# Patient Record
Sex: MALE | Race: WHITE | NOT HISPANIC OR LATINO
[De-identification: names, ages, dates, MRNs, and addresses within clinical notes are randomized per-mention and may not be internally consistent; named-entity substitution may affect disease eponyms.]

---

## 2017-11-22 PROBLEM — Z00.00 ENCOUNTER FOR PREVENTIVE HEALTH EXAMINATION: Status: ACTIVE | Noted: 2017-11-22

## 2017-11-27 ENCOUNTER — APPOINTMENT (OUTPATIENT)
Dept: ORTHOPEDIC SURGERY | Facility: CLINIC | Age: 62
End: 2017-11-27
Payer: COMMERCIAL

## 2017-11-27 VITALS — WEIGHT: 250 LBS | HEIGHT: 76 IN | BODY MASS INDEX: 30.44 KG/M2 | HEART RATE: 14 BPM

## 2017-11-27 DIAGNOSIS — S72.002A FRACTURE OF UNSPECIFIED PART OF NECK OF LEFT FEMUR, INITIAL ENCOUNTER FOR CLOSED FRACTURE: ICD-10-CM

## 2017-11-27 DIAGNOSIS — M79.652 PAIN IN LEFT THIGH: ICD-10-CM

## 2017-11-27 PROCEDURE — 99203 OFFICE O/P NEW LOW 30 MIN: CPT

## 2018-01-22 ENCOUNTER — INPATIENT (INPATIENT)
Facility: HOSPITAL | Age: 63
LOS: 3 days | Discharge: HOME | DRG: 872 | End: 2018-01-26
Attending: FAMILY MEDICINE | Admitting: INTERNAL MEDICINE
Payer: COMMERCIAL

## 2018-01-22 VITALS
SYSTOLIC BLOOD PRESSURE: 145 MMHG | DIASTOLIC BLOOD PRESSURE: 86 MMHG | RESPIRATION RATE: 16 BRPM | HEIGHT: 76 IN | TEMPERATURE: 100 F | HEART RATE: 100 BPM | OXYGEN SATURATION: 97 % | WEIGHT: 274.92 LBS

## 2018-01-22 DIAGNOSIS — R50.9 FEVER, UNSPECIFIED: ICD-10-CM

## 2018-01-22 LAB
ALBUMIN SERPL ELPH-MCNC: 3 G/DL — LOW (ref 3.3–5)
ALP SERPL-CCNC: 91 U/L — SIGNIFICANT CHANGE UP (ref 40–120)
ALT FLD-CCNC: 11 U/L — LOW (ref 12–78)
ANION GAP SERPL CALC-SCNC: 8 MMOL/L — SIGNIFICANT CHANGE UP (ref 5–17)
APPEARANCE UR: ABNORMAL
APTT BLD: 35.2 SEC — SIGNIFICANT CHANGE UP (ref 27.5–37.4)
AST SERPL-CCNC: 8 U/L — LOW (ref 15–37)
BASOPHILS # BLD AUTO: 0 K/UL — SIGNIFICANT CHANGE UP (ref 0–0.2)
BASOPHILS NFR BLD AUTO: 0.4 % — SIGNIFICANT CHANGE UP (ref 0–2)
BILIRUB SERPL-MCNC: 0.7 MG/DL — SIGNIFICANT CHANGE UP (ref 0.2–1.2)
BILIRUB UR-MCNC: ABNORMAL
BUN SERPL-MCNC: 15 MG/DL — SIGNIFICANT CHANGE UP (ref 7–23)
CALCIUM SERPL-MCNC: 8.7 MG/DL — SIGNIFICANT CHANGE UP (ref 8.5–10.1)
CHLORIDE SERPL-SCNC: 94 MMOL/L — LOW (ref 96–108)
CK MB CFR SERPL CALC: 0.7 NG/ML — SIGNIFICANT CHANGE UP (ref 0–3.6)
CO2 SERPL-SCNC: 31 MMOL/L — SIGNIFICANT CHANGE UP (ref 22–31)
COLOR SPEC: YELLOW — SIGNIFICANT CHANGE UP
CREAT SERPL-MCNC: 0.72 MG/DL — SIGNIFICANT CHANGE UP (ref 0.5–1.3)
DIFF PNL FLD: ABNORMAL
EOSINOPHIL # BLD AUTO: 0 K/UL — SIGNIFICANT CHANGE UP (ref 0–0.5)
EOSINOPHIL NFR BLD AUTO: 0.1 % — SIGNIFICANT CHANGE UP (ref 0–6)
GLUCOSE SERPL-MCNC: 110 MG/DL — HIGH (ref 70–99)
GLUCOSE UR QL: NEGATIVE — SIGNIFICANT CHANGE UP
HCT VFR BLD CALC: 37.5 % — LOW (ref 39–50)
HGB BLD-MCNC: 12.2 G/DL — LOW (ref 13–17)
INR BLD: 1.31 RATIO — HIGH (ref 0.88–1.16)
KETONES UR-MCNC: ABNORMAL
LACTATE SERPL-SCNC: 1.7 MMOL/L — SIGNIFICANT CHANGE UP (ref 0.7–2)
LEUKOCYTE ESTERASE UR-ACNC: ABNORMAL
LIDOCAIN IGE QN: 44 U/L — LOW (ref 73–393)
LYMPHOCYTES # BLD AUTO: 1.1 K/UL — SIGNIFICANT CHANGE UP (ref 1–3.3)
LYMPHOCYTES # BLD AUTO: 8.9 % — LOW (ref 13–44)
MAGNESIUM SERPL-MCNC: 1.8 MG/DL — SIGNIFICANT CHANGE UP (ref 1.6–2.6)
MCHC RBC-ENTMCNC: 28.5 PG — SIGNIFICANT CHANGE UP (ref 27–34)
MCHC RBC-ENTMCNC: 32.5 GM/DL — SIGNIFICANT CHANGE UP (ref 32–36)
MCV RBC AUTO: 87.6 FL — SIGNIFICANT CHANGE UP (ref 80–100)
MONOCYTES # BLD AUTO: 0.8 K/UL — SIGNIFICANT CHANGE UP (ref 0–0.9)
MONOCYTES NFR BLD AUTO: 6.5 % — SIGNIFICANT CHANGE UP (ref 1–9)
NEUTROPHILS # BLD AUTO: 10.2 K/UL — HIGH (ref 1.8–7.4)
NEUTROPHILS NFR BLD AUTO: 84.2 % — HIGH (ref 43–77)
NITRITE UR-MCNC: NEGATIVE — SIGNIFICANT CHANGE UP
PH UR: 5 — SIGNIFICANT CHANGE UP (ref 5–8)
PLATELET # BLD AUTO: 210 K/UL — SIGNIFICANT CHANGE UP (ref 150–400)
POTASSIUM SERPL-MCNC: 3.8 MMOL/L — SIGNIFICANT CHANGE UP (ref 3.5–5.3)
POTASSIUM SERPL-SCNC: 3.8 MMOL/L — SIGNIFICANT CHANGE UP (ref 3.5–5.3)
PROT SERPL-MCNC: 7.1 G/DL — SIGNIFICANT CHANGE UP (ref 6–8.3)
PROT UR-MCNC: 75 MG/DL
PROTHROM AB SERPL-ACNC: 14.4 SEC — HIGH (ref 9.8–12.7)
RAPID RVP RESULT: SIGNIFICANT CHANGE UP
RBC # BLD: 4.28 M/UL — SIGNIFICANT CHANGE UP (ref 4.2–5.8)
RBC # FLD: 13.6 % — SIGNIFICANT CHANGE UP (ref 10.3–14.5)
SODIUM SERPL-SCNC: 133 MMOL/L — LOW (ref 135–145)
SP GR SPEC: 1.02 — SIGNIFICANT CHANGE UP (ref 1.01–1.02)
TROPONIN I SERPL-MCNC: <.015 NG/ML — SIGNIFICANT CHANGE UP (ref 0.01–0.04)
UROBILINOGEN FLD QL: 1
WBC # BLD: 12.1 K/UL — HIGH (ref 3.8–10.5)
WBC # FLD AUTO: 12.1 K/UL — HIGH (ref 3.8–10.5)

## 2018-01-22 PROCEDURE — 93010 ELECTROCARDIOGRAM REPORT: CPT

## 2018-01-22 PROCEDURE — 73502 X-RAY EXAM HIP UNI 2-3 VIEWS: CPT | Mod: 26,LT

## 2018-01-22 PROCEDURE — 99285 EMERGENCY DEPT VISIT HI MDM: CPT

## 2018-01-22 PROCEDURE — 71045 X-RAY EXAM CHEST 1 VIEW: CPT | Mod: 26

## 2018-01-22 PROCEDURE — 73552 X-RAY EXAM OF FEMUR 2/>: CPT | Mod: 26,LT

## 2018-01-22 RX ORDER — DEXTROSE MONOHYDRATE, SODIUM CHLORIDE, AND POTASSIUM CHLORIDE 50; .745; 4.5 G/1000ML; G/1000ML; G/1000ML
1000 INJECTION, SOLUTION INTRAVENOUS
Qty: 0 | Refills: 0 | Status: DISCONTINUED | OUTPATIENT
Start: 2018-01-22 | End: 2018-01-26

## 2018-01-22 RX ORDER — CEFTRIAXONE 500 MG/1
1 INJECTION, POWDER, FOR SOLUTION INTRAMUSCULAR; INTRAVENOUS ONCE
Qty: 0 | Refills: 0 | Status: COMPLETED | OUTPATIENT
Start: 2018-01-22 | End: 2018-01-22

## 2018-01-22 RX ORDER — CEFTRIAXONE 500 MG/1
1 INJECTION, POWDER, FOR SOLUTION INTRAMUSCULAR; INTRAVENOUS EVERY 24 HOURS
Qty: 0 | Refills: 0 | Status: DISCONTINUED | OUTPATIENT
Start: 2018-01-22 | End: 2018-01-25

## 2018-01-22 RX ORDER — ACETAMINOPHEN 500 MG
650 TABLET ORAL ONCE
Qty: 0 | Refills: 0 | Status: COMPLETED | OUTPATIENT
Start: 2018-01-22 | End: 2018-01-22

## 2018-01-22 RX ORDER — ENOXAPARIN SODIUM 100 MG/ML
40 INJECTION SUBCUTANEOUS DAILY
Qty: 0 | Refills: 0 | Status: DISCONTINUED | OUTPATIENT
Start: 2018-01-22 | End: 2018-01-23

## 2018-01-22 RX ORDER — SODIUM CHLORIDE 9 MG/ML
1000 INJECTION INTRAMUSCULAR; INTRAVENOUS; SUBCUTANEOUS ONCE
Qty: 0 | Refills: 0 | Status: COMPLETED | OUTPATIENT
Start: 2018-01-22 | End: 2018-01-22

## 2018-01-22 RX ORDER — ENOXAPARIN SODIUM 100 MG/ML
40 INJECTION SUBCUTANEOUS ONCE
Qty: 0 | Refills: 0 | Status: COMPLETED | OUTPATIENT
Start: 2018-01-22 | End: 2018-01-22

## 2018-01-22 RX ADMIN — ENOXAPARIN SODIUM 40 MILLIGRAM(S): 100 INJECTION SUBCUTANEOUS at 22:49

## 2018-01-22 RX ADMIN — CEFTRIAXONE 100 GRAM(S): 500 INJECTION, POWDER, FOR SOLUTION INTRAMUSCULAR; INTRAVENOUS at 21:00

## 2018-01-22 RX ADMIN — SODIUM CHLORIDE 1000 MILLILITER(S): 9 INJECTION INTRAMUSCULAR; INTRAVENOUS; SUBCUTANEOUS at 18:16

## 2018-01-22 RX ADMIN — Medication 650 MILLIGRAM(S): at 19:11

## 2018-01-22 NOTE — ED ADULT NURSE NOTE - CHPI ED SYMPTOMS NEG
no dizziness/no decreased eating/drinking/no chills/no nausea/no numbness/no vomiting/no tingling/no pain

## 2018-01-22 NOTE — ED ADULT NURSE NOTE - OBJECTIVE STATEMENT
Pt received in bed alert and oriented with the c/o increasing weakness. Pt has MS and has been bed ridden for numerous years now but has became weaker over the last 3 days. Pt states that he was able to move left arm freely. As per Md's Iv sajan placed blood specimen obtained and sent to the lab. Pt stable nursing care ongoing and safety maintained.

## 2018-01-22 NOTE — ED PROVIDER NOTE - OBJECTIVE STATEMENT
63 yo male hx of MS, left femur fracture (atraumatic) 63 yo male hx of MS with baseline lower extremity paralysis, left femur fracture (atraumatic), here with worsening weakness in his b/l upper extremities.  Oral temp 100.3, worried about infection/sepsis.  Has been c/o of urinary frequency/urgency.  No nausea/vomiting/diarrhea. PMD Dr. Gatito Conti.  Neuro Dr. Osullivan.

## 2018-01-22 NOTE — CONSULT NOTE ADULT - SUBJECTIVE AND OBJECTIVE BOX
[facility]     Patient is a 62y old  Male who presents with a chief complaint of     HPI:      HPI:    62y Male for Cardiology Consult    PAST MEDICAL & SURGICAL HISTORY:  Femur fracture, left  MS (multiple sclerosis)      FAMILY HISTORY:      SOCIAL HISTORY:   Alcohol:  Smoking:    Allergies    No Known Allergies    Intolerances        MEDICATIONS  (STANDING):    MEDICATIONS  (PRN):      REVIEW OF SYSTEMS:  CONSTITUTIONAL: No fever, weight loss, chills, shakes, or fat  RESPIRATORY: No cough, wheezing, hemoptysis, or shortness of breath  CARDIOVASCULAR: No chest pain, dyspnea, palpitations, dizziness, syncope, paroxysmal nocturnal dyspnea, orthopnea, or arm or leg swelling  GASTROINTESTINAL: No abdominal  or epigastric pain, nausea, vomiting, hematemesis, diarrhea, constipation, melena or bright red bloo  NEUROLOGICAL: No headaches, memory loss, slurred speech, limb weakness, loss of strength, numbness, or tremors  SKIN: No itching, burning, rashes, or lesions  ENDOCRINE: No heat or cold intolerance, or hair loss  MUSCULOSKELETAL: No joint pain or swelling, muscle, back, or extremity pain  HEME/LYMPH: No easy bruising or bleeding gums  ALLERY AND IMMUNOLOGIC: No hives or rash.    Vital Signs Last 24 Hrs  T(C): 37.2 (2018 22:00), Max: 38.4 (2018 18:00)  T(F): 99 (2018 22:00), Max: 101.1 (2018 18:00)  HR: 95 (2018 22:00) (90 - 100)  BP: 154/75 (2018 22:00) (145/86 - 168/85)  BP(mean): --  RR: 16 (2018 22:00) (15 - 16)  SpO2: 98% (2018 22:00) (97% - 99%)    PHYSICAL EXAM:  HEAD:  Atraumatic, Normocephalic  EYES: EOMI, PERRLA, conjunctiva and sclera clear  NECK: Supple and normal thyroid.  No JVD or carotid bruit.   HEART: S1, S2 regular , 1/6 soft ejection systolic murmur in mitral area , no thrill and no gallops .  PULMONARY: Bilateral vesicular breathing , few scattered ronchi and few scattered rales are present .  ABDOMEN: Soft nontender and positive bowl sounds   EXTREMITIES:  No clubbing, cyanosis, or pedal  edema  NEUROLOGICAL: AAOX3 , no focal deficit .    LABS:                        12.2   12.1  )-----------( 210      ( 2018 18:31 )             37.5         133<L>  |  94<L>  |  15  ----------------------------<  110<H>  3.8   |  31  |  0.72    Ca    8.7      2018 18:31  Mg     1.8         TPro  7.1  /  Alb  3.0<L>  /  TBili  0.7  /  DBili  x   /  AST  8<L>  /  ALT  11<L>  /  AlkPhos  91      CARDIAC MARKERS ( 2018 18:31 )  <.015 ng/mL / x     / x     / x     / 0.7 ng/mL      PT/INR - ( 2018 18:31 )   PT: 14.4 sec;   INR: 1.31 ratio         PTT - ( 2018 18:31 )  PTT:35.2 sec  Urinalysis Basic - ( 2018 20:12 )    Color: Yellow / Appearance: Slightly Turbid / S.020 / pH: x  Gluc: x / Ketone: Moderate  / Bili: Small / Urobili: 1   Blood: x / Protein: 75 mg/dL / Nitrite: Negative   Leuk Esterase: Small / RBC: 6-10 /HPF / WBC 11-25   Sq Epi: x / Non Sq Epi: Few / Bacteria: Few      BNP      EKG:  ECHO:  IMAGING:    Assessment and Plan :     Will continue to follow during hospital course and give further recommendations as needed . Thanks for your referral . if any questions please contact me at office [phone] [phone])

## 2018-01-22 NOTE — ED PROVIDER NOTE - CARE PLAN
Principal Discharge DX:	Fever Principal Discharge DX:	Fever  Secondary Diagnosis:	Closed fracture of left hip, initial encounter

## 2018-01-22 NOTE — ED ADULT NURSE NOTE - ED STAT RN HANDOFF DETAILS 2
Patient and report received.  Patient sleeping, comfortable appearance. Patient and report received.  Patient sleeping, comfortable appearance.  Per night RN, patiewnt had fever of 101 this morniong and he administered tylenol just before 7 AM. Patient and report received.  Patient sleeping, comfortable appearance.  Per night RN, patient had fever of 101 this morning and he administered tylenol just before 7 AM.

## 2018-01-22 NOTE — CONSULT NOTE ADULT - SUBJECTIVE AND OBJECTIVE BOX
62y Male bed and wheelchair bound presents to [facility] ED with worsening b/l upper extremity weakness. Pt also c/o L hip pain. Pt states he fell out of his wheelchair in September and was found to have a L hip IT fracture. Pt saw two orthopedic surgeons, one in [place] and one with [place] (can not remember the names) who both recommended non-operative management of the left hip fracture. Pt has been having minimal pain while at home, and he states the pain is only bad when his aides move him in certain directions. Denies any acute trauma/falls. Denies HS/LOC. Denies numbness/tingling. Pt unsure if he wants surgery at this time.    PAST MEDICAL & SURGICAL HISTORY:  Femur fracture, left  MS (multiple sclerosis)      MEDICATIONS  (STANDING):  cefTRIAXone   IVPB 1 Gram(s) IV Intermittent every 24 hours  dextrose 5% + sodium chloride 0.9% with potassium chloride 20 mEq/L 1000 milliLiter(s) IV Continuous <Continuous>  enoxaparin Injectable 40 milliGRAM(s) SubCutaneous daily    Allergies    No Known Allergies    Intolerances                              12.2   12.1  )-----------( 210      ( 22 Jan 2018 18:31 )             37.5     22 Jan 2018 18:31    133    |  94     |  15     ----------------------------<  110    3.8     |  31     |  0.72     Ca    8.7        22 Jan 2018 18:31  Mg     1.8       22 Jan 2018 18:31    TPro  7.1    /  Alb  3.0    /  TBili  0.7    /  DBili  x      /  AST  8      /  ALT  11     /  AlkPhos  91     22 Jan 2018 18:31    PT/INR - ( 22 Jan 2018 18:31 )   PT: 14.4 sec;   INR: 1.31 ratio         PTT - ( 22 Jan 2018 18:31 )  PTT:35.2 sec  Vital Signs Last 24 Hrs  T(C): 37.2 (01-22-18 @ 22:00), Max: 38.4 (01-22-18 @ 18:00)  T(F): 99 (01-22-18 @ 22:00), Max: 101.1 (01-22-18 @ 18:00)  HR: 95 (01-22-18 @ 22:00) (90 - 100)  BP: 154/75 (01-22-18 @ 22:00) (145/86 - 168/85)  BP(mean): --  RR: 16 (01-22-18 @ 22:00) (15 - 16)  SpO2: 98% (01-22-18 @ 22:00) (97% - 99%)    Imaging: XR demonstrates L hip IT fracture    Physical Exam  Gen: NAD  LLE: Skin intact, short/ER leg, +pitting edema of foot, minimal ttp over hip, no ttp elsewhere, Unable to move lower extremity 2/2 MS, SILT L3-S1, no calf ttp, dp/pt pulse intact, negative log roll, compartments soft  RLE: Skin intact, no ecchymosis/erythema, no ttp throughout extremity, +pitting edema of foot, Unable to move lower extremity 2/2 MS, SILT L3-S1, no calf ttp, dp/pt pulse intact, negative log roll, compartments soft    B/L UE: Skin intact, no ttp throughout extremities, unable to move shoulder/elbows, minimal flex/ext at wrists, weakened  strength, SILT C5-T1, +radial pulses, compartments soft, warm well perfused hands

## 2018-01-23 DIAGNOSIS — S72.002A FRACTURE OF UNSPECIFIED PART OF NECK OF LEFT FEMUR, INITIAL ENCOUNTER FOR CLOSED FRACTURE: ICD-10-CM

## 2018-01-23 DIAGNOSIS — N39.0 URINARY TRACT INFECTION, SITE NOT SPECIFIED: ICD-10-CM

## 2018-01-23 DIAGNOSIS — G35 MULTIPLE SCLEROSIS: ICD-10-CM

## 2018-01-23 DIAGNOSIS — R29.898 OTHER SYMPTOMS AND SIGNS INVOLVING THE MUSCULOSKELETAL SYSTEM: ICD-10-CM

## 2018-01-23 DIAGNOSIS — Z29.9 ENCOUNTER FOR PROPHYLACTIC MEASURES, UNSPECIFIED: ICD-10-CM

## 2018-01-23 DIAGNOSIS — R50.9 FEVER, UNSPECIFIED: ICD-10-CM

## 2018-01-23 LAB
ALBUMIN SERPL ELPH-MCNC: 2.7 G/DL — LOW (ref 3.3–5)
ALP SERPL-CCNC: 81 U/L — SIGNIFICANT CHANGE UP (ref 40–120)
ALT FLD-CCNC: 10 U/L — LOW (ref 12–78)
ANION GAP SERPL CALC-SCNC: 7 MMOL/L — SIGNIFICANT CHANGE UP (ref 5–17)
ANION GAP SERPL CALC-SCNC: 9 MMOL/L — SIGNIFICANT CHANGE UP (ref 5–17)
APTT BLD: 40 SEC — HIGH (ref 27.5–37.4)
AST SERPL-CCNC: 9 U/L — LOW (ref 15–37)
BILIRUB SERPL-MCNC: 0.5 MG/DL — SIGNIFICANT CHANGE UP (ref 0.2–1.2)
BUN SERPL-MCNC: 11 MG/DL — SIGNIFICANT CHANGE UP (ref 7–23)
BUN SERPL-MCNC: 12 MG/DL — SIGNIFICANT CHANGE UP (ref 7–23)
CALCIUM SERPL-MCNC: 8.2 MG/DL — LOW (ref 8.5–10.1)
CALCIUM SERPL-MCNC: 8.3 MG/DL — LOW (ref 8.5–10.1)
CHLORIDE SERPL-SCNC: 97 MMOL/L — SIGNIFICANT CHANGE UP (ref 96–108)
CHLORIDE SERPL-SCNC: 97 MMOL/L — SIGNIFICANT CHANGE UP (ref 96–108)
CHOLEST SERPL-MCNC: 145 MG/DL — SIGNIFICANT CHANGE UP (ref 10–199)
CO2 SERPL-SCNC: 27 MMOL/L — SIGNIFICANT CHANGE UP (ref 22–31)
CO2 SERPL-SCNC: 29 MMOL/L — SIGNIFICANT CHANGE UP (ref 22–31)
CREAT SERPL-MCNC: 0.54 MG/DL — SIGNIFICANT CHANGE UP (ref 0.5–1.3)
CREAT SERPL-MCNC: 0.56 MG/DL — SIGNIFICANT CHANGE UP (ref 0.5–1.3)
CULTURE RESULTS: SIGNIFICANT CHANGE UP
GLUCOSE SERPL-MCNC: 108 MG/DL — HIGH (ref 70–99)
GLUCOSE SERPL-MCNC: 120 MG/DL — HIGH (ref 70–99)
HCT VFR BLD CALC: 33.9 % — LOW (ref 39–50)
HCT VFR BLD CALC: 35.6 % — LOW (ref 39–50)
HDLC SERPL-MCNC: 51 MG/DL — SIGNIFICANT CHANGE UP (ref 40–125)
HGB BLD-MCNC: 11.3 G/DL — LOW (ref 13–17)
HGB BLD-MCNC: 11.9 G/DL — LOW (ref 13–17)
INR BLD: 1.48 RATIO — HIGH (ref 0.88–1.16)
LACTATE SERPL-SCNC: 0.7 MMOL/L — SIGNIFICANT CHANGE UP (ref 0.7–2)
LIPID PNL WITH DIRECT LDL SERPL: 73 MG/DL — SIGNIFICANT CHANGE UP
MAGNESIUM SERPL-MCNC: 1.6 MG/DL — SIGNIFICANT CHANGE UP (ref 1.6–2.6)
MCHC RBC-ENTMCNC: 28.9 PG — SIGNIFICANT CHANGE UP (ref 27–34)
MCHC RBC-ENTMCNC: 29 PG — SIGNIFICANT CHANGE UP (ref 27–34)
MCHC RBC-ENTMCNC: 33.3 GM/DL — SIGNIFICANT CHANGE UP (ref 32–36)
MCHC RBC-ENTMCNC: 33.4 GM/DL — SIGNIFICANT CHANGE UP (ref 32–36)
MCV RBC AUTO: 86.5 FL — SIGNIFICANT CHANGE UP (ref 80–100)
MCV RBC AUTO: 87 FL — SIGNIFICANT CHANGE UP (ref 80–100)
NT-PROBNP SERPL-SCNC: 453 PG/ML — HIGH (ref 0–125)
PLATELET # BLD AUTO: 178 K/UL — SIGNIFICANT CHANGE UP (ref 150–400)
PLATELET # BLD AUTO: 189 K/UL — SIGNIFICANT CHANGE UP (ref 150–400)
POTASSIUM SERPL-MCNC: 3.4 MMOL/L — LOW (ref 3.5–5.3)
POTASSIUM SERPL-MCNC: 3.5 MMOL/L — SIGNIFICANT CHANGE UP (ref 3.5–5.3)
POTASSIUM SERPL-SCNC: 3.4 MMOL/L — LOW (ref 3.5–5.3)
POTASSIUM SERPL-SCNC: 3.5 MMOL/L — SIGNIFICANT CHANGE UP (ref 3.5–5.3)
PROT SERPL-MCNC: 6.5 G/DL — SIGNIFICANT CHANGE UP (ref 6–8.3)
PROTHROM AB SERPL-ACNC: 16.3 SEC — HIGH (ref 9.8–12.7)
RBC # BLD: 3.9 M/UL — LOW (ref 4.2–5.8)
RBC # BLD: 4.11 M/UL — LOW (ref 4.2–5.8)
RBC # FLD: 13.2 % — SIGNIFICANT CHANGE UP (ref 10.3–14.5)
RBC # FLD: 13.3 % — SIGNIFICANT CHANGE UP (ref 10.3–14.5)
SODIUM SERPL-SCNC: 133 MMOL/L — LOW (ref 135–145)
SODIUM SERPL-SCNC: 133 MMOL/L — LOW (ref 135–145)
SPECIMEN SOURCE: SIGNIFICANT CHANGE UP
TOTAL CHOLESTEROL/HDL RATIO MEASUREMENT: 2.8 RATIO — LOW (ref 3.4–9.6)
TRIGL SERPL-MCNC: 103 MG/DL — SIGNIFICANT CHANGE UP (ref 10–149)
TSH SERPL-MCNC: 0.56 UIU/ML — SIGNIFICANT CHANGE UP (ref 0.36–3.74)
WBC # BLD: 12.1 K/UL — HIGH (ref 3.8–10.5)
WBC # BLD: 13.9 K/UL — HIGH (ref 3.8–10.5)
WBC # FLD AUTO: 12.1 K/UL — HIGH (ref 3.8–10.5)
WBC # FLD AUTO: 13.9 K/UL — HIGH (ref 3.8–10.5)

## 2018-01-23 PROCEDURE — 93970 EXTREMITY STUDY: CPT | Mod: 26

## 2018-01-23 PROCEDURE — 93010 ELECTROCARDIOGRAM REPORT: CPT

## 2018-01-23 RX ORDER — ENOXAPARIN SODIUM 100 MG/ML
40 INJECTION SUBCUTANEOUS DAILY
Qty: 0 | Refills: 0 | Status: DISCONTINUED | OUTPATIENT
Start: 2018-01-23 | End: 2018-01-25

## 2018-01-23 RX ORDER — IBUPROFEN 200 MG
400 TABLET ORAL EVERY 6 HOURS
Qty: 0 | Refills: 0 | Status: DISCONTINUED | OUTPATIENT
Start: 2018-01-23 | End: 2018-01-26

## 2018-01-23 RX ORDER — DULOXETINE HYDROCHLORIDE 30 MG/1
1 CAPSULE, DELAYED RELEASE ORAL
Qty: 0 | Refills: 0 | COMMUNITY

## 2018-01-23 RX ORDER — IBUPROFEN 200 MG
600 TABLET ORAL ONCE
Qty: 0 | Refills: 0 | Status: COMPLETED | OUTPATIENT
Start: 2018-01-23 | End: 2018-01-23

## 2018-01-23 RX ORDER — OXYBUTYNIN CHLORIDE 5 MG
5 TABLET ORAL
Qty: 0 | Refills: 0 | Status: DISCONTINUED | OUTPATIENT
Start: 2018-01-23 | End: 2018-01-26

## 2018-01-23 RX ORDER — HYDROMORPHONE HYDROCHLORIDE 2 MG/ML
0.5 INJECTION INTRAMUSCULAR; INTRAVENOUS; SUBCUTANEOUS EVERY 6 HOURS
Qty: 0 | Refills: 0 | Status: DISCONTINUED | OUTPATIENT
Start: 2018-01-23 | End: 2018-01-24

## 2018-01-23 RX ORDER — OXYBUTYNIN CHLORIDE 5 MG
10 TABLET ORAL DAILY
Qty: 0 | Refills: 0 | Status: DISCONTINUED | OUTPATIENT
Start: 2018-01-23 | End: 2018-01-23

## 2018-01-23 RX ORDER — ENOXAPARIN SODIUM 100 MG/ML
120 INJECTION SUBCUTANEOUS EVERY 12 HOURS
Qty: 0 | Refills: 0 | Status: DISCONTINUED | OUTPATIENT
Start: 2018-01-23 | End: 2018-01-23

## 2018-01-23 RX ORDER — DULOXETINE HYDROCHLORIDE 30 MG/1
60 CAPSULE, DELAYED RELEASE ORAL DAILY
Qty: 0 | Refills: 0 | Status: DISCONTINUED | OUTPATIENT
Start: 2018-01-23 | End: 2018-01-26

## 2018-01-23 RX ORDER — ENOXAPARIN SODIUM 100 MG/ML
80 INJECTION SUBCUTANEOUS ONCE
Qty: 0 | Refills: 0 | Status: COMPLETED | OUTPATIENT
Start: 2018-01-23 | End: 2018-01-23

## 2018-01-23 RX ORDER — ACETAMINOPHEN 500 MG
650 TABLET ORAL EVERY 6 HOURS
Qty: 0 | Refills: 0 | Status: DISCONTINUED | OUTPATIENT
Start: 2018-01-23 | End: 2018-01-26

## 2018-01-23 RX ORDER — ASPIRIN/CALCIUM CARB/MAGNESIUM 324 MG
81 TABLET ORAL DAILY
Qty: 0 | Refills: 0 | Status: DISCONTINUED | OUTPATIENT
Start: 2018-01-23 | End: 2018-01-25

## 2018-01-23 RX ORDER — OXYBUTYNIN CHLORIDE 5 MG
1 TABLET ORAL
Qty: 0 | Refills: 0 | COMMUNITY

## 2018-01-23 RX ORDER — OXCARBAZEPINE 300 MG/1
300 TABLET, FILM COATED ORAL
Qty: 0 | Refills: 0 | Status: DISCONTINUED | OUTPATIENT
Start: 2018-01-23 | End: 2018-01-26

## 2018-01-23 RX ORDER — FAMOTIDINE 10 MG/ML
20 INJECTION INTRAVENOUS DAILY
Qty: 0 | Refills: 0 | Status: DISCONTINUED | OUTPATIENT
Start: 2018-01-23 | End: 2018-01-26

## 2018-01-23 RX ORDER — OXYCODONE AND ACETAMINOPHEN 5; 325 MG/1; MG/1
1 TABLET ORAL EVERY 6 HOURS
Qty: 0 | Refills: 0 | Status: DISCONTINUED | OUTPATIENT
Start: 2018-01-23 | End: 2018-01-26

## 2018-01-23 RX ORDER — POTASSIUM CHLORIDE 20 MEQ
40 PACKET (EA) ORAL EVERY 4 HOURS
Qty: 0 | Refills: 0 | Status: COMPLETED | OUTPATIENT
Start: 2018-01-23 | End: 2018-01-23

## 2018-01-23 RX ADMIN — HYDROMORPHONE HYDROCHLORIDE 0.5 MILLIGRAM(S): 2 INJECTION INTRAMUSCULAR; INTRAVENOUS; SUBCUTANEOUS at 06:35

## 2018-01-23 RX ADMIN — Medication 150 MILLIGRAM(S): at 18:54

## 2018-01-23 RX ADMIN — Medication 40 MILLIEQUIVALENT(S): at 09:35

## 2018-01-23 RX ADMIN — HYDROMORPHONE HYDROCHLORIDE 0.5 MILLIGRAM(S): 2 INJECTION INTRAMUSCULAR; INTRAVENOUS; SUBCUTANEOUS at 19:08

## 2018-01-23 RX ADMIN — HYDROMORPHONE HYDROCHLORIDE 0.5 MILLIGRAM(S): 2 INJECTION INTRAMUSCULAR; INTRAVENOUS; SUBCUTANEOUS at 12:35

## 2018-01-23 RX ADMIN — OXCARBAZEPINE 300 MILLIGRAM(S): 300 TABLET, FILM COATED ORAL at 13:01

## 2018-01-23 RX ADMIN — CEFTRIAXONE 100 GRAM(S): 500 INJECTION, POWDER, FOR SOLUTION INTRAMUSCULAR; INTRAVENOUS at 23:35

## 2018-01-23 RX ADMIN — Medication 5 MILLIGRAM(S): at 18:54

## 2018-01-23 RX ADMIN — OXYCODONE AND ACETAMINOPHEN 1 TABLET(S): 5; 325 TABLET ORAL at 16:25

## 2018-01-23 RX ADMIN — Medication 5 MILLIGRAM(S): at 05:54

## 2018-01-23 RX ADMIN — OXYCODONE AND ACETAMINOPHEN 1 TABLET(S): 5; 325 TABLET ORAL at 10:45

## 2018-01-23 RX ADMIN — HYDROMORPHONE HYDROCHLORIDE 0.5 MILLIGRAM(S): 2 INJECTION INTRAMUSCULAR; INTRAVENOUS; SUBCUTANEOUS at 09:31

## 2018-01-23 RX ADMIN — Medication 600 MILLIGRAM(S): at 09:29

## 2018-01-23 RX ADMIN — Medication 650 MILLIGRAM(S): at 06:37

## 2018-01-23 RX ADMIN — Medication 40 MILLIEQUIVALENT(S): at 13:04

## 2018-01-23 RX ADMIN — Medication 600 MILLIGRAM(S): at 17:34

## 2018-01-23 RX ADMIN — FAMOTIDINE 20 MILLIGRAM(S): 10 INJECTION INTRAVENOUS at 11:12

## 2018-01-23 RX ADMIN — OXYCODONE AND ACETAMINOPHEN 1 TABLET(S): 5; 325 TABLET ORAL at 04:00

## 2018-01-23 RX ADMIN — Medication 150 MILLIGRAM(S): at 05:55

## 2018-01-23 RX ADMIN — OXYCODONE AND ACETAMINOPHEN 1 TABLET(S): 5; 325 TABLET ORAL at 09:34

## 2018-01-23 RX ADMIN — ENOXAPARIN SODIUM 80 MILLIGRAM(S): 100 INJECTION SUBCUTANEOUS at 02:55

## 2018-01-23 RX ADMIN — OXYCODONE AND ACETAMINOPHEN 1 TABLET(S): 5; 325 TABLET ORAL at 02:40

## 2018-01-23 RX ADMIN — Medication 81 MILLIGRAM(S): at 11:12

## 2018-01-23 RX ADMIN — HYDROMORPHONE HYDROCHLORIDE 0.5 MILLIGRAM(S): 2 INJECTION INTRAMUSCULAR; INTRAVENOUS; SUBCUTANEOUS at 13:03

## 2018-01-23 RX ADMIN — ENOXAPARIN SODIUM 40 MILLIGRAM(S): 100 INJECTION SUBCUTANEOUS at 11:11

## 2018-01-23 RX ADMIN — HYDROMORPHONE HYDROCHLORIDE 0.5 MILLIGRAM(S): 2 INJECTION INTRAMUSCULAR; INTRAVENOUS; SUBCUTANEOUS at 19:30

## 2018-01-23 RX ADMIN — DULOXETINE HYDROCHLORIDE 60 MILLIGRAM(S): 30 CAPSULE, DELAYED RELEASE ORAL at 11:12

## 2018-01-23 RX ADMIN — Medication 650 MILLIGRAM(S): at 15:40

## 2018-01-23 RX ADMIN — Medication 600 MILLIGRAM(S): at 08:12

## 2018-01-23 RX ADMIN — DEXTROSE MONOHYDRATE, SODIUM CHLORIDE, AND POTASSIUM CHLORIDE 80 MILLILITER(S): 50; .745; 4.5 INJECTION, SOLUTION INTRAVENOUS at 03:06

## 2018-01-23 NOTE — ED ADULT NURSE REASSESSMENT NOTE - NS ED NURSE REASSESS COMMENT FT1
am labs given.  pending bed assignment
medicated for pain
pain persists, md paged
pain persists, patient repositiioned, md paged
received report from simone velez.  no worsening of symptoms.  awaiting u/a sample
seen by ortho and remedicated for pain
ER Tech at bedside assisting patient with eating breakfast.
patient now able to move left arm.  Dr. Lizarraga at bedside.
Patient fever increased.  Dr. Uriostegui aware and orders received.

## 2018-01-23 NOTE — PHYSICAL THERAPY INITIAL EVALUATION ADULT - MANUAL MUSCLE TESTING RESULTS, REHAB EVAL
grossly assessed due to/BLE: 0/5; right shoulder flexion to elbow flex/ext 1+ to 2-/5; right hand/wrist 0/5, left shoulder 1+/5, left elbow flex/ext to hand 2-/5

## 2018-01-23 NOTE — PROGRESS NOTE ADULT - SUBJECTIVE AND OBJECTIVE BOX
Pt S/E at bedside, no acute events overnight, pain controlled. Pt decided he does not want surgery for left hip fracture at this time and would like to focus on issue of fevers/upper extremity weakness before thinking about surgery again. Pt also wants second opinion from his orthopedic surgeons he had seen previously for initial injury in September of 2017.    Vital Signs Last 24 Hrs  T(C): 36.7 (23 Jan 2018 05:00), Max: 38.4 (22 Jan 2018 18:00)  T(F): 98 (23 Jan 2018 05:00), Max: 101.1 (22 Jan 2018 18:00)  HR: 85 (23 Jan 2018 05:00) (85 - 100)  BP: 133/78 (23 Jan 2018 05:00) (133/78 - 168/85)  BP(mean): --  RR: 16 (23 Jan 2018 05:00) (15 - 16)  SpO2: 98% (23 Jan 2018 05:00) (97% - 99%)    Gen: NAD, AAOx3    LLE: Skin intact, short/ER leg, +pitting edema of foot, minimal ttp over hip, no ttp elsewhere, Unable to move lower extremity 2/2 MS, SILT L3-S1, no calf ttp, dp/pt pulse intact, negative log roll, compartments soft  RLE: Skin intact, no ecchymosis/erythema, no ttp throughout extremity, +pitting edema of foot, Unable to move lower extremity 2/2 MS, SILT L3-S1, no calf ttp, dp/pt pulse intact, negative log roll, compartments soft    B/L UE: Skin intact, no ttp throughout extremities, unable to move shoulder/elbows, minimal flex/ext at wrists, weakened  strength, SILT C5-T1, +radial pulses, compartments soft, warm well perfused hands

## 2018-01-23 NOTE — PHYSICAL THERAPY INITIAL EVALUATION ADULT - DIAGNOSIS, PT EVAL
Progressive decline due to primary progressive, weakness to BUE right > left, left hip fx since Sept 2017

## 2018-01-23 NOTE — CONSULT NOTE ADULT - SUBJECTIVE AND OBJECTIVE BOX
pt is a 63 yo male with hx of MS, LE paralysis, bed bound presents with fever and Upper extremity weakness.  PT states upper extremities have gotten progressively weaker over last few days.  Pt also having fevers.   Pt having some urinary urgency/frequency.  Ultrasound shows large fluid collection in L upper thigh.  PT denies any pain to this area, just hip pain. States couple months ago collection was present during workup at different hospital.  No treatment was done at that time on collection    REVIEW OF SYSTEMS      General: c/o fever    Skin/Breast: no complaints  	  Ophthalmologic: denies visual issues  	  Respiratory and Thorax: denies SOB, cough  	  Cardiovascular:	denies Chest pain, palpitation    Gastrointestinal:	 No N/V/D, denies abd pain    Genitourinary:	+urinary urgency/frequency    Musculoskeletal:	  baseline LE weakness,  worsening UE weakness    Neurological:	denies HA      pmhx:     Femur fracture, left  MS (multiple sclerosis)    pshx: denies    Allergies    No Known Allergies    Intolerances      MEDICATIONS  (STANDING):  aspirin enteric coated 81 milliGRAM(s) Oral daily  cefTRIAXone   IVPB 1 Gram(s) IV Intermittent every 24 hours  dextrose 5% + sodium chloride 0.9% with potassium chloride 20 mEq/L 1000 milliLiter(s) (80 mL/Hr) IV Continuous <Continuous>  DULoxetine 60 milliGRAM(s) Oral daily  enoxaparin Injectable 40 milliGRAM(s) SubCutaneous daily  famotidine    Tablet 20 milliGRAM(s) Oral daily  OXcarbazepine 300 milliGRAM(s) Oral two times a day  oxybutynin 5 milliGRAM(s) Oral two times a day  pregabalin 150 milliGRAM(s) Oral two times a day    SH: negative cigarettes, occasional ETOH, occasional marijuana    ICU Vital Signs Last 24 Hrs  T(C): 38.1 (23 Jan 2018 16:20), Max: 39.3 (23 Jan 2018 07:46)  T(F): 100.5 (23 Jan 2018 16:20), Max: 102.8 (23 Jan 2018 07:46)  HR: 98 (23 Jan 2018 16:20) (85 - 102)  BP: 151/84 (23 Jan 2018 16:20) (133/78 - 168/85)  BP(mean): --  ABP: --  ABP(mean): --  RR: 15 (23 Jan 2018 16:20) (14 - 16)  SpO2: 97% (23 Jan 2018 16:20) (97% - 99%)    PHYSICAL EXAM:      Constitutional: NAD    Eyes:  PERRL b/l    Respiratory: clear b/l    Cardiovascular: reg rate and rhythm     Gastrointestinal: soft, nondistended, NT, -masses palpated    Extremities:  Weakness upper and lower.  L thigh no tenderness, masses appreciated, heel ulcer, dressed    Skin: no rashes    Lymph Nodes: No LAD appreciated,                           11.3   12.1  )-----------( 178      ( 23 Jan 2018 05:11 )             33.9   01-23    133<L>  |  97  |  11  ----------------------------<  120<H>  3.4<L>   |  27  |  0.54    Ca    8.2<L>      23 Jan 2018 05:11  Mg     1.6     01-23    TPro  6.5  /  Alb  2.7<L>  /  TBili  0.5  /  DBili  x   /  AST  9<L>  /  ALT  10<L>  /  AlkPhos  81  01-23    US  7cm L thigh collection

## 2018-01-23 NOTE — PHYSICAL THERAPY INITIAL EVALUATION ADULT - PASSIVE RANGE OF MOTION EXAMINATION, REHAB EVAL
bilateral upper extremity Passive ROM was WFL (within functional limits)/except limited /c shoulder flexion to right > left, also limited and end range due to hypertonicity/deficits as listed below/bilateral lower extremity Passive ROM was WFL (within functional limits)

## 2018-01-23 NOTE — H&P ADULT - PROBLEM SELECTOR PLAN 2
Diagnosed with hip fx and orthopedic sx evaluation called,pain managmnet and dvt prophylaxis administrated Cardiology clearance requested for surgical treatment Patient refused sx intervention ,he would like to get 2nd opinion from his ortho ,no sx at this point CONTINUE CEFTRIAXONE ,FOLLOW URINE CX

## 2018-01-23 NOTE — H&P ADULT - NSHPLABSRESULTS_GEN_ALL_CORE
< from: US Duplex Venous Lower Ext Complete, Bilateral (01.23.18 @ 10:36) >    EXAM:  US DPLX LWR EXT VEINS COMPL BI                            PROCEDURE DATE:  01/23/2018          INTERPRETATION:  CLINICAL STATEMENT: Swelling leg.    TECHNIQUE: Ultrasound of bilateral lower extremity deep venous system.    COMPARISON: None.    FINDINGS:  There is color and spectral flow, compression and augmentation of the   common femoral, superficial femoral and popliteal veins.    There is flow in the posterior tibial vein.    Fluid collection noted in the soft tissues of the left upper thigh   measuring 7.0 x 3.1 x 6.2 cm containing internal echoes.    IMPRESSION:  No evidence of DVT.      Nonspecific 7 cm fluid collection in the soft tissues of the left upper   thigh. Considerations include hematoma, seroma, abscess in the correct   clinical setting. Correlate clinically    < end of copied text >    < from: Xray Hip w/ Pelvis 2 or 3 Views, Left (01.22.18 @ 19:03) >    EXAM:  XR HIP WITH PELV 2-3V LT                            PROCEDURE DATE:  01/22/2018          INTERPRETATION:  Clinical information: Left hip pain    AP pelvis, AP and lateral left hip    Left hip intertrochanteric fracture present. No evidence of pelvic bone   fracture or destructive lesions. SI joints intact. Regional soft tissues   unremarkable.    IMPRESSION: Left hip intertrochanteric fracture.

## 2018-01-23 NOTE — H&P ADULT - PROBLEM SELECTOR PLAN 3
continue home medications ,neuro eval Diagnosed with hip fx and orthopedic sx evaluation called,pain managmnet and dvt prophylaxis administrated Cardiology clearance requested for surgical treatment Patient refused sx intervention ,he would like to get 2nd opinion from his ortho ,no sx at this point

## 2018-01-23 NOTE — CONSULT NOTE ADULT - CONSULT REQUESTED DATE/TIME
23-Jan-2018
22-Jan-2018 23:18
22-Jan-2018 23:37
23-Jan-2018 12:33
23-Jan-2018 16:13
23-Jan-2018 14:03

## 2018-01-23 NOTE — CONSULT NOTE ADULT - SUBJECTIVE AND OBJECTIVE BOX
63 yo male consulted for left plantar foot eschar tissue. Pt denies f/c/n/v/sob    History and Physical:   Source of Information	Chart(s), Physician/Provider	  Outpatient Providers	[facility] Madrigal	       History of Present Illness:  Chief Complaint/Reason for Admission: fever and upper extremities weakness	      Allergies and Intolerances:        Allergies:  	No Known Allergies:     Home Medications:   * Incomplete Medication History as of 22-Jan-2018 17:14 documented in Structured Notes  · 	Cymbalta 60 mg oral delayed release capsule: 1 cap(s) orally once a day, Last Dose Taken:    · 	Lyrica 150 mg oral capsule: 1 cap(s) orally 2 times a day, Last Dose Taken:    · 	Vicodin: Last Dose Taken:    · 	oxybutynin 10 mg/24 hr oral tablet, extended release: 1 tab(s) orally once a day, Last Dose Taken:      Patient History:    Past Medical History:  Femur fracture, left    MS (multiple sclerosis).     Family History:  No pertinent family history in first degree relatives.    Vital Signs Last 24 Hrs  T(C): 37.3 (23 Jan 2018 13:00), Max: 39.3 (23 Jan 2018 07:46)  T(F): 99.2 (23 Jan 2018 13:00), Max: 102.8 (23 Jan 2018 07:46)  HR: 96 (23 Jan 2018 13:00) (85 - 102)  BP: 146/70 (23 Jan 2018 13:00) (133/78 - 168/85)  BP(mean): --  RR: 14 (23 Jan 2018 13:00) (14 - 16)  SpO2: 97% (23 Jan 2018 13:00) (97% - 99%)      LLE examinations:   Vasc: dopplable pedal pulses noted. CFT< 3secs, TGNL  Derm: Plantar foot stable eschar patch noted. No purulence, no malodor, not open, no bleeding, no pus.   Neuro: Diminished  Ortho: Pt is bedbound.                             11.3   12.1  )-----------( 178      ( 23 Jan 2018 05:11 )             33.9   01-23    133<L>  |  97  |  11  ----------------------------<  120<H>  3.4<L>   |  27  |  0.54    Ca    8.2<L>      23 Jan 2018 05:11  Mg     1.6     01-23    TPro  6.5  /  Alb  2.7<L>  /  TBili  0.5  /  DBili  x   /  AST  9<L>  /  ALT  10<L>  /  AlkPhos  81  01-23

## 2018-01-23 NOTE — GOALS OF CARE CONVERSATION - PERSONAL ADVANCE DIRECTIVE - CONVERSATION DETAILS
met pt w pt mother, Virginia, wife Alisson is at work. pt has no hcp, declines to complete hcp at this time. pt wife is surrogate for medical decisions, aware of his wishes. contact # given met pt w pt mother, [place], wife Alisson is at work. pt has no hcp, declines to complete hcp at this time. pt wife is surrogate for medical decisions, aware of his wishes. contact # given    1/24/18: 1230 hrs.   revisited pt, pt more alert today. approached AD, pt agrees to complete hcp, pt sisterChana will be hcp,  pt wife, Alisson will be alt hcp. pt spoke of his directives: pt remains full code at present, goal to return home w VNS, PT & OT when stable. pt will discuss further w family regarding if he was on a vent, would he want trach ...pt unsure at this time.  Pt await results of CT, if abscess or hematoma, for further treatment plan. HCP will be emailed to pt sister w pt permission. contact # given

## 2018-01-23 NOTE — PHYSICAL THERAPY INITIAL EVALUATION ADULT - ADDITIONAL COMMENTS
Pt lives in a private home /c his wife who works. Pt has aides from 6 hours/day for 7 days/wk. Pt has no steps that he negotiates due to being non-ambulatory and requires a over-head mechanical lift for all transfers at baseline. Pt is wheelchair bound. Pt has this over-head mechanical lift system than goes all the way into his bathroom. Pt has a motorized wheelchair that he sits in and also a shower wheelchair for showering. Pt has a mechanical bed. Pt was able to shower himself /c use of arms Left > right however now can not. Pt is dependent for all other care.

## 2018-01-23 NOTE — H&P ADULT - ASSESSMENT
63 yo male hx of MS with baseline lower extremity paralysis, left femur fracture (atraumatic) since september 2017 ,refused sx interventions at that time presents to ED  with worsening weakness in his b/l upper extremities ,NEUROLOGY eval requested . Found to have  oral temp 100.3 Admitted for septic workup and evaluation,send blood and urine cx,serial lactate levels,monitor vitals closley,ivfs hydration,monitor urine output and renal profile,iv abx initiated .  Has been c/o of urinary frequency/urgency ,started on tx of probable UTI .  No nausea/vomiting/diarrhea. Also found to have left necrotic heel and podiatry eval requested .

## 2018-01-23 NOTE — PHYSICAL THERAPY INITIAL EVALUATION ADULT - LEVEL OF INDEPENDENCE: GAIT, REHAB EVAL
NA, pt did not want to be seated at EOB, pt is dependent at baseline. Mechanical lift at baseline/unable to perform

## 2018-01-23 NOTE — PHYSICAL THERAPY INITIAL EVALUATION ADULT - PERTINENT HX OF CURRENT PROBLEM, REHAB EVAL
As per H&P:" 63 y/o male admitted for fever and upper extremities weakness" Pt /c history of primary progressive MS who states that at the end of September 2017 pt was leaning over his wheelchair to pick something off floor when he had fractured his left hip unknowingly. Pt reports he has had multiple opinion regarding left hip surgery but has decided to not undergo surgery due to the fact that pt has been non-ambulatory for ~ 6 years.

## 2018-01-23 NOTE — PROGRESS NOTE ADULT - ASSESSMENT
62M with left hip fracture  Pain control  NWB LLE, bedrest  DVT prophylaxis  PT  Cont medical management per primary team  Pt does not want surgery at this time  No acute orthopedic surgery intervention   If Pt decides he wants surgery in the future, will reassess  Will discuss with attending and advise if plan changes

## 2018-01-23 NOTE — PHYSICAL THERAPY INITIAL EVALUATION ADULT - CRITERIA FOR SKILLED THERAPEUTIC INTERVENTIONS
but close or at his baseline/impairments found/predicted duration of therapy intervention/rehab potential

## 2018-01-23 NOTE — PHYSICAL THERAPY INITIAL EVALUATION ADULT - ANKLE STRATEGY ASSESSMENT, REHAB EVAL
Functional assessment NA, NA, due to pt did not want to be seated at EOB, pt is dependent at baseline. Mechanical lift at baseline

## 2018-01-23 NOTE — CONSULT NOTE ADULT - SUBJECTIVE AND OBJECTIVE BOX
Patient is a 62y old  Male who presents with a chief complaint of fever and upper extremities weakness (23 Jan 2018 19:52)      HPI:  63 yo male hx of MS with baseline lower extremity paralysis, left femur fracture (atraumatic) since september 2017 ,refused sx interventions at that time presents to ED  with worsening weakness in his b/l upper extremities ,NEUROLOGY eval requested . Found to have  oral temp 100.3 Admitted for septic workup and evaluation,send blood and urine cx,serial lactate levels,monitor vitals closley,ivfs hydration,monitor urine output and renal profile,iv abx initiated .  Has been c/o of urinary frequency/urgency ,started on tx of probable UTI .  No nausea/vomiting/diarrhea. Also found to have left necrotic heel and podiatry eval requested . (23 Jan 2018 06:55)      Asked to see patient for ID Consult    PAST MEDICAL & SURGICAL HISTORY:  Femur fracture, left  MS (multiple sclerosis)      Allergies    No Known Allergies    Intolerances        REVIEW OF SYSTEMS:  All systems below were reviewed and are negative [  ]  HEENT:  ID:  Pulmonary:  Cardiac:  GI:  Renal:  Musculoskeletal:  All other systems above were reviewed and are negative   [  ]    HOME MEDICATIONS:    MEDICATIONS  (STANDING):  aspirin enteric coated 81 milliGRAM(s) Oral daily  cefTRIAXone   IVPB 1 Gram(s) IV Intermittent every 24 hours  dextrose 5% + sodium chloride 0.9% with potassium chloride 20 mEq/L 1000 milliLiter(s) (80 mL/Hr) IV Continuous <Continuous>  DULoxetine 60 milliGRAM(s) Oral daily  enoxaparin Injectable 40 milliGRAM(s) SubCutaneous daily  famotidine    Tablet 20 milliGRAM(s) Oral daily  OXcarbazepine 300 milliGRAM(s) Oral two times a day  oxybutynin 5 milliGRAM(s) Oral two times a day  pregabalin 150 milliGRAM(s) Oral two times a day    MEDICATIONS  (PRN):  acetaminophen   Tablet 650 milliGRAM(s) Oral every 6 hours PRN For Temp greater than 38 C (100.4 F)  HYDROmorphone  Injectable 0.5 milliGRAM(s) IV Push every 6 hours PRN Pain  ibuprofen  Tablet 400 milliGRAM(s) Oral every 6 hours PRN FOR TEMP GREATER THEN 101.3  oxyCODONE    5 mG/acetaminophen 325 mG 1 Tablet(s) Oral every 6 hours PRN Moderate Pain (4 - 6)      Vital Signs Last 24 Hrs  T(C): 38.2 (23 Jan 2018 18:32), Max: 39.3 (23 Jan 2018 07:46)  T(F): 100.7 (23 Jan 2018 18:32), Max: 102.8 (23 Jan 2018 07:46)  HR: 95 (23 Jan 2018 18:32) (85 - 102)  BP: 106/59 (23 Jan 2018 18:32) (106/59 - 154/75)  BP(mean): --  RR: 18 (23 Jan 2018 18:32) (14 - 18)  SpO2: 95% (23 Jan 2018 18:32) (95% - 99%)    PHYSICAL EXAM:  HEENT:  Neck:  Lungs:  Heart:  Abdomen:  Genital/ Rectal:  Extremities:  Neurologic:  Vascular:    I&O's Summary      LABORATORY:                          11.3   12.1  )-----------( 178      ( 23 Jan 2018 05:11 )             33.9           01-23    133<L>  |  97  |  11  ----------------------------<  120<H>  3.4<L>   |  27  |  0.54    Ca    8.2<L>      23 Jan 2018 05:11  Mg     1.6     01-23    TPro  6.5  /  Alb  2.7<L>  /  TBili  0.5  /  DBili  x   /  AST  9<L>  /  ALT  10<L>  /  AlkPhos  81  01-23      Rapid Respiratory Viral Panel Result        01-22 @ 18:30  Rapid RVP [facility]  Coronovirus --  Adenovirus --  Bordetella Pertussis --  Chlamydia Pneumonia --  Entero/Rhinovirus--  HKU1 Coronovirus --  HMPV Coronovirus --  Influenza A --  Influenza AH1 --  Influenza AH1 2009 --  Influenza AH3 --  Influenza B --  Mycoplasma Pneumoniae --  NL63 Coronovirus --  OC43 Coronovirus --  Parainfluenza 1 --  Parainfluenza 2 --  Parainfluenza 3 --  Parainfluenza 4 --  Resp Syncytial Virus --      LABORATORY:    CBC Full  -  ( 23 Jan 2018 05:11 )  WBC Count : 12.1 K/uL  Hemoglobin : 11.3 g/dL  Hematocrit : 33.9 %  Platelet Count - Automated : 178 K/uL  Mean Cell Volume : 87.0 fl  Mean Cell Hemoglobin : 29.0 pg  Mean Cell Hemoglobin Concentration : 33.3 gm/dL  Auto Neutrophil # : x  Auto Lymphocyte # : x  Auto Monocyte # : x  Auto Eosinophil # : x  Auto Basophil # : x  Auto Neutrophil % : x  Auto Lymphocyte % : x  Auto Monocyte % : x  Auto Eosinophil % : x  Auto Basophil % : x          01-23    133<L>  |  97  |  11  ----------------------------<  120<H>  3.4<L>   |  27  |  0.54    Ca    8.2<L>      23 Jan 2018 05:11  Mg     1.6     01-23    TPro  6.5  /  Alb  2.7<L>  /  TBili  0.5  /  DBili  x   /  AST  9<L>  /  ALT  10<L>  /  AlkPhos  81  01-23                    Wound culture:                01-22 @ 22:51  Organism --  Culture w/ gram stain --  Specimen Source .Urine Clean Catch (Midstream)        Abscess culture:             01-22 @ 22:51  Organism --  Gram Stain --  Specimen Source .Urine Clean Catch (Midstream)            Tissue culture:           01-22 @ 22:51  Organism --  Gram Stain --  Specimen Source .Urine Clean Catch (Midstream)        Body Fluid Smear & Culture:                        01-22 @ 22:51  AFB Smear  --  Culture Acid Fast Body Fluid w/ Smear  --  Culture Acid Fast Smear Concentrated   --    Culture Results:       Culture grew 3 or more types of organisms which indicate  collection contamination; consider recollection only if clinically  indicated.  Specimen Source .Urine Clean Catch (Midstream)            Rapid Respiratory Viral Panel Result        01-22 @ 18:30  Rapid RVP [facility]  Coronovirus --  Adenovirus --  Bordetella Pertussis --  Chlamydia Pneumonia --  Entero/Rhinovirus--  HKU1 Coronovirus --  HMPV Coronovirus --  Influenza A --  Influenza AH1 --  Influenza AH1 2009 --  Influenza AH3 --  Influenza B --  Mycoplasma Pneumoniae --  NL63 Coronovirus --  OC43 Coronovirus --  Parainfluenza 1 --  Parainfluenza 2 --  Parainfluenza 3 --  Parainfluenza 4 --  Resp Syncytial Virus -- Patient is a 62y old  Male who presents with a chief complaint of fever and upper extremities weakness (23 Jan 2018 19:52)    The patient is a 63 yo male with a history of MS with lower extremity paralysis, left femur fracture from a fall one year ago who came to the ED complaining of worsened weakness and fevers at home. The patient was noted to have a fever of 102F yesterday. He has been placed on IV Rocephin for UTI.  He had a US of LLE which a fluid collection in the left thigh. He denied pain or swelling of his left thigh. He had a fall from his wheel chair last year and had a hematoma on the lateral aspect of his left thigh.       PAST MEDICAL & SURGICAL HISTORY:  Femur fracture, left  MS (multiple sclerosis)      Allergies    No Known Allergies    Intolerances    REVIEW OF SYSTEMS:  No diarrhea or vomiting  No cough.     HOME MEDICATIONS:    MEDICATIONS  (STANDING):  aspirin enteric coated 81 milliGRAM(s) Oral daily  cefTRIAXone   IVPB 1 Gram(s) IV Intermittent every 24 hours  dextrose 5% + sodium chloride 0.9% with potassium chloride 20 mEq/L 1000 milliLiter(s) (80 mL/Hr) IV Continuous <Continuous>  DULoxetine 60 milliGRAM(s) Oral daily  enoxaparin Injectable 40 milliGRAM(s) SubCutaneous daily  famotidine    Tablet 20 milliGRAM(s) Oral daily  OXcarbazepine 300 milliGRAM(s) Oral two times a day  oxybutynin 5 milliGRAM(s) Oral two times a day  pregabalin 150 milliGRAM(s) Oral two times a day    MEDICATIONS  (PRN):  acetaminophen   Tablet 650 milliGRAM(s) Oral every 6 hours PRN For Temp greater than 38 C (100.4 F)  HYDROmorphone  Injectable 0.5 milliGRAM(s) IV Push every 6 hours PRN Pain  ibuprofen  Tablet 400 milliGRAM(s) Oral every 6 hours PRN FOR TEMP GREATER THEN 101.3  oxyCODONE    5 mG/acetaminophen 325 mG 1 Tablet(s) Oral every 6 hours PRN Moderate Pain (4 - 6)      Vital Signs Last 24 Hrs  T(C): 38.2 (23 Jan 2018 18:32), Max: 39.3 (23 Jan 2018 07:46)  T(F): 100.7 (23 Jan 2018 18:32), Max: 102.8 (23 Jan 2018 07:46)  HR: 95 (23 Jan 2018 18:32) (85 - 102)  BP: 106/59 (23 Jan 2018 18:32) (106/59 - 154/75)  BP(mean): --  RR: 18 (23 Jan 2018 18:32) (14 - 18)  SpO2: 95% (23 Jan 2018 18:32) (95% - 99%)    PHYSICAL EXAM:  HEENT: NC/AT  Neck: Soft; no masses.  Lungs: Coarse BS bilaterally; no wheezing.   Heart: RRR; no murmurs.  Abdomen: Soft; no masses.  Extremities: No swelling or erythema  Neurologic: Awake.     I&O's Summary      LABORATORY:                          11.3   12.1  )-----------( 178      ( 23 Jan 2018 05:11 )             33.9           01-23    133<L>  |  97  |  11  ----------------------------<  120<H>  3.4<L>   |  27  |  0.54    Ca    8.2<L>      23 Jan 2018 05:11  Mg     1.6     01-23    TPro  6.5  /  Alb  2.7<L>  /  TBili  0.5  /  DBili  x   /  AST  9<L>  /  ALT  10<L>  /  AlkPhos  81  01-23      Rapid Respiratory Viral Panel Result        01-22 @ 18:30  Rapid RVP [facility]  Coronovirus --  Adenovirus --  Bordetella Pertussis --  Chlamydia Pneumonia --  Entero/Rhinovirus--  HKU1 Coronovirus --  HMPV Coronovirus --  Influenza A --  Influenza AH1 --  Influenza AH1 2009 --  Influenza AH3 --  Influenza B --  Mycoplasma Pneumoniae --  NL63 Coronovirus --  OC43 Coronovirus --  Parainfluenza 1 --  Parainfluenza 2 --  Parainfluenza 3 --  Parainfluenza 4 --  Resp Syncytial Virus --      LABORATORY:    CBC Full  -  ( 23 Jan 2018 05:11 )  WBC Count : 12.1 K/uL  Hemoglobin : 11.3 g/dL  Hematocrit : 33.9 %  Platelet Count - Automated : 178 K/uL  Mean Cell Volume : 87.0 fl  Mean Cell Hemoglobin : 29.0 pg  Mean Cell Hemoglobin Concentration : 33.3 gm/dL  Auto Neutrophil # : x  Auto Lymphocyte # : x  Auto Monocyte # : x  Auto Eosinophil # : x  Auto Basophil # : x  Auto Neutrophil % : x  Auto Lymphocyte % : x  Auto Monocyte % : x  Auto Eosinophil % : x  Auto Basophil % : x      01-23    133<L>  |  97  |  11  ----------------------------<  120<H>  3.4<L>   |  27  |  0.54    Ca    8.2<L>      23 Jan 2018 05:11  Mg     1.6     01-23    TPro  6.5  /  Alb  2.7<L>  /  TBili  0.5  /  DBili  x   /  AST  9<L>  /  ALT  10<L>  /  AlkPhos  81  01-23    Wound culture:                01-22 @ 22:51  Organism --  Culture w/ gram stain --  Specimen Source .Urine Clean Catch (Midstream)    Assessment and Plan:    1. UTI.  2. Left thigh fluid collection: abscess vs hematoma.  3. Fevers.    . Continue IV Rocephin  . Repeat urine culture.  . Get CT with contrast of left thigh to evaluate for abscess.    Thank you for the courtesy of this consultation.

## 2018-01-23 NOTE — PHYSICAL THERAPY INITIAL EVALUATION ADULT - LEVEL OF INDEPENDENCE: SIT/STAND, REHAB EVAL
unable to perform/NA, pt did not want to be seated at EOB, pt is dependent at baseline. Mechanical lift at baseline

## 2018-01-23 NOTE — CONSULT NOTE ADULT - CONSULT REASON
.
EKG abnormalities , cardiac murmur
L thigh collection
Left hip pain
Evaluation for fever.
LEFT foot eschar tissue

## 2018-01-23 NOTE — CONSULT NOTE ADULT - ASSESSMENT
61 yo male with eschar tissue on the left foot    Plan: pt seen and evalauted.   Wound stable, not necessary for wound care  Podiatry will follow up if further problems noted    Thank you
63 yo male with MS, upper extremity weakness and fevers.  PT collection in L thigh can be source, but no tenderness in leg at this time.   PT poss have UTI.  D/W pt CT will help better evaluate collection in thigh but refusing at this time. PT states poss agree tomorrow       admit       IV abx       f/u urine culture       will check [facility] record regarding L thigh collection
A/P: 62y Male with L hip fracture  Pain control  NWB LLE, bedrest  Admit to medical team  Medical clearance/optimization for possible OR  NPO after midnight for possible OR on 1/23  IVF while NPO  Hold chemical AC  Follow up Pt decision in AM regarding surgery  Will discuss with attending and advise if plan changes
MS exacerbation secondary to infection   no steroid needed  antibiotics as needed

## 2018-01-24 DIAGNOSIS — S80.12XD CONTUSION OF LEFT LOWER LEG, SUBSEQUENT ENCOUNTER: ICD-10-CM

## 2018-01-24 LAB
ANION GAP SERPL CALC-SCNC: 8 MMOL/L — SIGNIFICANT CHANGE UP (ref 5–17)
BUN SERPL-MCNC: 12 MG/DL — SIGNIFICANT CHANGE UP (ref 7–23)
CALCIUM SERPL-MCNC: 8.5 MG/DL — SIGNIFICANT CHANGE UP (ref 8.5–10.1)
CHLORIDE SERPL-SCNC: 101 MMOL/L — SIGNIFICANT CHANGE UP (ref 96–108)
CO2 SERPL-SCNC: 26 MMOL/L — SIGNIFICANT CHANGE UP (ref 22–31)
CREAT SERPL-MCNC: 0.6 MG/DL — SIGNIFICANT CHANGE UP (ref 0.5–1.3)
GLUCOSE SERPL-MCNC: 94 MG/DL — SIGNIFICANT CHANGE UP (ref 70–99)
HCT VFR BLD CALC: 34 % — LOW (ref 39–50)
HGB BLD-MCNC: 10.9 G/DL — LOW (ref 13–17)
MCHC RBC-ENTMCNC: 27.7 PG — SIGNIFICANT CHANGE UP (ref 27–34)
MCHC RBC-ENTMCNC: 32 GM/DL — SIGNIFICANT CHANGE UP (ref 32–36)
MCV RBC AUTO: 86.3 FL — SIGNIFICANT CHANGE UP (ref 80–100)
PLATELET # BLD AUTO: 170 K/UL — SIGNIFICANT CHANGE UP (ref 150–400)
POTASSIUM SERPL-MCNC: 3.7 MMOL/L — SIGNIFICANT CHANGE UP (ref 3.5–5.3)
POTASSIUM SERPL-SCNC: 3.7 MMOL/L — SIGNIFICANT CHANGE UP (ref 3.5–5.3)
RBC # BLD: 3.94 M/UL — LOW (ref 4.2–5.8)
RBC # FLD: 13.7 % — SIGNIFICANT CHANGE UP (ref 10.3–14.5)
SODIUM SERPL-SCNC: 135 MMOL/L — SIGNIFICANT CHANGE UP (ref 135–145)
WBC # BLD: 9.6 K/UL — SIGNIFICANT CHANGE UP (ref 3.8–10.5)
WBC # FLD AUTO: 9.6 K/UL — SIGNIFICANT CHANGE UP (ref 3.8–10.5)

## 2018-01-24 PROCEDURE — 73701 CT LOWER EXTREMITY W/DYE: CPT | Mod: 26,LT

## 2018-01-24 RX ORDER — HYDROMORPHONE HYDROCHLORIDE 2 MG/ML
1 INJECTION INTRAMUSCULAR; INTRAVENOUS; SUBCUTANEOUS EVERY 6 HOURS
Qty: 0 | Refills: 0 | Status: DISCONTINUED | OUTPATIENT
Start: 2018-01-24 | End: 2018-01-26

## 2018-01-24 RX ORDER — AMLODIPINE BESYLATE 2.5 MG/1
5 TABLET ORAL DAILY
Qty: 0 | Refills: 0 | Status: COMPLETED | OUTPATIENT
Start: 2018-01-24 | End: 2018-01-24

## 2018-01-24 RX ADMIN — Medication 150 MILLIGRAM(S): at 18:10

## 2018-01-24 RX ADMIN — HYDROMORPHONE HYDROCHLORIDE 1 MILLIGRAM(S): 2 INJECTION INTRAMUSCULAR; INTRAVENOUS; SUBCUTANEOUS at 01:29

## 2018-01-24 RX ADMIN — CEFTRIAXONE 100 GRAM(S): 500 INJECTION, POWDER, FOR SOLUTION INTRAMUSCULAR; INTRAVENOUS at 23:13

## 2018-01-24 RX ADMIN — FAMOTIDINE 20 MILLIGRAM(S): 10 INJECTION INTRAVENOUS at 12:59

## 2018-01-24 RX ADMIN — OXYCODONE AND ACETAMINOPHEN 1 TABLET(S): 5; 325 TABLET ORAL at 12:59

## 2018-01-24 RX ADMIN — HYDROMORPHONE HYDROCHLORIDE 1 MILLIGRAM(S): 2 INJECTION INTRAMUSCULAR; INTRAVENOUS; SUBCUTANEOUS at 23:30

## 2018-01-24 RX ADMIN — DEXTROSE MONOHYDRATE, SODIUM CHLORIDE, AND POTASSIUM CHLORIDE 80 MILLILITER(S): 50; .745; 4.5 INJECTION, SOLUTION INTRAVENOUS at 08:26

## 2018-01-24 RX ADMIN — AMLODIPINE BESYLATE 5 MILLIGRAM(S): 2.5 TABLET ORAL at 06:28

## 2018-01-24 RX ADMIN — Medication 5 MILLIGRAM(S): at 18:10

## 2018-01-24 RX ADMIN — OXCARBAZEPINE 300 MILLIGRAM(S): 300 TABLET, FILM COATED ORAL at 06:29

## 2018-01-24 RX ADMIN — Medication 150 MILLIGRAM(S): at 06:28

## 2018-01-24 RX ADMIN — DULOXETINE HYDROCHLORIDE 60 MILLIGRAM(S): 30 CAPSULE, DELAYED RELEASE ORAL at 12:59

## 2018-01-24 RX ADMIN — ENOXAPARIN SODIUM 40 MILLIGRAM(S): 100 INJECTION SUBCUTANEOUS at 12:59

## 2018-01-24 RX ADMIN — HYDROMORPHONE HYDROCHLORIDE 1 MILLIGRAM(S): 2 INJECTION INTRAMUSCULAR; INTRAVENOUS; SUBCUTANEOUS at 16:44

## 2018-01-24 RX ADMIN — HYDROMORPHONE HYDROCHLORIDE 1 MILLIGRAM(S): 2 INJECTION INTRAMUSCULAR; INTRAVENOUS; SUBCUTANEOUS at 08:23

## 2018-01-24 RX ADMIN — Medication 5 MILLIGRAM(S): at 06:29

## 2018-01-24 RX ADMIN — HYDROMORPHONE HYDROCHLORIDE 1 MILLIGRAM(S): 2 INJECTION INTRAMUSCULAR; INTRAVENOUS; SUBCUTANEOUS at 23:12

## 2018-01-24 RX ADMIN — Medication 81 MILLIGRAM(S): at 12:59

## 2018-01-24 RX ADMIN — OXCARBAZEPINE 300 MILLIGRAM(S): 300 TABLET, FILM COATED ORAL at 18:10

## 2018-01-24 RX ADMIN — HYDROMORPHONE HYDROCHLORIDE 1 MILLIGRAM(S): 2 INJECTION INTRAMUSCULAR; INTRAVENOUS; SUBCUTANEOUS at 01:45

## 2018-01-24 NOTE — DIETITIAN INITIAL EVALUATION ADULT. - PROBLEM SELECTOR PLAN 3
Diagnosed with hip fx and orthopedic sx evaluation called,pain managmnet and dvt prophylaxis administrated Cardiology clearance requested for surgical treatment Patient refused sx intervention ,he would like to get 2nd opinion from his ortho ,no sx at this point

## 2018-01-24 NOTE — PROGRESS NOTE ADULT - SUBJECTIVE AND OBJECTIVE BOX
pt seen  feeling somewhat better  no complaints  ICU Vital Signs Last 24 Hrs  T(C): 37.7 (24 Jan 2018 04:44), Max: 39 (23 Jan 2018 17:34)  T(F): 99.9 (24 Jan 2018 04:44), Max: 102.2 (23 Jan 2018 17:34)  HR: 96 (24 Jan 2018 04:44) (69 - 99)  BP: 169/82 (24 Jan 2018 05:09) (104/66 - 183/100)  BP(mean): --  ABP: --  ABP(mean): --  RR: 18 (24 Jan 2018 04:44) (14 - 18)  SpO2: 98% (24 Jan 2018 04:44) (95% - 98%)  PHYSICAL EXAM    HEENT Unremarkable PERRLA atraumatic  RESP Fair air entry EXP prolonged   breath sounds  CARDIAC S1 S2 No S3     NO JVD   ABDOMEN SOFT BS PRESENT NOT DISTENDED No hepatosplenomegaly  PEDAL EDEMA present No calf tenderness  NO rash GENERAL Not TOXIC looking  Awake A & O x 3   .   L thigh some tenderness in hip, no other complaints  Seen by surg cons due to  L thigh collection found on ultrasound.  D/w pt collection was seen in 11/17 at [facility] and nothing done at that time.  Reviewed [facility] recorded and collection was 20cm in diameter compared to 7cm at this time. Decreasing in size LATE ENTRY- patient was seen and examined earlier today . Plan of care was discussed with med staff and unit coordinator . No complains No new issues reported by medical staff . All above noted Patient is resting in a bed comfortably .CT IS PENDING  .No distress noted   ICU Vital Signs Last 24 Hrs  T(C): 37.7 (24 Jan 2018 04:44), Max: 39 (23 Jan 2018 17:34)  T(F): 99.9 (24 Jan 2018 04:44), Max: 102.2 (23 Jan 2018 17:34)  HR: 96 (24 Jan 2018 04:44) (69 - 99)  BP: 169/82 (24 Jan 2018 05:09) (104/66 - 183/100)  BP(mean): --  ABP: --  ABP(mean): --  RR: 18 (24 Jan 2018 04:44) (14 - 18)  SpO2: 98% (24 Jan 2018 04:44) (95% - 98%)  PHYSICAL EXAM    HEENT Unremarkable PERRLA atraumatic  RESP Fair air entry EXP prolonged   breath sounds  CARDIAC S1 S2 No S3     NO JVD   ABDOMEN SOFT BS PRESENT NOT DISTENDED No hepatosplenomegaly  PEDAL EDEMA present No calf tenderness  NO rash GENERAL Not TOXIC looking  Awake A & O x 3   L thigh some tenderness in hip, no other complaints  Seen by surg cons due to  L thigh collection found on ultrasound.  D/w pt collection was seen in 11/17 at [facility] and nothing done at that time.  Reviewed [facility] recorded and collection was 20cm in diameter compared to 7cm at this time. Decreasing in size   labs and imaging Labs and imaging reviewed electronically

## 2018-01-24 NOTE — CHART NOTE - NSCHARTNOTEFT_GEN_A_CORE
CT Scan seen and evaluated. Small hematoma around fracture site. Possible bursitis read by radiology. In setting of hip fracture both of these are normal findings. No walled off collection seen on CT to suggest abcess as sign of infection at this time. Unlikely source for current fevers. Will discuss with attending and change plan as needed. CT Scan seen and evaluated. Small hematoma around fracture site. Possible bursitis read by radiology. In setting of hip fracture both of these are normal findings. A hematoma would be an expected finding and not necessarily a sign of infectious etiology. Can be further evaluated with indium scan and/or image guided aspiration by radiology if felt warranted by medicine and ID. Discussed with attending and will change plan as needed.

## 2018-01-24 NOTE — PROVIDER CONTACT NOTE (OTHER) - SITUATION
Patient with bp of 183/100 in AM (electronic), 169/82 (Manually, HR 98, Temp 99.9  Patient is stable, denies pain at present, denies chills, sob. Verbalize he feels fine.

## 2018-01-24 NOTE — PROGRESS NOTE ADULT - SUBJECTIVE AND OBJECTIVE BOX
Neurology follow up note    THANH [id number]      Interval History:    Patient feels stronger seen with mom     MEDICATIONS    acetaminophen   Tablet 650 milliGRAM(s) Oral every 6 hours PRN  aspirin enteric coated 81 milliGRAM(s) Oral daily  cefTRIAXone   IVPB 1 Gram(s) IV Intermittent every 24 hours  dextrose 5% + sodium chloride 0.9% with potassium chloride 20 mEq/L 1000 milliLiter(s) IV Continuous <Continuous>  DULoxetine 60 milliGRAM(s) Oral daily  enoxaparin Injectable 40 milliGRAM(s) SubCutaneous daily  famotidine    Tablet 20 milliGRAM(s) Oral daily  HYDROmorphone  Injectable 1 milliGRAM(s) IV Push every 6 hours PRN  ibuprofen  Tablet 400 milliGRAM(s) Oral every 6 hours PRN  OXcarbazepine 300 milliGRAM(s) Oral two times a day  oxybutynin 5 milliGRAM(s) Oral two times a day  oxyCODONE    5 mG/acetaminophen 325 mG 1 Tablet(s) Oral every 6 hours PRN  pregabalin 150 milliGRAM(s) Oral two times a day      Allergies    No Known Allergies    Intolerances          Weight (kg): 100 ( @ 18:32)    Vital Signs Last 24 Hrs  T(C): 36.9 (2018 13:07), Max: 39 (2018 17:34)  T(F): 98.4 (2018 13:07), Max: 102.2 (2018 17:34)  HR: 73 (2018 13:07) (69 - 99)  BP: 155/83 (2018 13:07) (104/66 - 183/100)  BP(mean): --  RR: 18 (2018 13:07) (15 - 18)  SpO2: 98% (2018 13:07) (95% - 98%)      REVIEW OF SYSTEMS:     Constitutional: No fever, chills, fatigue, weakness better  Eyes: no eye pain, visual disturbances, or discharge  ENT:  No difficulty hearing, tinnitus, vertigo; No sinus or throat pain  Neck: No pain or stiffness  Respiratory: No cough, dyspnea, wheezing   Cardiovascular: No chest pain, palpitations,   Gastrointestinal: No abdominal or epigastric pain. No nausea, vomiting  No diarrhea or constipation.   Genitourinary: No dysuria, frequency, hematuria or incontinence  Neurological: No headaches, lightheadedness, vertigo, numbness or tremors  Psychiatric: No depression, anxiety, mood swings or difficulty sleeping  Musculoskeletal: No joint pain or swelling; No muscle, back or extremity pain  Skin: No itching, burning, rashes or lesions   Lymph Nodes: No enlarged glands  Endocrine: No heat or cold intolerance; No hair loss   Allergy and Immunologic: No hives or eczema    On Neurological Examination:    Mental Status - Patient is alert, awake, oriented X3.     Extraocular movements were intact.      Pupils were equal, round, and reactive bilaterally 3 mm to 2 mm.      Speech was fluent.      Smile symmetric.      Right upper, there was subtle flexation extension and subtle finger flexation extension of the right upper extremity which is his baseline.  The left upper extremities were able to elevate roughly about 30 degrees, flexation extension at the elbow and hand grasp was 3/5.  This is apparently significantly better than when he first came into the emergency room.  Bilateral lower extremities, no movement.     The patient is normally wheelchair, bed bound.  Follow simple commands      Muscle tone - is normal all over.  No asymmetry is seen.      Sensory    Bilateral intact to light touch    GENERAL Exam: Nontoxic , No Acute Distress   	  HEENT:  normocephalic, atraumatic  		  LUNGS: Clear bilaterally    	  HEART: Normal S1S2   No murmur RRR        	  GI/ ABDOMEN:  Soft  Non tender    EXTREMITIES:   Positive swelling was noted bilateral feet.      MUSCULOSKELETAL:  decreased Range of Motion all 4 extremities   	   SKIN: Normal  No Ecchymosis               LABS:  CBC Full  -  ( 2018 06:55 )  WBC Count : 9.6 K/uL  Hemoglobin : 10.9 g/dL  Hematocrit : 34.0 %  Platelet Count - Automated : 170 K/uL  Mean Cell Volume : 86.3 fl  Mean Cell Hemoglobin : 27.7 pg  Mean Cell Hemoglobin Concentration : 32.0 gm/dL  Auto Neutrophil # : x  Auto Lymphocyte # : x  Auto Monocyte # : x  Auto Eosinophil # : x  Auto Basophil # : x  Auto Neutrophil % : x  Auto Lymphocyte % : x  Auto Monocyte % : x  Auto Eosinophil % : x  Auto Basophil % : x    Urinalysis Basic - ( 2018 20:12 )    Color: Yellow / Appearance: Slightly Turbid / S.020 / pH: x  Gluc: x / Ketone: Moderate  / Bili: Small / Urobili: 1   Blood: x / Protein: 75 mg/dL / Nitrite: Negative   Leuk Esterase: Small / RBC: 6-10 /HPF / WBC 11-25   Sq Epi: x / Non Sq Epi: Few / Bacteria: Few          135  |  101  |  12  ----------------------------<  94  3.7   |  26  |  0.60    Ca    8.5      2018 06:55  Mg     1.6         TPro  6.5  /  Alb  2.7<L>  /  TBili  0.5  /  DBili  x   /  AST  9<L>  /  ALT  10<L>  /  AlkPhos  81      Hemoglobin A1C:     LIVER FUNCTIONS - ( 2018 02:23 )  Alb: 2.7 g/dL / Pro: 6.5 g/dL / ALK PHOS: 81 U/L / ALT: 10 U/L / AST: 9 U/L / GGT: x           Vitamin B12   PT/INR - ( 2018 05:26 )   PT: 16.3 sec;   INR: 1.48 ratio         PTT - ( 2018 05:26 )  PTT:40.0 sec      RADIOLOGY      ANALYSIS AND PLAN:  This is a 62-year-old with a history of multiple sclerosis, weakness, trigeminal neuralgia, and neuropathic pain.  1.	For episode of weakness, most likely secondary to multiple sclerosis exacerbation secondary to underlying sepsis type process.  2.	Continue antibiotics.  3.	Sepsis workup.  4.	I do not feel that steroids are needed at present.  The patient overall has significantly improved from when he first came into the emergency room.  5.	For history of trigeminal neuralgia, continue the patient on Trileptal.  6.	For history of multiple sclerosis, supportive therapy.  7.	For neuropathic pain, continue the patient on Cymbalta and Lyrica.  8.	I would recommend fall precautions.  9.	Aspiration precautions.  10.	Spoke to mom at bedside     Thank you for the courtesy of this consultation.    hysical therapy evaluation as tolerated  OOB to chair/ambulation with assistance only if possible.    Greater than 45 minutes spent in direct patient care reviewing  the notes, lab data/ imaging , discussion with multidisciplinary team.

## 2018-01-24 NOTE — CHART NOTE - NSCHARTNOTEFT_GEN_A_CORE
PGY2 SERVICE NOTE    Was called by RN due to patient and patients wife wanting to know results of CT left lower extremity. Patient and his wife, who are at the bedside, are extremely upset that they have been unable to get in contact with Dr. Uriostegui to discuss plan of care. Results of CT LE and blood/urine cultures were given to the patient and patients wife, and explained that further testing may be indicated given the possible bursitis as read by radiology. Several calls placed to Dr. Uriostegui's answering service by myself, RN, and RN manager Kaitlynn, also several calls placed to Dr. Harris who is now covering Dr. Uriostegui's service for the evening. No call back as of yet from either Dr. Uriostegui or Dr. Harris. RN manager Kaitlynn to contact Dr. Maguire in AM regarding this issue to further facilitate discussion between attending on record and patient/patient family. Patient's additional concern this evening was that his normal home dose of Lyrica 150 mg PO QID is not being given to him. Upon review of med rec, it appears Lyrica is currently being given twice per day. Will increase Lyrica dose to 150 mg PO QID to reflect patients home dosage. Plan discussed with patient, his wife, with RN and RN manager Kaitlynn at the bedside.

## 2018-01-24 NOTE — PROVIDER CONTACT NOTE (OTHER) - ASSESSMENT
Patient is stable. denies pain/discomfort at present.  /82, manually, HR 96, Temp 99.9, HR 18, O2 98% on RA

## 2018-01-24 NOTE — PROGRESS NOTE ADULT - SUBJECTIVE AND OBJECTIVE BOX
pt seen  feeling somewhat better  no complaints  ICU Vital Signs Last 24 Hrs  T(C): 37.7 (24 Jan 2018 04:44), Max: 39 (23 Jan 2018 17:34)  T(F): 99.9 (24 Jan 2018 04:44), Max: 102.2 (23 Jan 2018 17:34)  HR: 96 (24 Jan 2018 04:44) (69 - 99)  BP: 169/82 (24 Jan 2018 05:09) (104/66 - 183/100)  BP(mean): --  ABP: --  ABP(mean): --  RR: 18 (24 Jan 2018 04:44) (14 - 18)  SpO2: 98% (24 Jan 2018 04:44) (95% - 98%)  gen- NAD  resp- clear b/l  CVS- reg rate and rhythm  abd- soft NT/ND  L thigh some tenderness in hip, no other complaints    Ct- P    A/P 63 yo male with hx of MS. s/p L femur fracture, treated conservatively presents with fevers. Pt has L thigh collection found on ultrasound.  D/w pt collection was seen in 11/17 at [facility] and nothing done at that time.  Reviewed [facility] recorded and collection was 20cm in diameter compared to 7cm at this time. Decreasing in size     -will review CT and if any signs collection is causing infection will d/w pt regarding drainage

## 2018-01-24 NOTE — CHART NOTE - NSCHARTNOTEFT_GEN_A_CORE
PGY2 SERVICE NOTE    Was called by RN due to patient experiencing manual BP of 169/82. Upon examination at the bedside, patient was resting comfortably in bed. Offers no complaints. Noted the patient has an oral temperature of 99.9, requested that a rectal temperature be done for better accuracy however patient refused. Patient denies being in any pain at this time. Earlier in the evening he was in pain, his severe PRN pain medication was increased to 1 mg IV push q6h Dilaudid (up from 0.5 mg). Patient denies fever, chills, chest pain, palpitations, SOB, cough, abdominal pain, n/v/d.     Vital Signs Last 24 Hrs  T(C): 37.7 (24 Jan 2018 04:44), Max: 39.3 (23 Jan 2018 07:46)  T(F): 99.9 (24 Jan 2018 04:44), Max: 102.8 (23 Jan 2018 07:46)  HR: 96 (24 Jan 2018 04:44) (69 - 102)  BP: 169/82 (24 Jan 2018 05:09) (104/66 - 183/100)  BP(mean): --  RR: 18 (24 Jan 2018 04:44) (14 - 18)  SpO2: 98% (24 Jan 2018 04:44) (95% - 98%)    Physical Exam:  General: Well developed, well nourished, NAD  HEENT: NCAT, PERRLA, EOMI bl, moist mucous membranes   Neck: Supple, nontender, no mass  Neurology: A&Ox3, nonfocal, CN II-XII grossly intact. Patient has no sensation intact from the waist down at baseline due to MS   Respiratory: CTA B/L, No W/R/R  CV: RRR, +S1/S2, no murmurs, rubs or gallops  Abdominal: Soft, NT, ND +BSx4  Extremities: bilateral nonpitting pedal edema, + black eschar on left heel, No C/C, + peripheral pulses  MSK: Normal ROM, no joint erythema or warmth, no joint swelling   Skin: warm, dry, normal color, no rash or abnormal lesions    Plan: Discussed case with Dr. Uriostegui, will start patient on 5 mg PO Norvasc with hold parameters and will give first dose now. Will monitor vitals closely.

## 2018-01-25 LAB
ANION GAP SERPL CALC-SCNC: 7 MMOL/L — SIGNIFICANT CHANGE UP (ref 5–17)
APPEARANCE UR: CLEAR — SIGNIFICANT CHANGE UP
BILIRUB UR-MCNC: NEGATIVE — SIGNIFICANT CHANGE UP
BUN SERPL-MCNC: 7 MG/DL — SIGNIFICANT CHANGE UP (ref 7–23)
CALCIUM SERPL-MCNC: 8.4 MG/DL — LOW (ref 8.5–10.1)
CHLORIDE SERPL-SCNC: 104 MMOL/L — SIGNIFICANT CHANGE UP (ref 96–108)
CO2 SERPL-SCNC: 29 MMOL/L — SIGNIFICANT CHANGE UP (ref 22–31)
COLOR SPEC: YELLOW — SIGNIFICANT CHANGE UP
CREAT SERPL-MCNC: 0.61 MG/DL — SIGNIFICANT CHANGE UP (ref 0.5–1.3)
CULTURE RESULTS: NO GROWTH — SIGNIFICANT CHANGE UP
DIFF PNL FLD: ABNORMAL
GLUCOSE SERPL-MCNC: 135 MG/DL — HIGH (ref 70–99)
GLUCOSE UR QL: NEGATIVE — SIGNIFICANT CHANGE UP
HCT VFR BLD CALC: 35.3 % — LOW (ref 39–50)
HGB BLD-MCNC: 11.5 G/DL — LOW (ref 13–17)
KETONES UR-MCNC: NEGATIVE — SIGNIFICANT CHANGE UP
LEUKOCYTE ESTERASE UR-ACNC: NEGATIVE — SIGNIFICANT CHANGE UP
MCHC RBC-ENTMCNC: 28.4 PG — SIGNIFICANT CHANGE UP (ref 27–34)
MCHC RBC-ENTMCNC: 32.5 GM/DL — SIGNIFICANT CHANGE UP (ref 32–36)
MCV RBC AUTO: 87.2 FL — SIGNIFICANT CHANGE UP (ref 80–100)
NITRITE UR-MCNC: NEGATIVE — SIGNIFICANT CHANGE UP
PH UR: 8 — SIGNIFICANT CHANGE UP (ref 5–8)
PLATELET # BLD AUTO: 227 K/UL — SIGNIFICANT CHANGE UP (ref 150–400)
POTASSIUM SERPL-MCNC: 3.6 MMOL/L — SIGNIFICANT CHANGE UP (ref 3.5–5.3)
POTASSIUM SERPL-SCNC: 3.6 MMOL/L — SIGNIFICANT CHANGE UP (ref 3.5–5.3)
PROT UR-MCNC: NEGATIVE — SIGNIFICANT CHANGE UP
RBC # BLD: 4.05 M/UL — LOW (ref 4.2–5.8)
RBC # FLD: 13.3 % — SIGNIFICANT CHANGE UP (ref 10.3–14.5)
SODIUM SERPL-SCNC: 140 MMOL/L — SIGNIFICANT CHANGE UP (ref 135–145)
SP GR SPEC: 1.01 — SIGNIFICANT CHANGE UP (ref 1.01–1.02)
SPECIMEN SOURCE: SIGNIFICANT CHANGE UP
UROBILINOGEN FLD QL: 1
WBC # BLD: 6 K/UL — SIGNIFICANT CHANGE UP (ref 3.8–10.5)
WBC # FLD AUTO: 6 K/UL — SIGNIFICANT CHANGE UP (ref 3.8–10.5)

## 2018-01-25 PROCEDURE — 99233 SBSQ HOSP IP/OBS HIGH 50: CPT | Mod: GC

## 2018-01-25 RX ORDER — VANCOMYCIN HCL 1 G
VIAL (EA) INTRAVENOUS
Qty: 0 | Refills: 0 | Status: DISCONTINUED | OUTPATIENT
Start: 2018-01-25 | End: 2018-01-26

## 2018-01-25 RX ORDER — VANCOMYCIN HCL 1 G
1500 VIAL (EA) INTRAVENOUS EVERY 12 HOURS
Qty: 0 | Refills: 0 | Status: DISCONTINUED | OUTPATIENT
Start: 2018-01-25 | End: 2018-01-26

## 2018-01-25 RX ORDER — PIPERACILLIN AND TAZOBACTAM 4; .5 G/20ML; G/20ML
3.38 INJECTION, POWDER, LYOPHILIZED, FOR SOLUTION INTRAVENOUS EVERY 8 HOURS
Qty: 0 | Refills: 0 | Status: DISCONTINUED | OUTPATIENT
Start: 2018-01-25 | End: 2018-01-26

## 2018-01-25 RX ORDER — PIPERACILLIN AND TAZOBACTAM 4; .5 G/20ML; G/20ML
3.38 INJECTION, POWDER, LYOPHILIZED, FOR SOLUTION INTRAVENOUS EVERY 8 HOURS
Qty: 0 | Refills: 0 | Status: DISCONTINUED | OUTPATIENT
Start: 2018-01-25 | End: 2018-01-25

## 2018-01-25 RX ORDER — DOCUSATE SODIUM 100 MG
100 CAPSULE ORAL THREE TIMES A DAY
Qty: 0 | Refills: 0 | Status: DISCONTINUED | OUTPATIENT
Start: 2018-01-25 | End: 2018-01-26

## 2018-01-25 RX ORDER — ENOXAPARIN SODIUM 100 MG/ML
40 INJECTION SUBCUTANEOUS DAILY
Qty: 0 | Refills: 0 | Status: DISCONTINUED | OUTPATIENT
Start: 2018-01-25 | End: 2018-01-26

## 2018-01-25 RX ORDER — SENNA PLUS 8.6 MG/1
2 TABLET ORAL AT BEDTIME
Qty: 0 | Refills: 0 | Status: DISCONTINUED | OUTPATIENT
Start: 2018-01-25 | End: 2018-01-26

## 2018-01-25 RX ORDER — VANCOMYCIN HCL 1 G
1500 VIAL (EA) INTRAVENOUS ONCE
Qty: 0 | Refills: 0 | Status: COMPLETED | OUTPATIENT
Start: 2018-01-25 | End: 2018-01-25

## 2018-01-25 RX ORDER — POLYETHYLENE GLYCOL 3350 17 G/17G
17 POWDER, FOR SOLUTION ORAL DAILY
Qty: 0 | Refills: 0 | Status: DISCONTINUED | OUTPATIENT
Start: 2018-01-25 | End: 2018-01-26

## 2018-01-25 RX ADMIN — PIPERACILLIN AND TAZOBACTAM 25 GRAM(S): 4; .5 INJECTION, POWDER, LYOPHILIZED, FOR SOLUTION INTRAVENOUS at 14:52

## 2018-01-25 RX ADMIN — Medication 150 MILLIGRAM(S): at 05:47

## 2018-01-25 RX ADMIN — HYDROMORPHONE HYDROCHLORIDE 1 MILLIGRAM(S): 2 INJECTION INTRAMUSCULAR; INTRAVENOUS; SUBCUTANEOUS at 18:02

## 2018-01-25 RX ADMIN — FAMOTIDINE 20 MILLIGRAM(S): 10 INJECTION INTRAVENOUS at 11:26

## 2018-01-25 RX ADMIN — OXCARBAZEPINE 300 MILLIGRAM(S): 300 TABLET, FILM COATED ORAL at 17:50

## 2018-01-25 RX ADMIN — POLYETHYLENE GLYCOL 3350 17 GRAM(S): 17 POWDER, FOR SOLUTION ORAL at 11:26

## 2018-01-25 RX ADMIN — DEXTROSE MONOHYDRATE, SODIUM CHLORIDE, AND POTASSIUM CHLORIDE 80 MILLILITER(S): 50; .745; 4.5 INJECTION, SOLUTION INTRAVENOUS at 00:04

## 2018-01-25 RX ADMIN — ENOXAPARIN SODIUM 40 MILLIGRAM(S): 100 INJECTION SUBCUTANEOUS at 15:54

## 2018-01-25 RX ADMIN — Medication 150 MILLIGRAM(S): at 00:02

## 2018-01-25 RX ADMIN — HYDROMORPHONE HYDROCHLORIDE 1 MILLIGRAM(S): 2 INJECTION INTRAMUSCULAR; INTRAVENOUS; SUBCUTANEOUS at 18:24

## 2018-01-25 RX ADMIN — Medication 300 MILLIGRAM(S): at 22:10

## 2018-01-25 RX ADMIN — Medication 150 MILLIGRAM(S): at 11:26

## 2018-01-25 RX ADMIN — Medication 5 MILLIGRAM(S): at 05:47

## 2018-01-25 RX ADMIN — PIPERACILLIN AND TAZOBACTAM 25 GRAM(S): 4; .5 INJECTION, POWDER, LYOPHILIZED, FOR SOLUTION INTRAVENOUS at 22:10

## 2018-01-25 RX ADMIN — Medication 5 MILLIGRAM(S): at 17:50

## 2018-01-25 RX ADMIN — Medication 100 MILLIGRAM(S): at 22:11

## 2018-01-25 RX ADMIN — OXCARBAZEPINE 300 MILLIGRAM(S): 300 TABLET, FILM COATED ORAL at 05:48

## 2018-01-25 RX ADMIN — DEXTROSE MONOHYDRATE, SODIUM CHLORIDE, AND POTASSIUM CHLORIDE 80 MILLILITER(S): 50; .745; 4.5 INJECTION, SOLUTION INTRAVENOUS at 11:30

## 2018-01-25 RX ADMIN — Medication 300 MILLIGRAM(S): at 11:30

## 2018-01-25 RX ADMIN — SENNA PLUS 2 TABLET(S): 8.6 TABLET ORAL at 22:10

## 2018-01-25 RX ADMIN — DULOXETINE HYDROCHLORIDE 60 MILLIGRAM(S): 30 CAPSULE, DELAYED RELEASE ORAL at 11:26

## 2018-01-25 RX ADMIN — Medication 100 MILLIGRAM(S): at 13:16

## 2018-01-25 RX ADMIN — Medication 150 MILLIGRAM(S): at 17:50

## 2018-01-25 NOTE — PROGRESS NOTE ADULT - SUBJECTIVE AND OBJECTIVE BOX
Neurology follow up note    THANH [id number]      Interval History:    Patient feels stronger     MEDICATIONS    acetaminophen   Tablet 650 milliGRAM(s) Oral every 6 hours PRN  dextrose 5% + sodium chloride 0.9% with potassium chloride 20 mEq/L 1000 milliLiter(s) IV Continuous <Continuous>  docusate sodium 100 milliGRAM(s) Oral three times a day  DULoxetine 60 milliGRAM(s) Oral daily  famotidine    Tablet 20 milliGRAM(s) Oral daily  HYDROmorphone  Injectable 1 milliGRAM(s) IV Push every 6 hours PRN  ibuprofen  Tablet 400 milliGRAM(s) Oral every 6 hours PRN  OXcarbazepine 300 milliGRAM(s) Oral two times a day  oxybutynin 5 milliGRAM(s) Oral two times a day  oxyCODONE    5 mG/acetaminophen 325 mG 1 Tablet(s) Oral every 6 hours PRN  piperacillin/tazobactam IVPB. 3.375 Gram(s) IV Intermittent every 8 hours  polyethylene glycol 3350 17 Gram(s) Oral daily  pregabalin 150 milliGRAM(s) Oral four times a day  senna 2 Tablet(s) Oral at bedtime  vancomycin  IVPB      vancomycin  IVPB 1500 milliGRAM(s) IV Intermittent every 12 hours      Allergies    No Known Allergies    Intolerances            Vital Signs Last 24 Hrs  T(C): 37.1 (25 Jan 2018 06:45), Max: 37.4 (24 Jan 2018 19:54)  T(F): 98.8 (25 Jan 2018 06:45), Max: 99.4 (24 Jan 2018 19:54)  HR: 66 (25 Jan 2018 06:45) (66 - 73)  BP: 148/69 (25 Jan 2018 06:45) (141/85 - 172/91)  BP(mean): --  RR: 18 (25 Jan 2018 06:45) (18 - 18)  SpO2: 95% (25 Jan 2018 06:45) (94% - 98%)          REVIEW OF SYSTEMS:     Constitutional: No fever, chills, fatigue, weakness better  Eyes: no eye pain, visual disturbances, or discharge  ENT:  No difficulty hearing, tinnitus, vertigo; No sinus or throat pain  Neck: No pain or stiffness  Respiratory: No cough, dyspnea, wheezing   Cardiovascular: No chest pain, palpitations,   Gastrointestinal: No abdominal or epigastric pain. No nausea, vomiting  No diarrhea or constipation.   Genitourinary: No dysuria, frequency, hematuria or incontinence  Neurological: No headaches, lightheadedness, vertigo, numbness or tremors  Psychiatric: No depression, anxiety, mood swings or difficulty sleeping  Musculoskeletal: No joint pain or swelling; No muscle, back or extremity pain  Skin: No itching, burning, rashes or lesions   Lymph Nodes: No enlarged glands  Endocrine: No heat or cold intolerance; No hair loss   Allergy and Immunologic: No hives or eczema    On Neurological Examination:    Mental Status - Patient is alert, awake, oriented X3.     Extraocular movements were intact.      Pupils were equal, round, and reactive bilaterally 3 mm to 2 mm.      Speech was fluent.      Smile symmetric.      Right upper, there was subtle flexation extension and subtle finger flexation extension of the right upper extremity which is his baseline.  The left upper extremities were able to elevate roughly about 30 degrees, flexation extension at the elbow and hand grasp was 3/5.  This is apparently significantly better than when he first came into the emergency room.  Bilateral lower extremities, no movement.     The patient is normally wheelchair, bed bound.  Follow simple commands      Muscle tone - is normal all over.  No asymmetry is seen.      Sensory    Bilateral intact to light touch    GENERAL Exam: Nontoxic , No Acute Distress   	  HEENT:  normocephalic, atraumatic  		  LUNGS: Clear bilaterally    	  HEART: Normal S1S2   No murmur RRR        	  GI/ ABDOMEN:  Soft  Non tender    EXTREMITIES:   Positive swelling was noted bilateral feet.      MUSCULOSKELETAL:  decreased Range of Motion all 4 extremities   	   SKIN: Normal  No Ecchymosis             LABS:  CBC Full  -  ( 24 Jan 2018 06:55 )  WBC Count : 9.6 K/uL  Hemoglobin : 10.9 g/dL  Hematocrit : 34.0 %  Platelet Count - Automated : 170 K/uL  Mean Cell Volume : 86.3 fl  Mean Cell Hemoglobin : 27.7 pg  Mean Cell Hemoglobin Concentration : 32.0 gm/dL  Auto Neutrophil # : x  Auto Lymphocyte # : x  Auto Monocyte # : x  Auto Eosinophil # : x  Auto Basophil # : x  Auto Neutrophil % : x  Auto Lymphocyte % : x  Auto Monocyte % : x  Auto Eosinophil % : x  Auto Basophil % : x      01-24    135  |  101  |  12  ----------------------------<  94  3.7   |  26  |  0.60    Ca    8.5      24 Jan 2018 06:55      Hemoglobin A1C:       Vitamin B12         RADIOLOGY      ANALYSIS AND PLAN:  This is a 62-year-old with a history of multiple sclerosis, weakness, trigeminal neuralgia, and neuropathic pain.  1.	For episode of weakness, most likely secondary to multiple sclerosis exacerbation secondary to underlying sepsis type process.  2.	Continue antibiotics.  3.	Sepsis workup.  4.	I do not feel that steroids are needed at present.  The patient overall has significantly improved from when he first came into the emergency room.  5.	For history of trigeminal neuralgia, continue the patient on Trileptal.  6.	For history of multiple sclerosis, supportive therapy.  7.	For neuropathic pain, continue the patient on Cymbalta and Lyrica.  8.	I would recommend fall precautions.  9.	Aspiration precautions.  10.	Spoke to mom at bedside yesterday  11.	overall strength is getting better     Thank you for the courtesy of this consultation.    Physical therapy evaluation as tolerated  OOB to chair/ambulation with assistance only if possible.    Greater than 45 minutes spent in direct patient care reviewing  the notes, lab data/ imaging , discussion with multidisciplinary team.

## 2018-01-25 NOTE — PROGRESS NOTE ADULT - SUBJECTIVE AND OBJECTIVE BOX
pt seen  feeling better with upper extremity strength  no pain otherwise  ICU Vital Signs Last 24 Hrs  T(C): 37.1 (25 Jan 2018 06:45), Max: 37.4 (24 Jan 2018 19:54)  T(F): 98.8 (25 Jan 2018 06:45), Max: 99.4 (24 Jan 2018 19:54)  HR: 66 (25 Jan 2018 06:45) (66 - 73)  BP: 148/69 (25 Jan 2018 06:45) (141/85 - 172/91)  BP(mean): --  ABP: --  ABP(mean): --  RR: 18 (25 Jan 2018 06:45) (18 - 18)  SpO2: 95% (25 Jan 2018 06:45) (94% - 98%)  gen-NAD  resp- CTA b/l  CVs- Reg rate and rhythm  abd-soft NT/ND  No left hip pain  increased strength upper extremities    CT- Left iliopsoas and trochanteric bursitis, can-not exclude infection    61 yo with hx of MS, presents with fevers. PT had equivocal positive UA, with L thigh collection from old hip fracture. Collection decreasing in size since last imaging at [facility].  Unsure of chances collection is infected, or seeding from other infection      F/U ID recommendations for possible drainage of L hip collection       cont abx

## 2018-01-25 NOTE — PROGRESS NOTE ADULT - SUBJECTIVE AND OBJECTIVE BOX
HPI:  61 yo male hx of MS with baseline lower extremity paralysis, left femur fracture (atraumatic) since september 2017 ,refused sx interventions at that time presents to ED  with worsening weakness in his b/l upper extremities ,NEUROLOGY eval requested . Found to have  oral temp 100.3 Admitted for septic workup and evaluation,send blood and urine cx,serial lactate levels,monitor vitals closley,ivfs hydration,monitor urine output and renal profile,iv abx initiated .  Has been c/o of urinary frequency/urgency ,started on tx of probable UTI .  No nausea/vomiting/diarrhea. Also found to have left necrotic heel and podiatry eval requested . (23 Jan 2018 06:55)    INTERVAL Hx: Pt seen and examined at bedside with wife present. In NAD, complains of constipation and increased urinary frequency and urgency but denies burning upon urination. Patient had increased UE weakness when he was admitted, is now improving. Does not wish to have orthopedic intervention at this time. Denies fever, chills , nausea vomiting.   Overnight patient's BP was elevated- 172/91, given 5 mg PO Norvasc, BP responded- currently systolic in 140s      REVIEW OF SYSTEMS:    CONSTITUTIONAL: +Weakness LE> UE, No fevers or chills  EYES/ENT: No visual changes, no throat pain   RESPIRATORY: No cough, wheezing, hemoptysis; No shortness of breath  CARDIOVASCULAR: No chest pain or palpitations  GASTROINTESTINAL: +Constipation, No abdominal, nausea, vomiting, or hematemesis; No melena or hematochezia.  GENITOURINARY: +increased frequency and urgency, denies burning upon urination  NEUROLOGICAL: No dizziness, numbness, or weakness  SKIN: No itching, burning, rashes, or lesions   All other review of systems is negative unless indicated above.    VITAL SIGNS:        PHYSICAL EXAM:     GENERAL: no acute distress  HEENT: NC/AT, EOMI, neck supple, MMM  RESPIRATORY: LCTAB/L, no rhonchi, rales, or wheezing  CARDIOVASCULAR: RRR, no murmurs, gallops, rubs  ABDOMINAL: soft, non-tender, non-distended, positive bowel sounds   EXTREMITIES: +Weak UE R>L, Weak LE b/l, +Pedal edema b/l, Left hip mildly tender +swollen  NEUROLOGICAL: alert and oriented x 3, non-focal  SKIN: no rashes or lesions   MUSCULOSKELETAL: no gross joint deformity                          10.9   9.6   )-----------( 170      ( 24 Jan 2018 06:55 )             34.0     01-24    135  |  101  |  12  ----------------------------<  94  3.7   |  26  |  0.60    Ca    8.5      24 Jan 2018 06:55        CAPILLARY BLOOD GLUCOSE          MEDICATIONS  (STANDING):  dextrose 5% + sodium chloride 0.9% with potassium chloride 20 mEq/L 1000 milliLiter(s) (80 mL/Hr) IV Continuous <Continuous>  docusate sodium 100 milliGRAM(s) Oral three times a day  DULoxetine 60 milliGRAM(s) Oral daily  famotidine    Tablet 20 milliGRAM(s) Oral daily  OXcarbazepine 300 milliGRAM(s) Oral two times a day  oxybutynin 5 milliGRAM(s) Oral two times a day  piperacillin/tazobactam IVPB. 3.375 Gram(s) IV Intermittent every 8 hours  polyethylene glycol 3350 17 Gram(s) Oral daily  pregabalin 150 milliGRAM(s) Oral four times a day  senna 2 Tablet(s) Oral at bedtime  vancomycin  IVPB      vancomycin  IVPB 1500 milliGRAM(s) IV Intermittent every 12 hours

## 2018-01-25 NOTE — PROGRESS NOTE ADULT - ASSESSMENT
61 yo male hx of MS with baseline lower extremity paralysis, left femur fracture (atraumatic) since september 2017 ,refused sx interventions at that time presents to ED  with worsening weakness in his b/l upper extremities ,NEUROLOGY eval requested . Found to have  oral temp 100.3 Admitted for septic workup and evaluation,send blood and urine cx,serial lactate levels,monitor vitals closley,ivfs hydration,monitor urine output and renal profile,iv abx initiated .  Has been c/o of urinary frequency/urgency ,started on tx of probable UTI .  No nausea/vomiting/diarrhea. Also found to have left necrotic heel and podiatry eval requested .

## 2018-01-25 NOTE — PROGRESS NOTE ADULT - PROBLEM SELECTOR PLAN 1
suspected source UTI vs fluid collection left hip. D/c'd rocephin today, started on Vanco & Zosyn  BCx x 2 negative, UCx contaminated, pending repeat  ID Dr. Pennington following, appreciate recs suspected source UTI vs fluid collection left hip. D/c'd rocephin today, started on Vanco & Zosyn  BCx x 2 negative, UCx contaminated, pending repeat  ID Dr. Pennington following, called and left msg at office today, appreciate recs suspected source UTI vs fluid collection left hip. D/c'd rocephin today, started on Vanco & Zosyn, apprec ID recs to transition to po  BCx x 2 negative, UCx contaminated, pending repeat  ID Dr. Pennington following, called and left msg at office today, appreciate further recs for po if needed

## 2018-01-25 NOTE — PROGRESS NOTE ADULT - ATTENDING COMMENTS
63 yo male hx of MS with baseline lower extremity paralysis, left femur fracture (atraumatic) since september 2017 ,refused sx interventions at that time presents to ED  with worsening weakness in his b/l upper extremities a/w ms exacerbation vs uti vs iliopsoas fluid collection. Plan: pt underwent course of ceftriaxone with improveemnt, ucx contaminate, repeated ucx, spoke to pt, wife and pt's mother with pt permission, will request po antibiotic selection from ID Dr. Pennington as surgery will defer drain as outpt if needed, pt weakness improved, likely dc tomorrow with close follow up with PMD and gen surgery, will update outpt neuro tomorrow

## 2018-01-25 NOTE — PROGRESS NOTE ADULT - SUBJECTIVE AND OBJECTIVE BOX
Interval History:    CENTRAL LINE:   [  ] YES       [  ] NO  LOMAS:                 [  ] YES       [  ] NO         REVIEW OF SYSTEMS:  All Systems below were reviewed and are negative [  ]  HEENT:  ID:  Pulmonary:  Cardiac:  GI:  Renal:  Musculoskeletal:  All other systems above were reviewed and are negative   [  ]      MEDICATIONS  (STANDING):  dextrose 5% + sodium chloride 0.9% with potassium chloride 20 mEq/L 1000 milliLiter(s) (80 mL/Hr) IV Continuous <Continuous>  docusate sodium 100 milliGRAM(s) Oral three times a day  DULoxetine 60 milliGRAM(s) Oral daily  enoxaparin Injectable 40 milliGRAM(s) SubCutaneous daily  famotidine    Tablet 20 milliGRAM(s) Oral daily  OXcarbazepine 300 milliGRAM(s) Oral two times a day  oxybutynin 5 milliGRAM(s) Oral two times a day  piperacillin/tazobactam IVPB. 3.375 Gram(s) IV Intermittent every 8 hours  polyethylene glycol 3350 17 Gram(s) Oral daily  pregabalin 150 milliGRAM(s) Oral four times a day  senna 2 Tablet(s) Oral at bedtime  vancomycin  IVPB      vancomycin  IVPB 1500 milliGRAM(s) IV Intermittent every 12 hours    MEDICATIONS  (PRN):  acetaminophen   Tablet 650 milliGRAM(s) Oral every 6 hours PRN For Temp greater than 38 C (100.4 F)  HYDROmorphone  Injectable 1 milliGRAM(s) IV Push every 6 hours PRN Severe Pain (7 - 10)  ibuprofen  Tablet 400 milliGRAM(s) Oral every 6 hours PRN FOR TEMP GREATER THEN 101.3  oxyCODONE    5 mG/acetaminophen 325 mG 1 Tablet(s) Oral every 6 hours PRN Moderate Pain (4 - 6)      Vital Signs Last 24 Hrs  T(C): 37.2 (25 Jan 2018 20:57), Max: 37.2 (25 Jan 2018 20:57)  T(F): 99 (25 Jan 2018 20:57), Max: 99 (25 Jan 2018 20:57)  HR: 74 (25 Jan 2018 20:57) (66 - 74)  BP: 117/71 (25 Jan 2018 20:57) (117/71 - 172/91)  BP(mean): --  RR: 17 (25 Jan 2018 20:57) (17 - 18)  SpO2: 97% (25 Jan 2018 20:57) (94% - 98%)    I&O's Summary    24 Jan 2018 07:01  -  25 Jan 2018 07:00  --------------------------------------------------------  IN: 1010 mL / OUT: 1000 mL / NET: 10 mL    25 Jan 2018 07:01  -  25 Jan 2018 21:38  --------------------------------------------------------  IN: 1580 mL / OUT: 2400 mL / NET: -820 mL        PHYSICAL EXAM:  HEENT: NC/AT; PERRLA  Neck: Soft; no tenderness  Lungs: CTA bilaterally; no wheezing.   Heart:  Abdomen:  Genital/ Rectal:  Extremities:  Neurologic:  Vascular:      LABORATORY:    CBC Full  -  ( 25 Jan 2018 14:57 )  WBC Count : 6.0 K/uL  Hemoglobin : 11.5 g/dL  Hematocrit : 35.3 %  Platelet Count - Automated : 227 K/uL  Mean Cell Volume : 87.2 fl  Mean Cell Hemoglobin : 28.4 pg  Mean Cell Hemoglobin Concentration : 32.5 gm/dL  Auto Neutrophil # : x  Auto Lymphocyte # : x  Auto Monocyte # : x  Auto Eosinophil # : x  Auto Basophil # : x  Auto Neutrophil % : x  Auto Lymphocyte % : x  Auto Monocyte % : x  Auto Eosinophil % : x  Auto Basophil % : x          01-25    140  |  104  |  7   ----------------------------<  135<H>  3.6   |  29  |  0.61    Ca    8.4<L>      25 Jan 2018 14:57        Rapid Respiratory Viral Panel Result        01-22 @ 18:30  Rapid RVP [facility]  Coronovirus --  Adenovirus --  Bordetella Pertussis --  Chlamydia Pneumonia --  Entero/Rhinovirus--  HKU1 Coronovirus --  HMPV Coronovirus --  Influenza A --  Influenza AH1 --  Influenza AH1 2009 --  Influenza AH3 --  Influenza B --  Mycoplasma Pneumoniae --  NL63 Coronovirus --  OC43 Coronovirus --  Parainfluenza 1 --  Parainfluenza 2 --  Parainfluenza 3 --  Parainfluenza 4 --  Resp Syncytial Virus --      Assessment and Plan:          Dain Pennington MD   [phone]. He is comfortable  No fevers noted.      MEDICATIONS  (STANDING):  dextrose 5% + sodium chloride 0.9% with potassium chloride 20 mEq/L 1000 milliLiter(s) (80 mL/Hr) IV Continuous <Continuous>  docusate sodium 100 milliGRAM(s) Oral three times a day  DULoxetine 60 milliGRAM(s) Oral daily  enoxaparin Injectable 40 milliGRAM(s) SubCutaneous daily  famotidine    Tablet 20 milliGRAM(s) Oral daily  OXcarbazepine 300 milliGRAM(s) Oral two times a day  oxybutynin 5 milliGRAM(s) Oral two times a day  piperacillin/tazobactam IVPB. 3.375 Gram(s) IV Intermittent every 8 hours  polyethylene glycol 3350 17 Gram(s) Oral daily  pregabalin 150 milliGRAM(s) Oral four times a day  senna 2 Tablet(s) Oral at bedtime  vancomycin  IVPB      vancomycin  IVPB 1500 milliGRAM(s) IV Intermittent every 12 hours    MEDICATIONS  (PRN):  acetaminophen   Tablet 650 milliGRAM(s) Oral every 6 hours PRN For Temp greater than 38 C (100.4 F)  HYDROmorphone  Injectable 1 milliGRAM(s) IV Push every 6 hours PRN Severe Pain (7 - 10)  ibuprofen  Tablet 400 milliGRAM(s) Oral every 6 hours PRN FOR TEMP GREATER THEN 101.3  oxyCODONE    5 mG/acetaminophen 325 mG 1 Tablet(s) Oral every 6 hours PRN Moderate Pain (4 - 6)      Vital Signs Last 24 Hrs  T(C): 37.2 (25 Jan 2018 20:57), Max: 37.2 (25 Jan 2018 20:57)  T(F): 99 (25 Jan 2018 20:57), Max: 99 (25 Jan 2018 20:57)  HR: 74 (25 Jan 2018 20:57) (66 - 74)  BP: 117/71 (25 Jan 2018 20:57) (117/71 - 172/91)  BP(mean): --  RR: 17 (25 Jan 2018 20:57) (17 - 18)  SpO2: 97% (25 Jan 2018 20:57) (94% - 98%)    I&O's Summary    24 Jan 2018 07:01  -  25 Jan 2018 07:00  --------------------------------------------------------  IN: 1010 mL / OUT: 1000 mL / NET: 10 mL    25 Jan 2018 07:01  -  25 Jan 2018 21:38  --------------------------------------------------------  IN: 1580 mL / OUT: 2400 mL / NET: -820 mL    PHYSICAL EXAM:  HEENT: NC/AT; PERRLA  Neck: Soft; no tenderness  Lungs: Coarse BS bilaterally; no wheezing.   Heart: RRR; no murmurs.   Abdomen: Soft; no masses.   Extremities: No ulcers.   Neurologic: Awake.     LABORATORY:    CBC Full  -  ( 25 Jan 2018 14:57 )  WBC Count : 6.0 K/uL  Hemoglobin : 11.5 g/dL  Hematocrit : 35.3 %  Platelet Count - Automated : 227 K/uL  Mean Cell Volume : 87.2 fl  Mean Cell Hemoglobin : 28.4 pg  Mean Cell Hemoglobin Concentration : 32.5 gm/dL  Auto Neutrophil # : x  Auto Lymphocyte # : x  Auto Monocyte # : x  Auto Eosinophil # : x  Auto Basophil # : x  Auto Neutrophil % : x  Auto Lymphocyte % : x  Auto Monocyte % : x  Auto Eosinophil % : x  Auto Basophil % : x      Assessment and Plan:    1. UTI  2. Left thigh fluid collection: abscess vs hematoma.  3. Possible left hip bursitis.       . Orthopedic resident has contacted me today requesting broaden IV antibiotics for possible left hip bursitis. Agree to change IV Rocephin to IV Zosyn and Vancomycin.  . Would get Indium scan to evaluate for possible infection in left hip. This scan will take 2 consecutive days to be done and can start on Monday of next week. Would get this scan done before discharge the patient.   . Orthooedics to follow up.  . Follow all cultures.       Dain Pennington MD   [phone].

## 2018-01-25 NOTE — PROGRESS NOTE ADULT - PROBLEM SELECTOR PLAN 3
Ortho and Surgery- Dr. Delgadillo- no need for intervention at this time as fluid seems to be reduced from previous imaging- unlikely source of infection. C/w antibiotics per ID for now. Ortho and Surgery- Dr. Delgadillo- no need for intervention at this time as fluid seems to be reduced from previous imaging- unlikely source of infection. C/w antibiotics per ID for now.  gen surg will follow as outpt prn

## 2018-01-26 ENCOUNTER — TRANSCRIPTION ENCOUNTER (OUTPATIENT)
Age: 63
End: 2018-01-26

## 2018-01-26 VITALS
OXYGEN SATURATION: 97 % | RESPIRATION RATE: 18 BRPM | SYSTOLIC BLOOD PRESSURE: 146 MMHG | DIASTOLIC BLOOD PRESSURE: 87 MMHG | HEART RATE: 67 BPM | TEMPERATURE: 98 F

## 2018-01-26 LAB
ANION GAP SERPL CALC-SCNC: 5 MMOL/L — SIGNIFICANT CHANGE UP (ref 5–17)
BUN SERPL-MCNC: 9 MG/DL — SIGNIFICANT CHANGE UP (ref 7–23)
CALCIUM SERPL-MCNC: 8.2 MG/DL — LOW (ref 8.5–10.1)
CHLORIDE SERPL-SCNC: 106 MMOL/L — SIGNIFICANT CHANGE UP (ref 96–108)
CO2 SERPL-SCNC: 30 MMOL/L — SIGNIFICANT CHANGE UP (ref 22–31)
CREAT SERPL-MCNC: 0.58 MG/DL — SIGNIFICANT CHANGE UP (ref 0.5–1.3)
CULTURE RESULTS: NO GROWTH — SIGNIFICANT CHANGE UP
GLUCOSE SERPL-MCNC: 100 MG/DL — HIGH (ref 70–99)
HCT VFR BLD CALC: 33.2 % — LOW (ref 39–50)
HGB BLD-MCNC: 10.4 G/DL — LOW (ref 13–17)
MCHC RBC-ENTMCNC: 27.4 PG — SIGNIFICANT CHANGE UP (ref 27–34)
MCHC RBC-ENTMCNC: 31.4 GM/DL — LOW (ref 32–36)
MCV RBC AUTO: 87.2 FL — SIGNIFICANT CHANGE UP (ref 80–100)
PLATELET # BLD AUTO: 204 K/UL — SIGNIFICANT CHANGE UP (ref 150–400)
POTASSIUM SERPL-MCNC: 3.8 MMOL/L — SIGNIFICANT CHANGE UP (ref 3.5–5.3)
POTASSIUM SERPL-SCNC: 3.8 MMOL/L — SIGNIFICANT CHANGE UP (ref 3.5–5.3)
RBC # BLD: 3.81 M/UL — LOW (ref 4.2–5.8)
RBC # FLD: 13.4 % — SIGNIFICANT CHANGE UP (ref 10.3–14.5)
SODIUM SERPL-SCNC: 141 MMOL/L — SIGNIFICANT CHANGE UP (ref 135–145)
SPECIMEN SOURCE: SIGNIFICANT CHANGE UP
WBC # BLD: 5.5 K/UL — SIGNIFICANT CHANGE UP (ref 3.8–10.5)
WBC # FLD AUTO: 5.5 K/UL — SIGNIFICANT CHANGE UP (ref 3.8–10.5)

## 2018-01-26 PROCEDURE — 82553 CREATINE MB FRACTION: CPT

## 2018-01-26 PROCEDURE — 83880 ASSAY OF NATRIURETIC PEPTIDE: CPT

## 2018-01-26 PROCEDURE — 86901 BLOOD TYPING SEROLOGIC RH(D): CPT

## 2018-01-26 PROCEDURE — 80053 COMPREHEN METABOLIC PANEL: CPT

## 2018-01-26 PROCEDURE — 87798 DETECT AGENT NOS DNA AMP: CPT

## 2018-01-26 PROCEDURE — 81001 URINALYSIS AUTO W/SCOPE: CPT

## 2018-01-26 PROCEDURE — 83690 ASSAY OF LIPASE: CPT

## 2018-01-26 PROCEDURE — 84484 ASSAY OF TROPONIN QUANT: CPT

## 2018-01-26 PROCEDURE — 93970 EXTREMITY STUDY: CPT

## 2018-01-26 PROCEDURE — 83605 ASSAY OF LACTIC ACID: CPT

## 2018-01-26 PROCEDURE — 86900 BLOOD TYPING SEROLOGIC ABO: CPT

## 2018-01-26 PROCEDURE — 85730 THROMBOPLASTIN TIME PARTIAL: CPT

## 2018-01-26 PROCEDURE — 96372 THER/PROPH/DIAG INJ SC/IM: CPT | Mod: XU

## 2018-01-26 PROCEDURE — 99285 EMERGENCY DEPT VISIT HI MDM: CPT | Mod: 25

## 2018-01-26 PROCEDURE — 80048 BASIC METABOLIC PNL TOTAL CA: CPT

## 2018-01-26 PROCEDURE — 97163 PT EVAL HIGH COMPLEX 45 MIN: CPT

## 2018-01-26 PROCEDURE — 99239 HOSP IP/OBS DSCHRG MGMT >30: CPT

## 2018-01-26 PROCEDURE — 85027 COMPLETE CBC AUTOMATED: CPT

## 2018-01-26 PROCEDURE — 85610 PROTHROMBIN TIME: CPT

## 2018-01-26 PROCEDURE — 87581 M.PNEUMON DNA AMP PROBE: CPT

## 2018-01-26 PROCEDURE — 71045 X-RAY EXAM CHEST 1 VIEW: CPT

## 2018-01-26 PROCEDURE — 97110 THERAPEUTIC EXERCISES: CPT

## 2018-01-26 PROCEDURE — 83735 ASSAY OF MAGNESIUM: CPT

## 2018-01-26 PROCEDURE — 96365 THER/PROPH/DIAG IV INF INIT: CPT

## 2018-01-26 PROCEDURE — 86850 RBC ANTIBODY SCREEN: CPT

## 2018-01-26 PROCEDURE — 80061 LIPID PANEL: CPT

## 2018-01-26 PROCEDURE — 73552 X-RAY EXAM OF FEMUR 2/>: CPT

## 2018-01-26 PROCEDURE — 93005 ELECTROCARDIOGRAM TRACING: CPT

## 2018-01-26 PROCEDURE — 84443 ASSAY THYROID STIM HORMONE: CPT

## 2018-01-26 PROCEDURE — 87486 CHLMYD PNEUM DNA AMP PROBE: CPT

## 2018-01-26 PROCEDURE — 73502 X-RAY EXAM HIP UNI 2-3 VIEWS: CPT

## 2018-01-26 PROCEDURE — 73701 CT LOWER EXTREMITY W/DYE: CPT

## 2018-01-26 PROCEDURE — 87086 URINE CULTURE/COLONY COUNT: CPT

## 2018-01-26 PROCEDURE — 87040 BLOOD CULTURE FOR BACTERIA: CPT

## 2018-01-26 PROCEDURE — 87633 RESP VIRUS 12-25 TARGETS: CPT

## 2018-01-26 RX ORDER — TOLTERODINE TARTRATE 1 MG/1
1 TABLET, FILM COATED ORAL
Qty: 60 | Refills: 0 | OUTPATIENT
Start: 2018-01-26 | End: 2018-02-24

## 2018-01-26 RX ORDER — CIPROFLOXACIN LACTATE 400MG/40ML
1 VIAL (ML) INTRAVENOUS
Qty: 6 | Refills: 0 | OUTPATIENT
Start: 2018-01-26 | End: 2018-01-28

## 2018-01-26 RX ADMIN — Medication 150 MILLIGRAM(S): at 06:14

## 2018-01-26 RX ADMIN — PIPERACILLIN AND TAZOBACTAM 25 GRAM(S): 4; .5 INJECTION, POWDER, LYOPHILIZED, FOR SOLUTION INTRAVENOUS at 06:14

## 2018-01-26 RX ADMIN — ENOXAPARIN SODIUM 40 MILLIGRAM(S): 100 INJECTION SUBCUTANEOUS at 11:34

## 2018-01-26 RX ADMIN — OXYCODONE AND ACETAMINOPHEN 1 TABLET(S): 5; 325 TABLET ORAL at 13:05

## 2018-01-26 RX ADMIN — HYDROMORPHONE HYDROCHLORIDE 1 MILLIGRAM(S): 2 INJECTION INTRAMUSCULAR; INTRAVENOUS; SUBCUTANEOUS at 00:20

## 2018-01-26 RX ADMIN — HYDROMORPHONE HYDROCHLORIDE 1 MILLIGRAM(S): 2 INJECTION INTRAMUSCULAR; INTRAVENOUS; SUBCUTANEOUS at 00:40

## 2018-01-26 RX ADMIN — Medication 150 MILLIGRAM(S): at 11:32

## 2018-01-26 RX ADMIN — Medication 5 MILLIGRAM(S): at 17:19

## 2018-01-26 RX ADMIN — POLYETHYLENE GLYCOL 3350 17 GRAM(S): 17 POWDER, FOR SOLUTION ORAL at 11:32

## 2018-01-26 RX ADMIN — FAMOTIDINE 20 MILLIGRAM(S): 10 INJECTION INTRAVENOUS at 11:32

## 2018-01-26 RX ADMIN — Medication 300 MILLIGRAM(S): at 11:32

## 2018-01-26 RX ADMIN — OXCARBAZEPINE 300 MILLIGRAM(S): 300 TABLET, FILM COATED ORAL at 17:19

## 2018-01-26 RX ADMIN — DULOXETINE HYDROCHLORIDE 60 MILLIGRAM(S): 30 CAPSULE, DELAYED RELEASE ORAL at 11:32

## 2018-01-26 RX ADMIN — Medication 100 MILLIGRAM(S): at 06:14

## 2018-01-26 RX ADMIN — Medication 150 MILLIGRAM(S): at 17:19

## 2018-01-26 RX ADMIN — Medication 150 MILLIGRAM(S): at 00:14

## 2018-01-26 RX ADMIN — DEXTROSE MONOHYDRATE, SODIUM CHLORIDE, AND POTASSIUM CHLORIDE 80 MILLILITER(S): 50; .745; 4.5 INJECTION, SOLUTION INTRAVENOUS at 04:12

## 2018-01-26 RX ADMIN — OXYCODONE AND ACETAMINOPHEN 1 TABLET(S): 5; 325 TABLET ORAL at 14:59

## 2018-01-26 RX ADMIN — Medication 5 MILLIGRAM(S): at 06:14

## 2018-01-26 RX ADMIN — OXCARBAZEPINE 300 MILLIGRAM(S): 300 TABLET, FILM COATED ORAL at 06:14

## 2018-01-26 NOTE — DISCHARGE NOTE ADULT - PROVIDER TOKENS
TOKEN:'[id number]',TOKEN:'[id number]' TOKEN:'[id number]:'[id number]',FREE:[LAST:[Wilfredo],FIRST:[],PHONE:[(   )    -],FAX:[(   )    -],ADDRESS:[Dr. Saleem Osullvian    Psychiatry & neurology - neurology    [address] [place] [address]    [phone]]]

## 2018-01-26 NOTE — PROGRESS NOTE ADULT - SUBJECTIVE AND OBJECTIVE BOX
Neurology follow up note    THANH [id number]      Interval History:    Patient had poor sleep last night feels tired     MEDICATIONS    acetaminophen   Tablet 650 milliGRAM(s) Oral every 6 hours PRN  dextrose 5% + sodium chloride 0.9% with potassium chloride 20 mEq/L 1000 milliLiter(s) IV Continuous <Continuous>  docusate sodium 100 milliGRAM(s) Oral three times a day  DULoxetine 60 milliGRAM(s) Oral daily  enoxaparin Injectable 40 milliGRAM(s) SubCutaneous daily  famotidine    Tablet 20 milliGRAM(s) Oral daily  HYDROmorphone  Injectable 1 milliGRAM(s) IV Push every 6 hours PRN  ibuprofen  Tablet 400 milliGRAM(s) Oral every 6 hours PRN  OXcarbazepine 300 milliGRAM(s) Oral two times a day  oxybutynin 5 milliGRAM(s) Oral two times a day  oxyCODONE    5 mG/acetaminophen 325 mG 1 Tablet(s) Oral every 6 hours PRN  piperacillin/tazobactam IVPB. 3.375 Gram(s) IV Intermittent every 8 hours  polyethylene glycol 3350 17 Gram(s) Oral daily  pregabalin 150 milliGRAM(s) Oral four times a day  senna 2 Tablet(s) Oral at bedtime  vancomycin  IVPB      vancomycin  IVPB 1500 milliGRAM(s) IV Intermittent every 12 hours      Allergies    No Known Allergies    Intolerances            Vital Signs Last 24 Hrs  T(C): 36.5 (2018 04:53), Max: 37.2 (2018 20:57)  T(F): 97.7 (2018 04:53), Max: 99 (2018 20:57)  HR: 58 (2018 04:53) (58 - 74)  BP: 155/87 (2018 04:53) (117/71 - 155/87)  BP(mean): --  RR: 16 (2018 04:53) (16 - 18)  SpO2: 99% (2018 04:53) (97% - 99%)        REVIEW OF SYSTEMS:     Constitutional: No fever, chills, fatigue, tired   Eyes: no eye pain, visual disturbances, or discharge  ENT:  No difficulty hearing, tinnitus, vertigo; No sinus or throat pain  Neck: No pain or stiffness  Respiratory: No cough, dyspnea, wheezing   Cardiovascular: No chest pain, palpitations,   Gastrointestinal: No abdominal or epigastric pain. No nausea, vomiting  No diarrhea or constipation.   Genitourinary: No dysuria, frequency, hematuria or incontinence  Neurological: No headaches, lightheadedness, vertigo, numbness or tremors  Psychiatric: No depression, anxiety, mood swings or difficulty sleeping  Musculoskeletal: No joint pain or swelling; No muscle, back or extremity pain  Skin: No itching, burning, rashes or lesions   Lymph Nodes: No enlarged glands  Endocrine: No heat or cold intolerance; No hair loss   Allergy and Immunologic: No hives or eczema    On Neurological Examination:    Mental Status - Patient is alert, awake, oriented X3.     Extraocular movements were intact.      Pupils were equal, round, and reactive bilaterally 3 mm to 2 mm.      Speech was fluent.      Smile symmetric.      Right upper, there was subtle flexation extension and subtle finger flexation extension of the right upper extremity which is his baseline.  The left upper extremities were able to elevate roughly about 30 degrees, flexation extension at the elbow and hand grasp was 3/5.  This is apparently significantly better than when he first came into the emergency room.  Bilateral lower extremities, no movement.     The patient is normally wheelchair, bed bound.  Follow simple commands      Muscle tone - is normal all over.  No asymmetry is seen.      Sensory    Bilateral intact to light touch    GENERAL Exam: Nontoxic , No Acute Distress   	  HEENT:  normocephalic, atraumatic  		  LUNGS: Clear bilaterally    	  HEART: Normal S1S2   No murmur RRR        	  GI/ ABDOMEN:  Soft  Non tender    EXTREMITIES:   Positive swelling was noted bilateral feet.      MUSCULOSKELETAL:  decreased Range of Motion all 4 extremities   	   SKIN: Normal  No Ecchymosis           LABS:  CBC Full  -  ( 2018 07:19 )  WBC Count : 5.5 K/uL  Hemoglobin : 10.4 g/dL  Hematocrit : 33.2 %  Platelet Count - Automated : 204 K/uL  Mean Cell Volume : 87.2 fl  Mean Cell Hemoglobin : 27.4 pg  Mean Cell Hemoglobin Concentration : 31.4 gm/dL  Auto Neutrophil # : x  Auto Lymphocyte # : x  Auto Monocyte # : x  Auto Eosinophil # : x  Auto Basophil # : x  Auto Neutrophil % : x  Auto Lymphocyte % : x  Auto Monocyte % : x  Auto Eosinophil % : x  Auto Basophil % : x    Urinalysis Basic - ( 2018 15:27 )    Color: Yellow / Appearance: Clear / S.010 / pH: x  Gluc: x / Ketone: Negative  / Bili: Negative / Urobili: 1   Blood: x / Protein: Negative / Nitrite: Negative   Leuk Esterase: Negative / RBC: 3-5 /HPF / WBC 0-2   Sq Epi: x / Non Sq Epi: Occasional / Bacteria: Occasional          141  |  106  |  9   ----------------------------<  100<H>  3.8   |  30  |  0.58    Ca    8.2<L>      2018 07:19      Hemoglobin A1C:       Vitamin B12         RADIOLOGY      ANALYSIS AND PLAN:  This is a 62-year-old with a history of multiple sclerosis, weakness, trigeminal neuralgia, and neuropathic pain.  1.	For episode of weakness, most likely secondary to multiple sclerosis exacerbation secondary to underlying sepsis type process.  2.	Continue antibiotics.  3.	Sepsis workup.  4.	I do not feel that steroids are needed at present.  The patient overall has significantly improved from when he first came into the emergency room.  5.	For history of trigeminal neuralgia, continue the patient on Trileptal.  6.	For history of multiple sclerosis, supportive therapy.  7.	For neuropathic pain, continue the patient on Cymbalta and Lyrica.  8.	I would recommend fall precautions.  9.	Aspiration precautions.  10.	neurologic wise stable   Thank you for the courtesy of this consultation.    Physical therapy evaluation as tolerated  OOB to chair/ambulation with assistance only if possible.    Greater than 25 minutes spent in direct patient care reviewing  the notes, lab data/ imaging , discussion with multidisciplinary team.

## 2018-01-26 NOTE — DISCHARGE NOTE ADULT - PATIENT PORTAL LINK FT
“You can access the [facility] Patient Portal, offered by [facility], by registering with the following website: [email or web address]”

## 2018-01-26 NOTE — DISCHARGE NOTE ADULT - CARE PROVIDER_API CALL
Gatito Conti), Internal Medicine  [address] [place] [address]  Phone: [phone]  Fax: [phone]    Sundar Delgadillo), Surgery  [address] [facility]  [address]  [place] [address]  Phone: [phone]  Fax: [phone] Gatito Conti), Internal Medicine  [address] [place] [address]  Phone: [phone]  Fax: [phone]    Sundar Delgadillo), Surgery  [address] [facility]  [address]  [place] [address]  Phone: [phone]  Fax: [phone]    Dr. Dr. Saleem Villalobos    Psychiatry & neurology - neurology    [address] [place], [place] [address] [phone]  Phone: (   )    -  Fax: (   )    -

## 2018-01-26 NOTE — DISCHARGE NOTE ADULT - ADDITIONAL INSTRUCTIONS
Follow up with PCP within 1 week  Follow up with Surgery Dr. Delgadillo within 1 week for further management of left thigh fluid collection Follow up with PCP/neuro  within 1 week of discharge  Follow up with Surgery Dr. Delgadillo within 1 week for further management of left thigh fluid collection  pt and wife to setup all follow up appts

## 2018-01-26 NOTE — DISCHARGE NOTE ADULT - MEDICATION SUMMARY - MEDICATIONS TO TAKE
I will START or STAY ON the medications listed below when I get home from the hospital:    Norco 10 mg-325 mg oral tablet  -- 1 tab(s) by mouth every 6 hours, As Needed  -- Indication: For Pain    Lyrica 150 mg oral capsule  -- 1 cap(s) by mouth 4 times a day  -- Indication: For Neuropathy    Cymbalta 60 mg oral delayed release capsule  -- 1 cap(s) by mouth 2 times a day  -- Indication: For Neuropathy    oxybutynin  -- 15 milligram(s) by mouth 2 times a day  -- Indication: For Neurogenic bladder I will START or STAY ON the medications listed below when I get home from the hospital:    Norco 10 mg-325 mg oral tablet  -- 1 tab(s) by mouth every 6 hours, As Needed  -- Indication: For Pain    Lyrica 150 mg oral capsule  -- 1 cap(s) by mouth 4 times a day  -- Indication: For Neuropathy    Cymbalta 60 mg oral delayed release capsule  -- 1 cap(s) by mouth 2 times a day  -- Indication: For Neuropathy    ciprofloxacin 500 mg oral tablet  -- 1 tab(s) by mouth 2 times a day   -- Avoid prolonged or excessive exposure to direct and/or artificial sunlight while taking this medication.  Check with your doctor before becoming pregnant.  Do not take dairy products, antacids, or iron preparations within one hour of this medication.  Finish all this medication unless otherwise directed by prescriber.  Medication should be taken with plenty of water.    -- Indication: For UTI (urinary tract infection)    tolterodine 1 mg oral tablet  -- 1 tab(s) by mouth 2 times a day   -- It is very important that you take or use this exactly as directed.  Do not skip doses or discontinue unless directed by your doctor.  May cause drowsiness.  Alcohol may intensify this effect.  Use care when operating dangerous machinery.    -- Indication: For Neurogenic bladder    oxybutynin  -- 15 milligram(s) by mouth 2 times a day  -- Indication: For Neurogenic bladder

## 2018-01-26 NOTE — DISCHARGE NOTE ADULT - HOSPITAL COURSE
63 yo male hx of MS with baseline lower extremity paralysis, left femur fracture (atraumatic) since september 2017 presented to the ED with worsening weakness and increasing urgency/frequency in his b/l upper extremities.  Found to have oral temp 100.3 Admitted for septic workup and evaluation.  In the ED, blood and urine cx sent, IVF started, IV abx initiated for probable UTI.  Found to have left necrotic heel and podiatry eval requested.  No intervention at this time.  Pt seen by Neurology Dr. Lizarraga.  MS exacerbation likely secondary to infectious process.  No steroids needed.  Continue Abx.  Pt seen by Surgery for left thigh fluid collection.  CT shows Left iliopsoas and trochanteric bursitis and left intratrochanteric fracture.  Pt seen by ID Dr. Pennington.  Abscess vs. hematoma.  Continue Rocephin.  Indium scan recommended.  Condition discussed with Hospitalist Dr. Bush and Surgery Dr. Delgadillo.  [place] it best for patient not to wait on Indium scan at this point and be discharged home considering improved condition, to follow up with Dr. Delgadillo as an outpatient.  Pt afebrile, cultures are negative, stable for discharge home on Ciprofloxacin. 63 yo male hx of MS with baseline lower extremity paralysis, left femur fracture (atraumatic) since september 2017 presented to the ED with worsening weakness and increasing urgency/frequency in his b/l upper extremities.  Found to have oral temp 100.3 Admitted for septic workup and evaluation.  In the ED, blood and urine cx sent, IVF started, IV abx initiated for probable UTI.  Found to have left necrotic heel and podiatry eval requested.  No intervention at this time.  Pt seen by Neurology Dr. Lizarraga.  MS exacerbation likely secondary to infectious process.  No steroids needed.  Continue Abx.  Pt seen by Surgery for left thigh fluid collection.  CT shows Left iliopsoas and trochanteric bursitis and left intratrochanteric fracture.  Pt seen by ID Dr. Pennington.  Abscess vs. hematoma.  Continue Rocephin.  Indium scan recommended.  Felt it best for patient not to wait on Indium scan at this point and be discharged home considering improved condition, to follow up with Dr. Delgadillo as an outpatient.  Pt afebrile, cultures are negative, stable for discharge home on Ciprofloxacin. 63 yo male hx of MS with baseline lower extremity paralysis, left femur fracture (atraumatic) since september 2017 presented to the ED with worsening weakness and increasing urgency/frequency in his b/l upper extremities.  Found to have oral temp 100.3 Admitted for septic workup and evaluation.  In the ED, blood and urine cx sent, IVF started, IV abx initiated for probable UTI.  Found to have left necrotic heel and podiatry eval requested.  No intervention at this time.  Pt seen by Neurology Dr. Lizarraga.  MS exacerbation likely secondary to infectious process.  No steroids needed.  Continue Abx.  Pt seen by Surgery for left thigh fluid collection.  CT shows Left iliopsoas and trochanteric bursitis and left intratrochanteric fracture.  Pt seen by ID Dr. Pennington.  Abscess vs. hematoma.  Continue Rocephin.  Indium scan recommended.  Felt it best for patient not to wait on Indium scan at this point and be discharged home considering improved condition, to follow up with Dr. Delgadillo as an outpatient.  Pt afebrile, cultures are negative, stable for discharge home on Ciprofloxacin.    Time spent: 65 minutes  Neuro from MS center as requested by pt,  office made aware of discharge

## 2018-01-26 NOTE — DISCHARGE NOTE ADULT - PLAN OF CARE
Clinically improvement Likely secondary to sepsis-like process, improved  Follow up with Surgery Dr. Delgadillo for Exacerbation likely secondary to sepsis-like process  Continue home meds and supportive therapy Likely chronic collection  Continue Cipro  Follow with Surgery Dr. Delgadillo Continue Cipro no need for antibiotics   continue with oxybutynin Likely secondary to infection, improved  Follow up with Surgery-Dr. Delgadillo for further intervention Exacerbation likely secondary to infection  Continue home meds and supportive therapy Likely chronic collection  Continue Cipro 500mg twice a day for 3 more days  follow-up with surgery-Dr. Delgadillo for further intervention   Follow with Surgery Dr. Delgadillo Likely secondary to urinary tract infection, improved  Follow up with Surgery-Dr. Delgadillo for further intervention empiric cipro   continue with oxybutynin, tolteridine as adjunct

## 2018-01-26 NOTE — PROGRESS NOTE ADULT - PROVIDER SPECIALTY LIST ADULT
Hospitalist
Infectious Disease
Internal Medicine
Neurology
Neurology
Orthopedics
Surgery
Neurology

## 2018-01-26 NOTE — DISCHARGE NOTE ADULT - SECONDARY DIAGNOSIS.
MS (multiple sclerosis) Hematoma of left lower extremity, subsequent encounter UTI (urinary tract infection)

## 2018-01-26 NOTE — PROGRESS NOTE ADULT - SUBJECTIVE AND OBJECTIVE BOX
pt seen  no complaints at this time  tolerating diet  no dysuria  no pain  ICU Vital Signs Last 24 Hrs  T(C): 36.5 (26 Jan 2018 04:53), Max: 37.2 (25 Jan 2018 20:57)  T(F): 97.7 (26 Jan 2018 04:53), Max: 99 (25 Jan 2018 20:57)  HR: 58 (26 Jan 2018 04:53) (58 - 74)  BP: 155/87 (26 Jan 2018 04:53) (117/71 - 155/87)  BP(mean): --  ABP: --  ABP(mean): --  RR: 16 (26 Jan 2018 04:53) (16 - 18)  SpO2: 99% (26 Jan 2018 04:53) (97% - 99%)  gen- NAD  resp- Clear b/l  CVS- RRR  abd-soft NT/ND  L thigh no tenderness  ext- no edema, nontender                            10.4   5.5   )-----------( 204      ( 26 Jan 2018 07:19 )             33.2

## 2018-01-26 NOTE — DISCHARGE NOTE ADULT - CARE PLAN
Principal Discharge DX:	Weakness of both arms  Goal:	Clinically improvement  Assessment and plan of treatment:	Likely secondary to sepsis-like process, improved  Follow up with Surgery Dr. Delgadillo for  Secondary Diagnosis:	MS (multiple sclerosis)  Assessment and plan of treatment:	Exacerbation likely secondary to sepsis-like process  Continue home meds and supportive therapy  Secondary Diagnosis:	Hematoma of left lower extremity, subsequent encounter  Assessment and plan of treatment:	Likely chronic collection  Continue Cipro  Follow with Surgery Dr. Delgadillo  Secondary Diagnosis:	UTI (urinary tract infection)  Assessment and plan of treatment:	Continue Cipro Principal Discharge DX:	Weakness of both arms  Goal:	Clinically improvement  Assessment and plan of treatment:	Likely secondary to infection, improved  Follow up with Surgery-Dr. Delgadillo for further intervention  Secondary Diagnosis:	MS (multiple sclerosis)  Assessment and plan of treatment:	Exacerbation likely secondary to infection  Continue home meds and supportive therapy  Secondary Diagnosis:	Hematoma of left lower extremity, subsequent encounter  Assessment and plan of treatment:	Likely chronic collection  Continue Cipro 500mg twice a day for 3 more days  follow-up with surgery-Dr. Delgadillo for further intervention   Follow with Surgery Dr. Delgadillo  Secondary Diagnosis:	UTI (urinary tract infection)  Assessment and plan of treatment:	no need for antibiotics   continue with oxybutynin Principal Discharge DX:	Weakness of both arms  Goal:	Clinically improvement  Assessment and plan of treatment:	Likely secondary to urinary tract infection, improved  Follow up with Surgery-Dr. Delgadillo for further intervention  Secondary Diagnosis:	MS (multiple sclerosis)  Assessment and plan of treatment:	Exacerbation likely secondary to infection  Continue home meds and supportive therapy  Secondary Diagnosis:	Hematoma of left lower extremity, subsequent encounter  Assessment and plan of treatment:	Likely chronic collection  Continue Cipro 500mg twice a day for 3 more days  follow-up with surgery-Dr. Delgadillo for further intervention   Follow with Surgery Dr. Delgadillo  Secondary Diagnosis:	UTI (urinary tract infection)  Assessment and plan of treatment:	empiric cipro   continue with oxybutynin, tolteridine as adjunct

## 2018-01-26 NOTE — PROGRESS NOTE ADULT - ASSESSMENT
63 yo with MS presents with fever.  Likely cause UTI. PT has chronic L thigh collection that is decreasing over time.  DW pt plan with d/c on abx for UTI.  will follow up in office.  IF fever recur then will need to drain L thigh collection    cleared for d/c from surgical standpoint

## 2018-01-28 LAB
CULTURE RESULTS: SIGNIFICANT CHANGE UP
CULTURE RESULTS: SIGNIFICANT CHANGE UP
SPECIMEN SOURCE: SIGNIFICANT CHANGE UP
SPECIMEN SOURCE: SIGNIFICANT CHANGE UP

## 2018-08-24 ENCOUNTER — INPATIENT (INPATIENT)
Facility: HOSPITAL | Age: 63
LOS: 3 days | Discharge: NURSING, REHAB OR LONG-TERM CARE | DRG: 481 | End: 2018-08-28
Attending: INTERNAL MEDICINE | Admitting: INTERNAL MEDICINE
Payer: COMMERCIAL

## 2018-08-24 ENCOUNTER — TRANSCRIPTION ENCOUNTER (OUTPATIENT)
Age: 63
End: 2018-08-24

## 2018-08-24 VITALS
DIASTOLIC BLOOD PRESSURE: 105 MMHG | SYSTOLIC BLOOD PRESSURE: 183 MMHG | RESPIRATION RATE: 14 BRPM | HEIGHT: 76 IN | HEART RATE: 70 BPM | WEIGHT: 238.1 LBS | OXYGEN SATURATION: 99 % | TEMPERATURE: 99 F

## 2018-08-24 DIAGNOSIS — S72.91XA UNSPECIFIED FRACTURE OF RIGHT FEMUR, INITIAL ENCOUNTER FOR CLOSED FRACTURE: ICD-10-CM

## 2018-08-24 PROCEDURE — 99285 EMERGENCY DEPT VISIT HI MDM: CPT

## 2018-08-24 PROCEDURE — 73502 X-RAY EXAM HIP UNI 2-3 VIEWS: CPT | Mod: 26,RT

## 2018-08-24 PROCEDURE — 73700 CT LOWER EXTREMITY W/O DYE: CPT | Mod: 26,RT

## 2018-08-24 PROCEDURE — 71045 X-RAY EXAM CHEST 1 VIEW: CPT | Mod: 26

## 2018-08-24 PROCEDURE — 73562 X-RAY EXAM OF KNEE 3: CPT | Mod: 26,RT

## 2018-08-24 RX ORDER — ENOXAPARIN SODIUM 100 MG/ML
40 INJECTION SUBCUTANEOUS ONCE
Qty: 0 | Refills: 0 | Status: COMPLETED | OUTPATIENT
Start: 2018-08-24 | End: 2018-08-24

## 2018-08-24 RX ORDER — ENOXAPARIN SODIUM 100 MG/ML
40 INJECTION SUBCUTANEOUS EVERY 24 HOURS
Qty: 0 | Refills: 0 | Status: DISCONTINUED | OUTPATIENT
Start: 2018-08-24 | End: 2018-08-24

## 2018-08-24 RX ORDER — OXYBUTYNIN CHLORIDE 5 MG
15 TABLET ORAL
Qty: 0 | Refills: 0 | Status: DISCONTINUED | OUTPATIENT
Start: 2018-08-24 | End: 2018-08-24

## 2018-08-24 RX ORDER — SODIUM CHLORIDE 9 MG/ML
1000 INJECTION, SOLUTION INTRAVENOUS
Qty: 0 | Refills: 0 | Status: DISCONTINUED | OUTPATIENT
Start: 2018-08-24 | End: 2018-08-25

## 2018-08-24 RX ORDER — MORPHINE SULFATE 50 MG/1
2 CAPSULE, EXTENDED RELEASE ORAL EVERY 4 HOURS
Qty: 0 | Refills: 0 | Status: DISCONTINUED | OUTPATIENT
Start: 2018-08-24 | End: 2018-08-25

## 2018-08-24 RX ORDER — ONDANSETRON 8 MG/1
4 TABLET, FILM COATED ORAL EVERY 6 HOURS
Qty: 0 | Refills: 0 | Status: DISCONTINUED | OUTPATIENT
Start: 2018-08-24 | End: 2018-08-25

## 2018-08-24 RX ORDER — ACETAMINOPHEN 500 MG
650 TABLET ORAL EVERY 6 HOURS
Qty: 0 | Refills: 0 | Status: DISCONTINUED | OUTPATIENT
Start: 2018-08-24 | End: 2018-08-25

## 2018-08-24 RX ORDER — OXCARBAZEPINE 300 MG/1
300 TABLET, FILM COATED ORAL
Qty: 0 | Refills: 0 | Status: DISCONTINUED | OUTPATIENT
Start: 2018-08-24 | End: 2018-08-25

## 2018-08-24 RX ORDER — OXYCODONE AND ACETAMINOPHEN 5; 325 MG/1; MG/1
1 TABLET ORAL EVERY 4 HOURS
Qty: 0 | Refills: 0 | Status: DISCONTINUED | OUTPATIENT
Start: 2018-08-24 | End: 2018-08-25

## 2018-08-24 RX ORDER — SODIUM CHLORIDE 9 MG/ML
3 INJECTION INTRAMUSCULAR; INTRAVENOUS; SUBCUTANEOUS ONCE
Qty: 0 | Refills: 0 | Status: COMPLETED | OUTPATIENT
Start: 2018-08-24 | End: 2018-08-24

## 2018-08-24 RX ORDER — OXYBUTYNIN CHLORIDE 5 MG
5 TABLET ORAL
Qty: 0 | Refills: 0 | Status: DISCONTINUED | OUTPATIENT
Start: 2018-08-24 | End: 2018-08-25

## 2018-08-24 RX ORDER — AMLODIPINE BESYLATE 2.5 MG/1
5 TABLET ORAL DAILY
Qty: 0 | Refills: 0 | Status: DISCONTINUED | OUTPATIENT
Start: 2018-08-24 | End: 2018-08-25

## 2018-08-24 RX ORDER — DULOXETINE HYDROCHLORIDE 30 MG/1
60 CAPSULE, DELAYED RELEASE ORAL
Qty: 0 | Refills: 0 | Status: DISCONTINUED | OUTPATIENT
Start: 2018-08-24 | End: 2018-08-25

## 2018-08-24 NOTE — CONSULT NOTE ADULT - SUBJECTIVE AND OBJECTIVE BOX
Patient is a 66M non-ambulator w/ a PMHx of MS who presents to the ED today for a c/o of R Leg Pain. Patient states he was home getting out of his bed using a lift when he fell and felt a sharp pain right above his R Knee. Patient states the pain has been constant since but tolerable. This morning the patient was getting changed with his aid when he experienced a sharp pain in his R leg which prompted him to come to the ED. Patient states he has no motor function in either of his lower extremities. Denies any numbness/tingling. Denies HH/LOC. Admits to a Left Intertrochanteric Hip fracture in September 2017, that was treated non-operatively by a orthopedic surgeon at [facility]. Denies having any other pain elsewhere. No other orthopedic concerns at this time.    ROS: All other systems reviewed and negative except as noted in HPI    PAST MEDICAL & SURGICAL HISTORY:  Femur fracture, left  MS (multiple sclerosis)    Home Medications:  Cymbalta 60 mg oral delayed release capsule: 1 cap(s) orally 2 times a day (26 Jan 2018 17:43)  Lyrica 150 mg oral capsule: 1 cap(s) orally 4 times a day (26 Jan 2018 17:43)  Norco 10 mg-325 mg oral tablet: 1 tab(s) orally every 6 hours, As Needed (26 Jan 2018 17:43)  oxybutynin: 15 milligram(s) orally 2 times a day (26 Jan 2018 17:43)  Trileptal 300 mg oral tablet: 1 tab(s) orally 2 times a day (26 Jan 2018 17:43)    Allergies    No Known Allergies    Intolerances      PHYSICAL EXAM:  Vital Signs Last 24 Hrs  T(C): 37.2 (24 Aug 2018 17:34), Max: 37.2 (24 Aug 2018 17:34)  T(F): 99 (24 Aug 2018 17:34), Max: 99 (24 Aug 2018 17:34)  HR: 70 (24 Aug 2018 17:34) (70 - 70)  BP: 183/105 (24 Aug 2018 17:34) (183/105 - 183/105)  RR: 14 (24 Aug 2018 17:34) (14 - 14)  SpO2: 99% (24 Aug 2018 17:34) (99% - 99%)    Gen: NAD; Resting comfortably  RLE: Moderate swelling over anterior distal aspect of femur just superior to patella  +ttp over anterior aspect of distal femur  Baseline 3+ pitting edema  SILT L3-S1  Unable to assess ROM due to MS; No motor function at baseline  +1DP  Unable to SLR  - Log roll  Compartments soft and compressible  No calf tenderness    Secondary Survey:  LLE/RUE/LUE: No TTP over bony prominences, SILT, palpable pulses, full/painless range of motion, compartments soft     Imaging:  XR R Knee: Mildly comminuted/displaced oblique fracture of distal femur Patient is a 66M non-ambulator w/ a PMHx of MS who presents to the ED today for a c/o of R Leg Pain. Patient states he was home 3 weeks ago getting out of his bed using a lift when he fell and felt a sharp pain right above his R Knee. Patient states the pain has been constant since but tolerable. This morning the patient was getting changed with his aid when he experienced a sharp pain in his R leg which prompted him to come to the ED. Patient states he has no motor function in either of his lower extremities. Denies any numbness/tingling. Denies HH/LOC. Admits to a Left Intertrochanteric Hip fracture in September 2017, that was treated non-operatively by a orthopedic surgeon at [facility]. Denies having any other pain elsewhere. No other orthopedic concerns at this time.    ROS: All other systems reviewed and negative except as noted in HPI    PAST MEDICAL & SURGICAL HISTORY:  Femur fracture, left  MS (multiple sclerosis)    Home Medications:  Cymbalta 60 mg oral delayed release capsule: 1 cap(s) orally 2 times a day (26 Jan 2018 17:43)  Lyrica 150 mg oral capsule: 1 cap(s) orally 4 times a day (26 Jan 2018 17:43)  Norco 10 mg-325 mg oral tablet: 1 tab(s) orally every 6 hours, As Needed (26 Jan 2018 17:43)  oxybutynin: 15 milligram(s) orally 2 times a day (26 Jan 2018 17:43)  Trileptal 300 mg oral tablet: 1 tab(s) orally 2 times a day (26 Jan 2018 17:43)    Allergies    No Known Allergies    Intolerances      PHYSICAL EXAM:  Vital Signs Last 24 Hrs  T(C): 37.2 (24 Aug 2018 17:34), Max: 37.2 (24 Aug 2018 17:34)  T(F): 99 (24 Aug 2018 17:34), Max: 99 (24 Aug 2018 17:34)  HR: 70 (24 Aug 2018 17:34) (70 - 70)  BP: 183/105 (24 Aug 2018 17:34) (183/105 - 183/105)  RR: 14 (24 Aug 2018 17:34) (14 - 14)  SpO2: 99% (24 Aug 2018 17:34) (99% - 99%)    Gen: NAD; Resting comfortably  RLE: Moderate swelling over anterior distal aspect of femur just superior to patella  +ttp over anterior aspect of distal femur  Baseline 3+ pitting edema  SILT L3-S1  Unable to assess ROM due to MS; No motor function at baseline  +1DP  Unable to SLR  - Log roll  Compartments soft and compressible  No calf tenderness    Secondary Survey:  LLE/RUE/LUE: No TTP over bony prominences, SILT, palpable pulses, full/painless range of motion, compartments soft     Imaging:  XR R Knee: Comminuted oblique fracture of distal femur

## 2018-08-24 NOTE — ED ADULT NURSE NOTE - OBJECTIVE STATEMENT
patient came in Ed from home, BIBA with c/o right knee pain. patient states he fell 22 days ago while trying to get out of bed. denies head injury at that time. Hx of MS.

## 2018-08-24 NOTE — H&P ADULT - NSHPLABSRESULTS_GEN_ALL_CORE
Lab Results:  CBC  CBC Full  -  ( 25 Aug 2018 05:30 )  WBC Count : 5.84 K/uL  Hemoglobin : 9.5 g/dL  Hematocrit : 28.0 %  Platelet Count - Automated : 270 K/uL  Mean Cell Volume : 89.2 fl  Mean Cell Hemoglobin : 30.3 pg  Mean Cell Hemoglobin Concentration : 33.9 gm/dL  Auto Neutrophil # : x  Auto Lymphocyte # : x  Auto Monocyte # : x  Auto Eosinophil # : x  Auto Basophil # : x  Auto Neutrophil % : x  Auto Lymphocyte % : x  Auto Monocyte % : x  Auto Eosinophil % : x  Auto Basophil % : x    .		Differential:	[] Automated		[] Manual  Chemistry                        9.5    5.84  )-----------( 270      ( 25 Aug 2018 05:30 )             28.0     08-25    134<L>  |  98  |  12  ----------------------------<  79  3.6   |  29  |  0.55    Ca    8.3<L>      25 Aug 2018 05:30    TPro  7.0  /  Alb  3.4  /  TBili  0.7  /  DBili  x   /  AST  15  /  ALT  10<L>  /  AlkPhos  118  08-25    LIVER FUNCTIONS - ( 25 Aug 2018 00:32 )  Alb: 3.4 g/dL / Pro: 7.0 g/dL / ALK PHOS: 118 U/L / ALT: 10 U/L / AST: 15 U/L / GGT: x           PT/INR - ( 25 Aug 2018 05:30 )   PT: 14.0 sec;   INR: 1.28 ratio         PTT - ( 25 Aug 2018 05:30 )  PTT:37.0 sec  Urinalysis Basic - ( 25 Aug 2018 07:00 )    Color: Yellow / Appearance: Clear / S.010 / pH: x  Gluc: x / Ketone: Small  / Bili: Negative / Urobili: Negative   Blood: x / Protein: Negative / Nitrite: Negative   Leuk Esterase: Negative / RBC: 0-2 /HPF / WBC x   Sq Epi: x / Non Sq Epi: x / Bacteria: x            MICROBIOLOGY/CULTURES:      RADIOLOGY RESULTS:

## 2018-08-24 NOTE — H&P ADULT - NSHPPHYSICALEXAM_GEN_ALL_CORE
General: WN/WD NAD  PERRLA  Neurology: A&Ox3, nonfocal, FROST x 4  Respiratory: CTA B/L  CV: RRR, S1S2, no murmurs, rubs or gallops  Abdominal: Soft, NT, ND +BS, Last BM  Extremities: No edema, + peripheral pulses  Skin Normal

## 2018-08-24 NOTE — CONSULT NOTE ADULT - ASSESSMENT
A/P: 66M w/ R Distal Femur Fracture  Analgesia  DVT ppx  NWB RLE  PT/OT  DC planning A/P: 66M w/ R Distal Femur Fracture  Plan for OR tomorrow for IMN fixation  Bulky king knee immobilizer  Analgesia  Hold chemical DVT ppx for OR  NPO  IVF while NPO  NWB RLE  Will discuss with attending and advise if plan changes

## 2018-08-24 NOTE — ED ADULT NURSE NOTE - NSIMPLEMENTINTERV_GEN_ALL_ED
Implemented All Fall with Harm Risk Interventions:  [place] to call system. Call bell, personal items and telephone within reach. Instruct patient to call for assistance. Room bathroom lighting operational. Non-slip footwear when patient is off stretcher. Physically safe environment: no spills, clutter or unnecessary equipment. Stretcher in lowest position, wheels locked, appropriate side rails in place. Provide visual cue, wrist band, yellow gown, etc. Monitor gait and stability. Monitor for mental status changes and reorient to person, place, and time. Review medications for side effects contributing to fall risk. Reinforce activity limits and safety measures with patient and family. Provide visual clues: red socks.

## 2018-08-24 NOTE — H&P ADULT - HISTORY OF PRESENT ILLNESS
63 yr old male with MS, sp fall  while trying to get out of the bed. Pt is wheelchair bound due to MS. 63 yr old male with MS, sp fall  while trying to get out of the bed. Pt is wheelchair bound due to MS.  No other associated symptoms

## 2018-08-24 NOTE — ED PROVIDER NOTE - PROGRESS NOTE DETAILS
All results were explained to patient and/or family and a copy of all available results given.  case dw Dr. Hanson and SHAQUILLE Pham

## 2018-08-24 NOTE — ED PROVIDER NOTE - OBJECTIVE STATEMENT
s/p fall while trying to get out of the bed.  pt is wheelchair bound due to MS.  no other complaint.  pmd- N Dash

## 2018-08-24 NOTE — H&P ADULT - ASSESSMENT
63 yr old male with MS, sp fall  while trying to get out of the bed. Pt is wheelchair bound due to MS.  No other associated symptoms

## 2018-08-25 ENCOUNTER — TRANSCRIPTION ENCOUNTER (OUTPATIENT)
Age: 63
End: 2018-08-25

## 2018-08-25 DIAGNOSIS — G35 MULTIPLE SCLEROSIS: ICD-10-CM

## 2018-08-25 DIAGNOSIS — S72.91XA UNSPECIFIED FRACTURE OF RIGHT FEMUR, INITIAL ENCOUNTER FOR CLOSED FRACTURE: ICD-10-CM

## 2018-08-25 LAB
ALBUMIN SERPL ELPH-MCNC: 3.4 G/DL — SIGNIFICANT CHANGE UP (ref 3.3–5)
ALP SERPL-CCNC: 118 U/L — SIGNIFICANT CHANGE UP (ref 40–120)
ALT FLD-CCNC: 10 U/L — LOW (ref 12–78)
ANION GAP SERPL CALC-SCNC: 12 MMOL/L — SIGNIFICANT CHANGE UP (ref 5–17)
ANION GAP SERPL CALC-SCNC: 7 MMOL/L — SIGNIFICANT CHANGE UP (ref 5–17)
ANION GAP SERPL CALC-SCNC: 7 MMOL/L — SIGNIFICANT CHANGE UP (ref 5–17)
APPEARANCE UR: CLEAR — SIGNIFICANT CHANGE UP
APTT BLD: 30.3 SEC — SIGNIFICANT CHANGE UP (ref 27.5–37.4)
APTT BLD: 37 SEC — SIGNIFICANT CHANGE UP (ref 27.5–37.4)
AST SERPL-CCNC: 15 U/L — SIGNIFICANT CHANGE UP (ref 15–37)
BASOPHILS # BLD AUTO: 0.05 K/UL — SIGNIFICANT CHANGE UP (ref 0–0.2)
BASOPHILS NFR BLD AUTO: 0.7 % — SIGNIFICANT CHANGE UP (ref 0–2)
BILIRUB SERPL-MCNC: 0.7 MG/DL — SIGNIFICANT CHANGE UP (ref 0.2–1.2)
BILIRUB UR-MCNC: NEGATIVE — SIGNIFICANT CHANGE UP
BLD GP AB SCN SERPL QL: SIGNIFICANT CHANGE UP
BUN SERPL-MCNC: 12 MG/DL — SIGNIFICANT CHANGE UP (ref 7–23)
BUN SERPL-MCNC: 13 MG/DL — SIGNIFICANT CHANGE UP (ref 7–23)
BUN SERPL-MCNC: 9 MG/DL — SIGNIFICANT CHANGE UP (ref 7–23)
CALCIUM SERPL-MCNC: 8.3 MG/DL — LOW (ref 8.5–10.1)
CALCIUM SERPL-MCNC: 8.7 MG/DL — SIGNIFICANT CHANGE UP (ref 8.5–10.1)
CALCIUM SERPL-MCNC: 8.7 MG/DL — SIGNIFICANT CHANGE UP (ref 8.5–10.1)
CHLORIDE SERPL-SCNC: 97 MMOL/L — SIGNIFICANT CHANGE UP (ref 96–108)
CHLORIDE SERPL-SCNC: 97 MMOL/L — SIGNIFICANT CHANGE UP (ref 96–108)
CHLORIDE SERPL-SCNC: 98 MMOL/L — SIGNIFICANT CHANGE UP (ref 96–108)
CO2 SERPL-SCNC: 26 MMOL/L — SIGNIFICANT CHANGE UP (ref 22–31)
CO2 SERPL-SCNC: 29 MMOL/L — SIGNIFICANT CHANGE UP (ref 22–31)
CO2 SERPL-SCNC: 31 MMOL/L — SIGNIFICANT CHANGE UP (ref 22–31)
COLOR SPEC: YELLOW — SIGNIFICANT CHANGE UP
CREAT SERPL-MCNC: 0.55 MG/DL — SIGNIFICANT CHANGE UP (ref 0.5–1.3)
CREAT SERPL-MCNC: 0.61 MG/DL — SIGNIFICANT CHANGE UP (ref 0.5–1.3)
CREAT SERPL-MCNC: 0.66 MG/DL — SIGNIFICANT CHANGE UP (ref 0.5–1.3)
DIFF PNL FLD: ABNORMAL
EOSINOPHIL # BLD AUTO: 0.31 K/UL — SIGNIFICANT CHANGE UP (ref 0–0.5)
EOSINOPHIL NFR BLD AUTO: 4.5 % — SIGNIFICANT CHANGE UP (ref 0–6)
GLUCOSE SERPL-MCNC: 110 MG/DL — HIGH (ref 70–99)
GLUCOSE SERPL-MCNC: 79 MG/DL — SIGNIFICANT CHANGE UP (ref 70–99)
GLUCOSE SERPL-MCNC: 80 MG/DL — SIGNIFICANT CHANGE UP (ref 70–99)
GLUCOSE UR QL: NEGATIVE — SIGNIFICANT CHANGE UP
HCT VFR BLD CALC: 28 % — LOW (ref 39–50)
HCT VFR BLD CALC: 30.2 % — LOW (ref 39–50)
HCT VFR BLD CALC: 33 % — LOW (ref 39–50)
HGB BLD-MCNC: 10.2 G/DL — LOW (ref 13–17)
HGB BLD-MCNC: 11.1 G/DL — LOW (ref 13–17)
HGB BLD-MCNC: 9.5 G/DL — LOW (ref 13–17)
IMM GRANULOCYTES NFR BLD AUTO: 0.1 % — SIGNIFICANT CHANGE UP (ref 0–1.5)
INR BLD: 1.22 RATIO — HIGH (ref 0.88–1.16)
INR BLD: 1.28 RATIO — HIGH (ref 0.88–1.16)
KETONES UR-MCNC: ABNORMAL
LEUKOCYTE ESTERASE UR-ACNC: NEGATIVE — SIGNIFICANT CHANGE UP
LYMPHOCYTES # BLD AUTO: 1.86 K/UL — SIGNIFICANT CHANGE UP (ref 1–3.3)
LYMPHOCYTES # BLD AUTO: 26.9 % — SIGNIFICANT CHANGE UP (ref 13–44)
MCHC RBC-ENTMCNC: 30 PG — SIGNIFICANT CHANGE UP (ref 27–34)
MCHC RBC-ENTMCNC: 30.3 PG — SIGNIFICANT CHANGE UP (ref 27–34)
MCHC RBC-ENTMCNC: 30.4 PG — SIGNIFICANT CHANGE UP (ref 27–34)
MCHC RBC-ENTMCNC: 33.6 GM/DL — SIGNIFICANT CHANGE UP (ref 32–36)
MCHC RBC-ENTMCNC: 33.8 GM/DL — SIGNIFICANT CHANGE UP (ref 32–36)
MCHC RBC-ENTMCNC: 33.9 GM/DL — SIGNIFICANT CHANGE UP (ref 32–36)
MCV RBC AUTO: 89.2 FL — SIGNIFICANT CHANGE UP (ref 80–100)
MCV RBC AUTO: 89.2 FL — SIGNIFICANT CHANGE UP (ref 80–100)
MCV RBC AUTO: 89.9 FL — SIGNIFICANT CHANGE UP (ref 80–100)
MONOCYTES # BLD AUTO: 0.38 K/UL — SIGNIFICANT CHANGE UP (ref 0–0.9)
MONOCYTES NFR BLD AUTO: 5.5 % — SIGNIFICANT CHANGE UP (ref 2–14)
NEUTROPHILS # BLD AUTO: 4.31 K/UL — SIGNIFICANT CHANGE UP (ref 1.8–7.4)
NEUTROPHILS NFR BLD AUTO: 62.3 % — SIGNIFICANT CHANGE UP (ref 43–77)
NITRITE UR-MCNC: NEGATIVE — SIGNIFICANT CHANGE UP
NRBC # BLD: 0 /100 WBCS — SIGNIFICANT CHANGE UP (ref 0–0)
PH UR: 7 — SIGNIFICANT CHANGE UP (ref 5–8)
PLATELET # BLD AUTO: 270 K/UL — SIGNIFICANT CHANGE UP (ref 150–400)
PLATELET # BLD AUTO: 288 K/UL — SIGNIFICANT CHANGE UP (ref 150–400)
PLATELET # BLD AUTO: 322 K/UL — SIGNIFICANT CHANGE UP (ref 150–400)
POTASSIUM SERPL-MCNC: 3.6 MMOL/L — SIGNIFICANT CHANGE UP (ref 3.5–5.3)
POTASSIUM SERPL-MCNC: 3.9 MMOL/L — SIGNIFICANT CHANGE UP (ref 3.5–5.3)
POTASSIUM SERPL-MCNC: 4.1 MMOL/L — SIGNIFICANT CHANGE UP (ref 3.5–5.3)
POTASSIUM SERPL-SCNC: 3.6 MMOL/L — SIGNIFICANT CHANGE UP (ref 3.5–5.3)
POTASSIUM SERPL-SCNC: 3.9 MMOL/L — SIGNIFICANT CHANGE UP (ref 3.5–5.3)
POTASSIUM SERPL-SCNC: 4.1 MMOL/L — SIGNIFICANT CHANGE UP (ref 3.5–5.3)
PROT SERPL-MCNC: 7 G/DL — SIGNIFICANT CHANGE UP (ref 6–8.3)
PROT UR-MCNC: NEGATIVE — SIGNIFICANT CHANGE UP
PROTHROM AB SERPL-ACNC: 13.3 SEC — HIGH (ref 9.8–12.7)
PROTHROM AB SERPL-ACNC: 14 SEC — HIGH (ref 9.8–12.7)
RBC # BLD: 3.14 M/UL — LOW (ref 4.2–5.8)
RBC # BLD: 3.36 M/UL — LOW (ref 4.2–5.8)
RBC # BLD: 3.7 M/UL — LOW (ref 4.2–5.8)
RBC # FLD: 13.8 % — SIGNIFICANT CHANGE UP (ref 10.3–14.5)
RBC # FLD: 13.8 % — SIGNIFICANT CHANGE UP (ref 10.3–14.5)
RBC # FLD: 13.9 % — SIGNIFICANT CHANGE UP (ref 10.3–14.5)
SODIUM SERPL-SCNC: 134 MMOL/L — LOW (ref 135–145)
SODIUM SERPL-SCNC: 135 MMOL/L — SIGNIFICANT CHANGE UP (ref 135–145)
SODIUM SERPL-SCNC: 135 MMOL/L — SIGNIFICANT CHANGE UP (ref 135–145)
SP GR SPEC: 1.01 — SIGNIFICANT CHANGE UP (ref 1.01–1.02)
UROBILINOGEN FLD QL: NEGATIVE — SIGNIFICANT CHANGE UP
WBC # BLD: 19.7 K/UL — HIGH (ref 3.8–10.5)
WBC # BLD: 5.84 K/UL — SIGNIFICANT CHANGE UP (ref 3.8–10.5)
WBC # BLD: 6.92 K/UL — SIGNIFICANT CHANGE UP (ref 3.8–10.5)
WBC # FLD AUTO: 19.7 K/UL — HIGH (ref 3.8–10.5)
WBC # FLD AUTO: 5.84 K/UL — SIGNIFICANT CHANGE UP (ref 3.8–10.5)
WBC # FLD AUTO: 6.92 K/UL — SIGNIFICANT CHANGE UP (ref 3.8–10.5)

## 2018-08-25 RX ORDER — CEFAZOLIN SODIUM 1 G
2000 VIAL (EA) INJECTION ONCE
Qty: 0 | Refills: 0 | Status: COMPLETED | OUTPATIENT
Start: 2018-08-25 | End: 2018-08-25

## 2018-08-25 RX ORDER — CEFAZOLIN SODIUM 1 G
2000 VIAL (EA) INJECTION EVERY 8 HOURS
Qty: 0 | Refills: 0 | Status: COMPLETED | OUTPATIENT
Start: 2018-08-25 | End: 2018-08-26

## 2018-08-25 RX ORDER — HYDROMORPHONE HYDROCHLORIDE 2 MG/ML
1 INJECTION INTRAMUSCULAR; INTRAVENOUS; SUBCUTANEOUS
Qty: 0 | Refills: 0 | Status: DISCONTINUED | OUTPATIENT
Start: 2018-08-25 | End: 2018-08-25

## 2018-08-25 RX ORDER — FOLIC ACID 0.8 MG
1 TABLET ORAL DAILY
Qty: 0 | Refills: 0 | Status: DISCONTINUED | OUTPATIENT
Start: 2018-08-25 | End: 2018-08-28

## 2018-08-25 RX ORDER — DULOXETINE HYDROCHLORIDE 30 MG/1
60 CAPSULE, DELAYED RELEASE ORAL
Qty: 0 | Refills: 0 | Status: DISCONTINUED | OUTPATIENT
Start: 2018-08-25 | End: 2018-08-28

## 2018-08-25 RX ORDER — OXYCODONE HYDROCHLORIDE 5 MG/1
5 TABLET ORAL EVERY 4 HOURS
Qty: 0 | Refills: 0 | Status: DISCONTINUED | OUTPATIENT
Start: 2018-08-25 | End: 2018-08-28

## 2018-08-25 RX ORDER — HYDROMORPHONE HYDROCHLORIDE 2 MG/ML
1 INJECTION INTRAMUSCULAR; INTRAVENOUS; SUBCUTANEOUS ONCE
Qty: 0 | Refills: 0 | Status: DISCONTINUED | OUTPATIENT
Start: 2018-08-25 | End: 2018-08-25

## 2018-08-25 RX ORDER — OXYBUTYNIN CHLORIDE 5 MG
5 TABLET ORAL
Qty: 0 | Refills: 0 | Status: DISCONTINUED | OUTPATIENT
Start: 2018-08-25 | End: 2018-08-28

## 2018-08-25 RX ORDER — HYDROMORPHONE HYDROCHLORIDE 2 MG/ML
0.5 INJECTION INTRAMUSCULAR; INTRAVENOUS; SUBCUTANEOUS EVERY 6 HOURS
Qty: 0 | Refills: 0 | Status: DISCONTINUED | OUTPATIENT
Start: 2018-08-25 | End: 2018-08-25

## 2018-08-25 RX ORDER — AMLODIPINE BESYLATE 2.5 MG/1
5 TABLET ORAL DAILY
Qty: 0 | Refills: 0 | Status: DISCONTINUED | OUTPATIENT
Start: 2018-08-25 | End: 2018-08-28

## 2018-08-25 RX ORDER — OXYCODONE HYDROCHLORIDE 5 MG/1
10 TABLET ORAL ONCE
Qty: 0 | Refills: 0 | Status: DISCONTINUED | OUTPATIENT
Start: 2018-08-25 | End: 2018-08-25

## 2018-08-25 RX ORDER — HYDROMORPHONE HYDROCHLORIDE 2 MG/ML
1 INJECTION INTRAMUSCULAR; INTRAVENOUS; SUBCUTANEOUS EVERY 4 HOURS
Qty: 0 | Refills: 0 | Status: DISCONTINUED | OUTPATIENT
Start: 2018-08-25 | End: 2018-08-28

## 2018-08-25 RX ORDER — OXCARBAZEPINE 300 MG/1
300 TABLET, FILM COATED ORAL
Qty: 0 | Refills: 0 | Status: DISCONTINUED | OUTPATIENT
Start: 2018-08-25 | End: 2018-08-28

## 2018-08-25 RX ORDER — DIPHENHYDRAMINE HCL 50 MG
25 CAPSULE ORAL AT BEDTIME
Qty: 0 | Refills: 0 | Status: DISCONTINUED | OUTPATIENT
Start: 2018-08-25 | End: 2018-08-28

## 2018-08-25 RX ORDER — OXYCODONE HYDROCHLORIDE 5 MG/1
10 TABLET ORAL EVERY 4 HOURS
Qty: 0 | Refills: 0 | Status: DISCONTINUED | OUTPATIENT
Start: 2018-08-25 | End: 2018-08-28

## 2018-08-25 RX ORDER — SODIUM CHLORIDE 9 MG/ML
1000 INJECTION, SOLUTION INTRAVENOUS
Qty: 0 | Refills: 0 | Status: DISCONTINUED | OUTPATIENT
Start: 2018-08-25 | End: 2018-08-25

## 2018-08-25 RX ORDER — ACETAMINOPHEN 500 MG
650 TABLET ORAL EVERY 6 HOURS
Qty: 0 | Refills: 0 | Status: DISCONTINUED | OUTPATIENT
Start: 2018-08-25 | End: 2018-08-28

## 2018-08-25 RX ORDER — ONDANSETRON 8 MG/1
4 TABLET, FILM COATED ORAL EVERY 6 HOURS
Qty: 0 | Refills: 0 | Status: DISCONTINUED | OUTPATIENT
Start: 2018-08-25 | End: 2018-08-28

## 2018-08-25 RX ORDER — ENOXAPARIN SODIUM 100 MG/ML
40 INJECTION SUBCUTANEOUS EVERY 24 HOURS
Qty: 0 | Refills: 0 | Status: DISCONTINUED | OUTPATIENT
Start: 2018-08-26 | End: 2018-08-28

## 2018-08-25 RX ORDER — ASCORBIC ACID 60 MG
500 TABLET,CHEWABLE ORAL
Qty: 0 | Refills: 0 | Status: DISCONTINUED | OUTPATIENT
Start: 2018-08-25 | End: 2018-08-28

## 2018-08-25 RX ORDER — DOCUSATE SODIUM 100 MG
100 CAPSULE ORAL THREE TIMES A DAY
Qty: 0 | Refills: 0 | Status: DISCONTINUED | OUTPATIENT
Start: 2018-08-25 | End: 2018-08-28

## 2018-08-25 RX ORDER — HYDROMORPHONE HYDROCHLORIDE 2 MG/ML
0.5 INJECTION INTRAMUSCULAR; INTRAVENOUS; SUBCUTANEOUS ONCE
Qty: 0 | Refills: 0 | Status: DISCONTINUED | OUTPATIENT
Start: 2018-08-25 | End: 2018-08-25

## 2018-08-25 RX ORDER — OXYCODONE HYDROCHLORIDE 5 MG/1
5 TABLET ORAL ONCE
Qty: 0 | Refills: 0 | Status: DISCONTINUED | OUTPATIENT
Start: 2018-08-25 | End: 2018-08-25

## 2018-08-25 RX ORDER — METOCLOPRAMIDE HCL 10 MG
5 TABLET ORAL ONCE
Qty: 0 | Refills: 0 | Status: DISCONTINUED | OUTPATIENT
Start: 2018-08-25 | End: 2018-08-25

## 2018-08-25 RX ORDER — SODIUM CHLORIDE 9 MG/ML
1000 INJECTION, SOLUTION INTRAVENOUS
Qty: 0 | Refills: 0 | Status: DISCONTINUED | OUTPATIENT
Start: 2018-08-25 | End: 2018-08-26

## 2018-08-25 RX ORDER — FERROUS SULFATE 325(65) MG
325 TABLET ORAL
Qty: 0 | Refills: 0 | Status: DISCONTINUED | OUTPATIENT
Start: 2018-08-25 | End: 2018-08-28

## 2018-08-25 RX ADMIN — Medication 150 MILLIGRAM(S): at 23:50

## 2018-08-25 RX ADMIN — Medication 100 MILLIGRAM(S): at 23:50

## 2018-08-25 RX ADMIN — ENOXAPARIN SODIUM 40 MILLIGRAM(S): 100 INJECTION SUBCUTANEOUS at 01:34

## 2018-08-25 RX ADMIN — SODIUM CHLORIDE 100 MILLILITER(S): 9 INJECTION, SOLUTION INTRAVENOUS at 18:26

## 2018-08-25 RX ADMIN — HYDROMORPHONE HYDROCHLORIDE 1 MILLIGRAM(S): 2 INJECTION INTRAMUSCULAR; INTRAVENOUS; SUBCUTANEOUS at 18:52

## 2018-08-25 RX ADMIN — Medication 150 MILLIGRAM(S): at 06:19

## 2018-08-25 RX ADMIN — Medication 150 MILLIGRAM(S): at 01:35

## 2018-08-25 RX ADMIN — AMLODIPINE BESYLATE 5 MILLIGRAM(S): 2.5 TABLET ORAL at 06:20

## 2018-08-25 RX ADMIN — HYDROMORPHONE HYDROCHLORIDE 1 MILLIGRAM(S): 2 INJECTION INTRAMUSCULAR; INTRAVENOUS; SUBCUTANEOUS at 21:40

## 2018-08-25 RX ADMIN — OXCARBAZEPINE 300 MILLIGRAM(S): 300 TABLET, FILM COATED ORAL at 06:19

## 2018-08-25 RX ADMIN — Medication 5 MILLIGRAM(S): at 23:50

## 2018-08-25 RX ADMIN — Medication 5 MILLIGRAM(S): at 01:36

## 2018-08-25 RX ADMIN — SODIUM CHLORIDE 75 MILLILITER(S): 9 INJECTION, SOLUTION INTRAVENOUS at 03:02

## 2018-08-25 RX ADMIN — HYDROMORPHONE HYDROCHLORIDE 0.5 MILLIGRAM(S): 2 INJECTION INTRAMUSCULAR; INTRAVENOUS; SUBCUTANEOUS at 04:39

## 2018-08-25 RX ADMIN — Medication 5 MILLIGRAM(S): at 06:20

## 2018-08-25 RX ADMIN — HYDROMORPHONE HYDROCHLORIDE 1 MILLIGRAM(S): 2 INJECTION INTRAMUSCULAR; INTRAVENOUS; SUBCUTANEOUS at 07:57

## 2018-08-25 RX ADMIN — SODIUM CHLORIDE 3 MILLILITER(S): 9 INJECTION INTRAMUSCULAR; INTRAVENOUS; SUBCUTANEOUS at 00:36

## 2018-08-25 RX ADMIN — HYDROMORPHONE HYDROCHLORIDE 1 MILLIGRAM(S): 2 INJECTION INTRAMUSCULAR; INTRAVENOUS; SUBCUTANEOUS at 21:23

## 2018-08-25 RX ADMIN — HYDROMORPHONE HYDROCHLORIDE 0.5 MILLIGRAM(S): 2 INJECTION INTRAMUSCULAR; INTRAVENOUS; SUBCUTANEOUS at 03:37

## 2018-08-25 RX ADMIN — DULOXETINE HYDROCHLORIDE 60 MILLIGRAM(S): 30 CAPSULE, DELAYED RELEASE ORAL at 06:19

## 2018-08-25 RX ADMIN — HYDROMORPHONE HYDROCHLORIDE 1 MILLIGRAM(S): 2 INJECTION INTRAMUSCULAR; INTRAVENOUS; SUBCUTANEOUS at 18:21

## 2018-08-25 RX ADMIN — MORPHINE SULFATE 2 MILLIGRAM(S): 50 CAPSULE, EXTENDED RELEASE ORAL at 01:35

## 2018-08-25 RX ADMIN — MORPHINE SULFATE 2 MILLIGRAM(S): 50 CAPSULE, EXTENDED RELEASE ORAL at 03:28

## 2018-08-25 RX ADMIN — Medication 100 MILLIGRAM(S): at 21:29

## 2018-08-25 RX ADMIN — Medication 150 MILLIGRAM(S): at 13:37

## 2018-08-25 RX ADMIN — HYDROMORPHONE HYDROCHLORIDE 1 MILLIGRAM(S): 2 INJECTION INTRAMUSCULAR; INTRAVENOUS; SUBCUTANEOUS at 12:46

## 2018-08-25 NOTE — PATIENT PROFILE ADULT. - REASON FOR ADMISSION
I fell when transferring from bed to wheelchair and landed on my knees and have had pain for last 2 wks

## 2018-08-25 NOTE — PROGRESS NOTE ADULT - ASSESSMENT
A/P: 66M w/ R Distal Femur Fracture  Plan for OR today for IMN fixation  Analgesia  Hold chemical DVT ppx for OR today  NPO  IVF while NPO  NWB RLE  Encourage incentive spirometry  Will discuss with attending and advise if plan changes

## 2018-08-25 NOTE — BRIEF OPERATIVE NOTE - PROCEDURE
<<-----Click on this checkbox to enter Procedure Insertion, intramedullary lashaun, femur, retrograde  08/25/2018    Active  [id number]

## 2018-08-25 NOTE — BRIEF OPERATIVE NOTE - POST-OP DX
Closed fracture of distal end of right femur, unspecified fracture morphology, initial encounter  08/25/2018    Active  Brendon Reyes

## 2018-08-25 NOTE — DISCHARGE NOTE ADULT - HOSPITAL COURSE
63 yr old male with MS, sp fall  while trying to get out of the bed. Pt is wheelchair bound due to MS.  No other associated symptoms, sp surgery for fracture repair, stable to be discharged to rehab 63 yr old male with MS, sp fall  while trying to get out of the bed. Pt is wheelchair bound due to MS.  No other associated symptoms, sp IMN retrograde of R distal femur fracture, stable to be discharged to rehab. Please continue lovenox for dvt ppx for 30 days.

## 2018-08-25 NOTE — PROGRESS NOTE ADULT - ASSESSMENT
63 yr old male with MS, sp fall  while trying to get out of the bed. Pt is wheelchair bound due to MS.  No other associated symptoms     Problem/Plan - 1:  ·  Problem: Femur fracture, right.  Plan: pain control   OR today  ortho fu  Hold DVT prophylaxis     Problem/Plan - 2:  ·  Problem: MS (multiple sclerosis).  Plan: cw home meds  neuro fu.

## 2018-08-25 NOTE — DIETITIAN INITIAL EVALUATION ADULT. - OTHER INFO
Pt w/c bound at home. Awaiting IMN fixation of R femur fx today.   Pt reports BM q 3-4 days at home due to Percocet use.  Is on bowel regimen at home.  Increased intake of high fiber foods d/w pt.

## 2018-08-25 NOTE — DISCHARGE NOTE ADULT - PLAN OF CARE
Return to baseline ADL's IM Nail DC Instructions:    1.	Resume previous diet, regular or diabetic as appropriate  2.	Weight Bearing as Tolerated with assistance and rolling walker  3.	Continue DVT/PE Prophylaxis. Lovenox SC40mg qd. See Med Rec for Duration and dose.  4.	PT daily  5.	Follow up with Orthopedic Surgeon Dr. Nayak in 10-14 Days after Discharge from the Hospital. Call Office asap For Appointment.  6.	Staples to be removed Post-Op Day 14, provided wound is healed, no open areas or copious drainage..  7.	Ice the hip as much as possible  8.	Keep bandage on Hip. Change only if wet or soiled using Tegaderm/Paper tape and dry gauze.  9.            Sponge bathe only. Do not get dressing wet. Will need assistance to shower. IM Nail DC Instructions:    1.	Resume previous diet, regular or diabetic as appropriate  2.	Weight Bearing as Tolerated with assistance and rolling walker  3.	Continue DVT/PE Prophylaxis. Lovenox SC40mg qd for 30 days. See Med Rec for Duration and dose.  4.	PT daily  5.	Follow up with Orthopedic Surgeon Dr. Nayak in 10-14 Days after Discharge from the Hospital. Call Office asap For Appointment.  6.	Staples to be removed Post-Op Day 14, provided wound is healed, no open areas or copious drainage..  7.	Ice the hip as much as possible  8.	Keep bandage on Hip. Change only if wet or soiled using Tegaderm/Paper tape and dry gauze.  9.            Sponge bathe only. Do not get dressing wet. Will need assistance to shower. possible pathologic fx, lesions in right pelvis (inferior pubic ramus) and right femoral shaft bone marrow recommend follow up with heme/oncologist, plan discussed with Dr. Pham, Dr. Nayak as well patient's spouse.

## 2018-08-25 NOTE — PROGRESS NOTE ADULT - SUBJECTIVE AND OBJECTIVE BOX
Patient seen and examined at bedside. Pain is well controlled. Resting comfortably.      PE:    Vital Signs Last 24 Hrs  T(C): 36.5 (25 Aug 2018 18:00), Max: 37.1 (25 Aug 2018 05:00)  T(F): 97.7 (25 Aug 2018 18:00), Max: 98.8 (25 Aug 2018 05:00)  HR: 95 (25 Aug 2018 18:52) (64 - 103)  BP: 156/83 (25 Aug 2018 18:52) (135/85 - 202/90)    RR: 15 (25 Aug 2018 18:52) (11 - 18)  SpO2: 97% (25 Aug 2018 18:52) (93% - 98%)    PE:    Gen: NAD, resting comfortably     RLE: Placed in a bulky king knee immobilizer, post operatively. Dressing Clean/Dry/Intact    Baseline 3+ pitting edema  SILT L3-S1  Unable to assess ROM due to MS; No motor function at baseline  +1DP  Compartments soft and compressible  No calf tenderness

## 2018-08-25 NOTE — DISCHARGE NOTE ADULT - PATIENT PORTAL LINK FT
You can access the [facility] Portal, offered by [facility], by registering with the following website: [email or web address]

## 2018-08-25 NOTE — ED ADULT NURSE REASSESSMENT NOTE - NS ED NURSE REASSESS COMMENT FT1
Pt received lying on stretcher. Right leg knee immobilizer placed by Orthopedist, n/v intact after placement No significant past surgical history

## 2018-08-25 NOTE — DISCHARGE NOTE ADULT - NSTOBACCOHOTLINE_GEN_A_CS
[facility] Smokers Quitline ([phone]-[id number]) [facility] Smokers Quitline (543-QP-MEJAH) [facility] Smokers Quitline ([phone]-[id number]) [facility] Smokers Quitline ([phone]-FH-[id number])

## 2018-08-25 NOTE — DISCHARGE NOTE ADULT - CARE PLAN
Principal Discharge DX:	Other closed fracture of distal end of right femur, initial encounter  Goal:	Return to baseline ADL's  Assessment and plan of treatment:	IM Nail DC Instructions:    1.	Resume previous diet, regular or diabetic as appropriate  2.	Weight Bearing as Tolerated with assistance and rolling walker  3.	Continue DVT/PE Prophylaxis. Lovenox SC40mg qd. See Med Rec for Duration and dose.  4.	PT daily  5.	Follow up with Orthopedic Surgeon Dr. Nayak in 10-14 Days after Discharge from the Hospital. Call Office asap For Appointment.  6.	Staples to be removed Post-Op Day 14, provided wound is healed, no open areas or copious drainage..  7.	Ice the hip as much as possible  8.	Keep bandage on Hip. Change only if wet or soiled using Tegaderm/Paper tape and dry gauze.  9.            Sponge bathe only. Do not get dressing wet. Will need assistance to shower. Principal Discharge DX:	Other closed fracture of distal end of right femur, initial encounter  Goal:	Return to baseline ADL's  Assessment and plan of treatment:	IM Nail DC Instructions:    1.	Resume previous diet, regular or diabetic as appropriate  2.	Weight Bearing as Tolerated with assistance and rolling walker  3.	Continue DVT/PE Prophylaxis. Lovenox SC40mg qd for 30 days. See Med Rec for Duration and dose.  4.	PT daily  5.	Follow up with Orthopedic Surgeon Dr. Nayak in 10-14 Days after Discharge from the Hospital. Call Office asap For Appointment.  6.	Staples to be removed Post-Op Day 14, provided wound is healed, no open areas or copious drainage..  7.	Ice the hip as much as possible  8.	Keep bandage on Hip. Change only if wet or soiled using Tegaderm/Paper tape and dry gauze.  9.            Sponge bathe only. Do not get dressing wet. Will need assistance to shower. Principal Discharge DX:	Other closed fracture of distal end of right femur, initial encounter  Goal:	Return to baseline ADL's  Assessment and plan of treatment:	IM Nail DC Instructions:    1.	Resume previous diet, regular or diabetic as appropriate  2.	Weight Bearing as Tolerated with assistance and rolling walker  3.	Continue DVT/PE Prophylaxis. Lovenox SC40mg qd for 30 days. See Med Rec for Duration and dose.  4.	PT daily  5.	Follow up with Orthopedic Surgeon Dr. Nayak in 10-14 Days after Discharge from the Hospital. Call Office asap For Appointment.  6.	Staples to be removed Post-Op Day 14, provided wound is healed, no open areas or copious drainage..  7.	Ice the hip as much as possible  8.	Keep bandage on Hip. Change only if wet or soiled using Tegaderm/Paper tape and dry gauze.  9.            Sponge bathe only. Do not get dressing wet. Will need assistance to shower.  Goal:	possible pathologic fx, lesions in right pelvis (inferior pubic ramus) and right femoral shaft bone marrow  Assessment and plan of treatment:	recommend follow up with heme/oncologist, plan discussed with Dr. Pham, Dr. Nayak as well patient's spouse.

## 2018-08-25 NOTE — PROGRESS NOTE ADULT - ASSESSMENT
A/P: 66M w/ R Distal Femur Fracture s/p R IMN POD 0    Analgesia  Hold chemical DVT ppx for 24 hours  c/w Diet  WBAT RLE  Encourage incentive spirometry  PT/OT  DC planning    Will discuss with attending and advise if plan changes

## 2018-08-25 NOTE — PROGRESS NOTE ADULT - SUBJECTIVE AND OBJECTIVE BOX
Patient is a 63y old  Male who presents with a chief complaint of I fell when transferring from bed to wheelchair and landed on my knees and have had pain for last 2 wks (25 Aug 2018 04:15)      INTERVAL HPI/OVERNIGHT EVENTS:  T(C): 37.1 (18 @ 11:36), Max: 37.2 (18 @ 17:34)  HR: 82 (18 @ 11:36) (64 - 82)  BP: 155/75 (18 @ 11:36) (135/85 - 183/105)  RR: 17 (18 @ 11:36) (14 - 18)  SpO2: 94% (18 @ 11:36) (94% - 99%)  Wt(kg): --  I&O's Summary    24 Aug 2018 07:  -  25 Aug 2018 07:00  --------------------------------------------------------  IN: 225 mL / OUT: 625 mL / NET: -400 mL    25 Aug 2018 07:  -  25 Aug 2018 13:49  --------------------------------------------------------  IN: 0 mL / OUT: 800 mL / NET: -800 mL        LABS:                        9.5    5.84  )-----------( 270      ( 25 Aug 2018 05:30 )             28.0         134<L>  |  98  |  12  ----------------------------<  79  3.6   |  29  |  0.55    Ca    8.3<L>      25 Aug 2018 05:30    TPro  6.2  /  Alb  2.9<L>  /  TBili  0.7  /  DBili  .20  /  AST  16  /  ALT  9<L>  /  AlkPhos  105      PT/INR - ( 25 Aug 2018 05:30 )   PT: 14.0 sec;   INR: 1.28 ratio         PTT - ( 25 Aug 2018 05:30 )  PTT:37.0 sec  Urinalysis Basic - ( 25 Aug 2018 07:00 )    Color: Yellow / Appearance: Clear / S.010 / pH: x  Gluc: x / Ketone: Small  / Bili: Negative / Urobili: Negative   Blood: x / Protein: Negative / Nitrite: Negative   Leuk Esterase: Negative / RBC: 0-2 /HPF / WBC x   Sq Epi: x / Non Sq Epi: x / Bacteria: x      CAPILLARY BLOOD GLUCOSE            Urinalysis Basic - ( 25 Aug 2018 07:00 )    Color: Yellow / Appearance: Clear / S.010 / pH: x  Gluc: x / Ketone: Small  / Bili: Negative / Urobili: Negative   Blood: x / Protein: Negative / Nitrite: Negative   Leuk Esterase: Negative / RBC: 0-2 /HPF / WBC x   Sq Epi: x / Non Sq Epi: x / Bacteria: x        MEDICATIONS  (STANDING):  amLODIPine   Tablet 5 milliGRAM(s) Oral daily  ceFAZolin   IVPB 2000 milliGRAM(s) IV Intermittent once  DULoxetine 60 milliGRAM(s) Oral two times a day  lactated ringers. 1000 milliLiter(s) (75 mL/Hr) IV Continuous <Continuous>  OXcarbazepine 300 milliGRAM(s) Oral two times a day  oxybutynin 5 milliGRAM(s) Oral four times a day  pregabalin 150 milliGRAM(s) Oral four times a day    MEDICATIONS  (PRN):  acetaminophen   Tablet 650 milliGRAM(s) Oral every 6 hours PRN For Temp greater than 38 C (100.4 F)  acetaminophen   Tablet. 650 milliGRAM(s) Oral every 6 hours PRN Mild Pain (1 - 3)  morphine  - Injectable 2 milliGRAM(s) IV Push every 4 hours PRN Severe Pain (7 - 10)  ondansetron Injectable 4 milliGRAM(s) IV Push every 6 hours PRN Nausea  oxyCODONE    5 mG/acetaminophen 325 mG 1 Tablet(s) Oral every 4 hours PRN Moderate Pain (4 - 6)          PHYSICAL EXAM:  GENERAL: NAD, well-groomed, well-developed  HEAD:  Atraumatic, Normocephalic  CHEST/LUNG: Clear to percussion bilaterally; No rales, rhonchi, wheezing, or rubs  HEART: Regular rate and rhythm; No murmurs, rubs, or gallops  ABDOMEN: Soft, Nontender, Nondistended; Bowel sounds present  EXTREMITIES:  2+ Peripheral Pulses, No clubbing, cyanosis, or edema  LYMPH: No lymphadenopathy noted  SKIN: No rashes or lesions    Care Discussed with Consultants/Other Providers [ ] YES  [ ] NO

## 2018-08-25 NOTE — DISCHARGE NOTE ADULT - MEDICATION SUMMARY - MEDICATIONS TO TAKE
I will START or STAY ON the medications listed below when I get home from the hospital:    oxyCODONE 10 mg oral tablet  -- 1 tab(s) by mouth every 4 hours, As needed, Moderate Pain (4 - 6)  -- Indication: For pain    oxyCODONE 5 mg oral tablet  -- 1 tab(s) by mouth every 4 hours, As needed, Mild Pain (1 - 3)  -- Indication: For pain    Lyrica 150 mg oral capsule  -- 1 cap(s) by mouth 4 times a day  -- Indication: For seizure    Trileptal 300 mg oral tablet  -- 1 tab(s) by mouth 2 times a day  -- Indication: For seizure    Cymbalta 60 mg oral delayed release capsule  -- 1 cap(s) by mouth 2 times a day  -- Indication: For depression    oxybutynin  -- 15 milligram(s) by mouth 2 times a day  -- Indication: For antispasmodic I will START or STAY ON the medications listed below when I get home from the hospital:    oxyCODONE 10 mg oral tablet  -- 1 tab(s) by mouth every 4 hours, As needed, Moderate Pain (4 - 6)  -- Indication: For pain    oxyCODONE 5 mg oral tablet  -- 1 tab(s) by mouth every 4 hours, As needed, Mild Pain (1 - 3)  -- Indication: For pain    enoxaparin  -- 40 milligram(s) subcutaneous once a day continue for 30 days  -- Indication: For dvt ppx please continue for 30 days

## 2018-08-25 NOTE — CONSULT NOTE ADULT - SUBJECTIVE AND OBJECTIVE BOX
CHIEF COMPLAINT: Patient is a 63y old  Male who presents with a chief complaint of I fell when transferring from bed to wheelchair and landed on my knees and have had pain for last 2 wks (25 Aug 2018 04:15)      HPI:  63 yr old male with MS, sp fall  while trying to get out of the bed. Pt is wheelchair bound due to MS.  No other associated symptoms (24 Aug 2018 23:35)      PAST MEDICAL & SURGICAL HISTORY:  Femur fracture, left  MS (multiple sclerosis)  No significant past surgical history      SOCIAL HISTORY:    FAMILY HISTORY: FAMILY HISTORY:  No pertinent family history in first degree relatives      MEDICATIONS  (STANDING):  amLODIPine   Tablet 5 milliGRAM(s) Oral daily  DULoxetine 60 milliGRAM(s) Oral two times a day  lactated ringers. 1000 milliLiter(s) (75 mL/Hr) IV Continuous <Continuous>  OXcarbazepine 300 milliGRAM(s) Oral two times a day  oxybutynin 5 milliGRAM(s) Oral four times a day  pregabalin 150 milliGRAM(s) Oral four times a day    MEDICATIONS  (PRN):  acetaminophen   Tablet 650 milliGRAM(s) Oral every 6 hours PRN For Temp greater than 38 C (100.4 F)  acetaminophen   Tablet. 650 milliGRAM(s) Oral every 6 hours PRN Mild Pain (1 - 3)  HYDROmorphone  Injectable 1 milliGRAM(s) IV Push once PRN Severe Pain (7 - 10)  morphine  - Injectable 2 milliGRAM(s) IV Push every 4 hours PRN Severe Pain (7 - 10)  ondansetron Injectable 4 milliGRAM(s) IV Push every 6 hours PRN Nausea  oxyCODONE    5 mG/acetaminophen 325 mG 1 Tablet(s) Oral every 4 hours PRN Moderate Pain (4 - 6)      Allergies    No Known Allergies    Intolerances        REVIEW OF SYSTEMS:    CONSTITUTIONAL: No weakness, fevers or chills  EYES: No visual changes, No diplopia  ENMT: No throat pain , No exudate  NECK: No pain or stiffness  RESPIRATORY: No cough, wheezing, hemoptysis; No shortness of breath  CARDIOVASCULAR: No chest pain or palpitations  GASTROINTESTINAL: No abdominal pain. No nausea, vomiting, or hematemesis; No diarrhea or constipation. No melena or hematochezia.  GENITOURINARY: No dysuria, frequency or hematuria  NEUROLOGICAL: No numbness or weakness  SKIN: No itching or rash  All other review of systems is negative unless indicated above    VITAL SIGNS:   Vital Signs Last 24 Hrs  T(C): 36.8 (25 Aug 2018 07:20), Max: 37.2 (24 Aug 2018 17:34)  T(F): 98.2 (25 Aug 2018 07:20), Max: 99 (24 Aug 2018 17:34)  HR: 70 (25 Aug 2018 07:20) (64 - 75)  BP: 135/85 (25 Aug 2018 07:20) (135/85 - 183/105)  BP(mean): --  RR: 18 (25 Aug 2018 07:20) (14 - 18)  SpO2: 96% (25 Aug 2018 07:20) (96% - 99%)    I&O's Summary    24 Aug 2018 07:01  -  25 Aug 2018 07:00  --------------------------------------------------------  IN: 225 mL / OUT: 625 mL / NET: -400 mL        PHYSICAL EXAM:    Constitutional: NAD, awake and alert, well-developed  Eyes:  EOMI,  Pupils round, no lesions  ENMT: no exudate or erythema  Pulmonary: Non-labored, breath sounds are clear bilaterally, No wheezing, rales or rhonchi  Cardiovascular: PMI not palpable non-displaced Regular S1 and S2, no murmurs, rubs, gallops or clicks  Gastrointestinal: Bowel Sounds present, soft, nontender.   Lymph: No peripheral edema. No cervical lymphadenopathy.  Neurological: Alert, no focal deficits  Skin: No rashes. Changes of chronic venous stasis. No cyanosis.  Psych:  Mood & affect appropriate Confused.    LABS: All Labs Reviewed:                        9.5    5.84  )-----------( 270      ( 25 Aug 2018 05:30 )             28.0                         10.2   6.92  )-----------( 288      ( 25 Aug 2018 00:32 )             30.2     25 Aug 2018 05:30    134    |  98     |  12     ----------------------------<  79     3.6     |  29     |  0.55   25 Aug 2018 00:32    135    |  97     |  13     ----------------------------<  80     3.9     |  31     |  0.66     Ca    8.3        25 Aug 2018 05:30  Ca    8.7        25 Aug 2018 00:32    TPro  7.0    /  Alb  3.4    /  TBili  0.7    /  DBili  x      /  AST  15     /  ALT  10     /  AlkPhos  118    25 Aug 2018 00:32    PT/INR - ( 25 Aug 2018 05:30 )   PT: 14.0 sec;   INR: 1.28 ratio         PTT - ( 25 Aug 2018 05:30 )  PTT:37.0 sec      Blood Culture:         RADIOLOGY/EKG:

## 2018-08-25 NOTE — PATIENT PROFILE ADULT. - NS TRANSFER PATIENT BELONGINGS
Jewelry/Money (specify)/Cell Phone/PDA (specify)/Clothing/gold color necklace and gold colored wedding band

## 2018-08-25 NOTE — DISCHARGE NOTE ADULT - MEDICATION SUMMARY - MEDICATIONS TO STOP TAKING
I will STOP taking the medications listed below when I get home from the hospital:    ciprofloxacin 500 mg oral tablet  -- 1 tab(s) by mouth 2 times a day   -- Avoid prolonged or excessive exposure to direct and/or artificial sunlight while taking this medication.  Check with your doctor before becoming pregnant.  Do not take dairy products, antacids, or iron preparations within one hour of this medication.  Finish all this medication unless otherwise directed by prescriber.  Medication should be taken with plenty of water.    tolterodine 1 mg oral tablet  -- 1 tab(s) by mouth 2 times a day   -- It is very important that you take or use this exactly as directed.  Do not skip doses or discontinue unless directed by your doctor.  May cause drowsiness.  Alcohol may intensify this effect.  Use care when operating dangerous machinery.

## 2018-08-25 NOTE — CONSULT NOTE ADULT - ASSESSMENT
63 yr old male with MS, sp fall  while trying to get out of the bed. Pt is wheelchair bound due to MS.  No other associated symptoms    Cardiac clearance  ·	No evidence of active ischemia, normal ECG, asymptomatic  ·	No evidence of LV dysfunction  ·	No history of arrythmia  ·	No significant valvular disease  ·	Poor functional capacity, wheelchair bound secondary to MR  ·	no history of ASHD, HTN, DM, elevated chol or FH or premature ASHD, former smoker 1 PPD for 18-20 years quit 22 years ago  ·	Revised Cardiac Risk Index: Low Risk  ·	Patient is in optimal achievable cardiac condition for the proposed surgery

## 2018-08-25 NOTE — PROGRESS NOTE ADULT - SUBJECTIVE AND OBJECTIVE BOX
Patient seen and examined at bedside. Reports pain is suboptimally controlled. No other complaints at this time.    PHYSICAL EXAM:  Vital Signs Last 24 Hrs  T(C): 36.8 (25 Aug 2018 07:20), Max: 37.2 (24 Aug 2018 17:34)  T(F): 98.2 (25 Aug 2018 07:20), Max: 99 (24 Aug 2018 17:34)  HR: 70 (25 Aug 2018 07:20) (64 - 75)  BP: 135/85 (25 Aug 2018 07:20) (135/85 - 183/105)  RR: 18 (25 Aug 2018 07:20) (14 - 18)  SpO2: 96% (25 Aug 2018 07:20) (96% - 99%)    Gen: NAD; Resting comfortably  RLE: Moderate swelling over anterior distal aspect of femur just superior to patella  +ttp over anterior aspect of distal femur  Baseline 3+ pitting edema  SILT L3-S1  Unable to assess ROM due to MS; No motor function at baseline  +1DP  Compartments soft and compressible  No calf tenderness Patient seen and examined at bedside. Reports pain is suboptimally controlled. No other complaints at this time.    PHYSICAL EXAM:  Vital Signs Last 24 Hrs  T(C): 36.8 (25 Aug 2018 07:20), Max: 37.2 (24 Aug 2018 17:34)  T(F): 98.2 (25 Aug 2018 07:20), Max: 99 (24 Aug 2018 17:34)  HR: 70 (25 Aug 2018 07:20) (64 - 75)  BP: 135/85 (25 Aug 2018 07:20) (135/85 - 183/105)  RR: 18 (25 Aug 2018 07:20) (14 - 18)  SpO2: 96% (25 Aug 2018 07:20) (96% - 99%)    Gen: NAD; Resting comfortably  RLE: In bulky king knee immobilizer; Clean/Dry/Intact  Moderate swelling over anterior distal aspect of femur just superior to patella  +ttp over anterior aspect of distal femur  Baseline 3+ pitting edema  SILT L3-S1  Unable to assess ROM due to MS; No motor function at baseline  +1DP  Compartments soft and compressible  No calf tenderness

## 2018-08-25 NOTE — DISCHARGE NOTE ADULT - CARE PROVIDER_API CALL
Sanchez Nayak (DO), Orthopaedic Surgery  [address] [place] [address]  Phone: [phone]  Fax: [phone]

## 2018-08-26 LAB
ALBUMIN SERPL ELPH-MCNC: 2.9 G/DL — LOW (ref 3.3–5)
ALP SERPL-CCNC: 110 U/L — SIGNIFICANT CHANGE UP (ref 40–120)
ALT FLD-CCNC: 16 U/L — SIGNIFICANT CHANGE UP (ref 12–78)
ANION GAP SERPL CALC-SCNC: 9 MMOL/L — SIGNIFICANT CHANGE UP (ref 5–17)
AST SERPL-CCNC: 15 U/L — SIGNIFICANT CHANGE UP (ref 15–37)
BILIRUB SERPL-MCNC: 0.7 MG/DL — SIGNIFICANT CHANGE UP (ref 0.2–1.2)
BUN SERPL-MCNC: 9 MG/DL — SIGNIFICANT CHANGE UP (ref 7–23)
CALCIUM SERPL-MCNC: 8.5 MG/DL — SIGNIFICANT CHANGE UP (ref 8.5–10.1)
CHLORIDE SERPL-SCNC: 98 MMOL/L — SIGNIFICANT CHANGE UP (ref 96–108)
CO2 SERPL-SCNC: 29 MMOL/L — SIGNIFICANT CHANGE UP (ref 22–31)
CREAT SERPL-MCNC: 0.64 MG/DL — SIGNIFICANT CHANGE UP (ref 0.5–1.3)
GLUCOSE SERPL-MCNC: 100 MG/DL — HIGH (ref 70–99)
HCT VFR BLD CALC: 28.9 % — LOW (ref 39–50)
HGB BLD-MCNC: 9.7 G/DL — LOW (ref 13–17)
MCHC RBC-ENTMCNC: 30 PG — SIGNIFICANT CHANGE UP (ref 27–34)
MCHC RBC-ENTMCNC: 33.6 GM/DL — SIGNIFICANT CHANGE UP (ref 32–36)
MCV RBC AUTO: 89.5 FL — SIGNIFICANT CHANGE UP (ref 80–100)
NRBC # BLD: 0 /100 WBCS — SIGNIFICANT CHANGE UP (ref 0–0)
PLATELET # BLD AUTO: 292 K/UL — SIGNIFICANT CHANGE UP (ref 150–400)
POTASSIUM SERPL-MCNC: 4.3 MMOL/L — SIGNIFICANT CHANGE UP (ref 3.5–5.3)
POTASSIUM SERPL-SCNC: 4.3 MMOL/L — SIGNIFICANT CHANGE UP (ref 3.5–5.3)
PROT SERPL-MCNC: 6.6 G/DL — SIGNIFICANT CHANGE UP (ref 6–8.3)
RBC # BLD: 3.23 M/UL — LOW (ref 4.2–5.8)
RBC # FLD: 14 % — SIGNIFICANT CHANGE UP (ref 10.3–14.5)
SODIUM SERPL-SCNC: 136 MMOL/L — SIGNIFICANT CHANGE UP (ref 135–145)
WBC # BLD: 8.14 K/UL — SIGNIFICANT CHANGE UP (ref 3.8–10.5)
WBC # FLD AUTO: 8.14 K/UL — SIGNIFICANT CHANGE UP (ref 3.8–10.5)

## 2018-08-26 RX ORDER — CYCLOBENZAPRINE HYDROCHLORIDE 10 MG/1
5 TABLET, FILM COATED ORAL ONCE
Qty: 0 | Refills: 0 | Status: DISCONTINUED | OUTPATIENT
Start: 2018-08-26 | End: 2018-08-26

## 2018-08-26 RX ORDER — CYCLOBENZAPRINE HYDROCHLORIDE 10 MG/1
5 TABLET, FILM COATED ORAL THREE TIMES A DAY
Qty: 0 | Refills: 0 | Status: DISCONTINUED | OUTPATIENT
Start: 2018-08-26 | End: 2018-08-28

## 2018-08-26 RX ADMIN — Medication 500 MILLIGRAM(S): at 17:11

## 2018-08-26 RX ADMIN — HYDROMORPHONE HYDROCHLORIDE 1 MILLIGRAM(S): 2 INJECTION INTRAMUSCULAR; INTRAVENOUS; SUBCUTANEOUS at 23:58

## 2018-08-26 RX ADMIN — Medication 1 TABLET(S): at 12:29

## 2018-08-26 RX ADMIN — DULOXETINE HYDROCHLORIDE 60 MILLIGRAM(S): 30 CAPSULE, DELAYED RELEASE ORAL at 05:02

## 2018-08-26 RX ADMIN — Medication 325 MILLIGRAM(S): at 09:53

## 2018-08-26 RX ADMIN — Medication 150 MILLIGRAM(S): at 12:29

## 2018-08-26 RX ADMIN — Medication 100 MILLIGRAM(S): at 23:57

## 2018-08-26 RX ADMIN — Medication 1 MILLIGRAM(S): at 12:29

## 2018-08-26 RX ADMIN — HYDROMORPHONE HYDROCHLORIDE 1 MILLIGRAM(S): 2 INJECTION INTRAMUSCULAR; INTRAVENOUS; SUBCUTANEOUS at 13:44

## 2018-08-26 RX ADMIN — Medication 500 MILLIGRAM(S): at 05:02

## 2018-08-26 RX ADMIN — HYDROMORPHONE HYDROCHLORIDE 1 MILLIGRAM(S): 2 INJECTION INTRAMUSCULAR; INTRAVENOUS; SUBCUTANEOUS at 01:50

## 2018-08-26 RX ADMIN — Medication 150 MILLIGRAM(S): at 23:58

## 2018-08-26 RX ADMIN — Medication 150 MILLIGRAM(S): at 17:10

## 2018-08-26 RX ADMIN — AMLODIPINE BESYLATE 5 MILLIGRAM(S): 2.5 TABLET ORAL at 05:02

## 2018-08-26 RX ADMIN — Medication 100 MILLIGRAM(S): at 05:02

## 2018-08-26 RX ADMIN — Medication 5 MILLIGRAM(S): at 12:29

## 2018-08-26 RX ADMIN — HYDROMORPHONE HYDROCHLORIDE 1 MILLIGRAM(S): 2 INJECTION INTRAMUSCULAR; INTRAVENOUS; SUBCUTANEOUS at 05:54

## 2018-08-26 RX ADMIN — Medication 150 MILLIGRAM(S): at 05:02

## 2018-08-26 RX ADMIN — DULOXETINE HYDROCHLORIDE 60 MILLIGRAM(S): 30 CAPSULE, DELAYED RELEASE ORAL at 17:11

## 2018-08-26 RX ADMIN — OXCARBAZEPINE 300 MILLIGRAM(S): 300 TABLET, FILM COATED ORAL at 17:11

## 2018-08-26 RX ADMIN — HYDROMORPHONE HYDROCHLORIDE 1 MILLIGRAM(S): 2 INJECTION INTRAMUSCULAR; INTRAVENOUS; SUBCUTANEOUS at 01:26

## 2018-08-26 RX ADMIN — Medication 100 MILLIGRAM(S): at 14:08

## 2018-08-26 RX ADMIN — Medication 325 MILLIGRAM(S): at 17:10

## 2018-08-26 RX ADMIN — Medication 5 MILLIGRAM(S): at 05:02

## 2018-08-26 RX ADMIN — Medication 5 MILLIGRAM(S): at 23:58

## 2018-08-26 RX ADMIN — ENOXAPARIN SODIUM 40 MILLIGRAM(S): 100 INJECTION SUBCUTANEOUS at 13:45

## 2018-08-26 RX ADMIN — Medication 100 MILLIGRAM(S): at 07:52

## 2018-08-26 RX ADMIN — Medication 650 MILLIGRAM(S): at 17:11

## 2018-08-26 RX ADMIN — Medication 5 MILLIGRAM(S): at 17:11

## 2018-08-26 RX ADMIN — OXCARBAZEPINE 300 MILLIGRAM(S): 300 TABLET, FILM COATED ORAL at 05:02

## 2018-08-26 RX ADMIN — Medication 325 MILLIGRAM(S): at 12:29

## 2018-08-26 RX ADMIN — HYDROMORPHONE HYDROCHLORIDE 1 MILLIGRAM(S): 2 INJECTION INTRAMUSCULAR; INTRAVENOUS; SUBCUTANEOUS at 17:44

## 2018-08-26 RX ADMIN — HYDROMORPHONE HYDROCHLORIDE 1 MILLIGRAM(S): 2 INJECTION INTRAMUSCULAR; INTRAVENOUS; SUBCUTANEOUS at 05:24

## 2018-08-26 RX ADMIN — HYDROMORPHONE HYDROCHLORIDE 1 MILLIGRAM(S): 2 INJECTION INTRAMUSCULAR; INTRAVENOUS; SUBCUTANEOUS at 09:29

## 2018-08-26 NOTE — PHYSICAL THERAPY INITIAL EVALUATION ADULT - SKIN COLOR/CHARACTERISTICS
**pt is very tall 6' 4'', swelling to dorsum aspect of feet right > left **pt is very tall 6' 4'', swelling to dorsum aspect of feet right > left, RUE swollen, ace wrap and right knee immobilizer as per orders,

## 2018-08-26 NOTE — PHYSICAL THERAPY INITIAL EVALUATION ADULT - SKIN MOISTURE
dressing to left UE. IV intact to cubital fossa/dry dry/dressing to left UE. IV intact to cubital fossa/flaky

## 2018-08-26 NOTE — PHYSICAL THERAPY INITIAL EVALUATION ADULT - IMPAIRMENTS FOUND, PT EVAL
integumentary integrity/ROM/gross motor/tone/fine motor/muscle strength tone/ROM/integumentary integrity/muscle strength/aerobic capacity/endurance/fine motor/gross motor/joint integrity and mobility

## 2018-08-26 NOTE — PHYSICAL THERAPY INITIAL EVALUATION ADULT - DID THE PATIENT HAVE SURGERY?
n/a/X-ray to left femur: (-) fx to shaft; X-ray to left hip: (+) left hip intratrochanteric fx s/p insertion of intramedullary lashaun of right femur/yes

## 2018-08-26 NOTE — PROGRESS NOTE ADULT - ASSESSMENT
63 yr old male with MS, sp fall  while trying to get out of the bed. Pt is wheelchair bound due to MS.  No other associated symptoms    Cardiac clearance  ·	No evidence of active ischemia, normal ECG, asymptomatic  ·	No evidence of LV dysfunction  ·	No history of arrythmia  ·	No significant valvular disease  ·	Poor functional capacity, wheelchair bound secondary to MR  ·	no history of ASHD, HTN, DM, elevated chol or FH or premature ASHD, former smoker 1 PPD for 18-20 years quit 22 years ago  ·	Revised Cardiac Risk Index: Low Risk  ·	Patient is in optimal achievable cardiac condition for the proposed surgery    Post op day 1  No angina  No signs or symptoms of CHF  continue postop care

## 2018-08-26 NOTE — PHYSICAL THERAPY INITIAL EVALUATION ADULT - BED MOBILITY TRAINING, PT EVAL
Assess Bed mobility supine<->sit with in 2-3 sessions or when appropriate Assess Bed mobility supine<->sit with in 5 sessions or when appropriate

## 2018-08-26 NOTE — PROGRESS NOTE ADULT - ASSESSMENT
A/P: 63 M w/ R distal Femur Fx s/p IMN POD 1       Analgesia  DVT ppx  WBAT RLE  Encourage IS  FU labs  medical management recommendations appreciated   PT/OT  DC planning A/P: 63 M w/ R distal Femur Fx s/p IMN POD 1       Analgesia  DVT ppx  WBAT RLE  Encourage IS  FU labs  medical management recommendations appreciated   PT/OT  DC planning    will discuss with attending and advise if plan changes. A/P: 63 M w/ R distal Femur Fx s/p IMN POD 1       Analgesia  DVT ppx  WBAT RLE  Encourage IS  FU labs  medical management recommendations appreciated   PT/OT  DC planning    patient seen and examined with Dr. Nayak

## 2018-08-26 NOTE — PHYSICAL THERAPY INITIAL EVALUATION ADULT - ANKLE STRATEGY ASSESSMENT, REHAB EVAL
Functional assessment NA, NA, due to pt did not want to be seated at EOB, pt is dependent at baseline. Mechanical lift at baseline Functional assessment NA, NA, due to pt did not want to be seated at EOB, pt is dependent at baseline. Mechanical lift at baseline. Pt now /c s/p fx to RLE and in knee immobilizer

## 2018-08-26 NOTE — PHYSICAL THERAPY INITIAL EVALUATION ADULT - BALANCE TRAINING, PT EVAL
Assess static and dynamic sitting balance /c in 2-3 sessions or when appropriate Assess static and dynamic sitting balance /c in 5 sessions or when appropriate

## 2018-08-26 NOTE — PHYSICAL THERAPY INITIAL EVALUATION ADULT - NS ASR WT BEARING DETAIL LLE
NWB/nonweight-bearing full weight-bearing/FWB (pt sustained a left hip fx ~ 1year ago) **pt has remained NWB to BLE

## 2018-08-26 NOTE — PHYSICAL THERAPY INITIAL EVALUATION ADULT - PASSIVE RANGE OF MOTION EXAMINATION, REHAB EVAL
deficits as listed below/bilateral lower extremity Passive ROM was WFL (within functional limits)/except limited /c shoulder flexion to right > left, also limited and end range due to hypertonicity/bilateral upper extremity Passive ROM was WFL (within functional limits) bilateral upper extremity Passive ROM was WFL (within functional limits)/except limited /c shoulder flexion to right > left, also limited and end range due to hypertonicity, pt did not want PT to move right LE due to risk of pain, left LE: passive log roll of hip /c some discomfort too, no ROM assessment of right LE except right ankle DF, pain elicited (limited in DF), no PROM to left knee, ankle DF ~(-) 20 degrees from neutral/deficits as listed below

## 2018-08-26 NOTE — PHYSICAL THERAPY INITIAL EVALUATION ADULT - ADDITIONAL COMMENTS
Pt lives in a private home /c his wife who works. Pt has aides from 6 hours/day for 7 days/wk. Pt has no steps that he negotiates due to being non-ambulatory and requires a over-head mechanical lift for all transfers at baseline. Pt is wheelchair bound. Pt has this over-head mechanical lift system than goes all the way into his bathroom. Pt has a motorized wheelchair that he sits in and also a shower wheelchair for showering. Pt has a mechanical bed. Pt was able to shower himself /c use of arms Left > right however now can not. Pt is dependent for all other care. Pt lives in a private home /c his wife who works. Pt has aides from 6 hours/day for 7 days/wk. Pt has no steps that he negotiates due to being non-ambulatory and requires a over-head mechanical lift for all transfers at baseline. Pt is wheelchair bound. Pt has this over-head mechanical lift system than goes all the way from bedroom into his bathroom. Pt has a motorized wheelchair (pt plans on getting a new updated motorized wheelchair) that he sits in and also a shower wheelchair for showering. Pt has a mechanical bed (but this is old and needs a new one). Pt was able to shower himself /c use of arms Left > right however now can not. Pt is dependent for all other care. In order to get onto overhead mechanical lift pt has to be dangling off EOB at his baseline.

## 2018-08-26 NOTE — PROGRESS NOTE ADULT - SUBJECTIVE AND OBJECTIVE BOX
CHIEF COMPLAINT: Patient is a 63y old  Male who presents with a chief complaint of I fell when transferring from bed to wheelchair and landed on my knees and have had pain for last 2 wks (25 Aug 2018 18:07)      Follow Up: [ ] Chest Pain      [ ] Dyspnea     [ ] Palpitations    [ ] Atrial Fibrillation     [ ] Ventricular Dysrhythmia    [ ] Abnormal EKG                      [ ] Abnormal Cardiac Enzymes     [ ] Valvular Disease   [ ] CAD  [ ] Hypertension [ ] CHF     HPI:  63 yr old male with MS, sp fall  while trying to get out of the bed. Pt is wheelchair bound due to MS.  No other associated symptoms (24 Aug 2018 23:35)      PAST MEDICAL & SURGICAL HISTORY:  Femur fracture, left  MS (multiple sclerosis)  No significant past surgical history      MEDICATIONS  (STANDING):  amLODIPine   Tablet 5 milliGRAM(s) Oral daily  ascorbic acid 500 milliGRAM(s) Oral two times a day  ceFAZolin   IVPB 2000 milliGRAM(s) IV Intermittent every 8 hours  docusate sodium 100 milliGRAM(s) Oral three times a day  DULoxetine 60 milliGRAM(s) Oral two times a day  enoxaparin Injectable 40 milliGRAM(s) SubCutaneous every 24 hours  ferrous    sulfate 325 milliGRAM(s) Oral three times a day with meals  folic acid 1 milliGRAM(s) Oral daily  multivitamin 1 Tablet(s) Oral daily  OXcarbazepine 300 milliGRAM(s) Oral two times a day  oxybutynin 5 milliGRAM(s) Oral four times a day  pregabalin 150 milliGRAM(s) Oral four times a day    MEDICATIONS  (PRN):  acetaminophen   Tablet 650 milliGRAM(s) Oral every 6 hours PRN For Temp greater than 38 C (100.4 F)  acetaminophen   Tablet. 650 milliGRAM(s) Oral every 6 hours PRN headache  diphenhydrAMINE   Capsule 25 milliGRAM(s) Oral at bedtime PRN Insomnia  HYDROmorphone  Injectable 1 milliGRAM(s) IV Push every 4 hours PRN Severe Pain (7 - 10)  ondansetron Injectable 4 milliGRAM(s) IV Push every 6 hours PRN Nausea and/or Vomiting  oxyCODONE    IR 10 milliGRAM(s) Oral every 4 hours PRN Moderate Pain (4 - 6)  oxyCODONE    IR 5 milliGRAM(s) Oral every 4 hours PRN Mild Pain (1 - 3)      Allergies    No Known Allergies    Intolerances        REVIEW OF SYSTEMS:    CONSTITUTIONAL: No weakness, fevers or chills.   EYES/ENT: No visual changes;  No vertigo or throat pain   NECK: No pain or stiffness  RESPIRATORY: No cough, wheezing, hemoptysis; No shortness of breath  CARDIOVASCULAR: No chest pain or palpitations  GASTROINTESTINAL: No abdominal or epigastric pain. No nausea, vomiting, or hematemesis; No diarrhea or constipation. No melena or hematochezia.  GENITOURINARY: No dysuria, frequency or hematuria  NEUROLOGICAL: No numbness or weakness  SKIN: No itching, burning, rashes, or lesions   All other review of systems is negative unless indicated above    Vital Signs Last 24 Hrs  T(C): 36.7 (26 Aug 2018 04:10), Max: 37.6 (25 Aug 2018 19:07)  T(F): 98 (26 Aug 2018 04:10), Max: 99.7 (25 Aug 2018 19:07)  HR: 91 (26 Aug 2018 04:10) (82 - 103)  BP: 152/82 (26 Aug 2018 04:10) (129/76 - 202/90)  BP(mean): --  RR: 16 (26 Aug 2018 04:10) (11 - 17)  SpO2: 94% (26 Aug 2018 04:10) (93% - 100%)    I&O's Summary    25 Aug 2018 07:01  -  26 Aug 2018 07:00  --------------------------------------------------------  IN: 840 mL / OUT: 2275 mL / NET: -1435 mL        PHYSICAL EXAM:    Constitutional: NAD, awake and alert, well-developed  Eyes:  EOMI,  Pupils round, No oral cyanosis.  ENMT: No exudate or erythema  Pulmonary: Non-labored, breath sounds are clear bilaterally, No wheezing, rales or rhonchi  Cardiovascular: Regular, S1 and S2, No murmurs, rubs, gallops oir clicks  Gastrointestinal: Bowel Sounds present, soft, nontender.   Lymph: No peripheral edema. No lymphadenopathy.  Neurological: Alert, no focal deficits  Skin: No rashes.  Psych:  Mood & affect appropriate    LABS: All Labs Reviewed:                        9.7    8.14  )-----------( 292      ( 26 Aug 2018 05:34 )             28.9                         11.1   19.70 )-----------( 322      ( 25 Aug 2018 18:44 )             33.0                         9.5    5.84  )-----------( 270      ( 25 Aug 2018 05:30 )             28.0     26 Aug 2018 05:34    136    |  98     |  9      ----------------------------<  100    4.3     |  29     |  0.64   25 Aug 2018 18:44    135    |  97     |  9      ----------------------------<  110    4.1     |  26     |  0.61   25 Aug 2018 05:30    134    |  98     |  12     ----------------------------<  79     3.6     |  29     |  0.55     Ca    8.5        26 Aug 2018 05:34  Ca    8.7        25 Aug 2018 18:44  Ca    8.3        25 Aug 2018 05:30    TPro  6.6    /  Alb  2.9    /  TBili  0.7    /  DBili  x      /  AST  15     /  ALT  16     /  AlkPhos  110    26 Aug 2018 05:34  TPro  6.2    /  Alb  2.9    /  TBili  0.7    /  DBili  .20    /  AST  16     /  ALT  9      /  AlkPhos  105    25 Aug 2018 05:30  TPro  7.0    /  Alb  3.4    /  TBili  0.7    /  DBili  x      /  AST  15     /  ALT  10     /  AlkPhos  118    25 Aug 2018 00:32    PT/INR - ( 25 Aug 2018 05:30 )   PT: 14.0 sec;   INR: 1.28 ratio         PTT - ( 25 Aug 2018 05:30 )  PTT:37.0 sec      Blood Culture:         RADIOLOGY/EKG:

## 2018-08-26 NOTE — PHYSICAL THERAPY INITIAL EVALUATION ADULT - TRANSFER TRAINING, PT EVAL
Assess transfers OOB to recliner /c mechanical lift in 3 sessions or when appropriate Assess transfers OOB to recliner /c mechanical lift in 5 sessions or when appropriate

## 2018-08-26 NOTE — PHYSICAL THERAPY INITIAL EVALUATION ADULT - PATIENT PROFILE REVIEW, REHAB EVAL
Pt admitted for fever and BUE weakness/yes yes/Pt admitted for s/p fall 3 weeks ago and sustained a right distal femur fx

## 2018-08-26 NOTE — PHYSICAL THERAPY INITIAL EVALUATION ADULT - PATIENT/FAMILY/SIGNIFICANT OTHER GOALS STATEMENT, PT EVAL
Pt plans on returning jar[place] home at [place], pt does not want to go to any other facility Pt prefers on returning back home at [place] and wife and pt would like to have a hospital bed and specialized cushion

## 2018-08-26 NOTE — PHYSICAL THERAPY INITIAL EVALUATION ADULT - MD ORDER
Pt order for RLE WABT and LLE FWB; X-ray to right hip/pelvis: (+) acute displaced complete intertrochanteric fx to left? proximal femur (**pt has had a left femur fx ~ 1 year ago); X-ray to right knee: (+) oblique and mildly comminuted distal femur fx; Chest X-ray: Elevation of left hemidiaphragm, (-) acute consolidation, (-) pneumothorax or pleural effusion, (+) degenerative changes & mild thoracic scoliosis. CT RLE: Grossly comminuted & displaced & impacted fx of distal femur, possible path.

## 2018-08-26 NOTE — PROGRESS NOTE ADULT - SUBJECTIVE AND OBJECTIVE BOX
Patient is a 63y old  Male who presents with a chief complaint of I fell when transferring from bed to wheelchair and landed on my knees and have had pain for last 2 wks (25 Aug 2018 18:07)      INTERVAL HPI/OVERNIGHT EVENTS:  T(C): 36.7 (18 @ 04:10), Max: 37.6 (18 @ 19:07)  HR: 91 (18 @ 04:10) (82 - 103)  BP: 152/82 (18 @ 04:10) (129/76 - 202/90)  RR: 16 (18 @ 04:10) (11 - 17)  SpO2: 94% (18 @ 04:10) (93% - 100%)  Wt(kg): --  I&O's Summary    25 Aug 2018 07:01  -  26 Aug 2018 07:00  --------------------------------------------------------  IN: 840 mL / OUT: 2275 mL / NET: -1435 mL        LABS:                        9.7    8.14  )-----------( 292      ( 26 Aug 2018 05:34 )             28.9         136  |  98  |  9   ----------------------------<  100<H>  4.3   |  29  |  0.64    Ca    8.5      26 Aug 2018 05:34    TPro  6.6  /  Alb  2.9<L>  /  TBili  0.7  /  DBili  x   /  AST  15  /  ALT  16  /  AlkPhos  110      PT/INR - ( 25 Aug 2018 05:30 )   PT: 14.0 sec;   INR: 1.28 ratio         PTT - ( 25 Aug 2018 05:30 )  PTT:37.0 sec  Urinalysis Basic - ( 25 Aug 2018 07:00 )    Color: Yellow / Appearance: Clear / S.010 / pH: x  Gluc: x / Ketone: Small  / Bili: Negative / Urobili: Negative   Blood: x / Protein: Negative / Nitrite: Negative   Leuk Esterase: Negative / RBC: 0-2 /HPF / WBC x   Sq Epi: x / Non Sq Epi: x / Bacteria: x      CAPILLARY BLOOD GLUCOSE            Urinalysis Basic - ( 25 Aug 2018 07:00 )    Color: Yellow / Appearance: Clear / S.010 / pH: x  Gluc: x / Ketone: Small  / Bili: Negative / Urobili: Negative   Blood: x / Protein: Negative / Nitrite: Negative   Leuk Esterase: Negative / RBC: 0-2 /HPF / WBC x   Sq Epi: x / Non Sq Epi: x / Bacteria: x        MEDICATIONS  (STANDING):  amLODIPine   Tablet 5 milliGRAM(s) Oral daily  ascorbic acid 500 milliGRAM(s) Oral two times a day  ceFAZolin   IVPB 2000 milliGRAM(s) IV Intermittent every 8 hours  docusate sodium 100 milliGRAM(s) Oral three times a day  DULoxetine 60 milliGRAM(s) Oral two times a day  enoxaparin Injectable 40 milliGRAM(s) SubCutaneous every 24 hours  ferrous    sulfate 325 milliGRAM(s) Oral three times a day with meals  folic acid 1 milliGRAM(s) Oral daily  multivitamin 1 Tablet(s) Oral daily  OXcarbazepine 300 milliGRAM(s) Oral two times a day  oxybutynin 5 milliGRAM(s) Oral four times a day  pregabalin 150 milliGRAM(s) Oral four times a day    MEDICATIONS  (PRN):  acetaminophen   Tablet 650 milliGRAM(s) Oral every 6 hours PRN For Temp greater than 38 C (100.4 F)  acetaminophen   Tablet. 650 milliGRAM(s) Oral every 6 hours PRN headache  diphenhydrAMINE   Capsule 25 milliGRAM(s) Oral at bedtime PRN Insomnia  HYDROmorphone  Injectable 1 milliGRAM(s) IV Push every 4 hours PRN Severe Pain (7 - 10)  ondansetron Injectable 4 milliGRAM(s) IV Push every 6 hours PRN Nausea and/or Vomiting  oxyCODONE    IR 10 milliGRAM(s) Oral every 4 hours PRN Moderate Pain (4 - 6)  oxyCODONE    IR 5 milliGRAM(s) Oral every 4 hours PRN Mild Pain (1 - 3)          PHYSICAL EXAM:  GENERAL: NAD, well-groomed, well-developed  HEAD:  Atraumatic, Normocephalic  CHEST/LUNG: Clear to percussion bilaterally; No rales, rhonchi, wheezing, or rubs  HEART: Regular rate and rhythm; No murmurs, rubs, or gallops  ABDOMEN: Soft, Nontender, Nondistended; Bowel sounds present  EXTREMITIES:  2+ Peripheral Pulses, No clubbing, cyanosis, or edema  LYMPH: No lymphadenopathy noted  SKIN: No rashes or lesions    Care Discussed with Consultants/Other Providers [ ] YES  [ ] NO

## 2018-08-26 NOTE — PHYSICAL THERAPY INITIAL EVALUATION ADULT - MANUAL MUSCLE TESTING RESULTS, REHAB EVAL
BLE: 0/5; right shoulder flexion to elbow flex/ext 1+ to 2-/5; right hand/wrist 0/5, left shoulder 1+/5, left elbow flex/ext to hand 2-/5/grossly assessed due to BLE: 0/5 at baseline; right shoulder flexion to elbow flex/ext 1+ to 2-/5; right hand/wrist 0/5, right elbow <2-/5, left shoulder 2- to 3-/5, left elbow flex/ext to hand 2- to 3-/5/grossly assessed due to

## 2018-08-26 NOTE — PROGRESS NOTE ADULT - SUBJECTIVE AND OBJECTIVE BOX
Patient seen and examined at bedside. Pain is suboptimally controlled. No other complaints at this time.         PE:    Vital Signs Last 24 Hrs  T(C): 36.7 (26 Aug 2018 04:10), Max: 37.6 (25 Aug 2018 19:07)  T(F): 98 (26 Aug 2018 04:10), Max: 99.7 (25 Aug 2018 19:07)  HR: 91 (26 Aug 2018 04:10) (82 - 103)  BP: 152/82 (26 Aug 2018 04:10) (129/76 - 202/90)  RR: 16 (26 Aug 2018 04:10) (11 - 17)  SpO2: 94% (26 Aug 2018 04:10) (93% - 100%)    Gen: NAD    RLE:    SILT L3-S1  compartments soft and compressible  no calf tenderness  DP 2+  PT pulse 2+  2+ pitting edema  unable to asess motor exam due to MS hx

## 2018-08-26 NOTE — PHYSICAL THERAPY INITIAL EVALUATION ADULT - PERTINENT HX OF CURRENT PROBLEM, REHAB EVAL
As per H&P:" 61 y/o male admitted for fever and upper extremities weakness" Pt /c history of primary progressive MS who states that at the end of September 2017 pt was leaning over his wheelchair to pick something off floor when he had fractured his left hip unknowingly. Pt reports he has had multiple opinion regarding left hip surgery but has decided to not undergo surgery due to the fact that pt has been non-ambulatory for ~ 6 years. As per H&P:"63 yr old male with MS, sp fall  while trying to get out of the bed. Pt is wheelchair bound due to MS.  No other associated symptoms" Per pt during PT hisoty taking, pt was being assisted onto over-head mecahnical lift when he ended up falling off EOB ~ 3 weeks ago and landing on his right side. Pt did not want to come to ER and eventually came in due to increased pain. Pt has been non-ambulatory since ~7 years and has had primary progressive MS since 2003.

## 2018-08-26 NOTE — PHYSICAL THERAPY INITIAL EVALUATION ADULT - GENERAL OBSERVATIONS, REHAB EVAL
Pt rec'd in ED in bed in semi-supine position. Pt is pleasant & agreeable /c PT eval. Pt rec'd in bed on 2 north in bed in semi-supine position. RLE in ace wrap and right knee immobilizer *(orders for knee immobilizer to be worn at all times). Pt is pleasant & known to this PT form previous admission & was agreeable /c PT eval. Pt was pre-medicated.

## 2018-08-26 NOTE — PHYSICAL THERAPY INITIAL EVALUATION ADULT - PRECAUTIONS/LIMITATIONS, REHAB EVAL
MS, dependent, non-ambulatory,  lift at baseline/fall precautions fall precautions/surgical precautions/MS, dependent, non-ambulatory,  lift at baseline

## 2018-08-26 NOTE — PHYSICAL THERAPY INITIAL EVALUATION ADULT - DIAGNOSIS, PT EVAL
Progressive decline due to primary progressive, weakness to BUE right > left, left hip fx since Sept 2017 Progressive decline due to primary progressive, weakness to BUE right > left, left hip fx since Sept 2017 and now recent right hip/knee fx since ~ 3 weeks

## 2018-08-26 NOTE — PHYSICAL THERAPY INITIAL EVALUATION ADULT - HIP STRATEGY ASSESSMENT, REHAB EVAL
**PT spoke in length /c pt and wife regarding realistic DC planning and options as well as DME if to go home

## 2018-08-26 NOTE — PROGRESS NOTE ADULT - ASSESSMENT
63 yr old male with MS, sp fall  while trying to get out of the bed. Pt is wheelchair bound due to MS.  No other associated symptoms     Problem/Plan - 1:  ·  Problem: Femur fracture, right.  Plan: pain control  sp OR   ortho fu      Problem/Plan - 2:  ·  Problem: MS (multiple sclerosis).  Plan: cw home meds  neuro fu.

## 2018-08-26 NOTE — PROGRESS NOTE ADULT - SUBJECTIVE AND OBJECTIVE BOX
Neurology follow up note    THANH [id number]      Interval History:    Patient feels ok no new complaints seen with spouse     MEDICATIONS    acetaminophen   Tablet 650 milliGRAM(s) Oral every 6 hours PRN  acetaminophen   Tablet. 650 milliGRAM(s) Oral every 6 hours PRN  amLODIPine   Tablet 5 milliGRAM(s) Oral daily  ascorbic acid 500 milliGRAM(s) Oral two times a day  diphenhydrAMINE   Capsule 25 milliGRAM(s) Oral at bedtime PRN  docusate sodium 100 milliGRAM(s) Oral three times a day  DULoxetine 60 milliGRAM(s) Oral two times a day  enoxaparin Injectable 40 milliGRAM(s) SubCutaneous every 24 hours  ferrous    sulfate 325 milliGRAM(s) Oral three times a day with meals  folic acid 1 milliGRAM(s) Oral daily  HYDROmorphone  Injectable 1 milliGRAM(s) IV Push every 4 hours PRN  multivitamin 1 Tablet(s) Oral daily  ondansetron Injectable 4 milliGRAM(s) IV Push every 6 hours PRN  OXcarbazepine 300 milliGRAM(s) Oral two times a day  oxybutynin 5 milliGRAM(s) Oral four times a day  oxyCODONE    IR 10 milliGRAM(s) Oral every 4 hours PRN  oxyCODONE    IR 5 milliGRAM(s) Oral every 4 hours PRN  pregabalin 150 milliGRAM(s) Oral four times a day      Allergies    No Known Allergies    Intolerances            Vital Signs Last 24 Hrs  T(C): 36.7 (26 Aug 2018 04:10), Max: 37.6 (25 Aug 2018 19:07)  T(F): 98 (26 Aug 2018 04:10), Max: 99.7 (25 Aug 2018 19:07)  HR: 91 (26 Aug 2018 04:10) (85 - 103)  BP: 152/82 (26 Aug 2018 04:10) (129/76 - 202/90)  BP(mean): --  RR: 16 (26 Aug 2018 04:10) (11 - 16)  SpO2: 94% (26 Aug 2018 04:10) (93% - 100%)      REVIEW OF SYSTEMS:     Constitutional: No fever, chills, fatigue, weakness  Eyes: no eye pain, visual disturbances, or discharge  ENT:  No difficulty hearing, tinnitus, vertigo; No sinus or throat pain  Neck: No pain or stiffness  Respiratory: No cough, dyspnea, wheezing   Cardiovascular: No chest pain, palpitations,   Gastrointestinal: No abdominal or epigastric pain. No nausea, vomiting  No diarrhea or constipation.   Genitourinary: No dysuria, frequency, hematuria or incontinence  Neurological: No headaches, lightheadedness, vertigo, numbness or tremors  Psychiatric: No depression, anxiety, mood swings or difficulty sleeping  Musculoskeletal: slight  extremity pain S/P surgery  Skin: No itching, burning, rashes or lesions   Lymph Nodes: No enlarged glands  Endocrine: No heat or cold intolerance; No hair loss   Allergy and Immunologic: No hives or eczema    On Neurological Examination:    The patient is awake and alert.      Extraocular movements were intact.      Pupils were equal, round, and reactive bilaterally 3 mm to 2 mm.      Speech was fluent.  Smile was symmetric.  Right upper, there was subtle flexation and extension at the elbow and finger which is his baseline.  Left upper was able to elevate roughly about 30 degrees, would say flexation, extension, and hand grasp were 3/5.  Bilateral lower extremities; 0/5, which is his baseline.      There is positive swelling in bilateral feet, edema.    Follow simple commands      GENERAL Exam: Nontoxic , No Acute Distress   	  HEENT:  normocephalic, atraumatic  		  LUNGS: Clear bilaterally    HEART: Normal S1S2   No murmur RRR        	  GI/ ABDOMEN:  Soft  Non tender    EXTREMITIES:   No Edema  No Clubbing  No Cyanosis No Edema    MUSCULOSKELETAL:decreased Range of Motion all 4 extremities   	   SKIN: Normal  No Ecchymosis               LABS:  CBC Full  -  ( 26 Aug 2018 05:34 )  WBC Count : 8.14 K/uL  Hemoglobin : 9.7 g/dL  Hematocrit : 28.9 %  Platelet Count - Automated : 292 K/uL  Mean Cell Volume : 89.5 fl  Mean Cell Hemoglobin : 30.0 pg  Mean Cell Hemoglobin Concentration : 33.6 gm/dL  Auto Neutrophil # : x  Auto Lymphocyte # : x  Auto Monocyte # : x  Auto Eosinophil # : x  Auto Basophil # : x  Auto Neutrophil % : x  Auto Lymphocyte % : x  Auto Monocyte % : x  Auto Eosinophil % : x  Auto Basophil % : x    Urinalysis Basic - ( 25 Aug 2018 07:00 )    Color: Yellow / Appearance: Clear / S.010 / pH: x  Gluc: x / Ketone: Small  / Bili: Negative / Urobili: Negative   Blood: x / Protein: Negative / Nitrite: Negative   Leuk Esterase: Negative / RBC: 0-2 /HPF / WBC x   Sq Epi: x / Non Sq Epi: x / Bacteria: x          136  |  98  |  9   ----------------------------<  100<H>  4.3   |  29  |  0.64    Ca    8.5      26 Aug 2018 05:34    TPro  6.6  /  Alb  2.9<L>  /  TBili  0.7  /  DBili  x   /  AST  15  /  ALT  16  /  AlkPhos  110      Hemoglobin A1C:     LIVER FUNCTIONS - ( 26 Aug 2018 05:34 )  Alb: 2.9 g/dL / Pro: 6.6 g/dL / ALK PHOS: 110 U/L / ALT: 16 U/L / AST: 15 U/L / GGT: x           Vitamin B12   PT/INR - ( 25 Aug 2018 05:30 )   PT: 14.0 sec;   INR: 1.28 ratio         PTT - ( 25 Aug 2018 05:30 )  PTT:37.0 sec      RADIOLOGY      ANALYSIS AND PLAN:  This is a 63-year-old with a history of multiple sclerosis, trigeminal neuralgia, neuropathic pain.  1.	For history of multiple sclerosis, the patient appears to be at his baseline, will say appears to have a primary progressive type, would recommend for now supportive therapy.  2.	For history of trigeminal neuralgia, continue the patient on his Trileptal 300 mg two times a day and his Lyrica 150 mg four times a day.  3.	For history of neuropathic pain, continue the patient on his Cymbalta, Lyrica, and Trileptal.  4.	spoke to spouse at bedside 18    Thank you for the courtesy of this consultation.    Physical therapy evaluation as tolerated  OOB to chair/ambulation with assistance only if possible.    Greater than 45 minutes spent in direct patient care reviewing  the notes, lab data/ imaging , discussion with multidisciplinary team.

## 2018-08-27 LAB
ALBUMIN SERPL ELPH-MCNC: 2.8 G/DL — LOW (ref 3.3–5)
ALP SERPL-CCNC: 95 U/L — SIGNIFICANT CHANGE UP (ref 40–120)
ALT FLD-CCNC: 9 U/L — LOW (ref 12–78)
ANION GAP SERPL CALC-SCNC: 8 MMOL/L — SIGNIFICANT CHANGE UP (ref 5–17)
AST SERPL-CCNC: 15 U/L — SIGNIFICANT CHANGE UP (ref 15–37)
BILIRUB SERPL-MCNC: 0.5 MG/DL — SIGNIFICANT CHANGE UP (ref 0.2–1.2)
BUN SERPL-MCNC: 12 MG/DL — SIGNIFICANT CHANGE UP (ref 7–23)
CALCIUM SERPL-MCNC: 8.3 MG/DL — LOW (ref 8.5–10.1)
CHLORIDE SERPL-SCNC: 97 MMOL/L — SIGNIFICANT CHANGE UP (ref 96–108)
CO2 SERPL-SCNC: 30 MMOL/L — SIGNIFICANT CHANGE UP (ref 22–31)
CREAT SERPL-MCNC: 0.57 MG/DL — SIGNIFICANT CHANGE UP (ref 0.5–1.3)
GLUCOSE SERPL-MCNC: 103 MG/DL — HIGH (ref 70–99)
HCT VFR BLD CALC: 26.3 % — LOW (ref 39–50)
HCT VFR BLD CALC: 27.2 % — LOW (ref 39–50)
HGB BLD-MCNC: 8.8 G/DL — LOW (ref 13–17)
HGB BLD-MCNC: 8.9 G/DL — LOW (ref 13–17)
MCHC RBC-ENTMCNC: 29.1 PG — SIGNIFICANT CHANGE UP (ref 27–34)
MCHC RBC-ENTMCNC: 32.4 GM/DL — SIGNIFICANT CHANGE UP (ref 32–36)
MCV RBC AUTO: 90.1 FL — SIGNIFICANT CHANGE UP (ref 80–100)
NRBC # BLD: 0 /100 WBCS — SIGNIFICANT CHANGE UP (ref 0–0)
PLATELET # BLD AUTO: 245 K/UL — SIGNIFICANT CHANGE UP (ref 150–400)
POTASSIUM SERPL-MCNC: 3.8 MMOL/L — SIGNIFICANT CHANGE UP (ref 3.5–5.3)
POTASSIUM SERPL-SCNC: 3.8 MMOL/L — SIGNIFICANT CHANGE UP (ref 3.5–5.3)
PROT SERPL-MCNC: 6.4 G/DL — SIGNIFICANT CHANGE UP (ref 6–8.3)
RBC # BLD: 3.02 M/UL — LOW (ref 4.2–5.8)
RBC # FLD: 14.4 % — SIGNIFICANT CHANGE UP (ref 10.3–14.5)
SODIUM SERPL-SCNC: 135 MMOL/L — SIGNIFICANT CHANGE UP (ref 135–145)
WBC # BLD: 8.25 K/UL — SIGNIFICANT CHANGE UP (ref 3.8–10.5)
WBC # FLD AUTO: 8.25 K/UL — SIGNIFICANT CHANGE UP (ref 3.8–10.5)

## 2018-08-27 RX ADMIN — DULOXETINE HYDROCHLORIDE 60 MILLIGRAM(S): 30 CAPSULE, DELAYED RELEASE ORAL at 06:07

## 2018-08-27 RX ADMIN — DULOXETINE HYDROCHLORIDE 60 MILLIGRAM(S): 30 CAPSULE, DELAYED RELEASE ORAL at 17:57

## 2018-08-27 RX ADMIN — Medication 150 MILLIGRAM(S): at 17:57

## 2018-08-27 RX ADMIN — Medication 150 MILLIGRAM(S): at 06:06

## 2018-08-27 RX ADMIN — Medication 325 MILLIGRAM(S): at 12:47

## 2018-08-27 RX ADMIN — CYCLOBENZAPRINE HYDROCHLORIDE 5 MILLIGRAM(S): 10 TABLET, FILM COATED ORAL at 00:01

## 2018-08-27 RX ADMIN — Medication 150 MILLIGRAM(S): at 12:47

## 2018-08-27 RX ADMIN — HYDROMORPHONE HYDROCHLORIDE 1 MILLIGRAM(S): 2 INJECTION INTRAMUSCULAR; INTRAVENOUS; SUBCUTANEOUS at 00:15

## 2018-08-27 RX ADMIN — Medication 100 MILLIGRAM(S): at 21:40

## 2018-08-27 RX ADMIN — OXCARBAZEPINE 300 MILLIGRAM(S): 300 TABLET, FILM COATED ORAL at 17:57

## 2018-08-27 RX ADMIN — HYDROMORPHONE HYDROCHLORIDE 1 MILLIGRAM(S): 2 INJECTION INTRAMUSCULAR; INTRAVENOUS; SUBCUTANEOUS at 04:22

## 2018-08-27 RX ADMIN — Medication 5 MILLIGRAM(S): at 06:06

## 2018-08-27 RX ADMIN — HYDROMORPHONE HYDROCHLORIDE 1 MILLIGRAM(S): 2 INJECTION INTRAMUSCULAR; INTRAVENOUS; SUBCUTANEOUS at 11:13

## 2018-08-27 RX ADMIN — HYDROMORPHONE HYDROCHLORIDE 1 MILLIGRAM(S): 2 INJECTION INTRAMUSCULAR; INTRAVENOUS; SUBCUTANEOUS at 14:34

## 2018-08-27 RX ADMIN — Medication 1 MILLIGRAM(S): at 12:47

## 2018-08-27 RX ADMIN — AMLODIPINE BESYLATE 5 MILLIGRAM(S): 2.5 TABLET ORAL at 06:07

## 2018-08-27 RX ADMIN — Medication 5 MILLIGRAM(S): at 17:57

## 2018-08-27 RX ADMIN — Medication 5 MILLIGRAM(S): at 12:47

## 2018-08-27 RX ADMIN — Medication 325 MILLIGRAM(S): at 08:45

## 2018-08-27 RX ADMIN — Medication 500 MILLIGRAM(S): at 06:06

## 2018-08-27 RX ADMIN — Medication 150 MILLIGRAM(S): at 23:03

## 2018-08-27 RX ADMIN — OXCARBAZEPINE 300 MILLIGRAM(S): 300 TABLET, FILM COATED ORAL at 06:06

## 2018-08-27 RX ADMIN — HYDROMORPHONE HYDROCHLORIDE 1 MILLIGRAM(S): 2 INJECTION INTRAMUSCULAR; INTRAVENOUS; SUBCUTANEOUS at 09:24

## 2018-08-27 RX ADMIN — ENOXAPARIN SODIUM 40 MILLIGRAM(S): 100 INJECTION SUBCUTANEOUS at 12:47

## 2018-08-27 RX ADMIN — HYDROMORPHONE HYDROCHLORIDE 1 MILLIGRAM(S): 2 INJECTION INTRAMUSCULAR; INTRAVENOUS; SUBCUTANEOUS at 18:51

## 2018-08-27 RX ADMIN — Medication 100 MILLIGRAM(S): at 14:35

## 2018-08-27 RX ADMIN — HYDROMORPHONE HYDROCHLORIDE 1 MILLIGRAM(S): 2 INJECTION INTRAMUSCULAR; INTRAVENOUS; SUBCUTANEOUS at 23:03

## 2018-08-27 RX ADMIN — Medication 100 MILLIGRAM(S): at 06:07

## 2018-08-27 RX ADMIN — Medication 500 MILLIGRAM(S): at 17:57

## 2018-08-27 RX ADMIN — HYDROMORPHONE HYDROCHLORIDE 1 MILLIGRAM(S): 2 INJECTION INTRAMUSCULAR; INTRAVENOUS; SUBCUTANEOUS at 13:35

## 2018-08-27 RX ADMIN — Medication 5 MILLIGRAM(S): at 23:04

## 2018-08-27 RX ADMIN — HYDROMORPHONE HYDROCHLORIDE 1 MILLIGRAM(S): 2 INJECTION INTRAMUSCULAR; INTRAVENOUS; SUBCUTANEOUS at 04:45

## 2018-08-27 RX ADMIN — CYCLOBENZAPRINE HYDROCHLORIDE 5 MILLIGRAM(S): 10 TABLET, FILM COATED ORAL at 04:25

## 2018-08-27 RX ADMIN — Medication 1 TABLET(S): at 12:47

## 2018-08-27 RX ADMIN — Medication 325 MILLIGRAM(S): at 17:57

## 2018-08-27 NOTE — OCCUPATIONAL THERAPY INITIAL EVALUATION ADULT - ADDITIONAL COMMENTS
Pt lives in a private home with his spouse who works. Pt has aides from 6 hours/day for 7 days/wk. Pt has no steps that he negotiates due to being non-ambulatory and requires a over-head mechanical lift for all transfers at baseline. Pt is wheelchair bound. Pt has this over-head mechanical lift system than goes all the way from bedroom into his bathroom. Pt has a motorized wheelchair (pt plans on getting a new updated motorized wheelchair) that he sits in and also a shower wheelchair for showering. Pt has a mechanical bed (but this is old and needs a new one). Pt was able to shower himself /c use of arms Left > right however now can not. Pt is dependent for all other care. In order to get onto overhead mechanical lift pt has to be dangling off EOB at his baseline. Pt lives in a private home with his spouse who works. Pt has aides from 6 hours/day for 7 days/wk. Pt has no steps that he negotiates due to being non-ambulatory and requires a over-head mechanical lift for all transfers at baseline. Pt is wheelchair bound. Pt has an over-head mechanical lift system than goes all the way from bedroom into his bathroom. Pt has a motorized wheelchair (pt plans on getting a new updated motorized wheelchair) that he sits in. Pt owns a commode and wheeled shower chair. Pt has a walk-in stall shower. Pt has an adjustable bed. Pt is right hand dominant though his Right UE is non-functional due to minimal AROM. Pt reports he uses his Left UE to feed himself and assist with bathing though he is unable at this time due to decreased AROM/strength Left UE. Pt lives in a private home with a ramp to enter home with his spouse (works during the day). Pt has aides for 6 hours/day for 7 days/wk and spouse assists patient at home with ADL's and transfers. Pt has no steps that he negotiates due to being non-ambulatory and requires a over-head mechanical lift for all transfers at baseline. Pt is wheelchair bound. Pt has an over-head mechanical lift system than goes all the way from bedroom into his bathroom. Pt has a motorized wheelchair (pt plans on getting a new updated motorized wheelchair) that he sits in. Pt owns a commode and wheeled shower chair. Pt has a walk-in stall shower. Pt has an adjustable bed. Pt is right hand dominant though his Right UE is non-functional due to minimal AROM. Pt reports he uses his Left UE to feed himself and assist with bathing though he is unable at this time due to decreased AROM/strength Left UE.

## 2018-08-27 NOTE — PROGRESS NOTE ADULT - SUBJECTIVE AND OBJECTIVE BOX
Patient is a 63y old  Male who presents with a chief complaint of I fell when transferring from bed to wheelchair and landed on my knees and have had pain for last 2 wks (25 Aug 2018 18:07)      INTERVAL HPI/OVERNIGHT EVENTS:  T(C): 37.4 (08-27-18 @ 12:34), Max: 38.4 (08-26-18 @ 17:04)  HR: 83 (08-27-18 @ 12:34) (79 - 95)  BP: 128/77 (08-27-18 @ 12:34) (128/77 - 158/88)  RR: 18 (08-27-18 @ 12:34) (16 - 19)  SpO2: 97% (08-27-18 @ 12:34) (93% - 97%)  Wt(kg): --  I&O's Summary    26 Aug 2018 07:01  -  27 Aug 2018 07:00  --------------------------------------------------------  IN: 0 mL / OUT: 300 mL / NET: -300 mL    27 Aug 2018 07:01  -  27 Aug 2018 17:00  --------------------------------------------------------  IN: 240 mL / OUT: 0 mL / NET: 240 mL        LABS:                        8.9    x     )-----------( x        ( 27 Aug 2018 12:48 )             26.3     08-27    135  |  97  |  12  ----------------------------<  103<H>  3.8   |  30  |  0.57    Ca    8.3<L>      27 Aug 2018 05:02    TPro  6.4  /  Alb  2.8<L>  /  TBili  0.5  /  DBili  x   /  AST  15  /  ALT  9<L>  /  AlkPhos  95  08-27        CAPILLARY BLOOD GLUCOSE                MEDICATIONS  (STANDING):  amLODIPine   Tablet 5 milliGRAM(s) Oral daily  ascorbic acid 500 milliGRAM(s) Oral two times a day  docusate sodium 100 milliGRAM(s) Oral three times a day  DULoxetine 60 milliGRAM(s) Oral two times a day  enoxaparin Injectable 40 milliGRAM(s) SubCutaneous every 24 hours  ferrous    sulfate 325 milliGRAM(s) Oral three times a day with meals  folic acid 1 milliGRAM(s) Oral daily  multivitamin 1 Tablet(s) Oral daily  OXcarbazepine 300 milliGRAM(s) Oral two times a day  oxybutynin 5 milliGRAM(s) Oral four times a day  pregabalin 150 milliGRAM(s) Oral four times a day    MEDICATIONS  (PRN):  acetaminophen   Tablet 650 milliGRAM(s) Oral every 6 hours PRN For Temp greater than 38 C (100.4 F)  acetaminophen   Tablet. 650 milliGRAM(s) Oral every 6 hours PRN headache  cyclobenzaprine 5 milliGRAM(s) Oral three times a day PRN Muscle Spasm  diphenhydrAMINE   Capsule 25 milliGRAM(s) Oral at bedtime PRN Insomnia  HYDROmorphone  Injectable 1 milliGRAM(s) IV Push every 4 hours PRN Severe Pain (7 - 10)  ondansetron Injectable 4 milliGRAM(s) IV Push every 6 hours PRN Nausea and/or Vomiting  oxyCODONE    IR 10 milliGRAM(s) Oral every 4 hours PRN Moderate Pain (4 - 6)  oxyCODONE    IR 5 milliGRAM(s) Oral every 4 hours PRN Mild Pain (1 - 3)          PHYSICAL EXAM:  GENERAL: NAD, well-groomed, well-developed  HEAD:  Atraumatic, Normocephalic  CHEST/LUNG: Clear to percussion bilaterally; No rales, rhonchi, wheezing, or rubs  HEART: Regular rate and rhythm; No murmurs, rubs, or gallops  ABDOMEN: Soft, Nontender, Nondistended; Bowel sounds present  EXTREMITIES:  2+ Peripheral Pulses, No clubbing, cyanosis, or edema  LYMPH: No lymphadenopathy noted  SKIN: No rashes or lesions    Care Discussed with Consultants/Other Providers [ ] YES  [ ] NO

## 2018-08-27 NOTE — PROGRESS NOTE ADULT - SUBJECTIVE AND OBJECTIVE BOX
Neurology follow up note    THANH [id number]      Interval History:    Patient feels ok no new complaints     MEDICATIONS    acetaminophen   Tablet 650 milliGRAM(s) Oral every 6 hours PRN  acetaminophen   Tablet. 650 milliGRAM(s) Oral every 6 hours PRN  amLODIPine   Tablet 5 milliGRAM(s) Oral daily  ascorbic acid 500 milliGRAM(s) Oral two times a day  cyclobenzaprine 5 milliGRAM(s) Oral three times a day PRN  diphenhydrAMINE   Capsule 25 milliGRAM(s) Oral at bedtime PRN  docusate sodium 100 milliGRAM(s) Oral three times a day  DULoxetine 60 milliGRAM(s) Oral two times a day  enoxaparin Injectable 40 milliGRAM(s) SubCutaneous every 24 hours  ferrous    sulfate 325 milliGRAM(s) Oral three times a day with meals  folic acid 1 milliGRAM(s) Oral daily  HYDROmorphone  Injectable 1 milliGRAM(s) IV Push every 4 hours PRN  multivitamin 1 Tablet(s) Oral daily  ondansetron Injectable 4 milliGRAM(s) IV Push every 6 hours PRN  OXcarbazepine 300 milliGRAM(s) Oral two times a day  oxybutynin 5 milliGRAM(s) Oral four times a day  oxyCODONE    IR 10 milliGRAM(s) Oral every 4 hours PRN  oxyCODONE    IR 5 milliGRAM(s) Oral every 4 hours PRN  pregabalin 150 milliGRAM(s) Oral four times a day      Allergies    No Known Allergies    Intolerances            Vital Signs Last 24 Hrs  T(C): 37.1 (27 Aug 2018 07:36), Max: 38.4 (26 Aug 2018 17:04)  T(F): 98.8 (27 Aug 2018 07:36), Max: 101.2 (26 Aug 2018 17:04)  HR: 80 (27 Aug 2018 07:36) (79 - 95)  BP: 148/86 (27 Aug 2018 07:36) (134/69 - 158/88)  BP(mean): --  RR: 18 (27 Aug 2018 07:36) (16 - 19)  SpO2: 93% (27 Aug 2018 07:36) (93% - 96%)    REVIEW OF SYSTEMS:     Constitutional: No fever, chills, fatigue, weakness  Eyes: no eye pain, visual disturbances, or discharge  ENT:  No difficulty hearing, tinnitus, vertigo; No sinus or throat pain  Neck: No pain or stiffness  Respiratory: No cough, dyspnea, wheezing   Cardiovascular: No chest pain, palpitations,   Gastrointestinal: No abdominal or epigastric pain. No nausea, vomiting  No diarrhea or constipation.   Genitourinary: No dysuria, frequency, hematuria or incontinence  Neurological: No headaches, lightheadedness, vertigo, numbness or tremors  Psychiatric: No depression, anxiety, mood swings or difficulty sleeping  Musculoskeletal: slight  extremity pain S/P surgery  Skin: No itching, burning, rashes or lesions   Lymph Nodes: No enlarged glands  Endocrine: No heat or cold intolerance; No hair loss   Allergy and Immunologic: No hives or eczema    On Neurological Examination:    The patient is awake and alert.      Extraocular movements were intact.      Pupils were equal, round, and reactive bilaterally 3 mm to 2 mm.      Speech was fluent.  Smile was symmetric.  Right upper, there was subtle flexation and extension at the elbow and finger which is his baseline.  Left upper was able to elevate roughly about 30 degrees, would say flexation, extension, and hand grasp were 3/5.  Bilateral lower extremities; 0/5, which is his baseline.      There is positive swelling in bilateral feet, edema.    Follow simple commands      GENERAL Exam: Nontoxic , No Acute Distress   	  HEENT:  normocephalic, atraumatic  		  LUNGS: Clear bilaterally      HEART: Normal S1S2   No murmur RRR        	  GI/ ABDOMEN:  Soft  Non tender    EXTREMITIES:   No Edema  No Clubbing  No Cyanosis     MUSCULOSKELETAL: decreased Range of Motion all 4 extremities   	   SKIN: Normal  No Ecchymosis                    LABS:  CBC Full  -  ( 27 Aug 2018 05:02 )  WBC Count : 8.25 K/uL  Hemoglobin : 8.8 g/dL  Hematocrit : 27.2 %  Platelet Count - Automated : 245 K/uL  Mean Cell Volume : 90.1 fl  Mean Cell Hemoglobin : 29.1 pg  Mean Cell Hemoglobin Concentration : 32.4 gm/dL  Auto Neutrophil # : x  Auto Lymphocyte # : x  Auto Monocyte # : x  Auto Eosinophil # : x  Auto Basophil # : x  Auto Neutrophil % : x  Auto Lymphocyte % : x  Auto Monocyte % : x  Auto Eosinophil % : x  Auto Basophil % : x      08-27    135  |  97  |  12  ----------------------------<  103<H>  3.8   |  30  |  0.57    Ca    8.3<L>      27 Aug 2018 05:02    TPro  6.4  /  Alb  2.8<L>  /  TBili  0.5  /  DBili  x   /  AST  15  /  ALT  9<L>  /  AlkPhos  95  08-27    Hemoglobin A1C:     LIVER FUNCTIONS - ( 27 Aug 2018 05:02 )  Alb: 2.8 g/dL / Pro: 6.4 g/dL / ALK PHOS: 95 U/L / ALT: 9 U/L / AST: 15 U/L / GGT: x           Vitamin B12         RADIOLOGY    ANALYSIS AND PLAN:  This is a 63-year-old with a history of multiple sclerosis, trigeminal neuralgia, neuropathic pain.  1.	For history of multiple sclerosis, the patient appears to be at his baseline, will say appears to have a primary progressive type, would recommend for now supportive therapy.  2.	For history of trigeminal neuralgia, continue the patient on his Trileptal 300 mg two times a day and his Lyrica 150 mg four times a day.  3.	For history of neuropathic pain, continue the patient on his Cymbalta, Lyrica, and Trileptal.  4.	spoke to spouse at bedside 8/26/18    Neurologic standpoint only cleared for discharge planning     Thank you for the courtesy of this consultation.    Physical therapy evaluation as tolerated  OOB to chair/ambulation with assistance only if possible.    Greater than 45 minutes spent in direct patient care reviewing  the notes, lab data/ imaging , discussion with multidisciplinary team.

## 2018-08-27 NOTE — PROVIDER CONTACT NOTE (CHANGE IN STATUS NOTIFICATION) - ASSESSMENT
Patient is a 62yo male presenting with moisture associated skin damage in the intergluteal fold scar tissue present from past  surgical history of  pylinodial cyst

## 2018-08-27 NOTE — OCCUPATIONAL THERAPY INITIAL EVALUATION ADULT - STRENGTHENING, PT EVAL
Patient will improve Left UE muscle strength to enable patient to assist feeding self in 2-4 sessions.

## 2018-08-27 NOTE — PROGRESS NOTE ADULT - SUBJECTIVE AND OBJECTIVE BOX
Patient seen and examined at bedside. Pain is well controlled. No other complaints at this time.         PE:  Vital Signs Last 24 Hrs  T(C): 37.6 (27 Aug 2018 06:10), Max: 38.4 (26 Aug 2018 17:04)  T(F): 99.7 (27 Aug 2018 06:10), Max: 101.2 (26 Aug 2018 17:04)  HR: 82 (27 Aug 2018 06:10) (79 - 95)  BP: 144/81 (27 Aug 2018 06:10) (134/69 - 158/88)  RR: 18 (27 Aug 2018 06:10) (16 - 19)  SpO2: 95% (27 Aug 2018 06:10) (95% - 96%)    Gen: NAD    RLE:    Dressing/ACE bandage c/d/i  In bulky king KI  SILT L3-S1  compartments soft and compressible  no calf tenderness  DP 2+  PT pulse 2+  3+ pitting edema  unable to assess motor exam due to MS hx

## 2018-08-27 NOTE — PROGRESS NOTE ADULT - SUBJECTIVE AND OBJECTIVE BOX
[facility] Cardiology    CHIEF COMPLAINT: Patient is a 63y old  Male who presents with a chief complaint of I fell when transferring from bed to wheelchair and landed on my knees and have had pain for last 2 wks (25 Aug 2018 18:07)      Follow Up: [ ] Chest Pain      [ ] Dyspnea     [ ] Palpitations    [ ] Atrial Fibrillation     [ ] Ventricular Dysrhythmia    [ ] Abnormal EKG                      [ ] Abnormal Cardiac Enzymes     [ ] Valvular Disease    HPI:  63 yr old male with MS, sp fall  while trying to get out of the bed. Pt is wheelchair bound due to MS.  No other associated symptoms (24 Aug 2018 23:35)    Pt denies CP or SOB    PAST MEDICAL & SURGICAL HISTORY:  Femur fracture, left  MS (multiple sclerosis)  No significant past surgical history    MEDICATIONS  (STANDING):  amLODIPine   Tablet 5 milliGRAM(s) Oral daily  ascorbic acid 500 milliGRAM(s) Oral two times a day  docusate sodium 100 milliGRAM(s) Oral three times a day  DULoxetine 60 milliGRAM(s) Oral two times a day  enoxaparin Injectable 40 milliGRAM(s) SubCutaneous every 24 hours  ferrous    sulfate 325 milliGRAM(s) Oral three times a day with meals  folic acid 1 milliGRAM(s) Oral daily  multivitamin 1 Tablet(s) Oral daily  OXcarbazepine 300 milliGRAM(s) Oral two times a day  oxybutynin 5 milliGRAM(s) Oral four times a day  pregabalin 150 milliGRAM(s) Oral four times a day    MEDICATIONS  (PRN):  acetaminophen   Tablet 650 milliGRAM(s) Oral every 6 hours PRN For Temp greater than 38 C (100.4 F)  acetaminophen   Tablet. 650 milliGRAM(s) Oral every 6 hours PRN headache  cyclobenzaprine 5 milliGRAM(s) Oral three times a day PRN Muscle Spasm  diphenhydrAMINE   Capsule 25 milliGRAM(s) Oral at bedtime PRN Insomnia  HYDROmorphone  Injectable 1 milliGRAM(s) IV Push every 4 hours PRN Severe Pain (7 - 10)  ondansetron Injectable 4 milliGRAM(s) IV Push every 6 hours PRN Nausea and/or Vomiting  oxyCODONE    IR 10 milliGRAM(s) Oral every 4 hours PRN Moderate Pain (4 - 6)  oxyCODONE    IR 5 milliGRAM(s) Oral every 4 hours PRN Mild Pain (1 - 3)    Allergies    No Known Allergies    Intolerances    REVIEW OF SYSTEMS:    CONSTITUTIONAL: No weakness, fevers or chills.   EYES/ENT: No visual changes;  No vertigo or throat pain   NECK: No pain or stiffness  RESPIRATORY: No cough, wheezing, hemoptysis; No shortness of breath  CARDIOVASCULAR: No chest pain or palpitations  GASTROINTESTINAL: No abdominal or epigastric pain. No nausea, vomiting, or hematemesis; No diarrhea or constipation. No melena or hematochezia.  GENITOURINARY: No dysuria, frequency or hematuria  NEUROLOGICAL: No numbness or weakness  SKIN: No itching, burning, rashes, or lesions   All other review of systems is negative unless indicated above    Vital Signs Last 24 Hrs  T(C): 37.6 (27 Aug 2018 06:10), Max: 38.4 (26 Aug 2018 17:04)  T(F): 99.7 (27 Aug 2018 06:10), Max: 101.2 (26 Aug 2018 17:04)  HR: 82 (27 Aug 2018 06:10) (79 - 95)  BP: 144/81 (27 Aug 2018 06:10) (134/69 - 158/88)  BP(mean): --  RR: 18 (27 Aug 2018 06:10) (16 - 19)  SpO2: 95% (27 Aug 2018 06:10) (95% - 96%)    I&O's Summary    26 Aug 2018 07:01  -  27 Aug 2018 07:00  --------------------------------------------------------  IN: 0 mL / OUT: 300 mL / NET: -300 mL    PHYSICAL EXAM:  Constitutional: NAD, awake and alert, well-developed  Eyes:  EOMI,  Pupils round, No oral cyanosis.  ENMT: No exudate or erythema  Pulmonary: Non-labored, breath sounds are clear bilaterally, No wheezing, rales or rhonchi  Cardiovascular: Regular, S1 and S2, No murmurs, rubs, gallops oir clicks  Gastrointestinal: Bowel Sounds present, soft, nontender.   Lymph: No peripheral edema. No lymphadenopathy.  Neurological: Alert, no focal deficits  Skin: No rashes.  Psych:  Mood & affect appropriate  LABS: All Labs Reviewed:                8.8    8.25  )-----------( 245      ( 27 Aug 2018 05:02 )             27.2   08-27  135  |  97  |  12  ----------------------------<  103<H>  3.8   |  30  |  0.57    Ca    8.3<L>      27 Aug 2018 05:02    TPro  6.4  /  Alb  2.8<L>  /  TBili  0.5  /  DBili  x   /  AST  15  /  ALT  9<L>  /  AlkPhos  95  08-27    · Assessment		  63 yr old male with MS, sp fall  while trying to get out of the bed. Pt is wheelchair bound due to MS.  No other associated symptoms    Cardiac clearance  ·	No evidence of active ischemia, normal ECG, asymptomatic  ·	No evidence of LV dysfunction  ·	No history of arrythmia  ·	No significant valvular disease  ·	Poor functional capacity, wheelchair bound secondary to MR  ·	no history of ASHD, HTN, DM, elevated chol or FH or premature ASHD, former smoker 1 PPD for 18-20 years quit 22 years ago  Revised Cardiac Risk Index: Low Risk    Pt now Post op   No angina  No signs or symptoms of CHF  continue postop care  Cont current meds    Problem/Plan - 1:  ·  Problem: Femur fracture, right.     Problem/Plan - 2:  ·  Problem: MS (multiple sclerosis).

## 2018-08-27 NOTE — OCCUPATIONAL THERAPY INITIAL EVALUATION ADULT - GENERAL OBSERVATIONS, REHAB EVAL
Pt supine with HOB elevated and +IV lock Left UE. Pt supine with HOB elevated, +bandage Right LE and +IV lock Left UE. CT scan Right LE 8/24/18 grossly comminuted and displaced & impacted fx distal femur. Possible pathologic fx through a lytic lesion. 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

## 2018-08-27 NOTE — OCCUPATIONAL THERAPY INITIAL EVALUATION ADULT - PERTINENT HX OF CURRENT PROBLEM, REHAB EVAL
62 y/o male s/p insertion intramedullary lashaun right femur 8/25/18 due to femur fx s/p fall  while trying to get out of the bed. Pt is wheelchair bound due to MS.  Per pt report, pt was being assisted onto over-head mechanical lift when he ended up falling off EOB ~ 3 weeks ago and landing on his right side. Pt did not want to come to ER and eventually came in due to increased pain. Pt has been non-ambulatory since ~7 years and has had primary progressive MS since 2003. 62 y/o male s/p insertion intramedullary lashaun right femur 8/25/18 due to femur fx s/p fall while trying to get out of the bed. Pt is w/c bound due to MS.  Per pt report, pt was being assisted onto over-head mechanical lift when he ended up falling off EOB ~ 3 weeks ago and landing on his right side. Pt did not want to come to ER and eventually came in due to increased pain. Pt has been non-ambulatory since ~7 years and has had primary progressive MS since 2003.

## 2018-08-27 NOTE — GOALS OF CARE CONVERSATION - PERSONAL ADVANCE DIRECTIVE - CONVERSATION DETAILS
met pt, confirmed pt hcp from prior hospitalization EMR. pt sisterChana is hcp,   alt hcp is Isaura. no directives in place at this time.

## 2018-08-27 NOTE — PROGRESS NOTE ADULT - ASSESSMENT
A/P: 63 M w/ R distal Femur Fx s/p IMN POD 2      Analgesia  DVT ppx  WBAT RLE  Encourage IS  FU labs  medical management recommendations appreciated   PT/OT  DC planning    Will discuss with attending and advise if plan changes

## 2018-08-27 NOTE — CHART NOTE - NSCHARTNOTEFT_GEN_A_CORE
Update:  We recommend patient to continue Lovenox as outpatient for dvt ppx; however, as pt insurance does not cover Lovenox, plan for discharge tomorrow on Ecotrin 325 BID for dvt ppx.   Patient stable for dc tomorrow per medical team.

## 2018-08-27 NOTE — PROGRESS NOTE ADULT - ASSESSMENT
63 yr old male with MS, sp fall  while trying to get out of the bed. Pt is wheelchair bound due to MS.  No other associated symptoms     Problem/Plan - 1:  ·  Problem: Femur fracture, right.  Plan: pain control  sp OR   ortho fu      Problem/Plan - 2:  ·  Problem: MS (multiple sclerosis).  Plan: cw home meds  neuro fu.     PT and dc planning to rehab

## 2018-08-28 VITALS
OXYGEN SATURATION: 95 % | RESPIRATION RATE: 17 BRPM | SYSTOLIC BLOOD PRESSURE: 146 MMHG | TEMPERATURE: 99 F | HEART RATE: 67 BPM | DIASTOLIC BLOOD PRESSURE: 75 MMHG

## 2018-08-28 LAB
ANION GAP SERPL CALC-SCNC: 5 MMOL/L — SIGNIFICANT CHANGE UP (ref 5–17)
BUN SERPL-MCNC: 10 MG/DL — SIGNIFICANT CHANGE UP (ref 7–23)
CALCIUM SERPL-MCNC: 8.4 MG/DL — LOW (ref 8.5–10.1)
CHLORIDE SERPL-SCNC: 99 MMOL/L — SIGNIFICANT CHANGE UP (ref 96–108)
CO2 SERPL-SCNC: 33 MMOL/L — HIGH (ref 22–31)
CREAT SERPL-MCNC: 0.51 MG/DL — SIGNIFICANT CHANGE UP (ref 0.5–1.3)
GLUCOSE SERPL-MCNC: 106 MG/DL — HIGH (ref 70–99)
HCT VFR BLD CALC: 27.2 % — LOW (ref 39–50)
HGB BLD-MCNC: 9.1 G/DL — LOW (ref 13–17)
MCHC RBC-ENTMCNC: 30.1 PG — SIGNIFICANT CHANGE UP (ref 27–34)
MCHC RBC-ENTMCNC: 33.5 GM/DL — SIGNIFICANT CHANGE UP (ref 32–36)
MCV RBC AUTO: 90.1 FL — SIGNIFICANT CHANGE UP (ref 80–100)
NRBC # BLD: 0 /100 WBCS — SIGNIFICANT CHANGE UP (ref 0–0)
PLATELET # BLD AUTO: 253 K/UL — SIGNIFICANT CHANGE UP (ref 150–400)
POTASSIUM SERPL-MCNC: 3.6 MMOL/L — SIGNIFICANT CHANGE UP (ref 3.5–5.3)
POTASSIUM SERPL-SCNC: 3.6 MMOL/L — SIGNIFICANT CHANGE UP (ref 3.5–5.3)
RBC # BLD: 3.02 M/UL — LOW (ref 4.2–5.8)
RBC # FLD: 14.6 % — HIGH (ref 10.3–14.5)
SODIUM SERPL-SCNC: 137 MMOL/L — SIGNIFICANT CHANGE UP (ref 135–145)
WBC # BLD: 7.84 K/UL — SIGNIFICANT CHANGE UP (ref 3.8–10.5)
WBC # FLD AUTO: 7.84 K/UL — SIGNIFICANT CHANGE UP (ref 3.8–10.5)

## 2018-08-28 PROCEDURE — 80053 COMPREHEN METABOLIC PANEL: CPT

## 2018-08-28 PROCEDURE — C1713: CPT

## 2018-08-28 PROCEDURE — 97110 THERAPEUTIC EXERCISES: CPT

## 2018-08-28 PROCEDURE — 76001: CPT

## 2018-08-28 PROCEDURE — 86900 BLOOD TYPING SEROLOGIC ABO: CPT

## 2018-08-28 PROCEDURE — 97530 THERAPEUTIC ACTIVITIES: CPT

## 2018-08-28 PROCEDURE — 85027 COMPLETE CBC AUTOMATED: CPT

## 2018-08-28 PROCEDURE — 85018 HEMOGLOBIN: CPT

## 2018-08-28 PROCEDURE — 96372 THER/PROPH/DIAG INJ SC/IM: CPT | Mod: XU

## 2018-08-28 PROCEDURE — 86850 RBC ANTIBODY SCREEN: CPT

## 2018-08-28 PROCEDURE — 73502 X-RAY EXAM HIP UNI 2-3 VIEWS: CPT

## 2018-08-28 PROCEDURE — 85014 HEMATOCRIT: CPT

## 2018-08-28 PROCEDURE — 96376 TX/PRO/DX INJ SAME DRUG ADON: CPT

## 2018-08-28 PROCEDURE — 80076 HEPATIC FUNCTION PANEL: CPT

## 2018-08-28 PROCEDURE — 96375 TX/PRO/DX INJ NEW DRUG ADDON: CPT

## 2018-08-28 PROCEDURE — 73700 CT LOWER EXTREMITY W/O DYE: CPT

## 2018-08-28 PROCEDURE — 72170 X-RAY EXAM OF PELVIS: CPT

## 2018-08-28 PROCEDURE — 97163 PT EVAL HIGH COMPLEX 45 MIN: CPT

## 2018-08-28 PROCEDURE — 85730 THROMBOPLASTIN TIME PARTIAL: CPT

## 2018-08-28 PROCEDURE — 96374 THER/PROPH/DIAG INJ IV PUSH: CPT

## 2018-08-28 PROCEDURE — 97167 OT EVAL HIGH COMPLEX 60 MIN: CPT

## 2018-08-28 PROCEDURE — 36415 COLL VENOUS BLD VENIPUNCTURE: CPT

## 2018-08-28 PROCEDURE — 85610 PROTHROMBIN TIME: CPT

## 2018-08-28 PROCEDURE — 99285 EMERGENCY DEPT VISIT HI MDM: CPT | Mod: 25

## 2018-08-28 PROCEDURE — C1889: CPT

## 2018-08-28 PROCEDURE — 86901 BLOOD TYPING SEROLOGIC RH(D): CPT

## 2018-08-28 PROCEDURE — 81001 URINALYSIS AUTO W/SCOPE: CPT

## 2018-08-28 PROCEDURE — 73562 X-RAY EXAM OF KNEE 3: CPT

## 2018-08-28 PROCEDURE — 80048 BASIC METABOLIC PNL TOTAL CA: CPT

## 2018-08-28 PROCEDURE — 71045 X-RAY EXAM CHEST 1 VIEW: CPT

## 2018-08-28 RX ORDER — OXCARBAZEPINE 300 MG/1
1 TABLET, FILM COATED ORAL
Qty: 0 | Refills: 0 | COMMUNITY

## 2018-08-28 RX ORDER — OXYCODONE HYDROCHLORIDE 5 MG/1
1 TABLET ORAL
Qty: 0 | Refills: 0 | DISCHARGE
Start: 2018-08-28

## 2018-08-28 RX ORDER — DULOXETINE HYDROCHLORIDE 30 MG/1
1 CAPSULE, DELAYED RELEASE ORAL
Qty: 0 | Refills: 0 | COMMUNITY

## 2018-08-28 RX ORDER — OXYBUTYNIN CHLORIDE 5 MG
15 TABLET ORAL
Qty: 0 | Refills: 0 | COMMUNITY

## 2018-08-28 RX ORDER — ENOXAPARIN SODIUM 100 MG/ML
40 INJECTION SUBCUTANEOUS
Qty: 0 | Refills: 0 | DISCHARGE
Start: 2018-08-28

## 2018-08-28 RX ADMIN — HYDROMORPHONE HYDROCHLORIDE 1 MILLIGRAM(S): 2 INJECTION INTRAMUSCULAR; INTRAVENOUS; SUBCUTANEOUS at 04:43

## 2018-08-28 RX ADMIN — OXCARBAZEPINE 300 MILLIGRAM(S): 300 TABLET, FILM COATED ORAL at 05:13

## 2018-08-28 RX ADMIN — Medication 1 MILLIGRAM(S): at 11:50

## 2018-08-28 RX ADMIN — HYDROMORPHONE HYDROCHLORIDE 1 MILLIGRAM(S): 2 INJECTION INTRAMUSCULAR; INTRAVENOUS; SUBCUTANEOUS at 08:22

## 2018-08-28 RX ADMIN — ENOXAPARIN SODIUM 40 MILLIGRAM(S): 100 INJECTION SUBCUTANEOUS at 14:09

## 2018-08-28 RX ADMIN — Medication 100 MILLIGRAM(S): at 05:13

## 2018-08-28 RX ADMIN — HYDROMORPHONE HYDROCHLORIDE 1 MILLIGRAM(S): 2 INJECTION INTRAMUSCULAR; INTRAVENOUS; SUBCUTANEOUS at 09:03

## 2018-08-28 RX ADMIN — Medication 500 MILLIGRAM(S): at 05:13

## 2018-08-28 RX ADMIN — HYDROMORPHONE HYDROCHLORIDE 1 MILLIGRAM(S): 2 INJECTION INTRAMUSCULAR; INTRAVENOUS; SUBCUTANEOUS at 13:16

## 2018-08-28 RX ADMIN — Medication 5 MILLIGRAM(S): at 05:14

## 2018-08-28 RX ADMIN — Medication 100 MILLIGRAM(S): at 14:09

## 2018-08-28 RX ADMIN — Medication 150 MILLIGRAM(S): at 05:13

## 2018-08-28 RX ADMIN — Medication 5 MILLIGRAM(S): at 11:50

## 2018-08-28 RX ADMIN — HYDROMORPHONE HYDROCHLORIDE 1 MILLIGRAM(S): 2 INJECTION INTRAMUSCULAR; INTRAVENOUS; SUBCUTANEOUS at 00:00

## 2018-08-28 RX ADMIN — Medication 325 MILLIGRAM(S): at 08:21

## 2018-08-28 RX ADMIN — Medication 325 MILLIGRAM(S): at 11:50

## 2018-08-28 RX ADMIN — AMLODIPINE BESYLATE 5 MILLIGRAM(S): 2.5 TABLET ORAL at 05:13

## 2018-08-28 RX ADMIN — DULOXETINE HYDROCHLORIDE 60 MILLIGRAM(S): 30 CAPSULE, DELAYED RELEASE ORAL at 05:13

## 2018-08-28 RX ADMIN — Medication 150 MILLIGRAM(S): at 11:50

## 2018-08-28 RX ADMIN — Medication 1 TABLET(S): at 11:50

## 2018-08-28 NOTE — PROGRESS NOTE ADULT - SUBJECTIVE AND OBJECTIVE BOX
Neurology follow up note    THANH [id number]      Interval History:    Patient feels ok no new complaints.    MEDICATIONS    acetaminophen   Tablet 650 milliGRAM(s) Oral every 6 hours PRN  acetaminophen   Tablet. 650 milliGRAM(s) Oral every 6 hours PRN  amLODIPine   Tablet 5 milliGRAM(s) Oral daily  ascorbic acid 500 milliGRAM(s) Oral two times a day  cyclobenzaprine 5 milliGRAM(s) Oral three times a day PRN  diphenhydrAMINE   Capsule 25 milliGRAM(s) Oral at bedtime PRN  docusate sodium 100 milliGRAM(s) Oral three times a day  DULoxetine 60 milliGRAM(s) Oral two times a day  enoxaparin Injectable 40 milliGRAM(s) SubCutaneous every 24 hours  ferrous    sulfate 325 milliGRAM(s) Oral three times a day with meals  folic acid 1 milliGRAM(s) Oral daily  HYDROmorphone  Injectable 1 milliGRAM(s) IV Push every 4 hours PRN  multivitamin 1 Tablet(s) Oral daily  ondansetron Injectable 4 milliGRAM(s) IV Push every 6 hours PRN  OXcarbazepine 300 milliGRAM(s) Oral two times a day  oxybutynin 5 milliGRAM(s) Oral four times a day  oxyCODONE    IR 10 milliGRAM(s) Oral every 4 hours PRN  oxyCODONE    IR 5 milliGRAM(s) Oral every 4 hours PRN  pregabalin 150 milliGRAM(s) Oral four times a day      Allergies    No Known Allergies    Intolerances            Vital Signs Last 24 Hrs  T(C): 37.2 (28 Aug 2018 07:34), Max: 38.1 (27 Aug 2018 21:08)  T(F): 99 (28 Aug 2018 07:34), Max: 100.5 (27 Aug 2018 21:08)  HR: 67 (28 Aug 2018 07:34) (67 - 90)  BP: 146/75 (28 Aug 2018 07:34) (110/72 - 146/75)  BP(mean): --  RR: 17 (28 Aug 2018 07:34) (17 - 18)  SpO2: 95% (28 Aug 2018 07:34) (95% - 98%)    REVIEW OF SYSTEMS:     Constitutional: No fever, chills, fatigue, weakness  Eyes: no eye pain, visual disturbances, or discharge  ENT:  No difficulty hearing, tinnitus, vertigo; No sinus or throat pain  Neck: No pain or stiffness  Respiratory: No cough, dyspnea, wheezing   Cardiovascular: No chest pain, palpitations,   Gastrointestinal: No abdominal or epigastric pain. No nausea, vomiting  No diarrhea or constipation.   Genitourinary: No dysuria, frequency, hematuria or incontinence  Neurological: No headaches, lightheadedness, vertigo, numbness or tremors  Psychiatric: No depression, anxiety, mood swings or difficulty sleeping  Musculoskeletal: slight  extremity pain S/P surgery  Skin: No itching, burning, rashes or lesions   Lymph Nodes: No enlarged glands  Endocrine: No heat or cold intolerance; No hair loss   Allergy and Immunologic: No hives or eczema    On Neurological Examination:    The patient is awake and alert.      Extraocular movements were intact.      Pupils were equal, round, and reactive bilaterally 3 mm to 2 mm.      Speech was fluent.  Smile was symmetric.  Right upper, there was subtle flexation and extension at the elbow and finger which is his baseline.  Left upper was able to elevate roughly about 30 degrees, would say flexation, extension, and hand grasp were 3/5.  Bilateral lower extremities; 0/5, which is his baseline.      There is positive swelling in bilateral feet, edema.    Follow simple commands      GENERAL Exam: Nontoxic , No Acute Distress   	  HEENT:  normocephalic, atraumatic  		  LUNGS: Clear bilaterally      HEART: Normal S1S2   No murmur RRR        	  GI/ ABDOMEN:  Soft  Non tender    EXTREMITIES:   No Edema  No Clubbing  No Cyanosis     MUSCULOSKELETAL: decreased Range of Motion all 4 extremities   	   SKIN: Normal  No Ecchymosis               LABS:  CBC Full  -  ( 28 Aug 2018 04:54 )  WBC Count : 7.84 K/uL  Hemoglobin : 9.1 g/dL  Hematocrit : 27.2 %  Platelet Count - Automated : 253 K/uL  Mean Cell Volume : 90.1 fl  Mean Cell Hemoglobin : 30.1 pg  Mean Cell Hemoglobin Concentration : 33.5 gm/dL  Auto Neutrophil # : x  Auto Lymphocyte # : x  Auto Monocyte # : x  Auto Eosinophil # : x  Auto Basophil # : x  Auto Neutrophil % : x  Auto Lymphocyte % : x  Auto Monocyte % : x  Auto Eosinophil % : x  Auto Basophil % : x      08-28    137  |  99  |  10  ----------------------------<  106<H>  3.6   |  33<H>  |  0.51    Ca    8.4<L>      28 Aug 2018 06:51    TPro  6.4  /  Alb  2.8<L>  /  TBili  0.5  /  DBili  x   /  AST  15  /  ALT  9<L>  /  AlkPhos  95  08-27    Hemoglobin A1C:     LIVER FUNCTIONS - ( 27 Aug 2018 05:02 )  Alb: 2.8 g/dL / Pro: 6.4 g/dL / ALK PHOS: 95 U/L / ALT: 9 U/L / AST: 15 U/L / GGT: x           Vitamin B12         RADIOLOGY      ANALYSIS AND PLAN:  This is a 63-year-old with a history of multiple sclerosis, trigeminal neuralgia, neuropathic pain.  1.	For history of multiple sclerosis, the patient appears to be at his baseline, will say appears to have a primary progressive type, would recommend for now supportive therapy.  2.	For history of trigeminal neuralgia, continue the patient on his Trileptal 300 mg two times a day and his Lyrica 150 mg four times a day.  3.	For history of neuropathic pain, continue the patient on his Cymbalta, Lyrica, and Trileptal.  4.	spoke to spouse at bedside 8/26/18  5.	no new events    Neurologic standpoint only cleared for discharge planning     Thank you for the courtesy of this consultation.    Physical therapy evaluation as tolerated  OOB to chair/ambulation with assistance only if possible.    Greater than 30 minutes spent in direct patient care reviewing  the notes, lab data/ imaging , discussion with multidisciplinary team.

## 2018-08-28 NOTE — PROGRESS NOTE ADULT - SUBJECTIVE AND OBJECTIVE BOX
Patient is a 63y old  Male who presents with a chief complaint of I fell when transferring from bed to wheelchair and landed on my knees and have had pain for last 2 wks (25 Aug 2018 18:07)      INTERVAL HPI/OVERNIGHT EVENTS:  T(C): 37.1 (08-28-18 @ 04:56), Max: 38.1 (08-27-18 @ 21:08)  HR: 80 (08-28-18 @ 04:56) (80 - 90)  BP: 132/77 (08-28-18 @ 04:56) (110/72 - 148/86)  RR: 17 (08-28-18 @ 04:56) (17 - 18)  SpO2: 95% (08-28-18 @ 04:56) (93% - 98%)  Wt(kg): --  I&O's Summary    27 Aug 2018 07:01  -  28 Aug 2018 07:00  --------------------------------------------------------  IN: 470 mL / OUT: 0 mL / NET: 470 mL        LABS:                        9.1    7.84  )-----------( 253      ( 28 Aug 2018 04:54 )             27.2     08-28    137  |  99  |  10  ----------------------------<  106<H>  3.6   |  33<H>  |  0.51    Ca    8.4<L>      28 Aug 2018 06:51    TPro  6.4  /  Alb  2.8<L>  /  TBili  0.5  /  DBili  x   /  AST  15  /  ALT  9<L>  /  AlkPhos  95  08-27        CAPILLARY BLOOD GLUCOSE                MEDICATIONS  (STANDING):  amLODIPine   Tablet 5 milliGRAM(s) Oral daily  ascorbic acid 500 milliGRAM(s) Oral two times a day  docusate sodium 100 milliGRAM(s) Oral three times a day  DULoxetine 60 milliGRAM(s) Oral two times a day  enoxaparin Injectable 40 milliGRAM(s) SubCutaneous every 24 hours  ferrous    sulfate 325 milliGRAM(s) Oral three times a day with meals  folic acid 1 milliGRAM(s) Oral daily  multivitamin 1 Tablet(s) Oral daily  OXcarbazepine 300 milliGRAM(s) Oral two times a day  oxybutynin 5 milliGRAM(s) Oral four times a day  pregabalin 150 milliGRAM(s) Oral four times a day    MEDICATIONS  (PRN):  acetaminophen   Tablet 650 milliGRAM(s) Oral every 6 hours PRN For Temp greater than 38 C (100.4 F)  acetaminophen   Tablet. 650 milliGRAM(s) Oral every 6 hours PRN headache  cyclobenzaprine 5 milliGRAM(s) Oral three times a day PRN Muscle Spasm  diphenhydrAMINE   Capsule 25 milliGRAM(s) Oral at bedtime PRN Insomnia  HYDROmorphone  Injectable 1 milliGRAM(s) IV Push every 4 hours PRN Severe Pain (7 - 10)  ondansetron Injectable 4 milliGRAM(s) IV Push every 6 hours PRN Nausea and/or Vomiting  oxyCODONE    IR 10 milliGRAM(s) Oral every 4 hours PRN Moderate Pain (4 - 6)  oxyCODONE    IR 5 milliGRAM(s) Oral every 4 hours PRN Mild Pain (1 - 3)          PHYSICAL EXAM:  GENERAL: NAD, well-groomed, well-developed  HEAD:  Atraumatic, Normocephalic  CHEST/LUNG: Clear to percussion bilaterally; No rales, rhonchi, wheezing, or rubs  HEART: Regular rate and rhythm; No murmurs, rubs, or gallops  ABDOMEN: Soft, Nontender, Nondistended; Bowel sounds present  EXTREMITIES:  2+ Peripheral Pulses, No clubbing, cyanosis, or edema  LYMPH: No lymphadenopathy noted  SKIN: No rashes or lesions    Care Discussed with Consultants/Other Providers [ ] YES  [ ] NO

## 2018-08-28 NOTE — PROGRESS NOTE ADULT - PROVIDER SPECIALTY LIST ADULT
Anesthesia
Cardiology
Cardiology
Hospitalist
Neurology
Neurology
Orthopedics
Hospitalist
Neurology

## 2018-08-28 NOTE — PROGRESS NOTE ADULT - SUBJECTIVE AND OBJECTIVE BOX
Patient seen and examined at bedside. Pain is well controlled. No other complaints at this time.         PE:  Vital Signs Last 24 Hrs  T(C): 37.1 (28 Aug 2018 04:56), Max: 38.1 (27 Aug 2018 21:08)  T(F): 98.7 (28 Aug 2018 04:56), Max: 100.5 (27 Aug 2018 21:08)  HR: 80 (28 Aug 2018 04:56) (80 - 90)  BP: 132/77 (28 Aug 2018 04:56) (110/72 - 148/86)  RR: 17 (28 Aug 2018 04:56) (17 - 18)  SpO2: 95% (28 Aug 2018 04:56) (93% - 98%)    Gen: NAD    RLE:    Dressing/ACE bandage c/d/i  In bulky king KI  SILT L3-S1  compartments soft and compressible  no calf tenderness  DP 2+  PT pulse 2+  3+ pitting edema  unable to assess motor exam due to MS hx

## 2018-08-28 NOTE — PROGRESS NOTE ADULT - ASSESSMENT
A/P: 63 M w/ R distal Femur Fx s/p IMN POD 3      Analgesia  DVT ppx  WBAT RLE  Encourage IS  FU labs  medical management recommendations appreciated   PT/OT  Dressing change today  DC planning    Will discuss with attending and advise if plan changes

## 2018-08-28 NOTE — PROGRESS NOTE ADULT - ASSESSMENT
63 yr old male with MS, sp fall  while trying to get out of the bed. Pt is wheelchair bound due to MS.  No other associated symptoms     Problem/Plan - 1:  ·  Problem: Femur fracture, right.  Plan: pain control  sp OR   ortho fu      Problem/Plan - 2:  ·  Problem: MS (multiple sclerosis).  Plan:  home meds  neuro fu.     Pt had diagnosis of MS for 7 years . pt has been non-ambulatory and bedbound. He needs complete assist with out of bed to chair and transfers requiring overhead mechanical lift system, He will require a semi-electric hospital bed with gel overlay mattress for positioning and transferring. pt has unstageable decubs on sacrum, ccoccyx and right gluteal fold.     dc planning

## 2018-09-28 ENCOUNTER — TRANSCRIPTION ENCOUNTER (OUTPATIENT)
Age: 63
End: 2018-09-28

## 2018-10-04 ENCOUNTER — TRANSCRIPTION ENCOUNTER (OUTPATIENT)
Age: 63
End: 2018-10-04

## 2019-01-29 NOTE — BRIEF OPERATIVE NOTE - PRE-OP DX
[Family Member] : family member Closed fracture of distal end of right femur, unspecified fracture morphology, initial encounter  08/25/2018    Active  Brendon Reyes [FreeTextEntry8] : Patient presents status post [facility]  ED. Patient presented on 1/24 status post trip and fall sustaining right elbow laceration that would not stop bleeding (on Eliquis) , patient had no head injury or LOC.  right elbow bleeding was controlled with pressure bandage, no suturing was done.Patient states she had x-rays done which she reports as normal.\par \par Patient will see cardiothoracic specialist for evaluation regarding TAVR in 2days\par Spironolactone/Lasix added for edema, still very problematic

## 2019-02-25 ENCOUNTER — TRANSCRIPTION ENCOUNTER (OUTPATIENT)
Age: 64
End: 2019-02-25

## 2019-02-26 ENCOUNTER — OUTPATIENT (OUTPATIENT)
Dept: OUTPATIENT SERVICES | Facility: HOSPITAL | Age: 64
LOS: 1 days | End: 2019-02-26
Payer: COMMERCIAL

## 2019-02-26 DIAGNOSIS — D50.0 IRON DEFICIENCY ANEMIA SECONDARY TO BLOOD LOSS (CHRONIC): ICD-10-CM

## 2019-02-26 PROCEDURE — 45378 DIAGNOSTIC COLONOSCOPY: CPT | Mod: 74

## 2020-01-07 ENCOUNTER — APPOINTMENT (OUTPATIENT)
Dept: SURGERY | Facility: HOSPITAL | Age: 65
End: 2020-01-07
Payer: MEDICARE

## 2020-01-07 ENCOUNTER — OUTPATIENT (OUTPATIENT)
Dept: OUTPATIENT SERVICES | Facility: HOSPITAL | Age: 65
LOS: 1 days | Discharge: HOME | End: 2020-01-07
Payer: MEDICARE

## 2020-01-07 VITALS
TEMPERATURE: 98 F | HEIGHT: 76 IN | WEIGHT: 235 LBS | OXYGEN SATURATION: 98 % | SYSTOLIC BLOOD PRESSURE: 162 MMHG | DIASTOLIC BLOOD PRESSURE: 97 MMHG | BODY MASS INDEX: 28.62 KG/M2 | RESPIRATION RATE: 20 BRPM | HEART RATE: 60 BPM

## 2020-01-07 DIAGNOSIS — G50.0 TRIGEMINAL NEURALGIA: ICD-10-CM

## 2020-01-07 DIAGNOSIS — L89.311 PRESSURE ULCER OF RIGHT BUTTOCK, STAGE 1: ICD-10-CM

## 2020-01-07 DIAGNOSIS — G35 MULTIPLE SCLEROSIS: ICD-10-CM

## 2020-01-07 DIAGNOSIS — Z87.81 PERSONAL HISTORY OF (HEALED) TRAUMATIC FRACTURE: ICD-10-CM

## 2020-01-07 DIAGNOSIS — L89.300 PRESSURE ULCER OF UNSPECIFIED BUTTOCK, UNSTAGEABLE: ICD-10-CM

## 2020-01-07 PROCEDURE — 99203 OFFICE O/P NEW LOW 30 MIN: CPT

## 2020-01-07 PROCEDURE — G0463: CPT

## 2020-01-07 RX ORDER — ESCITALOPRAM OXALATE 20 MG/1
20 TABLET, FILM COATED ORAL
Refills: 0 | Status: ACTIVE | COMMUNITY

## 2020-01-07 RX ORDER — OXYBUTYNIN CHLORIDE 10 MG/1
10 TABLET, EXTENDED RELEASE ORAL
Refills: 0 | Status: ACTIVE | COMMUNITY

## 2020-01-07 RX ORDER — PREGABALIN 150 MG/1
150 CAPSULE ORAL
Refills: 0 | Status: ACTIVE | COMMUNITY

## 2020-01-07 RX ORDER — OXYCODONE HYDROCHLORIDE AND ACETAMINOPHEN 10; 325 MG/1; MG/1
10-325 TABLET ORAL 3 TIMES DAILY
Refills: 0 | Status: ACTIVE | COMMUNITY

## 2020-01-07 NOTE — VITALS
[Pain related to present condition?] : The patient's  pain is related to present condition. [Sharp] : sharp [de-identified] : 7/10 [FreeTextEntry1] : Repositioning [FreeTextEntry3] : Buttocks  [FreeTextEntry4] : None  [FreeTextEntry2] : Pressure

## 2020-01-07 NOTE — PHYSICAL EXAM
[4 x 4] : 4 x 4  [Normal Heart Sounds] : normal heart sounds [Normal Breath Sounds] : Normal breath sounds [de-identified] : WD/WN in no acute distress. Wheel chair bound secondary to MS [JVD] : no jugular venous distention  [de-identified] : Right ischium with small stage 1 pressure ulcer, very superficial, no drainage no infection.  [de-identified] : COLTONL [de-identified] : WNL [FreeTextEntry1] : Coccyx- No Open Wound [de-identified] : Cleansed with Normal saline\par  [de-identified] : Adalberto Khanna [TWNoteComboBox1] : Midline [de-identified] : Primary Dressing [de-identified] : 3x Weekly

## 2020-01-07 NOTE — ASSESSMENT
[Verbal] : Verbal [Fair - mild discomfort, physical impairment, low acceptance] : Fair - mild discomfort, physical impairment, low acceptance [Patient] : Patient [Home Health Provider] : Home Health Provider [Verbalizes knowledge/Understanding] : Verbalizes knowledge/understanding [Skin Care] : skin care [Pressure relief] : pressure relief [How and When to Call] : how and when to call [Off-loading] : off-loading [Signs and symptoms of infection] : sign and symptoms of infection [Patient responsibility to plan of care] : patient responsibility to plan of care [Ambulatory] : Ambulatory [Home] : Home [Stable] : stable [Not Applicable - Long Term Care/Home Health Agency] : Long Term Care/Home Health Agency: Not Applicable [FreeTextEntry2] : Discharge [] : No [FreeTextEntry3] : Initial visit  [FreeTextEntry4] : Patient educated on pressure relief/offloading techniques.\par Patient discharged [FreeTextEntry1] : Stage 1 right ischium pressure ulcer.

## 2020-01-08 DIAGNOSIS — L89.311 PRESSURE ULCER OF RIGHT BUTTOCK, STAGE 1: ICD-10-CM

## 2020-01-08 DIAGNOSIS — Z79.899 OTHER LONG TERM (CURRENT) DRUG THERAPY: ICD-10-CM

## 2020-01-08 DIAGNOSIS — Z99.3 DEPENDENCE ON WHEELCHAIR: ICD-10-CM

## 2020-01-08 DIAGNOSIS — Z87.81 PERSONAL HISTORY OF (HEALED) TRAUMATIC FRACTURE: ICD-10-CM

## 2020-01-08 DIAGNOSIS — G35 MULTIPLE SCLEROSIS: ICD-10-CM

## 2020-03-05 ENCOUNTER — TRANSCRIPTION ENCOUNTER (OUTPATIENT)
Age: 65
End: 2020-03-05

## 2020-03-06 ENCOUNTER — INPATIENT (INPATIENT)
Facility: HOSPITAL | Age: 65
LOS: 5 days | Discharge: HOME | DRG: 853 | End: 2020-03-12
Attending: SPECIALIST | Admitting: SPECIALIST
Payer: MEDICARE

## 2020-03-06 ENCOUNTER — RESULT REVIEW (OUTPATIENT)
Age: 65
End: 2020-03-06

## 2020-03-06 VITALS
HEIGHT: 76 IN | OXYGEN SATURATION: 95 % | SYSTOLIC BLOOD PRESSURE: 154 MMHG | TEMPERATURE: 104 F | WEIGHT: 240.08 LBS | DIASTOLIC BLOOD PRESSURE: 93 MMHG | HEART RATE: 115 BPM | RESPIRATION RATE: 26 BRPM

## 2020-03-06 DIAGNOSIS — A41.9 SEPSIS, UNSPECIFIED ORGANISM: ICD-10-CM

## 2020-03-06 LAB
ALBUMIN SERPL ELPH-MCNC: 3 G/DL — LOW (ref 3.3–5)
ALP SERPL-CCNC: 84 U/L — SIGNIFICANT CHANGE UP (ref 40–120)
ALT FLD-CCNC: 23 U/L — SIGNIFICANT CHANGE UP (ref 12–78)
ANION GAP SERPL CALC-SCNC: 9 MMOL/L — SIGNIFICANT CHANGE UP (ref 5–17)
APPEARANCE UR: ABNORMAL
APTT BLD: 31.4 SEC — SIGNIFICANT CHANGE UP (ref 28.5–37)
AST SERPL-CCNC: 16 U/L — SIGNIFICANT CHANGE UP (ref 15–37)
BASE EXCESS BLDA CALC-SCNC: 3.7 MMOL/L — HIGH (ref -2–2)
BASOPHILS # BLD AUTO: 0.08 K/UL — SIGNIFICANT CHANGE UP (ref 0–0.2)
BASOPHILS NFR BLD AUTO: 0.5 % — SIGNIFICANT CHANGE UP (ref 0–2)
BILIRUB SERPL-MCNC: 1 MG/DL — SIGNIFICANT CHANGE UP (ref 0.2–1.2)
BILIRUB UR-MCNC: ABNORMAL
BLOOD GAS COMMENTS ARTERIAL: SIGNIFICANT CHANGE UP
BUN SERPL-MCNC: 25 MG/DL — HIGH (ref 7–23)
CALCIUM SERPL-MCNC: 8.6 MG/DL — SIGNIFICANT CHANGE UP (ref 8.5–10.1)
CHLORIDE SERPL-SCNC: 103 MMOL/L — SIGNIFICANT CHANGE UP (ref 96–108)
CO2 SERPL-SCNC: 25 MMOL/L — SIGNIFICANT CHANGE UP (ref 22–31)
COLOR SPEC: ABNORMAL
CREAT SERPL-MCNC: 1 MG/DL — SIGNIFICANT CHANGE UP (ref 0.5–1.3)
DIFF PNL FLD: ABNORMAL
EOSINOPHIL # BLD AUTO: 0.09 K/UL — SIGNIFICANT CHANGE UP (ref 0–0.5)
EOSINOPHIL NFR BLD AUTO: 0.6 % — SIGNIFICANT CHANGE UP (ref 0–6)
FLU A RESULT: SIGNIFICANT CHANGE UP
FLU A RESULT: SIGNIFICANT CHANGE UP
FLUAV AG NPH QL: SIGNIFICANT CHANGE UP
FLUBV AG NPH QL: SIGNIFICANT CHANGE UP
GLUCOSE SERPL-MCNC: 119 MG/DL — HIGH (ref 70–99)
GLUCOSE UR QL: NEGATIVE — SIGNIFICANT CHANGE UP
HCO3 BLDA-SCNC: 28 MMOL/L — HIGH (ref 23–27)
HCT VFR BLD CALC: 37.8 % — LOW (ref 39–50)
HGB BLD-MCNC: 13 G/DL — SIGNIFICANT CHANGE UP (ref 13–17)
HOROWITZ INDEX BLDA+IHG-RTO: 32 — SIGNIFICANT CHANGE UP
IMM GRANULOCYTES NFR BLD AUTO: 0.3 % — SIGNIFICANT CHANGE UP (ref 0–1.5)
INR BLD: 1.55 RATIO — HIGH (ref 0.88–1.16)
KETONES UR-MCNC: ABNORMAL
LACTATE SERPL-SCNC: 1.3 MMOL/L — SIGNIFICANT CHANGE UP (ref 0.7–2)
LEUKOCYTE ESTERASE UR-ACNC: ABNORMAL
LIDOCAIN IGE QN: 20 U/L — LOW (ref 73–393)
LYMPHOCYTES # BLD AUTO: 0.91 K/UL — LOW (ref 1–3.3)
LYMPHOCYTES # BLD AUTO: 6.1 % — LOW (ref 13–44)
MCHC RBC-ENTMCNC: 30.6 PG — SIGNIFICANT CHANGE UP (ref 27–34)
MCHC RBC-ENTMCNC: 34.4 GM/DL — SIGNIFICANT CHANGE UP (ref 32–36)
MCV RBC AUTO: 88.9 FL — SIGNIFICANT CHANGE UP (ref 80–100)
MONOCYTES # BLD AUTO: 0.42 K/UL — SIGNIFICANT CHANGE UP (ref 0–0.9)
MONOCYTES NFR BLD AUTO: 2.8 % — SIGNIFICANT CHANGE UP (ref 2–14)
NEUTROPHILS # BLD AUTO: 13.49 K/UL — HIGH (ref 1.8–7.4)
NEUTROPHILS NFR BLD AUTO: 89.7 % — HIGH (ref 43–77)
NITRITE UR-MCNC: NEGATIVE — SIGNIFICANT CHANGE UP
NRBC # BLD: 0 /100 WBCS — SIGNIFICANT CHANGE UP (ref 0–0)
NT-PROBNP SERPL-SCNC: 1328 PG/ML — HIGH (ref 0–125)
PCO2 BLDA: 36 MMHG — SIGNIFICANT CHANGE UP (ref 32–46)
PH BLDA: 7.48 — HIGH (ref 7.35–7.45)
PH UR: 8 — SIGNIFICANT CHANGE UP (ref 5–8)
PLATELET # BLD AUTO: 200 K/UL — SIGNIFICANT CHANGE UP (ref 150–400)
PO2 BLDA: 78 MMHG — SIGNIFICANT CHANGE UP (ref 74–108)
POTASSIUM SERPL-MCNC: 4.1 MMOL/L — SIGNIFICANT CHANGE UP (ref 3.5–5.3)
POTASSIUM SERPL-SCNC: 4.1 MMOL/L — SIGNIFICANT CHANGE UP (ref 3.5–5.3)
PROCALCITONIN SERPL-MCNC: 1.8 NG/ML — HIGH (ref 0–0.04)
PROT SERPL-MCNC: 6.8 G/DL — SIGNIFICANT CHANGE UP (ref 6–8.3)
PROT UR-MCNC: 30 MG/DL
PROTHROM AB SERPL-ACNC: 17.6 SEC — HIGH (ref 10–12.9)
RBC # BLD: 4.25 M/UL — SIGNIFICANT CHANGE UP (ref 4.2–5.8)
RBC # FLD: 13 % — SIGNIFICANT CHANGE UP (ref 10.3–14.5)
RSV RESULT: SIGNIFICANT CHANGE UP
RSV RNA RESP QL NAA+PROBE: SIGNIFICANT CHANGE UP
SAO2 % BLDA: 96 % — SIGNIFICANT CHANGE UP (ref 92–96)
SODIUM SERPL-SCNC: 137 MMOL/L — SIGNIFICANT CHANGE UP (ref 135–145)
SP GR SPEC: 1.01 — SIGNIFICANT CHANGE UP (ref 1.01–1.02)
UROBILINOGEN FLD QL: 1
WBC # BLD: 15.03 K/UL — HIGH (ref 3.8–10.5)
WBC # FLD AUTO: 15.03 K/UL — HIGH (ref 3.8–10.5)

## 2020-03-06 PROCEDURE — 70450 CT HEAD/BRAIN W/O DYE: CPT | Mod: 26

## 2020-03-06 PROCEDURE — 99285 EMERGENCY DEPT VISIT HI MDM: CPT

## 2020-03-06 PROCEDURE — 70486 CT MAXILLOFACIAL W/O DYE: CPT | Mod: 26

## 2020-03-06 PROCEDURE — 71045 X-RAY EXAM CHEST 1 VIEW: CPT | Mod: 26

## 2020-03-06 PROCEDURE — 99223 1ST HOSP IP/OBS HIGH 75: CPT

## 2020-03-06 PROCEDURE — 93010 ELECTROCARDIOGRAM REPORT: CPT

## 2020-03-06 PROCEDURE — 47562 LAPAROSCOPIC CHOLECYSTECTOMY: CPT | Mod: AS

## 2020-03-06 PROCEDURE — 78226 HEPATOBILIARY SYSTEM IMAGING: CPT | Mod: 26

## 2020-03-06 PROCEDURE — 74177 CT ABD & PELVIS W/CONTRAST: CPT | Mod: 26

## 2020-03-06 PROCEDURE — 71260 CT THORAX DX C+: CPT | Mod: 26

## 2020-03-06 PROCEDURE — 88304 TISSUE EXAM BY PATHOLOGIST: CPT | Mod: 26

## 2020-03-06 RX ORDER — SODIUM CHLORIDE 9 MG/ML
600 INJECTION INTRAMUSCULAR; INTRAVENOUS; SUBCUTANEOUS ONCE
Refills: 0 | Status: COMPLETED | OUTPATIENT
Start: 2020-03-06 | End: 2020-03-06

## 2020-03-06 RX ORDER — CEFAZOLIN SODIUM 1 G
2000 VIAL (EA) INJECTION ONCE
Refills: 0 | Status: DISCONTINUED | OUTPATIENT
Start: 2020-03-06 | End: 2020-03-06

## 2020-03-06 RX ORDER — MORPHINE SULFATE 50 MG/1
4 CAPSULE, EXTENDED RELEASE ORAL ONCE
Refills: 0 | Status: DISCONTINUED | OUTPATIENT
Start: 2020-03-06 | End: 2020-03-06

## 2020-03-06 RX ORDER — HYDROMORPHONE HYDROCHLORIDE 2 MG/ML
1 INJECTION INTRAMUSCULAR; INTRAVENOUS; SUBCUTANEOUS ONCE
Refills: 0 | Status: DISCONTINUED | OUTPATIENT
Start: 2020-03-06 | End: 2020-03-06

## 2020-03-06 RX ORDER — HYDROMORPHONE HYDROCHLORIDE 2 MG/ML
0.5 INJECTION INTRAMUSCULAR; INTRAVENOUS; SUBCUTANEOUS ONCE
Refills: 0 | Status: DISCONTINUED | OUTPATIENT
Start: 2020-03-06 | End: 2020-03-06

## 2020-03-06 RX ORDER — SODIUM CHLORIDE 9 MG/ML
1000 INJECTION, SOLUTION INTRAVENOUS
Refills: 0 | Status: DISCONTINUED | OUTPATIENT
Start: 2020-03-06 | End: 2020-03-06

## 2020-03-06 RX ORDER — PIPERACILLIN AND TAZOBACTAM 4; .5 G/20ML; G/20ML
3.38 INJECTION, POWDER, LYOPHILIZED, FOR SOLUTION INTRAVENOUS ONCE
Refills: 0 | Status: COMPLETED | OUTPATIENT
Start: 2020-03-06 | End: 2020-03-06

## 2020-03-06 RX ORDER — SODIUM CHLORIDE 9 MG/ML
3 INJECTION INTRAMUSCULAR; INTRAVENOUS; SUBCUTANEOUS ONCE
Refills: 0 | Status: COMPLETED | OUTPATIENT
Start: 2020-03-06 | End: 2020-03-06

## 2020-03-06 RX ORDER — AZITHROMYCIN 500 MG/1
500 TABLET, FILM COATED ORAL ONCE
Refills: 0 | Status: COMPLETED | OUTPATIENT
Start: 2020-03-06 | End: 2020-03-06

## 2020-03-06 RX ORDER — METRONIDAZOLE 500 MG
500 TABLET ORAL ONCE
Refills: 0 | Status: DISCONTINUED | OUTPATIENT
Start: 2020-03-06 | End: 2020-03-06

## 2020-03-06 RX ORDER — KETOROLAC TROMETHAMINE 30 MG/ML
15 SYRINGE (ML) INJECTION ONCE
Refills: 0 | Status: DISCONTINUED | OUTPATIENT
Start: 2020-03-06 | End: 2020-03-06

## 2020-03-06 RX ORDER — HEPARIN SODIUM 5000 [USP'U]/ML
5000 INJECTION INTRAVENOUS; SUBCUTANEOUS EVERY 8 HOURS
Refills: 0 | Status: DISCONTINUED | OUTPATIENT
Start: 2020-03-06 | End: 2020-03-06

## 2020-03-06 RX ORDER — SODIUM CHLORIDE 9 MG/ML
2000 INJECTION INTRAMUSCULAR; INTRAVENOUS; SUBCUTANEOUS ONCE
Refills: 0 | Status: COMPLETED | OUTPATIENT
Start: 2020-03-06 | End: 2020-03-06

## 2020-03-06 RX ORDER — ACETAMINOPHEN 500 MG
650 TABLET ORAL ONCE
Refills: 0 | Status: COMPLETED | OUTPATIENT
Start: 2020-03-06 | End: 2020-03-06

## 2020-03-06 RX ORDER — CEFTRIAXONE 500 MG/1
1000 INJECTION, POWDER, FOR SOLUTION INTRAMUSCULAR; INTRAVENOUS ONCE
Refills: 0 | Status: COMPLETED | OUTPATIENT
Start: 2020-03-06 | End: 2020-03-06

## 2020-03-06 RX ADMIN — SODIUM CHLORIDE 2000 MILLILITER(S): 9 INJECTION INTRAMUSCULAR; INTRAVENOUS; SUBCUTANEOUS at 09:28

## 2020-03-06 RX ADMIN — HYDROMORPHONE HYDROCHLORIDE 0.5 MILLIGRAM(S): 2 INJECTION INTRAMUSCULAR; INTRAVENOUS; SUBCUTANEOUS at 13:52

## 2020-03-06 RX ADMIN — SODIUM CHLORIDE 600 MILLILITER(S): 9 INJECTION INTRAMUSCULAR; INTRAVENOUS; SUBCUTANEOUS at 09:28

## 2020-03-06 RX ADMIN — Medication 15 MILLIGRAM(S): at 13:36

## 2020-03-06 RX ADMIN — SODIUM CHLORIDE 600 MILLILITER(S): 9 INJECTION INTRAMUSCULAR; INTRAVENOUS; SUBCUTANEOUS at 10:28

## 2020-03-06 RX ADMIN — HYDROMORPHONE HYDROCHLORIDE 0.5 MILLIGRAM(S): 2 INJECTION INTRAMUSCULAR; INTRAVENOUS; SUBCUTANEOUS at 15:00

## 2020-03-06 RX ADMIN — Medication 650 MILLIGRAM(S): at 09:25

## 2020-03-06 RX ADMIN — HYDROMORPHONE HYDROCHLORIDE 0.5 MILLIGRAM(S): 2 INJECTION INTRAMUSCULAR; INTRAVENOUS; SUBCUTANEOUS at 15:15

## 2020-03-06 RX ADMIN — PIPERACILLIN AND TAZOBACTAM 200 GRAM(S): 4; .5 INJECTION, POWDER, LYOPHILIZED, FOR SOLUTION INTRAVENOUS at 13:21

## 2020-03-06 RX ADMIN — Medication 650 MILLIGRAM(S): at 10:07

## 2020-03-06 RX ADMIN — SODIUM CHLORIDE 2000 MILLILITER(S): 9 INJECTION INTRAMUSCULAR; INTRAVENOUS; SUBCUTANEOUS at 10:28

## 2020-03-06 RX ADMIN — Medication 650 MILLIGRAM(S): at 21:59

## 2020-03-06 RX ADMIN — HYDROMORPHONE HYDROCHLORIDE 0.5 MILLIGRAM(S): 2 INJECTION INTRAMUSCULAR; INTRAVENOUS; SUBCUTANEOUS at 14:07

## 2020-03-06 RX ADMIN — HYDROMORPHONE HYDROCHLORIDE 1 MILLIGRAM(S): 2 INJECTION INTRAMUSCULAR; INTRAVENOUS; SUBCUTANEOUS at 18:28

## 2020-03-06 RX ADMIN — HYDROMORPHONE HYDROCHLORIDE 1 MILLIGRAM(S): 2 INJECTION INTRAMUSCULAR; INTRAVENOUS; SUBCUTANEOUS at 18:43

## 2020-03-06 RX ADMIN — Medication 15 MILLIGRAM(S): at 13:21

## 2020-03-06 RX ADMIN — SODIUM CHLORIDE 125 MILLILITER(S): 9 INJECTION, SOLUTION INTRAVENOUS at 20:14

## 2020-03-06 RX ADMIN — MORPHINE SULFATE 4 MILLIGRAM(S): 50 CAPSULE, EXTENDED RELEASE ORAL at 17:01

## 2020-03-06 RX ADMIN — AZITHROMYCIN 255 MILLIGRAM(S): 500 TABLET, FILM COATED ORAL at 10:18

## 2020-03-06 RX ADMIN — MORPHINE SULFATE 4 MILLIGRAM(S): 50 CAPSULE, EXTENDED RELEASE ORAL at 16:55

## 2020-03-06 RX ADMIN — SODIUM CHLORIDE 125 MILLILITER(S): 9 INJECTION, SOLUTION INTRAVENOUS at 18:31

## 2020-03-06 RX ADMIN — CEFTRIAXONE 100 MILLIGRAM(S): 500 INJECTION, POWDER, FOR SOLUTION INTRAMUSCULAR; INTRAVENOUS at 10:07

## 2020-03-06 RX ADMIN — Medication 650 MILLIGRAM(S): at 20:11

## 2020-03-06 RX ADMIN — SODIUM CHLORIDE 3 MILLILITER(S): 9 INJECTION INTRAMUSCULAR; INTRAVENOUS; SUBCUTANEOUS at 09:25

## 2020-03-06 NOTE — ED ADULT TRIAGE NOTE - CHIEF COMPLAINT QUOTE
Patient is only responsive to deep painful stimuli. As per EMT altered mental status. Fever at home. F/S was 144 by EMT. S/P Fall off wheel chair yesterday. Dried blood noticed on the right side face.

## 2020-03-06 NOTE — H&P ADULT - NSHPPHYSICALEXAM_GEN_ALL_CORE
General:  Awake, Orientated to person only, in NAD  Eyes : MYRTLE  HENNT:  ecchymosis around R eye, no crepitus noted, + mild tenderness  Chest:  clear breath sounds  Cardiovascular:  S1, S2 2/6 SEJM  Abdomen:  soft, + generalized tenderness to palpation, + RUQ tenderness, no [place] noted at this time  Extremities:  + Lymphedema noted bilaterally   Musculoskeletal:  no motor in bilateral LE and RUE, 4/5 strength to LUE + sensation to touch

## 2020-03-06 NOTE — CONSULT NOTE ADULT - ASSESSMENT
65 yo wheelchair bound male with SPMhx of multiple sclerosis, left hip disarticulation and Right hip fracture s/p IM nail in 2018 who presents to the ED with AMS s/p fall  forward out of the motorized chair and landed on the ground striking the right side of his face.  Patient was found today by his wife with altered mental status with confusion and responsive to pain, EMS called, fever of 102 / 104 F noted with elevated WBC, procalcitonin, CT findings of distended gallbladder with RUQ tenderness. Flu/UA negative.    Plan  - will check HIDA scan as CT does not show any GB wall thickening  - IV abx   - NPO  - pain control  - will discuss case with Dr. Marcelino

## 2020-03-06 NOTE — ED PROVIDER NOTE - CLINICAL SUMMARY MEDICAL DECISION MAKING FREE TEXT BOX
Pt is a 63 yo male who presents to the ED with a cc of AMS.  PMHx of MS. Pt is unable to provide history at this time.  Pt wife reports that he was coming down a ramp yesterday in his motorized scooter when he fell forward out of the chair and landed on the ground striking the right side of his face.  There was no LOC.  Pt is not on blood thinners.  She reports that the fire department was called for a lift assist and pt had refused EMS transfer at that time.  He was otherwise in his normal state of health.  Today when she went to check in on him he was noted to be confused and responsive only to pain at that time.  EMS was called and pt was noted to have a fever T max of 102 per their reports.  Wife is unsure if he received a flu vaccine this year.  Concern for underlying sepsis unclear source.  Will obtain screening septic work up, EKG, chest x-ray, and swab for flu.  Will treat fever, hydrate obtain CT head due to fall and chest x-ray.  Will monitor pt.  He will likely require admission

## 2020-03-06 NOTE — PATIENT PROFILE ADULT - VISION (WITH CORRECTIVE LENSES IF THE PATIENT USUALLY WEARS THEM):
Partially impaired: cannot see medication labels or newsprint, but can see obstacles in path, and the surrounding layout; can count fingers at arm's length/Patient wears 1 pair of eye glasses at home

## 2020-03-06 NOTE — ED ADULT NURSE NOTE - OBJECTIVE STATEMENT
65 y/o male, pmh of MS, wheelchair bound, presents to the ed c/o for ams. pt biba from home for ams as per wife. as per wife pt had fall out of wheelchair yesterday and did nto want to come to the er. today he was lethargic and felt hot. 104.2 rectal temp. unable to obtain other ros as 2/2 to ams

## 2020-03-06 NOTE — PROGRESS NOTE ADULT - SUBJECTIVE AND OBJECTIVE BOX
T 101.4  Lethargic and disoriented.    Abd: RUQ tenderness and guarding  HIDA scan positive  O2 sat in 80's  CT of chest, abdomen, pelvis and head reviewed  Pt is septic and mental status changes may be delirium from sepsis  Pt unable to understand why he needs surgery  I discussed the need for cholecystectomy and its risks with family at bedside and earlier in ER

## 2020-03-06 NOTE — ED PROVIDER NOTE - OBJECTIVE STATEMENT
Pt is a 65 yo male who presents to the ED with a cc of AMS.  PMHx of MS. Pt is unable to provide history at this time.  Pt wife reports that he was coming down a ramp yesterday in his motorized scooter when he fell forward out of the chair and landed on the ground striking the right side of his face.  There was no LOC.  Pt is not on blood thinners.  She reports that the fire department was called for a lift assist and pt had refused EMS transfer at that time.  He was otherwise in his normal state of health.  Today when she went to check in on him he was noted to be confused and responsive only to pain at that time.  EMS was called and pt was noted to have a fever T max of 102 per their reports.  Wife is unsure if he received a flu vaccine this year

## 2020-03-06 NOTE — ED PROVIDER NOTE - NEUROLOGICAL, MLM
Alert opening eyes to verbal stimuli not speaking, at baseline pt able unable to move RUE, LLE, RLE, noted to be moving LUE at this time

## 2020-03-06 NOTE — ED PROVIDER NOTE - PHYSICAL EXAMINATION
contusion noted to right inferior orbital region with no fadia TTP  facial abrasions and dry blood noted to right maxillary region with no fadia TTP   no midline cervical TTP  healing abrasion noted to right shine no fadia TTP or deformity noted

## 2020-03-06 NOTE — CONSULT NOTE ADULT - SUBJECTIVE AND OBJECTIVE BOX
Pt is a 63 yo M presented to the ER with altered mental status 2/2 to suspected cholecystitis. PMH Primary Progressive Multiple Sclerosis, Chronic pain.   Patient seen and examined at bedside with his wife and patients mother present.   Patient is Alert and oriented to person and place, following commands, has baseline parapglegia in all extremities EXCEPT LUE.   History primarily provided by family at bedside.   Patient was doing well, but this morning woke up confused and noted to have a fever, he was confused and lethargic so family brought him to ED.   Patient was in his usual state of health yesterday, tolerating PO intake, no recent travel, illnesses or sick contacts.   Today patient does state he has abdominal pain generalized, worse with palpation, has no other complaints.   No recent, diarrhea, dysuria, melena, hematochezia, no past hx of MI/TIA/CVA/DM2.   No previous adverse reactions to anesthesia.   Of note wife states patient fell out of his wheelchair going down a ramp due to unequal weight distribution, landed forward on his knees and suffered an abrasion to his R face due to his glasses. No change in vision.     Allergies: NKDA  Family hx: no family hx of gallbladder disease in mother or father  Social hx: former smoker quit 30 years ago, no etoh or drug use.   Surgical Hx: femur surgery last year.     T(C): 37.5 (03-06-20 @ 12:55), Max: 40.1 (03-06-20 @ 08:41)  HR: 94 (03-06-20 @ 12:55) (94 - 115)  BP: 151/90 (03-06-20 @ 12:55) (144/82 - 154/93)  RR: 20 (03-06-20 @ 12:55) (20 - 26)  SpO2: 94% (03-06-20 @ 12:55) (94% - 95%)  Wt(kg): --    Physical Exam:   GENERAL: well-groomed, well-developed, NAD  HEENT: head NC, abrasion to R supraorbital; EOM intact, PERRLA, conjunctiva & sclera clear; hearing grossly intact, moist mucous membranes  NECK: supple, no JVD  RESPIRATORY: CTA B/L, no wheezing, rales, rhonchi or rubs  CARDIOVASCULAR: S1&S2, RRR, no murmurs or gallops  ABDOMEN: soft, non-tender, non-distended, + Bowel sounds x4 quadrants, no guarding, rebound or rigidity  MUSCULOSKELETAL:  no clubbing, cyanosis of extremities, b/l LE pitting edema  LYMPH: no cervical lymphadenopathy  VASCULAR: Radial pulses 2+ bilaterally, no varicose veins   SKIN: warm and dry, color normal  NEUROLOGIC: AA&O X2 (person and place), CN2-12 intact w/ no focal deficits, no sensory loss, motor Strength 4/5 LUE, all other ext 0/5  Psych: Normal mood and affect, normal behavior

## 2020-03-06 NOTE — CONSULT NOTE ADULT - SUBJECTIVE AND OBJECTIVE BOX
contact information: Office: [phone] Cell: [phone]    GENERAL SURGERY CONSULT NOTE    Patient is a 64y old  Male who presents with a chief complaint of     HPI:  65 yo wheelchair bound male with PMhx of multiple sclerosis, left hip disarticulation and Right hip fracture s/p IM nail in 2018 who presents to the ED with AMS s/p fall  forward out of the motorized chair and landed on the ground striking the right side of his face.  Patient was found today by his wife with altered mental status with confusion and responsive to pain, EMS called, fever of 102 / 104 F noted upon arrival. Patient unable to provide any significant history at this time as he is A and O x 1 to person. Wife / daughter states patient with no history of memory issues in the past and his new mental status change is new. Noted that patient was lethargic last night but family denies any fevers, chills, nausea, vomiting or patient complaints of abdominal pain.       PAST MEDICAL & SURGICAL HISTORY:  Femur fracture, left  MS (multiple sclerosis)  No significant past surgical history  Right IM nail- 2018    Review of Systems:  Dion historian, ZORAN as above      Allergies    No Known Allergies    Intolerances        SOCIAL HISTORY          Smoking: Yes [ ]  No [x ]   ______pk yrs          ETOH  Yes [ ]  No [x ]  Social [ ]          DRUGS:  Yes [x ]  No [ ]  Opioids for chronic pain    FAMILY HISTORY:  No pertinent family history in first degree relatives      Vital Signs Last 24 Hrs  T(C): 37.5 (06 Mar 2020 12:55), Max: 40.1 (06 Mar 2020 08:41)  T(F): 99.5 (06 Mar 2020 12:55), Max: 104.2 (06 Mar 2020 08:41)  HR: 94 (06 Mar 2020 12:55) (94 - 115)  BP: 151/90 (06 Mar 2020 12:55) (144/82 - 154/93)  BP(mean): --  RR: 20 (06 Mar 2020 12:55) (20 - 26)  SpO2: 94% (06 Mar 2020 12:55) (94% - 95%)    Physical Exam:    General:  Awake, Orientated to person only, in NAD  Eyes : MYRTLE  HENNT:  ecchymosis around R eye, no crepitus noted, + mild tenderness  Chest:  clear breath sounds  Cardiovascular:  S1, S2 2/6 SEJM  Abdomen:  soft, + generalized tenderness to palpation, + RUQ tenderness, no [place] noted at this time  Extremities:  + Lymphedema noted bilaterally   Musculoskeletal:  no motor in bilateral LE and RUE, 4/5 strength to LUE + sensation to touch      LABS:                        13.0   15.03 )-----------( 200      ( 06 Mar 2020 09:09 )             37.8     03-06    137  |  103  |  25<H>  ----------------------------<  119<H>  4.1   |  25  |  1.00    Ca    8.6      06 Mar 2020 09:09    TPro  6.8  /  Alb  3.0<L>  /  TBili  1.0  /  DBili  x   /  AST  16  /  ALT  23  /  AlkPhos  84  03-06      Urinalysis Basic - ( 06 Mar 2020 09:10 )    Color: Carole / Appearance: Slightly Turbid / S.010 / pH: x  Gluc: x / Ketone: Small  / Bili: Small / Urobili: 1   Blood: x / Protein: 30 mg/dL / Nitrite: Negative   Leuk Esterase: Trace / RBC: 3-5 /HPF / WBC 0-2   Sq Epi: x / Non Sq Epi: Few / Bacteria: Few        RADIOLOGY & ADDITIONAL STUDIES:    Risks, benefits, and alternatives to treatment discussed. All questions answered with understanding.

## 2020-03-06 NOTE — ED ADULT NURSE REASSESSMENT NOTE - NS ED NURSE REASSESS COMMENT FT1
pt to be transported to [facility], pt lethargic, wife left to go homeb ut will be back, LR infusing, 20 g r hand, 20 g l hand, in no distress, vitals are as charted

## 2020-03-06 NOTE — H&P ADULT - HISTORY OF PRESENT ILLNESS
63 yo wheelchair bound male with PMhx of multiple sclerosis, left hip disarticulation and Right hip fracture s/p IM nail in 2018 who presents to the ED with AMS s/p fall  forward out of the motorized chair and landed on the ground striking the right side of his face.  Patient was found today by his wife with altered mental status with confusion and responsive to pain, EMS called, fever of 102 / 104 F noted upon arrival. Patient unable to provide any significant history at this time as he is A and O x 1 to person. Wife / daughter states patient with no history of memory issues in the past and his new mental status change is new. Noted that patient was lethargic last night but family denies any fevers, chills, nausea, vomiting or patient complaints of abdominal pain.

## 2020-03-06 NOTE — ED PROVIDER NOTE - CARE PLAN
Principal Discharge DX:	Sepsis  Secondary Diagnosis:	Cholecystitis  Secondary Diagnosis:	Abdominal pain

## 2020-03-06 NOTE — PROVIDER CONTACT NOTE (OTHER) - REASON
Patient de sating to 87% on RA. Pt on 4L nasal cannula sating at 99%. Pt had 101.4 rectal temp and reassessed temp 98.9 axillary.

## 2020-03-06 NOTE — H&P ADULT - ASSESSMENT
63 yo wheelchair bound male with SPMhx of multiple sclerosis, left hip disarticulation and Right hip fracture s/p IM nail in 2018 who presents to the ED with AMS s/p fall  forward out of the motorized chair and landed on the ground striking the right side of his face.  Patient was found today by his wife with altered mental status with confusion and responsive to pain, EMS called, fever of 102 / 104 F noted with elevated WBC, procalcitonin, CT findings of distended gallbladder with RUQ tenderness. Flu/UA negative.      Plan  - will check HIDA scan as CT does not show any GB wall thickening  - IV abx   - f/u coags, type and screen  - NPO  - pain control  - will discuss case with Dr. Marcelino

## 2020-03-06 NOTE — CONSULT NOTE ADULT - ASSESSMENT
Pt is a 65 yo M presented to the ER with altered mental status 2/2 to suspected cholecystitis. PMH Primary Progressive Multiple Sclerosis, Chronic pain.     Pre-Operative Exam:   -in event patient needs surgery pre-op examination is done.   -EKG initially sinus tachy, repeat EKG NSR without any acute ST-T wave changes/no ischemic changes  -Revised cardiac risk index 0 points.   -at this time there are no absolute contraindications to surgery if clinically indicated.     Primary Progressive MS:   -f/u with your neurologist Dr. Saleem Osullivan as outpatient  -can resume your home medications    Depression: continue Lexapro 20mg daily    Chronic Pain: Continue Lyrica 150mg TID, Percocet 10mg/325mg PRN    Distended Gallbladder: possible cholecystitis.   -recommend continuing abx.   -consider ID consult.   -monitor for fever and trend WBC count.     Facial abrasion: CT head reviewed.   -recommend tetanus booster    DVT ppx: as per surgical team.     Patient to be followed by Dr. HARVINDER Pham beginning 3/7/2020. Discussed with surgery PA and Dr. Pham. Pt is a 65 yo M presented to the ER with altered mental status 2/2 to suspected cholecystitis. PMH Primary Progressive Multiple Sclerosis, Chronic pain.     Pre-Operative Exam:   -in event patient needs surgery pre-op examination is done.   -EKG initially sinus tachy, repeat EKG NSR without any acute ST-T wave changes/no ischemic changes  -Revised cardiac risk index 0 points.   -at this time there are no absolute contraindications to surgery if clinically indicated.     Primary Progressive MS:   -f/u with your neurologist Dr. Saleem Osullivan as outpatient  -can resume your home medications    Depression: continue Lexapro 20mg daily    Chronic Pain: Continue Lyrica 150mg TID, Percocet 10mg/325mg PRN    Distended Gallbladder: possible cholecystitis.   -recommend continuing abx.   -monitor for fever and trend WBC count.     Facial abrasion: CT head reviewed.   -recommend tetanus booster    DVT ppx: as per surgical team.     Patient to be followed by Dr. HARVINDER Pham beginning 3/7/2020. Discussed with surgery PA and Dr. Pham.

## 2020-03-06 NOTE — ED PROVIDER NOTE - PROGRESS NOTE DETAILS
pt now more alert c/o upper abdominal pain.  Results of CT reviewed,  pt with TT to epigastric and RUQ at this time

## 2020-03-06 NOTE — H&P ADULT - NSHPLABSRESULTS_GEN_ALL_CORE
CBC Full  -  ( 06 Mar 2020 09:09 )  WBC Count : 15.03 K/uL  RBC Count : 4.25 M/uL  Hemoglobin : 13.0 g/dL  Hematocrit : 37.8 %  Platelet Count - Automated : 200 K/uL  Mean Cell Volume : 88.9 fl  Mean Cell Hemoglobin : 30.6 pg  Mean Cell Hemoglobin Concentration : 34.4 gm/dL  Auto Neutrophil # : 13.49 K/uL  Auto Lymphocyte # : 0.91 K/uL  Auto Monocyte # : 0.42 K/uL  Auto Eosinophil # : 0.09 K/uL  Auto Basophil # : 0.08 K/uL  Auto Neutrophil % : 89.7 %  Auto Lymphocyte % : 6.1 %  Auto Monocyte % : 2.8 %  Auto Eosinophil % : 0.6 %  Auto Basophil % : 0.5 %    Comprehensive Metabolic Panel (03.06.20 @ 09:09)    Sodium, Serum: 137 mmol/L    Potassium, Serum: 4.1 mmol/L    Chloride, Serum: 103 mmol/L    Carbon Dioxide, Serum: 25 mmol/L    Anion Gap, Serum: 9 mmol/L    Blood Urea Nitrogen, Serum: 25 mg/dL    Creatinine, Serum: 1.00 mg/dL    Glucose, Serum: 119 mg/dL    Calcium, Total Serum: 8.6 mg/dL    Protein Total, Serum: 6.8 g/dL    Albumin, Serum: 3.0 g/dL    Bilirubin Total, Serum: 1.0 mg/dL    Alkaline Phosphatase, Serum: 84 U/L    Aspartate Aminotransferase (AST/SGOT): 16 U/L    Alanine Aminotransferase (ALT/SGPT): 23 U/L    eGFR if Non : 79: Interpretative comment  The units for eGFR are mL/min/1.73M2 (normalized body surface area). The  eGFR is calculated from a serum creatinine using the CKD-EPI equation.  Other variables required for calculation are race, age and sex. Among  patients with chronic kidney disease (CKD), the eGFR is useful in  determining the stage of disease according to KDOQI CKD classification.  All eGFR results are reported numerically with the following  interpretation.          GFR                    With                 Without     (ml/min/1.73 m2)    Kidney Damage       Kidney Damage        >= 90                    Stage 1                     Normal        60-89                    Stage 2                     Decreased GFR        30-59     Stage 3                     Stage 3        15-29                    Stage 4                     Stage 4        < 15                      Stage 5                     Stage 5  Each stage of CKD assumes that the associated GFR level has been in  effect for at least 3 months. Determination of stages one and two (with  eGFR > 59 ml/min/m2) requires estimation of kidney damage for at least 3  months as defined by structural or functional abnormalities.  Limitations: All estimates of GFR will be less accurate for patients at  extremes of muscle mass (including but not limited to frail elderly,  critically ill, or cancer patients), those with unusual diets, and those  with conditions associated with reduced secretion or extrarenal  elimination of creatinine. The eGFR equation is not recommended for use  in patients with unstable creatinine levels. mL/min/1.73M2    eGFR if African American: 92 mL/min/1.73M2    Procalcitonin, Serum: 1.80: Procalcitonin (PCT) Interpretation (ng/mL) - Diagnosis of systemic  bacterial infection/sepsis  PCT < 0.5: Systemic infection (sepsis) is not likely and risk for  progression to severe systemic infection is low. Local bacterial  infection is possible. If early sepsis is suspected clinically, PCT  should be re-assessed in 6-24 hours.  PCT >/= 0.5 but < 2.0: Systemic infection (sepsis) is possible, but other  conditions are known to elevate PCT as well. Moderate risk for  progression to severe systemic infection. The patient should be closely  monitored both clinically and by re-assessing PCT within 6-24 hours.  PCT >/= 2.0 but < 10.0: Systemic infection (sepsis) is likely, unless  other causes are known. High risk of progression to severe systemic  infection (severe sepsis/septic shock).  PCT >/= 10.0: Important systemic inflammatory response, almost  exclusively due to severe bacterial sepsis or septic shock. High  likelihood of severe sepsis or septic shock. ng/mL (03.06.20 @ 09:09)

## 2020-03-07 LAB
ANION GAP SERPL CALC-SCNC: 12 MMOL/L — SIGNIFICANT CHANGE UP (ref 5–17)
ANION GAP SERPL CALC-SCNC: 9 MMOL/L — SIGNIFICANT CHANGE UP (ref 5–17)
BASE EXCESS BLDA CALC-SCNC: 0.5 MMOL/L — SIGNIFICANT CHANGE UP (ref -2–2)
BLOOD GAS COMMENTS ARTERIAL: SIGNIFICANT CHANGE UP
BLOOD GAS COMMENTS ARTERIAL: SIGNIFICANT CHANGE UP
BUN SERPL-MCNC: 19 MG/DL — SIGNIFICANT CHANGE UP (ref 7–23)
BUN SERPL-MCNC: 22 MG/DL — SIGNIFICANT CHANGE UP (ref 7–23)
CALCIUM SERPL-MCNC: 8.2 MG/DL — LOW (ref 8.5–10.1)
CALCIUM SERPL-MCNC: 8.4 MG/DL — LOW (ref 8.5–10.1)
CHLORIDE SERPL-SCNC: 104 MMOL/L — SIGNIFICANT CHANGE UP (ref 96–108)
CHLORIDE SERPL-SCNC: 104 MMOL/L — SIGNIFICANT CHANGE UP (ref 96–108)
CO2 SERPL-SCNC: 23 MMOL/L — SIGNIFICANT CHANGE UP (ref 22–31)
CO2 SERPL-SCNC: 25 MMOL/L — SIGNIFICANT CHANGE UP (ref 22–31)
CREAT SERPL-MCNC: 0.71 MG/DL — SIGNIFICANT CHANGE UP (ref 0.5–1.3)
CREAT SERPL-MCNC: 0.72 MG/DL — SIGNIFICANT CHANGE UP (ref 0.5–1.3)
CULTURE RESULTS: NO GROWTH — SIGNIFICANT CHANGE UP
GLUCOSE SERPL-MCNC: 141 MG/DL — HIGH (ref 70–99)
GLUCOSE SERPL-MCNC: 164 MG/DL — HIGH (ref 70–99)
HCO3 BLDA-SCNC: 25 MMOL/L — SIGNIFICANT CHANGE UP (ref 23–27)
HCT VFR BLD CALC: 32.7 % — LOW (ref 39–50)
HCV AB S/CO SERPL IA: 0.23 S/CO — SIGNIFICANT CHANGE UP (ref 0–0.99)
HCV AB SERPL-IMP: SIGNIFICANT CHANGE UP
HGB BLD-MCNC: 11.1 G/DL — LOW (ref 13–17)
HOROWITZ INDEX BLDA+IHG-RTO: 40 — SIGNIFICANT CHANGE UP
MAGNESIUM SERPL-MCNC: 1.8 MG/DL — SIGNIFICANT CHANGE UP (ref 1.6–2.6)
MCHC RBC-ENTMCNC: 30.8 PG — SIGNIFICANT CHANGE UP (ref 27–34)
MCHC RBC-ENTMCNC: 33.9 GM/DL — SIGNIFICANT CHANGE UP (ref 32–36)
MCV RBC AUTO: 90.8 FL — SIGNIFICANT CHANGE UP (ref 80–100)
NRBC # BLD: 0 /100 WBCS — SIGNIFICANT CHANGE UP (ref 0–0)
PCO2 BLDA: 42 MMHG — SIGNIFICANT CHANGE UP (ref 32–46)
PH BLDA: 7.39 — SIGNIFICANT CHANGE UP (ref 7.35–7.45)
PHOSPHATE SERPL-MCNC: 2.8 MG/DL — SIGNIFICANT CHANGE UP (ref 2.5–4.5)
PLATELET # BLD AUTO: 129 K/UL — LOW (ref 150–400)
PO2 BLDA: 122 MMHG — HIGH (ref 74–108)
POTASSIUM SERPL-MCNC: 3.6 MMOL/L — SIGNIFICANT CHANGE UP (ref 3.5–5.3)
POTASSIUM SERPL-MCNC: 3.9 MMOL/L — SIGNIFICANT CHANGE UP (ref 3.5–5.3)
POTASSIUM SERPL-SCNC: 3.6 MMOL/L — SIGNIFICANT CHANGE UP (ref 3.5–5.3)
POTASSIUM SERPL-SCNC: 3.9 MMOL/L — SIGNIFICANT CHANGE UP (ref 3.5–5.3)
RBC # BLD: 3.6 M/UL — LOW (ref 4.2–5.8)
RBC # FLD: 13.1 % — SIGNIFICANT CHANGE UP (ref 10.3–14.5)
SAO2 % BLDA: 99 % — HIGH (ref 92–96)
SODIUM SERPL-SCNC: 138 MMOL/L — SIGNIFICANT CHANGE UP (ref 135–145)
SODIUM SERPL-SCNC: 139 MMOL/L — SIGNIFICANT CHANGE UP (ref 135–145)
SPECIMEN SOURCE: SIGNIFICANT CHANGE UP
WBC # BLD: 10.17 K/UL — SIGNIFICANT CHANGE UP (ref 3.8–10.5)
WBC # FLD AUTO: 10.17 K/UL — SIGNIFICANT CHANGE UP (ref 3.8–10.5)

## 2020-03-07 PROCEDURE — 71045 X-RAY EXAM CHEST 1 VIEW: CPT | Mod: 26

## 2020-03-07 PROCEDURE — 70450 CT HEAD/BRAIN W/O DYE: CPT | Mod: 26

## 2020-03-07 PROCEDURE — 99233 SBSQ HOSP IP/OBS HIGH 50: CPT

## 2020-03-07 RX ORDER — PIPERACILLIN AND TAZOBACTAM 4; .5 G/20ML; G/20ML
3.38 INJECTION, POWDER, LYOPHILIZED, FOR SOLUTION INTRAVENOUS EVERY 8 HOURS
Refills: 0 | Status: DISCONTINUED | OUTPATIENT
Start: 2020-03-07 | End: 2020-03-11

## 2020-03-07 RX ORDER — SODIUM CHLORIDE 9 MG/ML
1000 INJECTION, SOLUTION INTRAVENOUS
Refills: 0 | Status: DISCONTINUED | OUTPATIENT
Start: 2020-03-07 | End: 2020-03-07

## 2020-03-07 RX ORDER — HYDRALAZINE HCL 50 MG
10 TABLET ORAL ONCE
Refills: 0 | Status: COMPLETED | OUTPATIENT
Start: 2020-03-07 | End: 2020-03-07

## 2020-03-07 RX ORDER — CHLORHEXIDINE GLUCONATE 213 G/1000ML
1 SOLUTION TOPICAL
Refills: 0 | Status: DISCONTINUED | OUTPATIENT
Start: 2020-03-07 | End: 2020-03-12

## 2020-03-07 RX ORDER — DEXMEDETOMIDINE HYDROCHLORIDE IN 0.9% SODIUM CHLORIDE 4 UG/ML
0.2 INJECTION INTRAVENOUS
Qty: 200 | Refills: 0 | Status: DISCONTINUED | OUTPATIENT
Start: 2020-03-07 | End: 2020-03-07

## 2020-03-07 RX ORDER — PANTOPRAZOLE SODIUM 20 MG/1
40 TABLET, DELAYED RELEASE ORAL DAILY
Refills: 0 | Status: DISCONTINUED | OUTPATIENT
Start: 2020-03-07 | End: 2020-03-12

## 2020-03-07 RX ORDER — HYDROMORPHONE HYDROCHLORIDE 2 MG/ML
1 INJECTION INTRAMUSCULAR; INTRAVENOUS; SUBCUTANEOUS ONCE
Refills: 0 | Status: DISCONTINUED | OUTPATIENT
Start: 2020-03-07 | End: 2020-03-07

## 2020-03-07 RX ORDER — HYDROMORPHONE HYDROCHLORIDE 2 MG/ML
0.25 INJECTION INTRAMUSCULAR; INTRAVENOUS; SUBCUTANEOUS EVERY 4 HOURS
Refills: 0 | Status: DISCONTINUED | OUTPATIENT
Start: 2020-03-07 | End: 2020-03-09

## 2020-03-07 RX ORDER — HYDRALAZINE HCL 50 MG
10 TABLET ORAL EVERY 6 HOURS
Refills: 0 | Status: DISCONTINUED | OUTPATIENT
Start: 2020-03-07 | End: 2020-03-09

## 2020-03-07 RX ORDER — FENTANYL CITRATE 50 UG/ML
50 INJECTION INTRAVENOUS
Refills: 0 | Status: DISCONTINUED | OUTPATIENT
Start: 2020-03-07 | End: 2020-03-07

## 2020-03-07 RX ORDER — ESCITALOPRAM OXALATE 10 MG/1
20 TABLET, FILM COATED ORAL DAILY
Refills: 0 | Status: DISCONTINUED | OUTPATIENT
Start: 2020-03-07 | End: 2020-03-12

## 2020-03-07 RX ORDER — PROPOFOL 10 MG/ML
20 INJECTION, EMULSION INTRAVENOUS
Qty: 1000 | Refills: 0 | Status: DISCONTINUED | OUTPATIENT
Start: 2020-03-07 | End: 2020-03-07

## 2020-03-07 RX ORDER — OXYBUTYNIN CHLORIDE 5 MG
15 TABLET ORAL
Refills: 0 | Status: DISCONTINUED | OUTPATIENT
Start: 2020-03-07 | End: 2020-03-12

## 2020-03-07 RX ORDER — ENOXAPARIN SODIUM 100 MG/ML
40 INJECTION SUBCUTANEOUS AT BEDTIME
Refills: 0 | Status: DISCONTINUED | OUTPATIENT
Start: 2020-03-07 | End: 2020-03-12

## 2020-03-07 RX ORDER — HYDROMORPHONE HYDROCHLORIDE 2 MG/ML
0.5 INJECTION INTRAMUSCULAR; INTRAVENOUS; SUBCUTANEOUS EVERY 4 HOURS
Refills: 0 | Status: DISCONTINUED | OUTPATIENT
Start: 2020-03-07 | End: 2020-03-09

## 2020-03-07 RX ORDER — CHLORHEXIDINE GLUCONATE 213 G/1000ML
15 SOLUTION TOPICAL EVERY 12 HOURS
Refills: 0 | Status: DISCONTINUED | OUTPATIENT
Start: 2020-03-07 | End: 2020-03-07

## 2020-03-07 RX ORDER — MIDAZOLAM HYDROCHLORIDE 1 MG/ML
2 INJECTION, SOLUTION INTRAMUSCULAR; INTRAVENOUS ONCE
Refills: 0 | Status: DISCONTINUED | OUTPATIENT
Start: 2020-03-07 | End: 2020-03-07

## 2020-03-07 RX ORDER — HYDRALAZINE HCL 50 MG
25 TABLET ORAL EVERY 8 HOURS
Refills: 0 | Status: DISCONTINUED | OUTPATIENT
Start: 2020-03-07 | End: 2020-03-07

## 2020-03-07 RX ORDER — MIDAZOLAM HYDROCHLORIDE 1 MG/ML
2 INJECTION, SOLUTION INTRAMUSCULAR; INTRAVENOUS
Refills: 0 | Status: DISCONTINUED | OUTPATIENT
Start: 2020-03-07 | End: 2020-03-07

## 2020-03-07 RX ORDER — OXCARBAZEPINE 300 MG/1
300 TABLET, FILM COATED ORAL
Refills: 0 | Status: DISCONTINUED | OUTPATIENT
Start: 2020-03-07 | End: 2020-03-12

## 2020-03-07 RX ORDER — SODIUM CHLORIDE 9 MG/ML
1000 INJECTION, SOLUTION INTRAVENOUS
Refills: 0 | Status: DISCONTINUED | OUTPATIENT
Start: 2020-03-07 | End: 2020-03-11

## 2020-03-07 RX ORDER — OXYBUTYNIN CHLORIDE 5 MG
15 TABLET ORAL DAILY
Refills: 0 | Status: DISCONTINUED | OUTPATIENT
Start: 2020-03-07 | End: 2020-03-07

## 2020-03-07 RX ORDER — MAGNESIUM SULFATE 500 MG/ML
2 VIAL (ML) INJECTION ONCE
Refills: 0 | Status: COMPLETED | OUTPATIENT
Start: 2020-03-07 | End: 2020-03-07

## 2020-03-07 RX ORDER — HYDROMORPHONE HYDROCHLORIDE 2 MG/ML
1 INJECTION INTRAMUSCULAR; INTRAVENOUS; SUBCUTANEOUS EVERY 4 HOURS
Refills: 0 | Status: DISCONTINUED | OUTPATIENT
Start: 2020-03-07 | End: 2020-03-09

## 2020-03-07 RX ADMIN — DEXMEDETOMIDINE HYDROCHLORIDE IN 0.9% SODIUM CHLORIDE 5.45 MICROGRAM(S)/KG/HR: 4 INJECTION INTRAVENOUS at 13:02

## 2020-03-07 RX ADMIN — HYDROMORPHONE HYDROCHLORIDE 1 MILLIGRAM(S): 2 INJECTION INTRAMUSCULAR; INTRAVENOUS; SUBCUTANEOUS at 01:04

## 2020-03-07 RX ADMIN — MIDAZOLAM HYDROCHLORIDE 2 MILLIGRAM(S): 1 INJECTION, SOLUTION INTRAMUSCULAR; INTRAVENOUS at 00:32

## 2020-03-07 RX ADMIN — HYDROMORPHONE HYDROCHLORIDE 1 MILLIGRAM(S): 2 INJECTION INTRAMUSCULAR; INTRAVENOUS; SUBCUTANEOUS at 21:24

## 2020-03-07 RX ADMIN — Medication 10 MILLIGRAM(S): at 12:05

## 2020-03-07 RX ADMIN — PROPOFOL 13.07 MICROGRAM(S)/KG/MIN: 10 INJECTION, EMULSION INTRAVENOUS at 03:28

## 2020-03-07 RX ADMIN — MIDAZOLAM HYDROCHLORIDE 2 MILLIGRAM(S): 1 INJECTION, SOLUTION INTRAMUSCULAR; INTRAVENOUS at 00:23

## 2020-03-07 RX ADMIN — DEXMEDETOMIDINE HYDROCHLORIDE IN 0.9% SODIUM CHLORIDE 5.45 MICROGRAM(S)/KG/HR: 4 INJECTION INTRAVENOUS at 14:50

## 2020-03-07 RX ADMIN — CHLORHEXIDINE GLUCONATE 1 APPLICATION(S): 213 SOLUTION TOPICAL at 05:08

## 2020-03-07 RX ADMIN — HYDROMORPHONE HYDROCHLORIDE 1 MILLIGRAM(S): 2 INJECTION INTRAMUSCULAR; INTRAVENOUS; SUBCUTANEOUS at 20:54

## 2020-03-07 RX ADMIN — Medication 10 MILLIGRAM(S): at 18:11

## 2020-03-07 RX ADMIN — MIDAZOLAM HYDROCHLORIDE 2 MILLIGRAM(S): 1 INJECTION, SOLUTION INTRAMUSCULAR; INTRAVENOUS at 02:17

## 2020-03-07 RX ADMIN — PIPERACILLIN AND TAZOBACTAM 25 GRAM(S): 4; .5 INJECTION, POWDER, LYOPHILIZED, FOR SOLUTION INTRAVENOUS at 15:07

## 2020-03-07 RX ADMIN — PIPERACILLIN AND TAZOBACTAM 25 GRAM(S): 4; .5 INJECTION, POWDER, LYOPHILIZED, FOR SOLUTION INTRAVENOUS at 01:08

## 2020-03-07 RX ADMIN — PIPERACILLIN AND TAZOBACTAM 25 GRAM(S): 4; .5 INJECTION, POWDER, LYOPHILIZED, FOR SOLUTION INTRAVENOUS at 08:38

## 2020-03-07 RX ADMIN — Medication 50 GRAM(S): at 07:56

## 2020-03-07 RX ADMIN — HYDROMORPHONE HYDROCHLORIDE 1 MILLIGRAM(S): 2 INJECTION INTRAMUSCULAR; INTRAVENOUS; SUBCUTANEOUS at 08:26

## 2020-03-07 RX ADMIN — PIPERACILLIN AND TAZOBACTAM 25 GRAM(S): 4; .5 INJECTION, POWDER, LYOPHILIZED, FOR SOLUTION INTRAVENOUS at 21:14

## 2020-03-07 RX ADMIN — PANTOPRAZOLE SODIUM 40 MILLIGRAM(S): 20 TABLET, DELAYED RELEASE ORAL at 12:05

## 2020-03-07 RX ADMIN — HYDROMORPHONE HYDROCHLORIDE 1 MILLIGRAM(S): 2 INJECTION INTRAMUSCULAR; INTRAVENOUS; SUBCUTANEOUS at 07:56

## 2020-03-07 RX ADMIN — Medication 10 MILLIGRAM(S): at 21:34

## 2020-03-07 RX ADMIN — PROPOFOL 13.07 MICROGRAM(S)/KG/MIN: 10 INJECTION, EMULSION INTRAVENOUS at 00:24

## 2020-03-07 RX ADMIN — HYDROMORPHONE HYDROCHLORIDE 1 MILLIGRAM(S): 2 INJECTION INTRAMUSCULAR; INTRAVENOUS; SUBCUTANEOUS at 01:20

## 2020-03-07 RX ADMIN — SODIUM CHLORIDE 75 MILLILITER(S): 9 INJECTION, SOLUTION INTRAVENOUS at 10:31

## 2020-03-07 RX ADMIN — ENOXAPARIN SODIUM 40 MILLIGRAM(S): 100 INJECTION SUBCUTANEOUS at 21:14

## 2020-03-07 NOTE — PROVIDER CONTACT NOTE (EICU) - SITUATION
Pt with h/o MS wheel chair bound came in to ER because fell out of the wheel chair . patient also had fever and change in mental status as per wife . patient was  found to have acute mukul , s/p surgery not extubated since patient had an episode of hypoxia

## 2020-03-07 NOTE — CONSULT NOTE ADULT - SUBJECTIVE AND OBJECTIVE BOX
[facility], Division of Infectious Diseases  HARVINDER Good[facility] A. Lee    FRANKYMICHAELTHANH WAGNER  64y, Male  [id number]    HPI--  64M, nonhistorian at present. Per d/w wife at bedside was in [facility] 1 day prior to admission without any issues, had fever, delirium, and a fall prompting care here.  As per HPI:  65 yo wheelchair bound male with PMhx of multiple sclerosis, left hip disarticulation and Right hip fracture s/p IM nail in 2018 who presents to the ED with AMS s/p fall  forward out of the motorized chair and landed on the ground striking the right side of his face.  Patient was found today by his wife with altered mental status with confusion and responsive to pain, EMS called, fever of 102 / 104 F noted upon arrival. Patient unable to provide any significant history at this time as he is A and O x 1 to person. Wife / daughter states patient with no history of memory issues in the past and his new mental status change is new. Noted that patient was lethargic last night but family denies any fevers, chills, nausea, vomiting or patient complaints of abdominal pain. (06 Mar 2020 15:13)    Patient noted to have fever to 104F at presentation. Swab for FluA/B/RXV negative, full RVP was not done. CT C/A/P notable for debris in exopahgus and distended gallbladder. Seen by surgery, HIDA ordered, noted to be positive. Patient S/P lap mukul 3/6. Ischemic looking GB reported.     Presently awake in ICU but not appropriate/compliant.     About 1 month ago patient was given a brief course of an unknown antibiotic for an anal fissure.     PMH/PSH--  Femur fracture, left  MS (multiple sclerosis)  No significant past surgical history      Allergies-- NKDA      Medications--  Antibiotics: piperacillin/tazobactam IVPB.. 3.375 Gram(s) IV Intermittent every 8 hours    Immunologic:   Other: chlorhexidine 2% Cloths  dexMEDEtomidine Infusion  enoxaparin Injectable  escitalopram  hydrALAZINE Injectable  HYDROmorphone  Injectable PRN  HYDROmorphone  Injectable PRN  HYDROmorphone  Injectable PRN  lactated ringers.  OXcarbazepine  oxybutynin XL  pantoprazole  Injectable  pregabalin  Symproic (naldemedine) 0.2 mg tablet 1 Tablet(s)      Social History--  EtOH: none known.  Tobacco: none known.  Drug use: none known.     Family/Marital History--  .  No pertinent family history in first degree relatives    Remainder not relevant to clinical concern.    Travel/Environmental/Occupational History:  No travel  No sick contacts  Not working    Review of Systems:  Review of systems unable secondary to clinical condition.     Physical Exam--  Vital Signs: T(F): 100.3 (03-07-20 @ 12:09), Max: 101.4 (03-06-20 @ 19:44)  HR: 100 (03-07-20 @ 13:30)  BP: 174/110 (03-07-20 @ 13:30)  RR: 27 (03-07-20 @ 13:30)  SpO2: 96% (03-07-20 @ 13:30)  Wt(kg): --  General: Nontoxic-appearing Male in no acute distress.  HEENT: Facial trauma with scabs/ecchymoses R temporal/periorbital area. Pupils/EOM unable secondary to patient compliance. Oropharynx/dentition unable secondary to patient compliance.   Neck: Not rigid. No sense of mass.  Nodes: None palpable.  Lungs: Poor effort grossly clear.   Heart: Regular rate and rhythm. No Murmur. No rub. No gallop. No palpable thrill.  Abdomen: Bowel sounds present and normoactive. Soft. Moderatlely distended. Mild diffuse tenderness. No clear guarding or rebound. Trocar sites clean. SAM drain scant bloodly fluid. No sense of mass. No organomegaly.  Back: Unable  Extremities: No cyanosis or clubbing. 2-3+ LE edema R>L. Scars R leg c/w prior ?femur surgery.   Skin: Warm. Dry. Good turgor. No rash. No vasculitic stigmata.  Psychiatric: Unable due to poor mentation        Laboratory & Imaging Data--  CBC                        11.1   10.17 )-----------( 129      ( 07 Mar 2020 05:42 )             32.7       Chemistries  03-07    138  |  104  |  19  ----------------------------<  164<H>  3.9   |  25  |  0.72    Ca    8.4<L>      07 Mar 2020 05:42  Phos  2.8     03-07  Mg     1.8     03-07    TPro  6.4  /  Alb  2.9<L>  /  TBili  0.7  /  DBili  .30<H>  /  AST  23  /  ALT  28  /  AlkPhos  67  03-07    Urine Microscopic-Add On (NC) (03.06.20 @ 09:10)    Red Blood Cell - Urine: 3-5 /HPF    White Blood Cell - Urine: 0-2    Bacteria: Few    Epithelial Cells: Few        Culture Data    Culture - Urine (collected 06 Mar 2020 12:35)  Source: .Urine Clean Catch (Midstream)  Final Report (07 Mar 2020 08:38):    No growth    Culture - Blood (collected 06 Mar 2020 12:34)  Source: .Blood Blood-Venous  Preliminary Report (07 Mar 2020 13:01):    No growth to date.    Culture - Blood (collected 06 Mar 2020 12:34)  Source: .Blood Blood-Venous  Preliminary Report (07 Mar 2020 13:01):    No growth to date.    < from: CT Abdomen and Pelvis w/ IV Cont (03.06.20 @ 11:47) >    EXAM:  CT ABDOMEN AND PELVIS IC                          EXAM:  CT CHEST IC                            PROCEDURE DATE:  03/06/2020          INTERPRETATION:  CLINICAL INFORMATION: Fever, cough, shortness of breath    COMPARISON: None.    PROCEDURE:   CT of the Chest, Abdomen and Pelvis was performed with intravenous contrast.   Intravenous contrast: 95 ml Omnipaque 350. 5 ml discarded.  Oral contrast: None.  Sagittal and coronal reformats were performed.    FINDINGS:    CHEST:     LUNGS AND LARGE AIRWAYS: Patent central airways. Small amount of mucus within the distal trachea. Mild interlobular septal thickening.  PLEURA: Trace bilateral pleural effusions with associated atelectasis.  VESSELS: Within normal limits.  HEART: Heart size is normal. No pericardial effusion.  MEDIASTINUM AND NICHOLAS: Mild thickening of the proximal esophagus which contains a small amount of debris.  CHEST WALL AND LOWER NECK: Within normal limits.    ABDOMEN AND PELVIS: Study is limited by streak artifact from the patient's arms as well as respiratory motion    LIVER: Within normal limits.  BILE DUCTS: Within normal limits.  GALLBLADDER: Markedly distended.  SPLEEN: Within normal limits.  PANCREAS: Within normal limits.  ADRENALS: Within normal limits.  KIDNEYS/URETERS: Within normal limits.    BLADDER: Underdistended with diffuse wall thickening.  REPRODUCTIVE ORGANS: Prostate within normal limits.    BOWEL: Mild presacral edema. No bowel obstruction. Appendix is within normal limits.  PERITONEUM: No ascites.  VESSELS: Mild atherosclerotic changes of aorta and branching vessels.  RETROPERITONEUM/LYMPH NODES: No lymphadenopathy.    ABDOMINAL WALL: Within normal limits.  BONES: Partially visualized intramedullary lashaun within the proximal right femur. Degenerative changes of the spine with mild kyphosis of the thoracic spine. Chronic fracture deformity of the left hip.    IMPRESSION:   Trace bilateral pleural effusions and mild interlobular septal thickening which a be seen with mild pulmonary vascular congestion.    Mild thickening of the proximal esophagus which contains a small amount of debris. Correlate clinically for esophagitis.    Markedly distended gallbladder. Correlate with ultrasound to evaluate for cholecystitis.    Underdistended urinary bladder with diffuse wall thickening. Correlate with urinalysis for cystitis.    Mild presacral edema, nonspecific.  FRANCISCO SHAY M.D., ATTENDING RADIOLOGIST  This document has been electronically signed. Mar  6 2020 12:46PM  < end of copied text >    < from: [facility] Hepatobiliary Imaging (03.06.20 @ 18:13) >  EXAM:  [facility] HEPATOBILIARY IMG                        PROCEDURE DATE:  03/06/2020    INTERPRETATION:  CLINICAL INFORMATION: 64-year-old male, right upper quadrant abdominal pain, fever, leukocytosis and markedly distended gallbladder on CT evaluate for acute cholecystitis.    RADIOPHARMACEUTICAL: 3 mCi Tc-99m-Mebrofenin, I.V.; 2 doses  TECHNIQUE: Dynamic imaging of the anterior abdomen was performed for 2 hours after the injection of radiotracer followed by static images of the abdomenin the anterior, right lateral and right anterior oblique projections.  Morphine 4 mg I.V. and a second dose of radiotracer were administered at 1 hour.    COMPARISON: No prior hepatobiliary scan available for comparison.    FINDINGS: There is prompt, homogeneous uptake of radiotracer by the hepatocytes. Activity is first seen in the bowel at 20 minutes. The gallbladder is not visualized at any time during the study, despite administration of morphine. There is good clearance of activity from the liver at the end of the study.  IMPRESSION: Abnormal morphine-augmented hepatobiliary scan.  Findings consistent with acute cholecystitis.  Dr. Yovany Marcelino was notified of these results by telephone, on 3/6/2020, at about 6:15 PM, with read back.  < end of copied text >    < from: CT Maxillofacial No Cont (03.06.20 @ 10:10) >  IMPRESSION:    Motion limited exam without gross acute findings    < end of copied text >    < from: CT Head No Cont (03.06.20 @ 10:09) >    Impression:  1. Unremarkable noncontrast CT scan of the brain.    < end of copied text >

## 2020-03-07 NOTE — PROGRESS NOTE ADULT - SUBJECTIVE AND OBJECTIVE BOX
POD 1  T 100.3  VSS  Delirious and disoriented.  Does not respond to most questions. Not taking po meds  Abd: tender, hard to evaluate how tender because of delirium  SAM drain with no bile staining  WBC down to 10.2  Hb stable  LFTs wnl  P: d/c SAM drain; neuro consult, advance diet when able

## 2020-03-07 NOTE — CONSULT NOTE ADULT - SUBJECTIVE AND OBJECTIVE BOX
REASON FOR CONSULT: Sepsis, hypoxia     CONSULT REQUESTED BY: Dr. Marcelino    Patient is a 64y old  Male who presents with a chief complaint of s/p fall, RUQ pain (06 Mar 2020 21:44)      BRIEF HOSPITAL COURSE:   Patient is a 64 year old male with a pmhx of MS(wheelchair bound, moves only left upper ext) and chronic pain who presents to [facility] for fall. Information obtained via EMR. Patient had a fall yesterday from his motorized wheelchair and landed on the ground hitting the right side of his face. It has been reported that prior to fall he was confused and had started to have episodes of AMS. As patient presented to ED, he remained lethargic unable to answer questions, was found to be febrile have acute cholecystitis and taken for emergent surgery. Prior to surgery patient had episode of desaturation to low 80s which improved with 2L NC. Patient remained intubated post op due to prior episodes of hypoxia. Taken to ICU post op for further management.       PAST MEDICAL & SURGICAL HISTORY:  Femur fracture, left  MS (multiple sclerosis)  No significant past surgical history    Allergies    No Known Allergies    Intolerances      FAMILY HISTORY:  No pertinent family history in first degree relatives    Social:  Unable to assess 2/2 intubation     Review of Systems:  Unable to assess 2/2 intubation       Medications:  piperacillin/tazobactam IVPB.. 3.375 Gram(s) IV Intermittent every 8 hours  fentaNYL    Injectable 50 MICROGram(s) IV Push every 1 hour PRN  HYDROmorphone  Injectable 1 milliGRAM(s) IV Push every 4 hours PRN  HYDROmorphone  Injectable 0.5 milliGRAM(s) IV Push every 4 hours PRN  HYDROmorphone  Injectable 0.25 milliGRAM(s) IV Push every 4 hours PRN  midazolam Injectable 2 milliGRAM(s) IV Push every 15 minutes PRN  propofol Infusion 20 MICROgram(s)/kG/Min IV Continuous <Continuous>  enoxaparin Injectable 40 milliGRAM(s) SubCutaneous at bedtime  lactated ringers. 1000 milliLiter(s) IV Continuous <Continuous>  chlorhexidine 0.12% Liquid 15 milliLiter(s) Oral Mucosa every 12 hours  chlorhexidine 2% Cloths 1 Application(s) Topical <User Schedule>        Mode: AC/ CMV (Assist Control/ Continuous Mandatory Ventilation)  RR (machine): 16  TV (machine): 500  FiO2: 40  PEEP: 5  ITime: 1  MAP: 8  PIP: 16      ICU Vital Signs Last 24 Hrs  T(C): 37.5 (07 Mar 2020 00:18), Max: 40.1 (06 Mar 2020 08:41)  T(F): 99.5 (07 Mar 2020 00:18), Max: 104.2 (06 Mar 2020 08:41)  HR: 89 (07 Mar 2020 00:34) (89 - 115)  BP: 159/85 (07 Mar 2020 00:33) (144/82 - 224/124)  RR: 15 (07 Mar 2020 00:33) (15 - 26)  SpO2: 98% (07 Mar 2020 00:34) (90% - 100%)      ABG - ( 06 Mar 2020 09:02 )  pH, Arterial: 7.48  pH, Blood: x     /  pCO2: 36    /  pO2: 78    / HCO3: 28    / Base Excess: 3.7   /  SaO2: 96            I&O's Detail        LABS:                        13.0   15.03 )-----------( 200      ( 06 Mar 2020 09:09 )             37.8     03-07    139  |  104  |  22  ----------------------------<  141<H>  3.6   |  23  |  0.71    Ca    8.2<L>      07 Mar 2020 00:41    TPro  6.8  /  Alb  3.0<L>  /  TBili  1.0  /  DBili  x   /  AST  16  /  ALT  23  /  AlkPhos  84  03-06          CAPILLARY BLOOD GLUCOSE        PT/INR - ( 06 Mar 2020 18:52 )   PT: 17.6 sec;   INR: 1.55 ratio         PTT - ( 06 Mar 2020 18:52 )  PTT:31.4 sec  Urinalysis Basic - ( 06 Mar 2020 09:10 )    Color: Carole / Appearance: Slightly Turbid / S.010 / pH: x  Gluc: x / Ketone: Small  / Bili: Small / Urobili: 1   Blood: x / Protein: 30 mg/dL / Nitrite: Negative   Leuk Esterase: Trace / RBC: 3-5 /HPF / WBC 0-2   Sq Epi: x / Non Sq Epi: Few / Bacteria: Few      CULTURES:      Physical Examination:    General: Agitated, shaking head, intubated     HEENT: Pupils equal, reactive to light.  Symmetric. Right facial hematoma and scabs healing     PULM: Clear to auscultation bilaterally, + secretions    CVS: Regular rate and rhythm, no murmurs, rubs, or gallops    ABD: Soft, nondistended, nontender, normoactive bowel sounds, no masses, SAM drain in place     EXT: 1+ pitting edema b/l LE, nontender    SKIN: Warm and well perfused    Neuro: Unable to move b/l LE, and RUE can squeeze with LUE, follows simple commands, face symmetric     RADIOLOGY:   < from: NM Hepatobiliary Imaging (20 @ 18:13) >  INTERPRETATION:  CLINICAL INFORMATION: 64-year-old male, right upper quadrant abdominal pain, fever, leukocytosis and markedly distended gallbladder on CT evaluate for acute cholecystitis.    RADIOPHARMACEUTICAL: 3 mCi Tc-99m-Mebrofenin, I.V.; 2 doses    TECHNIQUE: Dynamic imaging of the anterior abdomen was performed for 2 hours after the injection of radiotracer followed by static images of the abdomenin the anterior, right lateral and right anterior oblique projections.  Morphine 4 mg I.V. and a second dose of radiotracer were administered at 1 hour.      COMPARISON: No prior hepatobiliary scan available for comparison.    FINDINGS: There is prompt, homogeneous uptake of radiotracer by the hepatocytes. Activity is first seen in the bowel at 20 minutes. The gallbladder is not visualized at any time during the study, despite administration of morphine. There is good clearance of activity from the liver at the end of the study.    IMPRESSION: Abnormal morphine-augmented hepatobiliary scan.    Findings consistent with acute cholecystitis.        Critical Care time: 45 mins assessing presenting problems of acute illness that poses high probability of life threatening deterioration or end organ damage/dysfunction.  Medical decision making inculding Initiating plan of care, reviewing data, reviewing radiology,direct patient bedside evaluation and interpretation of vital signs, any necessary ventilator management , discusion with multidisciplinary team, discussing goals of care with patient/family, all non inclusive of procedures REASON FOR CONSULT: Sepsis, hypoxia     CONSULT REQUESTED BY: Dr. Marcelino    Patient is a 64y old  Male who presents with a chief complaint of s/p fall, RUQ pain (06 Mar 2020 21:44)      BRIEF HOSPITAL COURSE:   Patient is a 64 year old male with a pmhx of MS(wheelchair bound, moves only left upper ext) and chronic pain who presents to [facility] for fall. Information obtained via EMR. Patient had a fall yesterday from his motorized wheelchair and landed on the ground hitting the right side of his face. It has been reported that prior to fall he was confused and had started to have episodes of AMS. As patient presented to ED, he remained lethargic unable to answer questions, was found to be febrile have acute cholecystitis and taken for emergent surgery. Prior to surgery patient had episode of desaturation to low 80s which improved with 2L NC. Patient remained intubated post op due to prior episodes of hypoxia. Per surgery intra op found markedly distended thin walled ischemic-colored gallbladder with black bile. Taken to ICU post op for further management.       PAST MEDICAL & SURGICAL HISTORY:  Femur fracture, left  MS (multiple sclerosis)  No significant past surgical history    Allergies    No Known Allergies    Intolerances      FAMILY HISTORY:  No pertinent family history in first degree relatives    Social:  Unable to assess 2/2 intubation     Review of Systems:  Unable to assess 2/2 intubation       Medications:  piperacillin/tazobactam IVPB.. 3.375 Gram(s) IV Intermittent every 8 hours  fentaNYL    Injectable 50 MICROGram(s) IV Push every 1 hour PRN  HYDROmorphone  Injectable 1 milliGRAM(s) IV Push every 4 hours PRN  HYDROmorphone  Injectable 0.5 milliGRAM(s) IV Push every 4 hours PRN  HYDROmorphone  Injectable 0.25 milliGRAM(s) IV Push every 4 hours PRN  midazolam Injectable 2 milliGRAM(s) IV Push every 15 minutes PRN  propofol Infusion 20 MICROgram(s)/kG/Min IV Continuous <Continuous>  enoxaparin Injectable 40 milliGRAM(s) SubCutaneous at bedtime  lactated ringers. 1000 milliLiter(s) IV Continuous <Continuous>  chlorhexidine 0.12% Liquid 15 milliLiter(s) Oral Mucosa every 12 hours  chlorhexidine 2% Cloths 1 Application(s) Topical <User Schedule>        Mode: AC/ CMV (Assist Control/ Continuous Mandatory Ventilation)  RR (machine): 16  TV (machine): 500  FiO2: 40  PEEP: 5  ITime: 1  MAP: 8  PIP: 16      ICU Vital Signs Last 24 Hrs  T(C): 37.5 (07 Mar 2020 00:18), Max: 40.1 (06 Mar 2020 08:41)  T(F): 99.5 (07 Mar 2020 00:18), Max: 104.2 (06 Mar 2020 08:41)  HR: 89 (07 Mar 2020 00:34) (89 - 115)  BP: 159/85 (07 Mar 2020 00:33) (144/82 - 224/124)  RR: 15 (07 Mar 2020 00:33) (15 - 26)  SpO2: 98% (07 Mar 2020 00:34) (90% - 100%)      ABG - ( 06 Mar 2020 09:02 )  pH, Arterial: 7.48  pH, Blood: x     /  pCO2: 36    /  pO2: 78    / HCO3: 28    / Base Excess: 3.7   /  SaO2: 96            I&O's Detail        LABS:                        13.0   15.03 )-----------( 200      ( 06 Mar 2020 09:09 )             37.8     03-07    139  |  104  |  22  ----------------------------<  141<H>  3.6   |  23  |  0.71    Ca    8.2<L>      07 Mar 2020 00:41    TPro  6.8  /  Alb  3.0<L>  /  TBili  1.0  /  DBili  x   /  AST  16  /  ALT  23  /  AlkPhos  84  03-06          CAPILLARY BLOOD GLUCOSE        PT/INR - ( 06 Mar 2020 18:52 )   PT: 17.6 sec;   INR: 1.55 ratio         PTT - ( 06 Mar 2020 18:52 )  PTT:31.4 sec  Urinalysis Basic - ( 06 Mar 2020 09:10 )    Color: Carole / Appearance: Slightly Turbid / S.010 / pH: x  Gluc: x / Ketone: Small  / Bili: Small / Urobili: 1   Blood: x / Protein: 30 mg/dL / Nitrite: Negative   Leuk Esterase: Trace / RBC: 3-5 /HPF / WBC 0-2   Sq Epi: x / Non Sq Epi: Few / Bacteria: Few      CULTURES:      Physical Examination:    General: Agitated, shaking head, intubated     HEENT: Pupils equal, reactive to light.  Symmetric. Right facial hematoma and scabs healing     PULM: Clear to auscultation bilaterally, + secretions    CVS: Regular rate and rhythm, no murmurs, rubs, or gallops    ABD: Soft, nondistended, nontender, normoactive bowel sounds, no masses, SAM drain in place     EXT: 1+ pitting edema b/l LE, nontender    SKIN: Warm and well perfused    Neuro: Unable to move b/l LE, and RUE can squeeze with LUE, follows simple commands, face symmetric     RADIOLOGY:   < from: NM Hepatobiliary Imaging (20 @ 18:13) >  INTERPRETATION:  CLINICAL INFORMATION: 64-year-old male, right upper quadrant abdominal pain, fever, leukocytosis and markedly distended gallbladder on CT evaluate for acute cholecystitis.    RADIOPHARMACEUTICAL: 3 mCi Tc-99m-Mebrofenin, I.V.; 2 doses    TECHNIQUE: Dynamic imaging of the anterior abdomen was performed for 2 hours after the injection of radiotracer followed by static images of the abdomenin the anterior, right lateral and right anterior oblique projections.  Morphine 4 mg I.V. and a second dose of radiotracer were administered at 1 hour.      COMPARISON: No prior hepatobiliary scan available for comparison.    FINDINGS: There is prompt, homogeneous uptake of radiotracer by the hepatocytes. Activity is first seen in the bowel at 20 minutes. The gallbladder is not visualized at any time during the study, despite administration of morphine. There is good clearance of activity from the liver at the end of the study.    IMPRESSION: Abnormal morphine-augmented hepatobiliary scan.    Findings consistent with acute cholecystitis.        Critical Care time: 45 mins assessing presenting problems of acute illness that poses high probability of life threatening deterioration or end organ damage/dysfunction.  Medical decision making inculding Initiating plan of care, reviewing data, reviewing radiology,direct patient bedside evaluation and interpretation of vital signs, any necessary ventilator management , discusion with multidisciplinary team, discussing goals of care with patient/family, all non inclusive of procedures REASON FOR CONSULT: Sepsis, hypoxia     CONSULT REQUESTED BY: Dr. Marcelino    Patient is a 64y old  Male who presents with a chief complaint of s/p fall, RUQ pain (06 Mar 2020 21:44)      BRIEF HOSPITAL COURSE:   Patient is a 64 year old male with a pmhx of MS(wheelchair bound, moves only left upper ext) and chronic pain who presents to [facility] for fall. Information obtained via EMR. Patient had a fall yesterday from his motorized wheelchair and landed on the ground hitting the right side of his face. It has been reported that prior to fall he was confused and had started to have episodes of AMS. As patient presented to ED, he remained lethargic unable to answer questions, was found to be febrile have acute cholecystitis and taken for emergent surgery. Prior to surgery patient had episode of desaturation to mid 80s which improved with 4L NC. Patient remained intubated post op due to prior episodes of hypoxia. Per surgery intra op found markedly distended thin walled ischemic-colored gallbladder with black bile. Taken to ICU post op for further management.       PAST MEDICAL & SURGICAL HISTORY:  Femur fracture, left  MS (multiple sclerosis)  No significant past surgical history    Allergies    No Known Allergies    Intolerances      FAMILY HISTORY:  No pertinent family history in first degree relatives    Social:  Unable to assess 2/2 intubation     Review of Systems:  Unable to assess 2/2 intubation       Medications:  piperacillin/tazobactam IVPB.. 3.375 Gram(s) IV Intermittent every 8 hours  fentaNYL    Injectable 50 MICROGram(s) IV Push every 1 hour PRN  HYDROmorphone  Injectable 1 milliGRAM(s) IV Push every 4 hours PRN  HYDROmorphone  Injectable 0.5 milliGRAM(s) IV Push every 4 hours PRN  HYDROmorphone  Injectable 0.25 milliGRAM(s) IV Push every 4 hours PRN  midazolam Injectable 2 milliGRAM(s) IV Push every 15 minutes PRN  propofol Infusion 20 MICROgram(s)/kG/Min IV Continuous <Continuous>  enoxaparin Injectable 40 milliGRAM(s) SubCutaneous at bedtime  lactated ringers. 1000 milliLiter(s) IV Continuous <Continuous>  chlorhexidine 0.12% Liquid 15 milliLiter(s) Oral Mucosa every 12 hours  chlorhexidine 2% Cloths 1 Application(s) Topical <User Schedule>        Mode: AC/ CMV (Assist Control/ Continuous Mandatory Ventilation)  RR (machine): 16  TV (machine): 500  FiO2: 40  PEEP: 5  ITime: 1  MAP: 8  PIP: 16      ICU Vital Signs Last 24 Hrs  T(C): 37.5 (07 Mar 2020 00:18), Max: 40.1 (06 Mar 2020 08:41)  T(F): 99.5 (07 Mar 2020 00:18), Max: 104.2 (06 Mar 2020 08:41)  HR: 89 (07 Mar 2020 00:34) (89 - 115)  BP: 159/85 (07 Mar 2020 00:33) (144/82 - 224/124)  RR: 15 (07 Mar 2020 00:33) (15 - 26)  SpO2: 98% (07 Mar 2020 00:34) (90% - 100%)      ABG - ( 06 Mar 2020 09:02 )  pH, Arterial: 7.48  pH, Blood: x     /  pCO2: 36    /  pO2: 78    / HCO3: 28    / Base Excess: 3.7   /  SaO2: 96            I&O's Detail        LABS:                        13.0   15.03 )-----------( 200      ( 06 Mar 2020 09:09 )             37.8     03-07    139  |  104  |  22  ----------------------------<  141<H>  3.6   |  23  |  0.71    Ca    8.2<L>      07 Mar 2020 00:41    TPro  6.8  /  Alb  3.0<L>  /  TBili  1.0  /  DBili  x   /  AST  16  /  ALT  23  /  AlkPhos  84  03-06          CAPILLARY BLOOD GLUCOSE        PT/INR - ( 06 Mar 2020 18:52 )   PT: 17.6 sec;   INR: 1.55 ratio         PTT - ( 06 Mar 2020 18:52 )  PTT:31.4 sec  Urinalysis Basic - ( 06 Mar 2020 09:10 )    Color: Carole / Appearance: Slightly Turbid / S.010 / pH: x  Gluc: x / Ketone: Small  / Bili: Small / Urobili: 1   Blood: x / Protein: 30 mg/dL / Nitrite: Negative   Leuk Esterase: Trace / RBC: 3-5 /HPF / WBC 0-2   Sq Epi: x / Non Sq Epi: Few / Bacteria: Few      CULTURES:      Physical Examination:    General: Agitated, shaking head, intubated     HEENT: Pupils equal, reactive to light.  Symmetric. Right facial hematoma and scabs healing     PULM: Clear to auscultation bilaterally, + secretions    CVS: Regular rate and rhythm, no murmurs, rubs, or gallops    ABD: Soft, nondistended, nontender, normoactive bowel sounds, no masses, SAM drain in place     EXT: 1+ pitting edema b/l LE, nontender    SKIN: Warm and well perfused    Neuro: Unable to move b/l LE, and RUE can squeeze with LUE, follows simple commands, face symmetric     RADIOLOGY:   < from: NM Hepatobiliary Imaging (20 @ 18:13) >  INTERPRETATION:  CLINICAL INFORMATION: 64-year-old male, right upper quadrant abdominal pain, fever, leukocytosis and markedly distended gallbladder on CT evaluate for acute cholecystitis.    RADIOPHARMACEUTICAL: 3 mCi Tc-99m-Mebrofenin, I.V.; 2 doses    TECHNIQUE: Dynamic imaging of the anterior abdomen was performed for 2 hours after the injection of radiotracer followed by static images of the abdomenin the anterior, right lateral and right anterior oblique projections.  Morphine 4 mg I.V. and a second dose of radiotracer were administered at 1 hour.      COMPARISON: No prior hepatobiliary scan available for comparison.    FINDINGS: There is prompt, homogeneous uptake of radiotracer by the hepatocytes. Activity is first seen in the bowel at 20 minutes. The gallbladder is not visualized at any time during the study, despite administration of morphine. There is good clearance of activity from the liver at the end of the study.    IMPRESSION: Abnormal morphine-augmented hepatobiliary scan.    Findings consistent with acute cholecystitis.        Critical Care time: 45 mins assessing presenting problems of acute illness that poses high probability of life threatening deterioration or end organ damage/dysfunction.  Medical decision making inculding Initiating plan of care, reviewing data, reviewing radiology,direct patient bedside evaluation and interpretation of vital signs, any necessary ventilator management , discusion with multidisciplinary team, discussing goals of care with patient/family, all non inclusive of procedures REASON FOR CONSULT: Sepsis, hypoxia     CONSULT REQUESTED BY: Dr. Marcelino    Patient is a 64y old  Male who presents with a chief complaint of s/p fall, RUQ pain (06 Mar 2020 21:44)      BRIEF HOSPITAL COURSE:   Patient is a 64 year old male with a pmhx of MS(wheelchair bound, moves only left upper ext) and chronic pain who presents to [facility] for fall. Information obtained via EMR. Patient had a fall yesterday from his motorized wheelchair and landed on the ground hitting the right side of his face. It has been reported that prior to fall he was confused and had started to have episodes of AMS. As patient presented to ED, he remained lethargic unable to answer questions, was found to be febrile have acute cholecystitis, subsequently taken for emergent surgery. Prior to surgery patient had episode of desaturation to mid 80s which improved with 4L NC. Patient remained intubated post op due to prior episodes of hypoxia. Per surgery intra op found markedly distended thin walled ischemic-colored gallbladder with black bile. Taken to ICU post op for further management.       PAST MEDICAL & SURGICAL HISTORY:  Femur fracture, left  MS (multiple sclerosis)  No significant past surgical history    Allergies    No Known Allergies    Intolerances      FAMILY HISTORY:  No pertinent family history in first degree relatives    Social:  Unable to assess 2/2 intubation     Review of Systems:  Unable to assess 2/2 intubation       Medications:  piperacillin/tazobactam IVPB.. 3.375 Gram(s) IV Intermittent every 8 hours  fentaNYL    Injectable 50 MICROGram(s) IV Push every 1 hour PRN  HYDROmorphone  Injectable 1 milliGRAM(s) IV Push every 4 hours PRN  HYDROmorphone  Injectable 0.5 milliGRAM(s) IV Push every 4 hours PRN  HYDROmorphone  Injectable 0.25 milliGRAM(s) IV Push every 4 hours PRN  midazolam Injectable 2 milliGRAM(s) IV Push every 15 minutes PRN  propofol Infusion 20 MICROgram(s)/kG/Min IV Continuous <Continuous>  enoxaparin Injectable 40 milliGRAM(s) SubCutaneous at bedtime  lactated ringers. 1000 milliLiter(s) IV Continuous <Continuous>  chlorhexidine 0.12% Liquid 15 milliLiter(s) Oral Mucosa every 12 hours  chlorhexidine 2% Cloths 1 Application(s) Topical <User Schedule>        Mode: AC/ CMV (Assist Control/ Continuous Mandatory Ventilation)  RR (machine): 16  TV (machine): 500  FiO2: 40  PEEP: 5  ITime: 1  MAP: 8  PIP: 16      ICU Vital Signs Last 24 Hrs  T(C): 37.5 (07 Mar 2020 00:18), Max: 40.1 (06 Mar 2020 08:41)  T(F): 99.5 (07 Mar 2020 00:18), Max: 104.2 (06 Mar 2020 08:41)  HR: 89 (07 Mar 2020 00:34) (89 - 115)  BP: 159/85 (07 Mar 2020 00:33) (144/82 - 224/124)  RR: 15 (07 Mar 2020 00:33) (15 - 26)  SpO2: 98% (07 Mar 2020 00:34) (90% - 100%)      ABG - ( 06 Mar 2020 09:02 )  pH, Arterial: 7.48  pH, Blood: x     /  pCO2: 36    /  pO2: 78    / HCO3: 28    / Base Excess: 3.7   /  SaO2: 96            I&O's Detail        LABS:                        13.0   15.03 )-----------( 200      ( 06 Mar 2020 09:09 )             37.8     03-07    139  |  104  |  22  ----------------------------<  141<H>  3.6   |  23  |  0.71    Ca    8.2<L>      07 Mar 2020 00:41    TPro  6.8  /  Alb  3.0<L>  /  TBili  1.0  /  DBili  x   /  AST  16  /  ALT  23  /  AlkPhos  84  03-06          CAPILLARY BLOOD GLUCOSE        PT/INR - ( 06 Mar 2020 18:52 )   PT: 17.6 sec;   INR: 1.55 ratio         PTT - ( 06 Mar 2020 18:52 )  PTT:31.4 sec  Urinalysis Basic - ( 06 Mar 2020 09:10 )    Color: Carole / Appearance: Slightly Turbid / S.010 / pH: x  Gluc: x / Ketone: Small  / Bili: Small / Urobili: 1   Blood: x / Protein: 30 mg/dL / Nitrite: Negative   Leuk Esterase: Trace / RBC: 3-5 /HPF / WBC 0-2   Sq Epi: x / Non Sq Epi: Few / Bacteria: Few      CULTURES:      Physical Examination:    General: Agitated, shaking head, intubated     HEENT: Pupils equal, reactive to light.  Symmetric. Right facial hematoma and scabs healing     PULM: Clear to auscultation bilaterally, + secretions    CVS: Regular rate and rhythm, no murmurs, rubs, or gallops    ABD: Soft, nondistended, nontender, normoactive bowel sounds, no masses, SAM drain in place     EXT: 1+ pitting edema b/l LE, nontender    SKIN: Warm and well perfused    Neuro: Unable to move b/l LE, and RUE can squeeze with LUE, follows simple commands, face symmetric     RADIOLOGY:   < from: NM Hepatobiliary Imaging (20 @ 18:13) >  INTERPRETATION:  CLINICAL INFORMATION: 64-year-old male, right upper quadrant abdominal pain, fever, leukocytosis and markedly distended gallbladder on CT evaluate for acute cholecystitis.    RADIOPHARMACEUTICAL: 3 mCi Tc-99m-Mebrofenin, I.V.; 2 doses    TECHNIQUE: Dynamic imaging of the anterior abdomen was performed for 2 hours after the injection of radiotracer followed by static images of the abdomenin the anterior, right lateral and right anterior oblique projections.  Morphine 4 mg I.V. and a second dose of radiotracer were administered at 1 hour.      COMPARISON: No prior hepatobiliary scan available for comparison.    FINDINGS: There is prompt, homogeneous uptake of radiotracer by the hepatocytes. Activity is first seen in the bowel at 20 minutes. The gallbladder is not visualized at any time during the study, despite administration of morphine. There is good clearance of activity from the liver at the end of the study.    IMPRESSION: Abnormal morphine-augmented hepatobiliary scan.    Findings consistent with acute cholecystitis.        Critical Care time: 45 mins assessing presenting problems of acute illness that poses high probability of life threatening deterioration or end organ damage/dysfunction.  Medical decision making inculding Initiating plan of care, reviewing data, reviewing radiology,direct patient bedside evaluation and interpretation of vital signs, any necessary ventilator management , discusion with multidisciplinary team, discussing goals of care with patient/family, all non inclusive of procedures

## 2020-03-07 NOTE — PROGRESS NOTE ADULT - SUBJECTIVE AND OBJECTIVE BOX
Patient is a 64y old  Male who presents with a chief complaint of s/p fall, RUQ pain (07 Mar 2020 18:14)      INTERVAL HPI/OVERNIGHT EVENTS:  T(C): 37.8 (20 @ 15:02), Max: 38.6 (20 @ 19:44)  HR: 89 (20 @ 18:00) (72 - 119)  BP: 177/84 (20 @ 18:00) (101/57 - 224/124)  RR: 22 (20 @ 18:00) (11 - 33)  SpO2: 98% (20 @ 18:00) (91% - 100%)  Wt(kg): --  I&O's Summary    06 Mar 2020 07:01  -  07 Mar 2020 07:00  --------------------------------------------------------  IN: 806.6 mL / OUT: 940 mL / NET: -133.4 mL    07 Mar 2020 06:01  -  07 Mar 2020 19:06  --------------------------------------------------------  IN: 1072.4 mL / OUT: 650 mL / NET: 422.4 mL        LABS:                        11.1   10.17 )-----------( 129      ( 07 Mar 2020 05:42 )             32.7     03-07    138  |  104  |  19  ----------------------------<  164<H>  3.9   |  25  |  0.72    Ca    8.4<L>      07 Mar 2020 05:42  Phos  2.8     03-07  Mg     1.8     03-07    TPro  6.4  /  Alb  2.9<L>  /  TBili  0.7  /  DBili  .30<H>  /  AST  23  /  ALT  28  /  AlkPhos  67  03-07    PT/INR - ( 06 Mar 2020 18:52 )   PT: 17.6 sec;   INR: 1.55 ratio         PTT - ( 06 Mar 2020 18:52 )  PTT:31.4 sec  Urinalysis Basic - ( 06 Mar 2020 09:10 )    Color: Carole / Appearance: Slightly Turbid / S.010 / pH: x  Gluc: x / Ketone: Small  / Bili: Small / Urobili: 1   Blood: x / Protein: 30 mg/dL / Nitrite: Negative   Leuk Esterase: Trace / RBC: 3-5 /HPF / WBC 0-2   Sq Epi: x / Non Sq Epi: Few / Bacteria: Few      CAPILLARY BLOOD GLUCOSE        ABG - ( 07 Mar 2020 01:18 )  pH, Arterial: 7.39  pH, Blood: x     /  pCO2: 42    /  pO2: 122   / HCO3: 25    / Base Excess: 0.5   /  SaO2: 99                  Urinalysis Basic - ( 06 Mar 2020 09:10 )    Color: Carole / Appearance: Slightly Turbid / S.010 / pH: x  Gluc: x / Ketone: Small  / Bili: Small / Urobili: 1   Blood: x / Protein: 30 mg/dL / Nitrite: Negative   Leuk Esterase: Trace / RBC: 3-5 /HPF / WBC 0-2   Sq Epi: x / Non Sq Epi: Few / Bacteria: Few        MEDICATIONS  (STANDING):  chlorhexidine 2% Cloths 1 Application(s) Topical <User Schedule>  enoxaparin Injectable 40 milliGRAM(s) SubCutaneous at bedtime  escitalopram 20 milliGRAM(s) Oral daily  hydrALAZINE Injectable 10 milliGRAM(s) IV Push every 6 hours  lactated ringers. 1000 milliLiter(s) (75 mL/Hr) IV Continuous <Continuous>  OXcarbazepine 300 milliGRAM(s) Oral two times a day  oxybutynin XL 15 milliGRAM(s) Oral <User Schedule>  pantoprazole  Injectable 40 milliGRAM(s) IV Push daily  piperacillin/tazobactam IVPB.. 3.375 Gram(s) IV Intermittent every 8 hours  pregabalin 150 milliGRAM(s) Oral three times a day  Symproic (naldemedine) 0.2 mg tablet 1 Tablet(s) 1 Tablet(s) Oral daily    MEDICATIONS  (PRN):  HYDROmorphone  Injectable 1 milliGRAM(s) IV Push every 4 hours PRN Severe Pain (7 - 10)  HYDROmorphone  Injectable 0.5 milliGRAM(s) IV Push every 4 hours PRN Moderate Pain (4 - 6)  HYDROmorphone  Injectable 0.25 milliGRAM(s) IV Push every 4 hours PRN Mild Pain (1 - 3)          PHYSICAL EXAM:  GENERAL: obese  CHEST/LUNG: Clear to percussion bilaterally; No rales, rhonchi, wheezing, or rubs  HEART: s1s2+  ABDOMEN: Soft, tender, Nondistended; Bowel sounds present  EXTREMITIES:  edema +    Care Discussed with Consultants/Other Providers [ ] YES  [ ] NO

## 2020-03-07 NOTE — PROGRESS NOTE ADULT - ASSESSMENT
63 yo M presented to the ER with altered mental status 2/2 to suspected cholecystitis. PMH Primary Progressive Multiple Sclerosis, Chronic pain.     Distended Gallbladder: possible cholecystitis.   -recommend continuing abx.   - ID and surgery fu     Primary Progressive MS:   - neuro fu  - management of agitation as per neuro     Depression: continue Lexapro 20mg daily    Management as per critical care     case dw wife at bedside

## 2020-03-07 NOTE — CONSULT NOTE ADULT - SUBJECTIVE AND OBJECTIVE BOX
Altered in mental status,   For Altered in mental status and lethargy, suspect most likely this is metabolic encephalopathy, which is multifactorial questionable to underlying infection type process dorothy, ms exacerbation form temperatures which can cause to decompensate mental wise  , possible lyrica withdrawal, h/o anesthesia      For history of sepsis, antibiotics as needed.  monitor renal function  restart Lyrica  when possible  spoke to family in detail

## 2020-03-07 NOTE — CONSULT NOTE ADULT - ASSESSMENT
Sepsis due to cholecystitis.  ?Gangrenous GB.  Patient apparently on chronic narcotics potentially masking abdominal symptoms PTA  Patient with what would appear to be fairly advanced MS. Suspect component of central temperature dysregulation accounts for fever to 104F.  Delirium due to acute medical issues  Current antibiotics adequate    Suggestions--  Continue Zosyn  Maintain euthermia as best possible, fever worsens functional status with MS  F/U cx data    Thank you for the courtesy of this referral.  Yohan Muñoz MD  [phone]

## 2020-03-07 NOTE — PROGRESS NOTE ADULT - ATTENDING COMMENTS
63 yo M with Hx advanced multiple sclerosis, hip Fx presenting with fall and AMS, found to have acute cholecystitis, POD 1 s/p lap cholecystitis.  Successfully extubated this am, though has significant encephalopathy +/- delirium.    --encephalopathy +/- delirium  suspect multifactorial from acute illness, surgery, pain  restart home lyrica, lexapro, trileptal when able to take PO (if unable then possible NGT)  neuro eval  ordered CTH, though even with precedex had too much motion to be diagnostically useful  pain control with dilaudid  --htn, started hydralazine  suspect related in part to pain and delirium  --stable from respiratory standpoint  --s/p lap mukul  advance diet per surgery, ok to have meds  --normal renal function  --continue zosyn for acute cholecystitis, ?gangrenous BG  --plan discussed with wife in detail  also discussed with ID, surgery

## 2020-03-07 NOTE — DIETITIAN INITIAL EVALUATION ADULT. - OTHER INFO
65 yo M with Hx MS, wheelchair bound presented with confusion and fall from wheelchair.  Found to have acute cholecystitis.  POD#1 post lap cholecystectomy.  Pt still somewhat confused, unclear whether pt understands questions asked.  Pt appears well nourished.   Per RDN note last admission 2018, #.

## 2020-03-07 NOTE — CHART NOTE - NSCHARTNOTESELECT_GEN_ALL_CORE
Chief Complaint   Patient presents with    Sore Throat     started today    Fever    Vomiting    Headache     1. Have you been to the ER, urgent care clinic since your last visit? Hospitalized since your last visit? No    2. Have you seen or consulted any other health care providers outside of the [address] [place] [address] since your last visit? Include any pap smears or colon screening.  No Event Note

## 2020-03-07 NOTE — PROGRESS NOTE ADULT - SUBJECTIVE AND OBJECTIVE BOX
24 hour events:   presented with confusion     Review of Systems:  Constitutional: No fever, chills, fatigue  Neuro: No headache, numbness, weakness  Resp: No cough, wheezing, shortness of breath  CVS: No chest pain, palpitations, leg swelling  GI: No abdominal pain, nausea, vomiting, diarrhea   : No dysuria, frequency, incontinence  Skin: No itching, burning, rashes, or lesions   Msk: No joint pain or swelling  Psych: No depression, anxiety, mood swings    T(F): 98.4 (20 @ 07:28), Max: 104.2 (20 @ 08:41)  HR: 75 (20 @ 06:30) (74 - 119)  BP: 169/87 (20 @ 06:30) (129/79 - 224/124)  RR: 13 (20 @ 06:30) (11 - 33)  SpO2: 100% (20 @ 06:30) (90% - 100%)  Wt(kg): --    Mode: AC/ CMV (Assist Control/ Continuous Mandatory Ventilation), RR (machine): 16, TV (machine): 500, FiO2: 30, PEEP: 5  20 @ 07:01  -  20 @ 07:00  --------------------------------------------------------  IN: 806.6 mL / OUT: 940 mL / NET: -133.4 mL        CAPILLARY BLOOD GLUCOSE          I&O's Summary    06 Mar 2020 07:01  -  07 Mar 2020 07:00  --------------------------------------------------------  IN: 806.6 mL / OUT: 940 mL / NET: -133.4 mL        Physical Exam:   Gen:  Neuro:  HEENT:  Resp:  CVS:  Abd:  Ext:  Skin:    Meds:  piperacillin/tazobactam IVPB.. IV Intermittent          HYDROmorphone  Injectable IV Push PRN  HYDROmorphone  Injectable IV Push PRN  HYDROmorphone  Injectable IV Push PRN      enoxaparin Injectable SubCutaneous    pantoprazole  Injectable IV Push      lactated ringers. IV Continuous      chlorhexidine 2% Cloths Topical                              11.1   10.17 )-----------( 129      ( 07 Mar 2020 05:42 )             32.7       03-07    138  |  104  |  19  ----------------------------<  164<H>  3.9   |  25  |  0.72    Ca    8.4<L>      07 Mar 2020 05:42  Phos  2.8     03-07  Mg     1.8     03-07    TPro  6.8  /  Alb  3.0<L>  /  TBili  1.0  /  DBili  x   /  AST  16  /  ALT  23  /  AlkPhos  84  03-06    Lactate 1.3           03-06 @ 09:09          PT/INR - ( 06 Mar 2020 18:52 )   PT: 17.6 sec;   INR: 1.55 ratio         PTT - ( 06 Mar 2020 18:52 )  PTT:31.4 sec  Urinalysis Basic - ( 06 Mar 2020 09:10 )    Color: Carole / Appearance: Slightly Turbid / S.010 / pH: x  Gluc: x / Ketone: Small  / Bili: Small / Urobili: 1   Blood: x / Protein: 30 mg/dL / Nitrite: Negative   Leuk Esterase: Trace / RBC: 3-5 /HPF / WBC 0-2   Sq Epi: x / Non Sq Epi: Few / Bacteria: Few                  Radiology: ***  Bedside ultrasound: ***    CENTRAL LINE: N/Y          DATE INSERTED:              REMOVE: Y/N  LOMAS: N/Y                       DATE INSERTED:              REMOVE: Y/N  A-LINE: N/Y                       DATE INSERTED:              REMOVE: Y/N    GLOBAL ISSUE/BEST PRACTICE:  Analgesia:  Sedation:  CAM-ICU:   HOB elevation: yes  Stress ulcer prophylaxis:  VTE prophylaxis:  Glycemic control:  Nutrition:    CODE STATUS: *** 24 hour events:   63 yo M with Hx MS, wheelchair bound presented with confusion and fall from wheelchair.  Found to have acute cholecystitis.  Tm 101.4 at 8pm  went to OR for lap cholecystectomy, found to have acute cholecystitis with ischemic colored GB  remained intubated postop due to preop episode of hypoxemia, 85% on RA  about 4am, balloon of ETT ruptured   pt was alert and oxygenating well, decision to extubate      T(F): 98.4 (20 @ 07:28), Max: 104.2 (20 @ 08:41)  HR: 75 (20 @ 06:30) (74 - 119)  BP: 169/87 (20 @ 06:30) (129/79 - 224/124)  RR: 13 (20 @ 06:30) (11 - 33)  SpO2: 100% (20 @ 06:30) (90% - 100%)  Wt(kg): --    Mode: AC/ CMV (Assist Control/ Continuous Mandatory Ventilation), RR (machine): 16, TV (machine): 500, FiO2: 30, PEEP: 5  20 @ 07:01  -  20 @ 07:00  --------------------------------------------------------  IN: 806.6 mL / OUT: 940 mL / NET: -133.4 mL        CAPILLARY BLOOD GLUCOSE          I&O's Summary    06 Mar 2020 07:01  -  07 Mar 2020 07:00  --------------------------------------------------------  IN: 806.6 mL / OUT: 940 mL / NET: -133.4 mL  drain 70cc      Physical Exam:   Gen:  Neuro:  HEENT:  Resp:  CVS:  Abd:  Ext:  Skin:    Meds:  piperacillin/tazobactam IVPB.. IV Intermittent          HYDROmorphone  Injectable IV Push PRN  HYDROmorphone  Injectable IV Push PRN  HYDROmorphone  Injectable IV Push PRN      enoxaparin Injectable SubCutaneous    pantoprazole  Injectable IV Push      lactated ringers. IV Continuous      chlorhexidine 2% Cloths Topical                              11.1   10.17 )-----------( 129      ( 07 Mar 2020 05:42 )             32.7       03-07    138  |  104  |  19  ----------------------------<  164<H>  3.9   |  25  |  0.72    Ca    8.4<L>      07 Mar 2020 05:42  Phos  2.8     03-07  Mg     1.8     03-07    TPro  6.8  /  Alb  3.0<L>  /  TBili  1.0  /  DBili  x   /  AST  16  /  ALT  23  /  AlkPhos  84  03-06    Lactate 1.3           03-06 @ 09:09          PT/INR - ( 06 Mar 2020 18:52 )   PT: 17.6 sec;   INR: 1.55 ratio         PTT - ( 06 Mar 2020 18:52 )  PTT:31.4 sec  Urinalysis Basic - ( 06 Mar 2020 09:10 )    Color: Carole / Appearance: Slightly Turbid / S.010 / pH: x  Gluc: x / Ketone: Small  / Bili: Small / Urobili: 1   Blood: x / Protein: 30 mg/dL / Nitrite: Negative   Leuk Esterase: Trace / RBC: 3-5 /HPF / WBC 0-2   Sq Epi: x / Non Sq Epi: Few / Bacteria: Few                  Radiology: ***  Bedside ultrasound: ***    CENTRAL LINE: N/Y          DATE INSERTED:              REMOVE: Y/N  LOMAS: N/Y                       DATE INSERTED:              REMOVE: Y/N  A-LINE: N/Y                       DATE INSERTED:              REMOVE: Y/N    GLOBAL ISSUE/BEST PRACTICE:  Analgesia:  Sedation:  CAM-ICU:   HOB elevation: yes  Stress ulcer prophylaxis:  VTE prophylaxis:  Glycemic control:  Nutrition:    CODE STATUS: *** 24 hour events:   65 yo M with Hx MS, wheelchair bound presented with confusion and fall from wheelchair.  Found to have acute cholecystitis.  Tm 101.4 at 8pm  went to OR for lap cholecystectomy, found to have acute cholecystitis with ischemic colored GB  remained intubated postop due to preop episode of hypoxemia, 85% on RA  about 4am, balloon of ETT ruptured   pt was alert and oxygenating well, decided to extubate      T(F): 98.4 (20 @ 07:28), Max: 104.2 (20 @ 08:41)  HR: 75 (20 @ 06:30) (74 - 119)  BP: 169/87 (20 @ 06:30) (129/79 - 224/124)  RR: 13 (20 @ 06:30) (11 - 33)  SpO2: 100% (20 @ 06:30) (90% - 100%)  Wt(kg): --    Mode: AC/ CMV (Assist Control/ Continuous Mandatory Ventilation), RR (machine): 16, TV (machine): 500, FiO2: 30, PEEP: 5  20 @ 07:01  -  20 @ 07:00  --------------------------------------------------------  IN: 806.6 mL / OUT: 940 mL / NET: -133.4 mL        CAPILLARY BLOOD GLUCOSE          I&O's Summary    06 Mar 2020 07:01  -  07 Mar 2020 07:00  --------------------------------------------------------  IN: 806.6 mL / OUT: 940 mL / NET: -133.4 mL  drain 70cc      Physical Exam:   Gen: chronically ill appearing  Neuro: resisting pupil exam, alert but inattentive, does not follow commands  HEENT: ecchymosis over R temple  Resp: CTABL  CVS: RRR  Abd: distended, SAM with serosanginous fluid, diffusely tender to palpation  Ext: 1+ edema b/l, muscle wasting, also seems to have severe pain to palpation of limbs  Skin: WWP    Meds:  piperacillin/tazobactam IVPB.. IV Intermittent          HYDROmorphone  Injectable IV Push PRN  HYDROmorphone  Injectable IV Push PRN  HYDROmorphone  Injectable IV Push PRN      enoxaparin Injectable SubCutaneous    pantoprazole  Injectable IV Push      lactated ringers. IV Continuous      chlorhexidine 2% Cloths Topical                              11.1   10.17 )-----------( 129      ( 07 Mar 2020 05:42 )             32.7       03-07    138  |  104  |  19  ----------------------------<  164<H>  3.9   |  25  |  0.72    Ca    8.4<L>      07 Mar 2020 05:42  Phos  2.8     03-07  Mg     1.8     03-07    TPro  6.8  /  Alb  3.0<L>  /  TBili  1.0  /  DBili  x   /  AST  16  /  ALT  23  /  AlkPhos  84  03-06    Lactate 1.3           03-06 @ 09:09          PT/INR - ( 06 Mar 2020 18:52 )   PT: 17.6 sec;   INR: 1.55 ratio         PTT - ( 06 Mar 2020 18:52 )  PTT:31.4 sec  Urinalysis Basic - ( 06 Mar 2020 09:10 )    Color: Carole / Appearance: Slightly Turbid / S.010 / pH: x  Gluc: x / Ketone: Small  / Bili: Small / Urobili: 1   Blood: x / Protein: 30 mg/dL / Nitrite: Negative   Leuk Esterase: Trace / RBC: 3-5 /HPF / WBC 0-2   Sq Epi: x / Non Sq Epi: Few / Bacteria: Few        Radiology: ***  < from: Xray Chest 1 View-PORTABLE IMMEDIATE (20 @ 00:42) >  INTERPRETATION:  History: Postop intubated status post cholecystectomy    Portable radiograph of the chest is performed. comparison is made to 3/6/2020.    ET tube is in place with the tip above the dario. The heart is not enlarged. There is linear atelectasis at the right lung base. Left lung is clear. There is osteopenia of the bony structures.    Impression: Status post intubation. Linear atelectasis at the right lung base.    < end of copied text >    < from: CT Chest w/ IV Cont (20 @ 11:48) >  IMPRESSION:     Trace bilateral pleural effusions and mild interlobular septal thickening which a be seen with mild pulmonary vascular congestion.    Mild thickening of the proximal esophagus which contains a small amount of debris. Correlate clinically for esophagitis.    Markedly distended gallbladder. Correlate with ultrasound to evaluate for cholecystitis.    Underdistended urinary bladder with diffuse wall thickening. Correlate with urinalysis for cystitis.    Mild presacral edema, nonspecific.    < end of copied text >    < from: CT Head No Cont (20 @ 10:09) >    INTERPRETATION:  HISTORY: Altered mental status, confusion    Image quality is limited by motion artifact    Evaluation demonstrates no evidence of mass-effect or midline shift. No intraparenchymal mass lesions or hemorrhage is identified. There is no evidence of hydrocephalus. No extra-axial collections are noted.    The bone windows demonstrate no gross osseous abnormalities.    Impression:  1. Unremarkable noncontrast CT scan of the brain.    < end of copied text >    < from: CT Maxillofacial No Cont (20 @ 10:10) >    INTERPRETATION:  Maxillofacial CT without contrast    History injury    Image quality is limited by motion artifact. There is no acute fracture or air-fluid level. The deep orbital contents are grossly normal.    IMPRESSION:    Motion limited exam without gross acute findings    < end of copied text >    CENTRAL LINE: N  LOMAS: Y  A-LINE: N    GLOBAL ISSUE/BEST PRACTICE:  Analgesia: Y  Sedation: N  HOB elevation: yes  Stress ulcer prophylaxis: Y  VTE prophylaxis: Y  Glycemic control: Y  Nutrition: NPO    CODE STATUS: FULL

## 2020-03-07 NOTE — CONSULT NOTE ADULT - ASSESSMENT
Patient is a 64 year old male with a pmhx of MS(wheelchair bound, moves only left upper ext) and chronic pain who presents to [facility] for fall, found to have:    1. Acute cholecystitis   2. AMS  3. Acute hypoxic respiratory failure   4. MS    Plan:  Neuro:  Cardio:  Pulm:  GI:  Renal:  Id:  Heme:  Endo:      Dispo: Admit to ICU Patient is a 64 year old male with a pmhx of MS(wheelchair bound, moves only left upper ext) and chronic pain who presents to [facility] for fall, found to have:    1. Acute cholecystitis   2. AMS  3. Acute hypoxic respiratory failure   4. MS    Plan  Neuro: Will start propofol for sedation with prn fentanyl and versed for acute agitation. Dilaudid prn for pain control    Cardio: No active issues  Pulm: Had episode of hypoxia prior to case, prior cxr with some pulmonary congestion. Remain intubated post op, brought to ICU. Will start on VAC settings 500/16/40/5. ABG pending and titrate vent prn. Will titrate fio2 to a sat of 92% and higher. Vent bundle in place   GI: POD #0 s/p cholecystectomy. NPO except meds.   Renal: Place meyers for urinary retention, strict I/Os, avoid nephrotoxic meds   Id: zosyn for biliary sepsis, pending blood, urine and surgical cultures, procal elevated, lactate w/n/l  Heme: Lovenox 40mg starting tomorrow night   Endo: no active issues       Dispo: Admit to ICU. case d/e eICU Patient is a 64 year old male with a pmhx of MS(wheelchair bound, moves only left upper ext) and chronic pain who presents to [place] for fall, found to have:    1. Acute cholecystitis   2. AMS  3. Acute hypoxic respiratory failure   4. MS    Plan  Neuro: Will start propofol for sedation with prn fentanyl and versed for acute agitation. Dilaudid prn for pain control    Cardio: No active issues  Pulm: Had episode of hypoxia prior to case, prior cxr with some pulmonary congestion. Remain intubated post op, brought to ICU. Will start on VAC settings 500/16/40/5. ABG 7.38/42/122/24. Will actively titrate fio2 to a sat of 92% and higher. Vent bundle in place. Sedation vacation in am, CPAP trail with goal to extubate.   GI: POD #0 s/p cholecystectomy. NPO except meds. Intra op found markedly distended thin walled ischemic-colored gallbladder  Renal: Place meyers for urinary retention, strict I/Os, avoid nephrotoxic meds, LR at 100cc/hr  Id: zosyn for biliary sepsis, pending blood, urine and surgical cultures, procal elevated, lactate w/n/l  Heme: Lovenox 40mg starting tomorrow night   Endo: no active issues       Dispo: Admit to ICU. case d/e eICU

## 2020-03-07 NOTE — CHART NOTE - NSCHARTNOTEFT_GEN_A_CORE
Called to bedside for acute agitation   Noted that patient is gurgling, aggressively moving in bed   ET tube balloon not holding any air   Placed fio2 to 100% on vent  eICU camera in, to discuss case  anesthesia explaining prior that patient was difficult intubation   Decision made to extubate patient to venti mask vs tube exchange   Extubated successfully to venti mask, sats 100% Called to bedside for acute agitation   Noted that patient is gurgling, aggressively moving in bed   ET tube balloon not holding any air   Placed fio2 to 100% on vent  eICU camera in, to discuss case  Decision made to extubate patient to venti mask vs tube exchange   Extubated successfully to venti mask, sats 100%

## 2020-03-08 DIAGNOSIS — R41.0 DISORIENTATION, UNSPECIFIED: ICD-10-CM

## 2020-03-08 DIAGNOSIS — K81.9 CHOLECYSTITIS, UNSPECIFIED: ICD-10-CM

## 2020-03-08 DIAGNOSIS — G35 MULTIPLE SCLEROSIS: ICD-10-CM

## 2020-03-08 LAB
BASOPHILS # BLD AUTO: 0.02 K/UL — SIGNIFICANT CHANGE UP (ref 0–0.2)
BASOPHILS NFR BLD AUTO: 0.2 % — SIGNIFICANT CHANGE UP (ref 0–2)
EOSINOPHIL # BLD AUTO: 0.07 K/UL — SIGNIFICANT CHANGE UP (ref 0–0.5)
EOSINOPHIL NFR BLD AUTO: 0.6 % — SIGNIFICANT CHANGE UP (ref 0–6)
HCT VFR BLD CALC: 30.6 % — LOW (ref 39–50)
HGB BLD-MCNC: 10.4 G/DL — LOW (ref 13–17)
IMM GRANULOCYTES NFR BLD AUTO: 0.6 % — SIGNIFICANT CHANGE UP (ref 0–1.5)
LYMPHOCYTES # BLD AUTO: 1.21 K/UL — SIGNIFICANT CHANGE UP (ref 1–3.3)
LYMPHOCYTES # BLD AUTO: 9.9 % — LOW (ref 13–44)
MAGNESIUM SERPL-MCNC: 1.9 MG/DL — SIGNIFICANT CHANGE UP (ref 1.6–2.6)
MCHC RBC-ENTMCNC: 30.5 PG — SIGNIFICANT CHANGE UP (ref 27–34)
MCHC RBC-ENTMCNC: 34 GM/DL — SIGNIFICANT CHANGE UP (ref 32–36)
MCV RBC AUTO: 89.7 FL — SIGNIFICANT CHANGE UP (ref 80–100)
MONOCYTES # BLD AUTO: 0.63 K/UL — SIGNIFICANT CHANGE UP (ref 0–0.9)
MONOCYTES NFR BLD AUTO: 5.1 % — SIGNIFICANT CHANGE UP (ref 2–14)
NEUTROPHILS # BLD AUTO: 10.25 K/UL — HIGH (ref 1.8–7.4)
NEUTROPHILS NFR BLD AUTO: 83.6 % — HIGH (ref 43–77)
NRBC # BLD: 0 /100 WBCS — SIGNIFICANT CHANGE UP (ref 0–0)
PHOSPHATE SERPL-MCNC: 2.2 MG/DL — LOW (ref 2.5–4.5)
PLATELET # BLD AUTO: 131 K/UL — LOW (ref 150–400)
RBC # BLD: 3.41 M/UL — LOW (ref 4.2–5.8)
RBC # FLD: 12.9 % — SIGNIFICANT CHANGE UP (ref 10.3–14.5)
WBC # BLD: 12.25 K/UL — HIGH (ref 3.8–10.5)
WBC # FLD AUTO: 12.25 K/UL — HIGH (ref 3.8–10.5)

## 2020-03-08 PROCEDURE — 99233 SBSQ HOSP IP/OBS HIGH 50: CPT

## 2020-03-08 RX ORDER — FUROSEMIDE 40 MG
20 TABLET ORAL ONCE
Refills: 0 | Status: COMPLETED | OUTPATIENT
Start: 2020-03-08 | End: 2020-03-08

## 2020-03-08 RX ORDER — POTASSIUM CHLORIDE 20 MEQ
10 PACKET (EA) ORAL
Refills: 0 | Status: COMPLETED | OUTPATIENT
Start: 2020-03-08 | End: 2020-03-08

## 2020-03-08 RX ORDER — ACETAMINOPHEN 500 MG
1000 TABLET ORAL ONCE
Refills: 0 | Status: COMPLETED | OUTPATIENT
Start: 2020-03-08 | End: 2020-03-08

## 2020-03-08 RX ORDER — LABETALOL HCL 100 MG
10 TABLET ORAL ONCE
Refills: 0 | Status: COMPLETED | OUTPATIENT
Start: 2020-03-08 | End: 2020-03-08

## 2020-03-08 RX ADMIN — Medication 1000 MILLIGRAM(S): at 11:58

## 2020-03-08 RX ADMIN — PIPERACILLIN AND TAZOBACTAM 25 GRAM(S): 4; .5 INJECTION, POWDER, LYOPHILIZED, FOR SOLUTION INTRAVENOUS at 21:48

## 2020-03-08 RX ADMIN — Medication 20 MILLIGRAM(S): at 09:13

## 2020-03-08 RX ADMIN — Medication 10 MILLIGRAM(S): at 00:12

## 2020-03-08 RX ADMIN — PIPERACILLIN AND TAZOBACTAM 25 GRAM(S): 4; .5 INJECTION, POWDER, LYOPHILIZED, FOR SOLUTION INTRAVENOUS at 05:10

## 2020-03-08 RX ADMIN — HYDROMORPHONE HYDROCHLORIDE 1 MILLIGRAM(S): 2 INJECTION INTRAMUSCULAR; INTRAVENOUS; SUBCUTANEOUS at 10:29

## 2020-03-08 RX ADMIN — CHLORHEXIDINE GLUCONATE 1 APPLICATION(S): 213 SOLUTION TOPICAL at 05:15

## 2020-03-08 RX ADMIN — ENOXAPARIN SODIUM 40 MILLIGRAM(S): 100 INJECTION SUBCUTANEOUS at 21:48

## 2020-03-08 RX ADMIN — HYDROMORPHONE HYDROCHLORIDE 0.5 MILLIGRAM(S): 2 INJECTION INTRAMUSCULAR; INTRAVENOUS; SUBCUTANEOUS at 04:21

## 2020-03-08 RX ADMIN — SODIUM CHLORIDE 75 MILLILITER(S): 9 INJECTION, SOLUTION INTRAVENOUS at 13:37

## 2020-03-08 RX ADMIN — Medication 10 MILLIGRAM(S): at 18:21

## 2020-03-08 RX ADMIN — Medication 100 MILLIEQUIVALENT(S): at 11:27

## 2020-03-08 RX ADMIN — HYDROMORPHONE HYDROCHLORIDE 1 MILLIGRAM(S): 2 INJECTION INTRAMUSCULAR; INTRAVENOUS; SUBCUTANEOUS at 10:59

## 2020-03-08 RX ADMIN — Medication 10 MILLIGRAM(S): at 03:50

## 2020-03-08 RX ADMIN — Medication 400 MILLIGRAM(S): at 11:28

## 2020-03-08 RX ADMIN — PIPERACILLIN AND TAZOBACTAM 25 GRAM(S): 4; .5 INJECTION, POWDER, LYOPHILIZED, FOR SOLUTION INTRAVENOUS at 13:37

## 2020-03-08 RX ADMIN — Medication 100 MILLIEQUIVALENT(S): at 10:16

## 2020-03-08 RX ADMIN — Medication 10 MILLIGRAM(S): at 11:58

## 2020-03-08 RX ADMIN — Medication 100 MILLIEQUIVALENT(S): at 09:12

## 2020-03-08 RX ADMIN — HYDROMORPHONE HYDROCHLORIDE 0.5 MILLIGRAM(S): 2 INJECTION INTRAMUSCULAR; INTRAVENOUS; SUBCUTANEOUS at 13:37

## 2020-03-08 RX ADMIN — PANTOPRAZOLE SODIUM 40 MILLIGRAM(S): 20 TABLET, DELAYED RELEASE ORAL at 11:27

## 2020-03-08 RX ADMIN — HYDROMORPHONE HYDROCHLORIDE 0.5 MILLIGRAM(S): 2 INJECTION INTRAMUSCULAR; INTRAVENOUS; SUBCUTANEOUS at 03:51

## 2020-03-08 RX ADMIN — Medication 10 MILLIGRAM(S): at 05:11

## 2020-03-08 NOTE — PROGRESS NOTE ADULT - ATTENDING COMMENTS
63 yo M with Hx advanced multiple sclerosis, Hx hip Fx presenting with fall and AMS, found to have acute cholecystitis, POD 2 s/p lap cholecystitis, postop with encephalopathy +/- delirium and htn.      --encephalopathy +/- delirium seems improved today  suspect multifactorial from acute illness, surgery, pain, ?lyrica withdrawal  resume lyrica, lexapro, trileptal when able to take PO   pain control with dilaudid  --htn, continue hydralazine  suspect related in part to pain and delirium  --stable from respiratory standpoint  --s/p lap mukul  advance diet per surgery, ok to have meds  --normal renal function  --continue zosyn for acute cholecystitis, ?gangrenous BG  --plan discussed with wife in detail  also discussed with ID, surgery 63 yo M with Hx advanced multiple sclerosis, Hx hip Fx presenting with fall and AMS, found to have acute cholecystitis, POD 2 s/p lap cholecystitis, postop with encephalopathy +/- delirium and htn.      --encephalopathy +/- delirium seems improved today  suspect multifactorial from acute illness, surgery, pain, ?lyrica withdrawal  resume lyrica, lexapro, trileptal when able to take PO   pain control with dilaudid  --htn, continue hydralazine  ?volume overload, give lasix   --stable from respiratory standpoint  --s/p lap mukul  advance diet as mental status allows  --normal renal function  --continue zosyn for acute cholecystitis, ?gangrenous BG  --LLE edema, check dopplar  --plan discussed with wife in detail 63 yo M with Hx advanced multiple sclerosis, Hx hip Fx presenting with fall and AMS, found to have acute cholecystitis, POD 2 s/p lap cholecystitis, postop with encephalopathy +/- delirium and htn.      --encephalopathy +/- delirium seems improved today  suspect multifactorial from acute illness, surgery, pain, ?lyrica withdrawal  resume lyrica, lexapro, trileptal when able to take PO   pain control with dilaudid  --htn, continue hydralazine  ?volume overload, give lasix   --stable from respiratory standpoint  --s/p lap mukul  advance diet as mental status allows  --normal renal function  --continue zosyn for acute cholecystitis, ?gangrenous BG  --RLE edema, check dopplar  --plan discussed with wife in detail

## 2020-03-08 NOTE — PROGRESS NOTE ADULT - SUBJECTIVE AND OBJECTIVE BOX
POD 2'  Afeb  /99  Still delirious and/or confused. Not taking po meds or fluids  Abd: soft, nondistended, less tender but hard to evaluate how tender because of mental status  WBC 12.3  P: advance diet when able

## 2020-03-08 NOTE — PROGRESS NOTE ADULT - SUBJECTIVE AND OBJECTIVE BOX
[facility], Division of Infectious Diseases  HARVINDER Goodwin A. Lee  [phone]    Name: THANH ISRAEL  Age: 64y  Gender: Male  MRN: [id number]    Interval History--  Notes reviewed. More alert says a few words but cannot answer simple question e.g. his name, year, where he is.   Less "washed out" looking      Past Medical History--  Femur fracture, left  MS (multiple sclerosis)  No significant past surgical history      For details regarding the patient's social history, family history, and other miscellaneous elements, please refer the initial infectious diseases consultation and/or the admitting history and physical examination for this admission.    Allergies    No Known Allergies    Intolerances        Medications--  Antibiotics:  piperacillin/tazobactam IVPB.. 3.375 Gram(s) IV Intermittent every 8 hours    Immunologic:    Other:  chlorhexidine 2% Cloths  enoxaparin Injectable  escitalopram  hydrALAZINE Injectable  HYDROmorphone  Injectable PRN  HYDROmorphone  Injectable PRN  HYDROmorphone  Injectable PRN  lactated ringers.  OXcarbazepine  oxybutynin XL  pantoprazole  Injectable  pregabalin  Symproic (naldemedine) 0.2 mg tablet 1 Tablet(s)      Review of Systems--  Review of systems unable secondary to clinical condition.     Physical Examination--  Vital Signs: T(F): 99.2 (03-08-20 @ 16:02), Max: 100.5 (03-07-20 @ 20:48)  HR: 98 (03-08-20 @ 16:00)  BP: 160/85 (03-08-20 @ 16:00)  RR: 31 (03-08-20 @ 16:00)  SpO2: 96% (03-08-20 @ 16:00)  Wt(kg): --  General: Nontoxic-appearing Male in no acute distress.  HEENT: Facial trauma with scabs/ecchymoses R temporal/periorbital area. Pupils/EOM unable secondary to patient compliance. Oropharynx/dentition unable secondary to patient compliance.   Neck: Not rigid. No sense of mass.  Nodes: None palpable.  Lungs: Poor effort grossly clear.   Heart: Regular rate and rhythm. No Murmur. No rub. No gallop. No palpable thrill.  Abdomen: Bowel sounds present and normoactive. Soft. Moderatlely distended. Nontender today. SAM drain removed. No sense of mass. No organomegaly.  Back: Unable  Extremities: No cyanosis or clubbing. 2-3+ LE edema R>L. Scars R leg c/w prior ?femur surgery.   Skin: Warm. Dry. Good turgor. No rash. No vasculitic stigmata.  Psychiatric: Unable due to poor mentation      Laboratory Studies--  CBC             10.4   12.25 )-----------( 131      ( 08 Mar 2020 05:58 )             30.6     Chemistries  03-08    140  |  106  |  17  ----------------------------<  112<H>  3.3<L>   |  23  |  0.69    Ca    8.4<L>      08 Mar 2020 07:02  Phos  2.2     03-08  Mg     1.9     03-08    TPro  6.4  /  Alb  2.9<L>  /  TBili  0.7  /  DBili  .30<H>  /  AST  23  /  ALT  28  /  AlkPhos  67  03-07      Culture Data  Culture - Surgical Swab (collected 07 Mar 2020 11:00)  Source: .Surgical Swab gallbladder bile  Preliminary Report (08 Mar 2020 11:41):    No growth    Culture - Urine (collected 06 Mar 2020 12:35)  Source: .Urine Clean Catch (Midstream)  Final Report (07 Mar 2020 08:38):    No growth    Culture - Blood (collected 06 Mar 2020 12:34)  Source: .Blood Blood-Venous  Preliminary Report (07 Mar 2020 13:01):    No growth to date.    Culture - Blood (collected 06 Mar 2020 12:34)  Source: .Blood Blood-Venous  Preliminary Report (07 Mar 2020 13:01):    No growth to date.

## 2020-03-08 NOTE — PROGRESS NOTE ADULT - SUBJECTIVE AND OBJECTIVE BOX
24 hour events:   remained hypertensive overnight  Tm 100.5 at 9pm    T(F): 98.5 (20 @ 07:35), Max: 100.5 (20 @ 20:48)  HR: 93 (20 @ 07:00) (72 - 109)  BP: 207/97 (20 @ 07:00) (101/57 - 207/97)  RR: 26 (20 @ 07:00) (11 - 31)  SpO2: 96% (20 @ 07:00) (94% - 99%)  Wt(kg): --      20 @ 06:01  -  20 @ 07:00  --------------------------------------------------------  IN: 2172.4 mL / OUT: 1050 mL / NET: 1122.4 mL        CAPILLARY BLOOD GLUCOSE          I&O's Summary    07 Mar 2020 06:01  -  08 Mar 2020 07:00  --------------------------------------------------------  IN: 2172.4 mL / OUT: 1050 mL / NET: 1122.4 mL        Physical Exam:   Gen:  Neuro:  HEENT:  Resp:  CVS:  Abd:  Ext:  Skin:    Meds:  piperacillin/tazobactam IVPB.. IV Intermittent    hydrALAZINE Injectable IV Push        escitalopram Oral  HYDROmorphone  Injectable IV Push PRN  HYDROmorphone  Injectable IV Push PRN  HYDROmorphone  Injectable IV Push PRN  OXcarbazepine Oral  pregabalin Oral      enoxaparin Injectable SubCutaneous    pantoprazole  Injectable IV Push    oxybutynin XL Oral    lactated ringers. IV Continuous      chlorhexidine 2% Cloths Topical    Symproic (naldemedine) 0.2 mg tablet 1 Tablet(s) Oral                            10.4   12.25 )-----------( 131      ( 08 Mar 2020 05:58 )             30.6       03-07    138  |  104  |  19  ----------------------------<  164<H>  3.9   |  25  |  0.72    Ca    8.4<L>      07 Mar 2020 05:42  Phos  2.2     03-08  Mg     1.9     03-08    TPro  6.4  /  Alb  2.9<L>  /  TBili  0.7  /  DBili  .30<H>  /  AST  23  /  ALT  28  /  AlkPhos  67  03-07          PT/INR - ( 06 Mar 2020 18:52 )   PT: 17.6 sec;   INR: 1.55 ratio         PTT - ( 06 Mar 2020 18:52 )  PTT:31.4 sec  Urinalysis Basic - ( 06 Mar 2020 09:10 )    Color: Carole / Appearance: Slightly Turbid / S.010 / pH: x  Gluc: x / Ketone: Small  / Bili: Small / Urobili: 1   Blood: x / Protein: 30 mg/dL / Nitrite: Negative   Leuk Esterase: Trace / RBC: 3-5 /HPF / WBC 0-2   Sq Epi: x / Non Sq Epi: Few / Bacteria: Few      .Urine Clean Catch (Midstream)   No growth --  @ 12:35  .Blood Blood-Venous   No growth to date. --  @ 12:34              Radiology: ***  Bedside ultrasound: ***    CENTRAL LINE: N/Y          DATE INSERTED:              REMOVE: Y/N  LOMAS: N/Y                       DATE INSERTED:              REMOVE: Y/N  A-LINE: N/Y                       DATE INSERTED:              REMOVE: Y/N    GLOBAL ISSUE/BEST PRACTICE:  Analgesia:  Sedation:  CAM-ICU:   HOB elevation: yes  Stress ulcer prophylaxis:  VTE prophylaxis:  Glycemic control:  Nutrition:    CODE STATUS: *** 24 hour events:   remained hypertensive overnight  Tm 100.5 at 9pm  NGT attempted overnight but noncooperative and unable to place  today says has no pain    T(F): 98.5 (20 @ 07:35), Max: 100.5 (20 @ 20:48)  HR: 93 (20 @ 07:00) (72 - 109)  BP: 207/97 (20 @ 07:00) (101/57 - 207/97)  RR: 26 (20 @ 07:00) (11 - 31)  SpO2: 96% (20 @ 07:00) (94% - 99%)  Wt(kg): --      20 @ 06:01  -  20 @ 07:00  --------------------------------------------------------  IN: 2172.4 mL / OUT: 1050 mL / NET: 1122.4 mL        CAPILLARY BLOOD GLUCOSE          I&O's Summary    07 Mar 2020 06:01  -  08 Mar 2020 07:00  --------------------------------------------------------  IN: 2172.4 mL / OUT: 1050 mL / NET: 1122.4 mL        Physical Exam:   Gen: chronically ill appearing  Neuro: alert, oriented x 0, inattention better than yesterday  has some appropriate responses to questions  HEENT: resists pupil exam, dry mm  Resp: CTA anteriorly  CVS: RRR  Abd: mild distension, diffuse tenderness to mild palpation improved from yesterday  surgical dressings in place with scant staining  Ext: RLE edema about 2+, LLE about 1+  Skin: WWP    Meds:  piperacillin/tazobactam IVPB.. IV Intermittent    hydrALAZINE Injectable IV Push        escitalopram Oral  HYDROmorphone  Injectable IV Push PRN  HYDROmorphone  Injectable IV Push PRN  HYDROmorphone  Injectable IV Push PRN  OXcarbazepine Oral  pregabalin Oral      enoxaparin Injectable SubCutaneous    pantoprazole  Injectable IV Push    oxybutynin XL Oral    lactated ringers. IV Continuous      chlorhexidine 2% Cloths Topical    Symproic (naldemedine) 0.2 mg tablet 1 Tablet(s) Oral                            10.4   12.25 )-----------( 131      ( 08 Mar 2020 05:58 )             30.6       03-07    138  |  104  |  19  ----------------------------<  164<H>  3.9   |  25  |  0.72    Ca    8.4<L>      07 Mar 2020 05:42  Phos  2.2     03-08  Mg     1.9     03-08    TPro  6.4  /  Alb  2.9<L>  /  TBili  0.7  /  DBili  .30<H>  /  AST  23  /  ALT  28  /  AlkPhos  67  03-07          PT/INR - ( 06 Mar 2020 18:52 )   PT: 17.6 sec;   INR: 1.55 ratio         PTT - ( 06 Mar 2020 18:52 )  PTT:31.4 sec  Urinalysis Basic - ( 06 Mar 2020 09:10 )    Color: Carole / Appearance: Slightly Turbid / S.010 / pH: x  Gluc: x / Ketone: Small  / Bili: Small / Urobili: 1   Blood: x / Protein: 30 mg/dL / Nitrite: Negative   Leuk Esterase: Trace / RBC: 3-5 /HPF / WBC 0-2   Sq Epi: x / Non Sq Epi: Few / Bacteria: Few      .Urine Clean Catch (Midstream)   No growth --  @ 12:35  .Blood Blood-Venous   No growth to date. --  @ 12:34          CENTRAL LINE: N  LOMAS: N  A-LINE: N    GLOBAL ISSUE/BEST PRACTICE:  Analgesia: Y  Sedation: N  HOB elevation: yes  Stress ulcer prophylaxis: Y  VTE prophylaxis: Y  Glycemic control: Y  Nutrition: NPO    CODE STATUS: FULL

## 2020-03-08 NOTE — PROGRESS NOTE ADULT - ASSESSMENT
Sepsis due to cholecystitis.  ?Gangrenous GB. S/P OR 3/6.  Patient apparently on chronic narcotics potentially masking abdominal symptoms PTA  Patient with what would appear to be fairly advanced MS. Suspect component of central temperature dysregulation accounts for fever to 104F.  Delirium due to acute medical issues  Current antibiotics adequate    3/8- mental status with some small improvement. Fevers down. WBC ~same range. Abdomen less tender.

## 2020-03-09 LAB
ANION GAP SERPL CALC-SCNC: 13 MMOL/L — SIGNIFICANT CHANGE UP (ref 5–17)
BASOPHILS # BLD AUTO: 0.04 K/UL — SIGNIFICANT CHANGE UP (ref 0–0.2)
BASOPHILS NFR BLD AUTO: 0.4 % — SIGNIFICANT CHANGE UP (ref 0–2)
BUN SERPL-MCNC: 18 MG/DL — SIGNIFICANT CHANGE UP (ref 7–23)
CALCIUM SERPL-MCNC: 8.4 MG/DL — LOW (ref 8.5–10.1)
CHLORIDE SERPL-SCNC: 104 MMOL/L — SIGNIFICANT CHANGE UP (ref 96–108)
CO2 SERPL-SCNC: 23 MMOL/L — SIGNIFICANT CHANGE UP (ref 22–31)
CREAT SERPL-MCNC: 0.61 MG/DL — SIGNIFICANT CHANGE UP (ref 0.5–1.3)
EOSINOPHIL # BLD AUTO: 0.15 K/UL — SIGNIFICANT CHANGE UP (ref 0–0.5)
EOSINOPHIL NFR BLD AUTO: 1.5 % — SIGNIFICANT CHANGE UP (ref 0–6)
GLUCOSE SERPL-MCNC: 87 MG/DL — SIGNIFICANT CHANGE UP (ref 70–99)
HCT VFR BLD CALC: 29.4 % — LOW (ref 39–50)
HGB BLD-MCNC: 9.9 G/DL — LOW (ref 13–17)
IMM GRANULOCYTES NFR BLD AUTO: 0.5 % — SIGNIFICANT CHANGE UP (ref 0–1.5)
LYMPHOCYTES # BLD AUTO: 1.21 K/UL — SIGNIFICANT CHANGE UP (ref 1–3.3)
LYMPHOCYTES # BLD AUTO: 12.4 % — LOW (ref 13–44)
MAGNESIUM SERPL-MCNC: 1.8 MG/DL — SIGNIFICANT CHANGE UP (ref 1.6–2.6)
MCHC RBC-ENTMCNC: 30.3 PG — SIGNIFICANT CHANGE UP (ref 27–34)
MCHC RBC-ENTMCNC: 33.7 GM/DL — SIGNIFICANT CHANGE UP (ref 32–36)
MCV RBC AUTO: 89.9 FL — SIGNIFICANT CHANGE UP (ref 80–100)
MONOCYTES # BLD AUTO: 0.64 K/UL — SIGNIFICANT CHANGE UP (ref 0–0.9)
MONOCYTES NFR BLD AUTO: 6.6 % — SIGNIFICANT CHANGE UP (ref 2–14)
NEUTROPHILS # BLD AUTO: 7.64 K/UL — HIGH (ref 1.8–7.4)
NEUTROPHILS NFR BLD AUTO: 78.6 % — HIGH (ref 43–77)
NRBC # BLD: 0 /100 WBCS — SIGNIFICANT CHANGE UP (ref 0–0)
PHOSPHATE SERPL-MCNC: 2.7 MG/DL — SIGNIFICANT CHANGE UP (ref 2.5–4.5)
PLATELET # BLD AUTO: 166 K/UL — SIGNIFICANT CHANGE UP (ref 150–400)
POTASSIUM SERPL-MCNC: 2.9 MMOL/L — CRITICAL LOW (ref 3.5–5.3)
POTASSIUM SERPL-SCNC: 2.9 MMOL/L — CRITICAL LOW (ref 3.5–5.3)
RBC # BLD: 3.27 M/UL — LOW (ref 4.2–5.8)
RBC # FLD: 13.2 % — SIGNIFICANT CHANGE UP (ref 10.3–14.5)
SODIUM SERPL-SCNC: 140 MMOL/L — SIGNIFICANT CHANGE UP (ref 135–145)
WBC # BLD: 9.73 K/UL — SIGNIFICANT CHANGE UP (ref 3.8–10.5)
WBC # FLD AUTO: 9.73 K/UL — SIGNIFICANT CHANGE UP (ref 3.8–10.5)

## 2020-03-09 PROCEDURE — 99232 SBSQ HOSP IP/OBS MODERATE 35: CPT

## 2020-03-09 PROCEDURE — 76604 US EXAM CHEST: CPT | Mod: 26

## 2020-03-09 PROCEDURE — 93971 EXTREMITY STUDY: CPT | Mod: 26,RT

## 2020-03-09 PROCEDURE — 71045 X-RAY EXAM CHEST 1 VIEW: CPT | Mod: 26

## 2020-03-09 RX ORDER — HYDRALAZINE HCL 50 MG
10 TABLET ORAL EVERY 6 HOURS
Refills: 0 | Status: DISCONTINUED | OUTPATIENT
Start: 2020-03-09 | End: 2020-03-09

## 2020-03-09 RX ORDER — MAGNESIUM SULFATE 500 MG/ML
1 VIAL (ML) INJECTION ONCE
Refills: 0 | Status: COMPLETED | OUTPATIENT
Start: 2020-03-09 | End: 2020-03-09

## 2020-03-09 RX ORDER — POTASSIUM CHLORIDE 20 MEQ
10 PACKET (EA) ORAL
Refills: 0 | Status: COMPLETED | OUTPATIENT
Start: 2020-03-09 | End: 2020-03-09

## 2020-03-09 RX ORDER — ACETAMINOPHEN 500 MG
650 TABLET ORAL EVERY 6 HOURS
Refills: 0 | Status: DISCONTINUED | OUTPATIENT
Start: 2020-03-09 | End: 2020-03-12

## 2020-03-09 RX ORDER — HYDRALAZINE HCL 50 MG
10 TABLET ORAL EVERY 6 HOURS
Refills: 0 | Status: DISCONTINUED | OUTPATIENT
Start: 2020-03-09 | End: 2020-03-10

## 2020-03-09 RX ORDER — POTASSIUM PHOSPHATE, MONOBASIC POTASSIUM PHOSPHATE, DIBASIC 236; 224 MG/ML; MG/ML
30 INJECTION, SOLUTION INTRAVENOUS ONCE
Refills: 0 | Status: COMPLETED | OUTPATIENT
Start: 2020-03-09 | End: 2020-03-09

## 2020-03-09 RX ADMIN — PANTOPRAZOLE SODIUM 40 MILLIGRAM(S): 20 TABLET, DELAYED RELEASE ORAL at 12:41

## 2020-03-09 RX ADMIN — Medication 150 MILLIGRAM(S): at 21:14

## 2020-03-09 RX ADMIN — OXCARBAZEPINE 300 MILLIGRAM(S): 300 TABLET, FILM COATED ORAL at 17:37

## 2020-03-09 RX ADMIN — Medication 100 MILLIEQUIVALENT(S): at 05:55

## 2020-03-09 RX ADMIN — ESCITALOPRAM OXALATE 20 MILLIGRAM(S): 10 TABLET, FILM COATED ORAL at 12:40

## 2020-03-09 RX ADMIN — Medication 100 MILLIEQUIVALENT(S): at 07:01

## 2020-03-09 RX ADMIN — Medication 10 MILLIGRAM(S): at 05:50

## 2020-03-09 RX ADMIN — PIPERACILLIN AND TAZOBACTAM 25 GRAM(S): 4; .5 INJECTION, POWDER, LYOPHILIZED, FOR SOLUTION INTRAVENOUS at 21:14

## 2020-03-09 RX ADMIN — PIPERACILLIN AND TAZOBACTAM 25 GRAM(S): 4; .5 INJECTION, POWDER, LYOPHILIZED, FOR SOLUTION INTRAVENOUS at 05:52

## 2020-03-09 RX ADMIN — Medication 150 MILLIGRAM(S): at 13:13

## 2020-03-09 RX ADMIN — Medication 10 MILLIGRAM(S): at 00:30

## 2020-03-09 RX ADMIN — Medication 100 MILLIEQUIVALENT(S): at 08:17

## 2020-03-09 RX ADMIN — CHLORHEXIDINE GLUCONATE 1 APPLICATION(S): 213 SOLUTION TOPICAL at 05:50

## 2020-03-09 RX ADMIN — ENOXAPARIN SODIUM 40 MILLIGRAM(S): 100 INJECTION SUBCUTANEOUS at 21:14

## 2020-03-09 RX ADMIN — Medication 100 GRAM(S): at 08:17

## 2020-03-09 RX ADMIN — POTASSIUM PHOSPHATE, MONOBASIC POTASSIUM PHOSPHATE, DIBASIC 83.33 MILLIMOLE(S): 236; 224 INJECTION, SOLUTION INTRAVENOUS at 10:31

## 2020-03-09 RX ADMIN — Medication 15 MILLIGRAM(S): at 17:37

## 2020-03-09 RX ADMIN — PIPERACILLIN AND TAZOBACTAM 25 GRAM(S): 4; .5 INJECTION, POWDER, LYOPHILIZED, FOR SOLUTION INTRAVENOUS at 13:14

## 2020-03-09 NOTE — PROGRESS NOTE ADULT - ATTENDING COMMENTS
64M h/o advanced multiple sclerosis, presenting with fall and AMS, found to have acute gangrenous cholecystitis, POD3 s/p lap cholecystitis c/b postop encephalopathy +/- delirium thought 2/2 ?lyrica withdrawal vs metabolic encephalopathy and intermittent episodes of hypertension.      Neuro: encephalopathy +/- delirium seems improved today oer wife but not yet back to baseline, suspect multifactorial w/w metabolic encepahopathy in setting of acute illness/surgery/pain +/- ?lyrica withdrawal - if unable to swallow will place KO and resume lyrica, lexapro, trileptal; limit opioids for pain control, can start RTC tylenol x24hrs once KO placed  CV: intermittent episodes likely 2/2 pain, agitation; continue with hydralazine for BP control; received lasix x1 yesterday for concern for volume overload, remains net positive, monitor UOP and dose prn to keep net even; RLE edema, doppler negative for DVT, likely related to recent hip fracture  Resp : no acute issues, protecting airway despite delirium/AMS   GI: s/p lap mukul, can start enteral feeds once KO placed  Renal: normal renal function, K+ repleted, monitor I/Os  ID: continue zosyn #3/5 for acute ?gangrenous cholecystitis; leukocytosis improved, remains afebrile; low suspicion that current mental status result of new infection  Heme: downtrending H/H noted, likely iatrogenic from blood draws, expected post-op changes, will trend; continue lovenox dvt ppx  --plan discussed with wife at bedside in detail 64M h/o advanced multiple sclerosis, presenting with fall and AMS, found to have acute gangrenous cholecystitis, POD3 s/p lap cholecystitis c/b postop encephalopathy +/- delirium thought 2/2 ?lyrica withdrawal vs metabolic encephalopathy and intermittent episodes of hypertension.      Neuro: encephalopathy +/- delirium seems improved today oer wife but not yet back to baseline, suspect multifactorial w/w metabolic encepahopathy in setting of acute illness/surgery/pain +/- ?lyrica withdrawal - if unable to swallow will place KO and resume lyrica, lexapro, trileptal; limit opioids for pain control, can start RTC tylenol x24hrs once KO placed  CV: intermittent episodes likely 2/2 pain, agitation; continue with hydralazine for BP control; received lasix x1 yesterday for concern for volume overload, remains net positive, monitor UOP and dose prn to keep net even; RLE edema, doppler negative for DVT, likely related to recent hip fracture  Resp : no acute issues, protecting airway despite delirium/AMS   GI: s/p lap mukul, can start enteral feeds once KO placed  Renal: normal renal function, K+ repleted, monitor I/Os  ID: continue zosyn #3/5 for acute ?gangrenous cholecystitis; leukocytosis improved, remains afebrile; low suspicion that current mental status result of new infection  Heme: downtrending H/H noted, likely iatrogenic from blood draws, expected post-op changes, will trend; continue lovenox dvt ppx  --plan discussed with wife at bedside in detail; stable for transfer to the floor

## 2020-03-09 NOTE — PROGRESS NOTE ADULT - ASSESSMENT
63yo wheelchair-bound M, with PMH/o advanced MS, L hip disarticulation, and R hip fracture s/p IMN in 2018, p/w AMS s/p fall out of motorized chair, found to have acute cholecystitis, now s/p lap mukul, requiring ICU-level care d/t pre-op hypoxia with post-op course complicated by encephalopathy/delirium and HTN.    NEURO:  CV:  PULM:  GI:  :  ID:  HEME:  ENDO:    Dispo: 63yo wheelchair-bound M, with PMH/o advanced MS, L hip disarticulation, and R hip fracture s/p IMN in 2018, p/w AMS s/p fall out of motorized chair, found to have acute cholecystitis, now s/p lap mukul, requiring ICU-level care d/t pre-op hypoxia with post-op course complicated by encephalopathy/delirium and HTN.      NEURO: Persistently encephalopathic today +/- delirium, though improving. Neuro Dr. Lizarraga following, suspects metabolic encephalopathy with multifactorial etiology: infectious process vs. advanced MS vs. medication withdrawal (home meds last taken Thurs 3/5). Dilaudid discontinued, continue pain management with Tylenol 650mg q6h.     CV: Remains hypertensive, possibly 2/2 agitation vs. pain with normotensive episodes. Continue Hydralazine 10mg IVP q6h prn for SBP >160. LE edema chronic per wife, RLE doppler negative for DVT, hold off diuresis.    PULM: Stable, no acute respiratory issues.    GI: CT a/p on admission demonstrating distended gallbladder, (+) HIDA scan, now s/p lap mukul (POD#3) with surgery Dr. Marcelino. Unsuccessfully re-attempted to place NGT today. Currently NPO except meds, plan to advance diet as mental status allows.    : Renal function normal. Monitor daily renal fxn, electrolytes and replete prn.    ID: Initially p/w sepsis 2/2 acute cholecysititis, s/p lap mukul. Zosyn initiated on 3/6, continue Zosyn TID until 3/12. Blood Cx: NGTD, f/u bile cx.    HEME: Hb downtrending though no obvious signs of blood loss on exam. Anemia possibly 2/2 surgical losses. Continue to monitor daily H/H.    ENDO: Stable, no acute endo issues.    Dispo: C/w ICU level of care. 63yo wheelchair-bound M, with PMH/o advanced MS, L hip disarticulation, and R hip fracture s/p IMN in 2018, p/w AMS s/p fall out of motorized chair, found to have acute cholecystitis, now s/p lap mukul, requiring ICU-level care d/t pre-op hypoxia with post-op course complicated by encephalopathy/delirium and HTN.      NEURO: Persistently encephalopathic today +/- delirium, though improving. Neuro Dr. Lizarraga following, suspects metabolic encephalopathy with multifactorial etiology: infectious process vs. advanced MS vs. medication withdrawal (home meds last taken Thurs 3/5). Dilaudid discontinued, continue pain management with Tylenol 650mg q6h.     CV: Remains hypertensive, possibly 2/2 agitation vs. pain with normotensive episodes. Continue Hydralazine 10mg IVP q6h prn for SBP >160. LE edema chronic per wife, RLE doppler negative for DVT, hold off diuresis.    PULM: Stable, no acute respiratory issues.    GI: CT a/p on admission demonstrating distended gallbladder, (+) HIDA scan, now s/p lap mukul (POD#3) with surgery Dr. Marcelino. Unsuccessfully re-attempted to place NGT today. Currently NPO except meds, plan to advance diet as mental status allows.    : Renal function normal. Monitor daily renal fxn, electrolytes and replete prn.    ID: Initially p/w sepsis 2/2 acute cholecysititis, s/p lap mukul. Zosyn initiated on 3/6, continue Zosyn TID for 5 total days (today is day #3). Leukocytosis resolved, remains afebrile. Blood Cx: NGTD, f/u bile cx.    HEME: Hb downtrending though no obvious signs of blood loss on exam. Anemia possibly 2/2 surgical losses vs. blood draws. Continue to monitor daily H/H. C/w ppx Lovenox.    ENDO: Stable, no acute endo issues.    Dispo: C/w ICU level of care.

## 2020-03-09 NOTE — PROGRESS NOTE ADULT - SUBJECTIVE AND OBJECTIVE BOX
POD 3  T 99.2    Not answering questions  Appears alert  Abd: nontender, nondistended  WBC 9.7  P: feed when able

## 2020-03-09 NOTE — PROGRESS NOTE ADULT - ASSESSMENT
Sepsis due to cholecystitis.  ?Gangrenous GB. S/P OR 3/6.  Patient apparently on chronic narcotics potentially masking abdominal symptoms PTA  Patient with what would appear to be fairly advanced MS. Suspect component of central temperature dysregulation accounts for fever to 104F.  Delirium due to acute medical issues, not much improvement  Current antibiotics adequate    3/8- mental status with some small improvement. Fevers down. WBC ~same range. Abdomen less tender.   3/9- ms about the same, afebrile, otherwise stable

## 2020-03-09 NOTE — PROGRESS NOTE ADULT - SUBJECTIVE AND OBJECTIVE BOX
CHARTING IN PROGRESS      Patient is a 64y old  Male who presents with a chief complaint of s/p fall, RUQ pain (08 Mar 2020 17:05)    24 hour events: ***    REVIEW OF SYSTEMS  Constitutional: No fever, chills, fatigue  Neuro: No headache, numbness, weakness  Resp: No cough, wheezing, shortness of breath  CVS: No chest pain, palpitations, leg swelling  GI: No abdominal pain, nausea, vomiting, diarrhea   : No dysuria, frequency, incontinence  Skin: No itching, burning, rashes, or lesions   Msk: No joint pain or swelling  Psych: No depression, anxiety, mood swings  Heme: No bleeding    T(F): 99.2 (03-09-20 @ 07:23), Max: 99.7 (03-09-20 @ 03:20)  HR: 101 (03-09-20 @ 08:00) (89 - 104)  BP: 177/87 (03-09-20 @ 08:00) (138/73 - 225/105)  RR: 33 (03-09-20 @ 08:00) (20 - 33)  SpO2: 97% (03-09-20 @ 08:00) (95% - 98%)  Wt(kg): --            I&O's Summary    03-08 @ 07:01  -  03-09 @ 07:00  --------------------------------------------------------  IN: 2600 mL / OUT: 650 mL / NET: 1950 mL      PHYSICAL EXAM  General:   CNS:   HEENT:   Resp:   CVS:   Abd:   Ext:   Skin:     MEDICATIONS  piperacillin/tazobactam IVPB.. IV Intermittent    hydrALAZINE Injectable IV Push        escitalopram Oral  HYDROmorphone  Injectable IV Push PRN  HYDROmorphone  Injectable IV Push PRN  HYDROmorphone  Injectable IV Push PRN  OXcarbazepine Oral  pregabalin Oral      enoxaparin Injectable SubCutaneous    pantoprazole  Injectable IV Push    oxybutynin XL Oral    lactated ringers. IV Continuous  potassium phosphate IVPB IV Intermittent      chlorhexidine 2% Cloths Topical    Symproic (naldemedine) 0.2 mg tablet 1 Tablet(s) Oral                          9.9    9.73  )-----------( 166      ( 09 Mar 2020 05:14 )             29.4       03-09    140  |  104  |  18  ----------------------------<  87  2.9<LL>   |  23  |  0.61    Ca    8.4<L>      09 Mar 2020 05:14  Phos  2.7     03-09  Mg     1.8     03-09                .Surgical Swab gallbladder bile   No growth -- 03-07 @ 11:00  .Urine Clean Catch (Midstream)   No growth -- 03-06 @ 12:35  .Blood Blood-Venous   No growth to date. -- 03-06 @ 12:34        Radiology: ***  Bedside lung ultrasound: ***  Bedside ECHO: ***    CENTRAL LINE: Y/N          DATE INSERTED:              REMOVE: Y/N  LOMAS: Y/N                        DATE INSERTED:              REMOVE: Y/N  A-LINE: Y/N                       DATE INSERTED:              REMOVE: Y/N    GLOBAL ISSUE/BEST PRACTICE  Analgesia:   Sedation:   CAM-ICU:   HOB elevation: yes  Stress ulcer prophylaxis:   VTE prophylaxis:   Glycemic control:   Nutrition:     CODE STATUS: ***  [facility] discussion: Y Patient is a 64y old  Male who presents with a chief complaint of s/p fall, RUQ pain (08 Mar 2020 17:05)    24 hour events: No acute events overnight. Wife, Alisson, present at bedside who notes mental status is slowly improving. Remained afebrile though hypertensive overnight. Currently denies any pain.    REVIEW OF SYSTEMS: Unable to obtain d/t patient's current medical condition.    T(F): 99.2 (03-09-20 @ 07:23), Max: 99.7 (03-09-20 @ 03:20)  HR: 101 (03-09-20 @ 08:00) (89 - 104)  BP: 177/87 (03-09-20 @ 08:00) (138/73 - 225/105)  RR: 33 (03-09-20 @ 08:00) (20 - 33)  SpO2: 97% (03-09-20 @ 08:00) (95% - 98%)      I&O's Summary    03-08 @ 07:01  -  03-09 @ 07:00  --------------------------------------------------------  IN: 2600 mL / OUT: 650 mL / NET: 1950 mL      PHYSICAL EXAM  General: awake, in NAD  CNS: AAOx0, waxing/waning inattention, able to answer simple yes/no questions  HEENT: uncooperative with pupil exam, EOMI, dry mucous membranes  Resp: CTA b/l, no w/r/r  CVS: +S1S2, RRR, no m/r/g  Abd: soft, mildly distended, diffuse mild TTP though more predominant of LLQ, surgical dressings dry, intact with minimal dry blood  Ext: RLE with 2+ pitting edema, no c/c/e of LLE  Skin: color normal for race, warm, dry, well perfused      MEDICATIONS  piperacillin/tazobactam IVPB.. IV Intermittent  hydrALAZINE Injectable IV Push  escitalopram Oral  HYDROmorphone  Injectable IV Push PRN  HYDROmorphone  Injectable IV Push PRN  HYDROmorphone  Injectable IV Push PRN  OXcarbazepine Oral  pregabalin Oral  enoxaparin Injectable SubCutaneous  pantoprazole  Injectable IV Push  oxybutynin XL Oral  lactated ringers. IV Continuous  potassium phosphate IVPB IV Intermittent  chlorhexidine 2% Cloths Topical  Symproic (naldemedine) 0.2 mg tablet 1 Tablet(s) Oral                              9.9    9.73  )-----------( 166      ( 09 Mar 2020 05:14 )             29.4       03-09    140  |  104  |  18  ----------------------------<  87  2.9<LL>   |  23  |  0.61    Ca    8.4<L>      09 Mar 2020 05:14  Phos  2.7     03-09  Mg     1.8     03-09        .Surgical Swab gallbladder bile   No growth -- 03-07 @ 11:00  .Urine Clean Catch (Midstream)   No growth -- 03-06 @ 12:35  .Blood Blood-Venous   No growth to date. -- 03-06 @ 12:34        Radiology:    EXAM:  US DPLX LWR EXT VEINS LTD RT                        PROCEDURE DATE:  03/09/2020      INTERPRETATION:  CLINICAL INFORMATION: Right leg swelling    COMPARISON: None available.    TECHNIQUE: Duplex sonography of the RIGHT LOWER extremity veins with color and spectral Doppler, with and without compression.      FINDINGS:    There is normal compressibility of the right common femoral, femoral and popliteal veins.     The contralateral common femoral vein is patent.    Doppler examination shows normal spontaneous and phasic flow.    No calf vein thrombosis is detected.    IMPRESSION:     No evidence of right lower extremity deep venous thrombosis.        CENTRAL LINE: N  LOMAS: Y  A-LINE: N      GLOBAL ISSUE/BEST PRACTICE  Analgesia: Y  Sedation: N  HOB elevation: Y  Stress ulcer prophylaxis: Y   VTE prophylaxis: Y  Glycemic control: Y  Nutrition: NPO except meds    CODE STATUS: FULL  G[facility] discussion: Y

## 2020-03-09 NOTE — PROGRESS NOTE ADULT - SUBJECTIVE AND OBJECTIVE BOX
Neurology follow up note    THANH [id number]      Interval History:    Patient feels ok seen with mom     MEDICATIONS    acetaminophen   Tablet .. 650 milliGRAM(s) Oral every 6 hours PRN  chlorhexidine 2% Cloths 1 Application(s) Topical <User Schedule>  enoxaparin Injectable 40 milliGRAM(s) SubCutaneous at bedtime  escitalopram 20 milliGRAM(s) Oral daily  hydrALAZINE Injectable 10 milliGRAM(s) IV Push every 6 hours PRN  lactated ringers. 1000 milliLiter(s) IV Continuous <Continuous>  OXcarbazepine 300 milliGRAM(s) Oral two times a day  oxybutynin XL 15 milliGRAM(s) Oral <User Schedule>  pantoprazole  Injectable 40 milliGRAM(s) IV Push daily  piperacillin/tazobactam IVPB.. 3.375 Gram(s) IV Intermittent every 8 hours  pregabalin 150 milliGRAM(s) Oral three times a day  Symproic (naldemedine) 0.2 mg tablet 1 Tablet(s) 1 Tablet(s) Oral daily      Allergies    No Known Allergies    Intolerances            Vital Signs Last 24 Hrs  T(C): 37.3 (09 Mar 2020 07:23), Max: 37.6 (08 Mar 2020 23:30)  T(F): 99.2 (09 Mar 2020 07:23), Max: 99.7 (09 Mar 2020 03:20)  HR: 98 (09 Mar 2020 11:00) (90 - 104)  BP: 160/76 (09 Mar 2020 11:00) (138/73 - 177/87)  BP(mean): 109 (09 Mar 2020 11:00) (100 - 125)  RR: 27 (09 Mar 2020 11:00) (22 - 33)  SpO2: 97% (09 Mar 2020 11:00) (95% - 98%)    REVIEW OF SYSTEMS:  Limited or unable to obtain secondary to patient's poor mental status.      On Neurological Examination:    The patient is awake in bed.  Extraocular movements were intact.  Pupils equal, round and reactive bilaterally 3 mm to 2.    Speech:  The patient would say one to two words maximum, but no dysarthria was noted.  Intact bilateral nasolabial folds.  Motor:  Left upper, positive flexation, extension at the elbow and opening and closing of the hand would say overall 2/5.  Right upper and bilateral lower extremities to command, no movement.  Upon plantar stimulation, subtle movement of his feet.  The patient is normally wheelchair bound.  Positive edema was noted in bilateral lower extremities.    Follow simple commands  Follow complex commands    GENERAL Exam: Nontoxic , No Acute Distress   	  HEENT:  normocephalic, atraumatic  		  LUNGS:  Decreased bilaterally  	  HEART: Normal S1S2   No murmur RRR        	  GI/ ABDOMEN:  Soft  Non tender    EXTREMITIES:   No Edema  No Clubbing  No Cyanosis     MUSCULOSKELETAL:  decreased Range of Motion all 4 extremities   	   SKIN: Normal  No Ecchymosis               LABS:  CBC Full  -  ( 09 Mar 2020 05:14 )  WBC Count : 9.73 K/uL  RBC Count : 3.27 M/uL  Hemoglobin : 9.9 g/dL  Hematocrit : 29.4 %  Platelet Count - Automated : 166 K/uL  Mean Cell Volume : 89.9 fl  Mean Cell Hemoglobin : 30.3 pg  Mean Cell Hemoglobin Concentration : 33.7 gm/dL  Auto Neutrophil # : 7.64 K/uL  Auto Lymphocyte # : 1.21 K/uL  Auto Monocyte # : 0.64 K/uL  Auto Eosinophil # : 0.15 K/uL  Auto Basophil # : 0.04 K/uL  Auto Neutrophil % : 78.6 %  Auto Lymphocyte % : 12.4 %  Auto Monocyte % : 6.6 %  Auto Eosinophil % : 1.5 %  Auto Basophil % : 0.4 %      03-09    140  |  104  |  18  ----------------------------<  87  2.9<LL>   |  23  |  0.61    Ca    8.4<L>      09 Mar 2020 05:14  Phos  2.7     03-09  Mg     1.8     03-09      Hemoglobin A1C:       Vitamin B12         RADIOLOGY    ANALYSIS AND PLAN:  This is a 64-year-old with episode of change in mental status, history of multiple sclerosis, neuropathic pain, and trigeminal neuralgia.  1.	For altered mental status, suspect most likely metabolic encephalopathy which could be secondary to a history of temperature, possibly underlying infectious type process, questionable this could be any type of medication withdrawal from Lyrica, questionable could be multiple sclerosis exacerbation secondary to underlying general medical issues along with temperature.  2.	I would recommend infectious workup as needed and antibiotics as needed.  3.	For history of multiple sclerosis, appears to be fairly advanced, would recommend supportive therapy.  4.	For neuropathic pain and trigeminal neuralgia, when possible recommend to re-institute patient's Lyrica and Trileptal.  5.	I would check out other causes of metabolic etiology.  6.	Monitor oral intake.  7.	Surgical followup as needed   8.	Spoke with mother at bedside. 3/9/2020  Her name is Alisson.  Her contact number is [phone]  called went to voicemail was full, does appear more interactive today was able to say some sentences today   Physical therapy evaluation if possible and as tolerated.  OOB to chair/ambulation with assistance only if possible.    Greater than 40 minutes spent in direct patient care reviewing  the notes, lab data/ imaging , discussion with multidisciplinary team.

## 2020-03-09 NOTE — PROGRESS NOTE ADULT - SUBJECTIVE AND OBJECTIVE BOX
[facility], Division of Infectious Diseases  HARVINDER Goodwin A. Lee  [phone]    Name: THANH ISRAEL  Age: 64y  Gender: Male  MRN: [id number]    Interval History--  Notes reviewed. Seen earlier this AM. Remains a nonhistorian. Less alert/interactive than yesterday's encounter.     Past Medical History--  Femur fracture, left  MS (multiple sclerosis)  No significant past surgical history      For details regarding the patient's social history, family history, and other miscellaneous elements, please refer the initial infectious diseases consultation and/or the admitting history and physical examination for this admission.    Allergies    No Known Allergies    Intolerances        Medications--  Antibiotics:  piperacillin/tazobactam IVPB.. 3.375 Gram(s) IV Intermittent every 8 hours    Immunologic:    Other:  acetaminophen   Tablet .. PRN  chlorhexidine 2% Cloths  enoxaparin Injectable  escitalopram  hydrALAZINE Injectable PRN  lactated ringers.  OXcarbazepine  oxybutynin XL  pantoprazole  Injectable  pregabalin  Symproic (naldemedine) 0.2 mg tablet 1 Tablet(s)      Review of Systems--  Review of systems unable secondary to clinical condition.     Physical Examination--  Vital Signs: T(F): 99.5 (03-09-20 @ 12:00), Max: 99.7 (03-09-20 @ 03:20)  HR: 125 (03-09-20 @ 12:00)  BP: 184/89 (03-09-20 @ 12:00)  RR: 24 (03-09-20 @ 12:00)  SpO2: 94% (03-09-20 @ 12:00)  Wt(kg): --  General: Nontoxic-appearing Male in no acute distress.  HEENT: Facial trauma with scabs/ecchymoses R temporal/periorbital area. Pupils/EOM unable secondary to patient compliance. Oropharynx/dentition unable secondary to patient compliance.   Neck: Not rigid. No sense of mass.  Nodes: None palpable.  Lungs: Poor effort grossly clear.   Heart: Regular rate and rhythm. No Murmur. No rub. No gallop. No palpable thrill.  Abdomen: Bowel sounds present and normoactive. Soft. Moderatlely distended. Nontender today. No sense of mass. No organomegaly.  Back: Unable  Extremities: No cyanosis or clubbing. 2-3+ LE edema R>L. Scars R leg c/w prior ?femur surgery.   Skin: Warm. Dry. Good turgor. No rash. No vasculitic stigmata.  Psychiatric: Unable due to poor mentation      Laboratory Studies--  CBC                        9.9    9.73  )-----------( 166      ( 09 Mar 2020 05:14 )             29.4     WBC Count: 12.25 K/uL (03-08-20 @ 05:58)  WBC Count: 10.17 K/uL (03-07-20 @ 05:42)  WBC Count: 15.03 K/uL (03-06-20 @ 09:09)      Chemistries  03-09    140  |  104  |  18  ----------------------------<  87  2.9<LL>   |  23  |  0.61    Ca    8.4<L>      09 Mar 2020 05:14  Phos  2.7     03-09  Mg     1.8     03-09        Culture Data  Culture - Surgical Swab (collected 07 Mar 2020 11:00)  Source: .Surgical Swab gallbladder bile  Preliminary Report (08 Mar 2020 11:41):    No growth    Culture - Urine (collected 06 Mar 2020 12:35)  Source: .Urine Clean Catch (Midstream)  Final Report (07 Mar 2020 08:38):    No growth    Culture - Blood (collected 06 Mar 2020 12:34)  Source: .Blood Blood-Venous  Preliminary Report (07 Mar 2020 13:01):    No growth to date.    Culture - Blood (collected 06 Mar 2020 12:34)  Source: .Blood Blood-Venous  Preliminary Report (07 Mar 2020 13:01):    No growth to date.

## 2020-03-10 LAB
ANION GAP SERPL CALC-SCNC: 12 MMOL/L — SIGNIFICANT CHANGE UP (ref 5–17)
BASOPHILS # BLD AUTO: 0.04 K/UL — SIGNIFICANT CHANGE UP (ref 0–0.2)
BASOPHILS NFR BLD AUTO: 0.5 % — SIGNIFICANT CHANGE UP (ref 0–2)
BUN SERPL-MCNC: 16 MG/DL — SIGNIFICANT CHANGE UP (ref 7–23)
CALCIUM SERPL-MCNC: 8.1 MG/DL — LOW (ref 8.5–10.1)
CHLORIDE SERPL-SCNC: 107 MMOL/L — SIGNIFICANT CHANGE UP (ref 96–108)
CO2 SERPL-SCNC: 23 MMOL/L — SIGNIFICANT CHANGE UP (ref 22–31)
CREAT SERPL-MCNC: 0.6 MG/DL — SIGNIFICANT CHANGE UP (ref 0.5–1.3)
EOSINOPHIL # BLD AUTO: 0.23 K/UL — SIGNIFICANT CHANGE UP (ref 0–0.5)
EOSINOPHIL NFR BLD AUTO: 2.7 % — SIGNIFICANT CHANGE UP (ref 0–6)
GLUCOSE SERPL-MCNC: 89 MG/DL — SIGNIFICANT CHANGE UP (ref 70–99)
HCT VFR BLD CALC: 28.6 % — LOW (ref 39–50)
HGB BLD-MCNC: 9.5 G/DL — LOW (ref 13–17)
IMM GRANULOCYTES NFR BLD AUTO: 0.6 % — SIGNIFICANT CHANGE UP (ref 0–1.5)
LYMPHOCYTES # BLD AUTO: 1.26 K/UL — SIGNIFICANT CHANGE UP (ref 1–3.3)
LYMPHOCYTES # BLD AUTO: 14.7 % — SIGNIFICANT CHANGE UP (ref 13–44)
MAGNESIUM SERPL-MCNC: 1.8 MG/DL — SIGNIFICANT CHANGE UP (ref 1.6–2.6)
MCHC RBC-ENTMCNC: 30.4 PG — SIGNIFICANT CHANGE UP (ref 27–34)
MCHC RBC-ENTMCNC: 33.2 GM/DL — SIGNIFICANT CHANGE UP (ref 32–36)
MCV RBC AUTO: 91.4 FL — SIGNIFICANT CHANGE UP (ref 80–100)
MONOCYTES # BLD AUTO: 0.73 K/UL — SIGNIFICANT CHANGE UP (ref 0–0.9)
MONOCYTES NFR BLD AUTO: 8.5 % — SIGNIFICANT CHANGE UP (ref 2–14)
NEUTROPHILS # BLD AUTO: 6.25 K/UL — SIGNIFICANT CHANGE UP (ref 1.8–7.4)
NEUTROPHILS NFR BLD AUTO: 73 % — SIGNIFICANT CHANGE UP (ref 43–77)
NRBC # BLD: 0 /100 WBCS — SIGNIFICANT CHANGE UP (ref 0–0)
PHOSPHATE SERPL-MCNC: 3.1 MG/DL — SIGNIFICANT CHANGE UP (ref 2.5–4.5)
PLATELET # BLD AUTO: 196 K/UL — SIGNIFICANT CHANGE UP (ref 150–400)
POTASSIUM SERPL-MCNC: 3.1 MMOL/L — LOW (ref 3.5–5.3)
POTASSIUM SERPL-SCNC: 3.1 MMOL/L — LOW (ref 3.5–5.3)
RBC # BLD: 3.13 M/UL — LOW (ref 4.2–5.8)
RBC # FLD: 13.5 % — SIGNIFICANT CHANGE UP (ref 10.3–14.5)
SODIUM SERPL-SCNC: 142 MMOL/L — SIGNIFICANT CHANGE UP (ref 135–145)
SURGICAL PATHOLOGY STUDY: SIGNIFICANT CHANGE UP
WBC # BLD: 8.56 K/UL — SIGNIFICANT CHANGE UP (ref 3.8–10.5)
WBC # FLD AUTO: 8.56 K/UL — SIGNIFICANT CHANGE UP (ref 3.8–10.5)

## 2020-03-10 PROCEDURE — 99232 SBSQ HOSP IP/OBS MODERATE 35: CPT

## 2020-03-10 RX ORDER — POTASSIUM CHLORIDE 20 MEQ
10 PACKET (EA) ORAL
Refills: 0 | Status: COMPLETED | OUTPATIENT
Start: 2020-03-10 | End: 2020-03-10

## 2020-03-10 RX ORDER — POTASSIUM CHLORIDE 20 MEQ
40 PACKET (EA) ORAL DAILY
Refills: 0 | Status: DISCONTINUED | OUTPATIENT
Start: 2020-03-10 | End: 2020-03-12

## 2020-03-10 RX ORDER — AMLODIPINE BESYLATE 2.5 MG/1
10 TABLET ORAL DAILY
Refills: 0 | Status: DISCONTINUED | OUTPATIENT
Start: 2020-03-10 | End: 2020-03-12

## 2020-03-10 RX ADMIN — OXCARBAZEPINE 300 MILLIGRAM(S): 300 TABLET, FILM COATED ORAL at 05:11

## 2020-03-10 RX ADMIN — PIPERACILLIN AND TAZOBACTAM 25 GRAM(S): 4; .5 INJECTION, POWDER, LYOPHILIZED, FOR SOLUTION INTRAVENOUS at 21:16

## 2020-03-10 RX ADMIN — Medication 100 MILLIEQUIVALENT(S): at 09:14

## 2020-03-10 RX ADMIN — Medication 15 MILLIGRAM(S): at 05:19

## 2020-03-10 RX ADMIN — Medication 150 MILLIGRAM(S): at 05:19

## 2020-03-10 RX ADMIN — Medication 100 MILLIEQUIVALENT(S): at 09:05

## 2020-03-10 RX ADMIN — ENOXAPARIN SODIUM 40 MILLIGRAM(S): 100 INJECTION SUBCUTANEOUS at 21:16

## 2020-03-10 RX ADMIN — AMLODIPINE BESYLATE 10 MILLIGRAM(S): 2.5 TABLET ORAL at 13:45

## 2020-03-10 RX ADMIN — OXCARBAZEPINE 300 MILLIGRAM(S): 300 TABLET, FILM COATED ORAL at 18:23

## 2020-03-10 RX ADMIN — Medication 150 MILLIGRAM(S): at 21:38

## 2020-03-10 RX ADMIN — ESCITALOPRAM OXALATE 20 MILLIGRAM(S): 10 TABLET, FILM COATED ORAL at 12:59

## 2020-03-10 RX ADMIN — PIPERACILLIN AND TAZOBACTAM 25 GRAM(S): 4; .5 INJECTION, POWDER, LYOPHILIZED, FOR SOLUTION INTRAVENOUS at 05:11

## 2020-03-10 RX ADMIN — Medication 100 MILLIEQUIVALENT(S): at 13:00

## 2020-03-10 RX ADMIN — Medication 15 MILLIGRAM(S): at 18:23

## 2020-03-10 RX ADMIN — Medication 10 MILLIGRAM(S): at 06:56

## 2020-03-10 RX ADMIN — CHLORHEXIDINE GLUCONATE 1 APPLICATION(S): 213 SOLUTION TOPICAL at 05:12

## 2020-03-10 RX ADMIN — Medication 150 MILLIGRAM(S): at 13:01

## 2020-03-10 RX ADMIN — PANTOPRAZOLE SODIUM 40 MILLIGRAM(S): 20 TABLET, DELAYED RELEASE ORAL at 12:59

## 2020-03-10 RX ADMIN — Medication 40 MILLIEQUIVALENT(S): at 12:59

## 2020-03-10 RX ADMIN — PIPERACILLIN AND TAZOBACTAM 25 GRAM(S): 4; .5 INJECTION, POWDER, LYOPHILIZED, FOR SOLUTION INTRAVENOUS at 13:38

## 2020-03-10 RX ADMIN — SODIUM CHLORIDE 75 MILLILITER(S): 9 INJECTION, SOLUTION INTRAVENOUS at 05:11

## 2020-03-10 NOTE — PROGRESS NOTE ADULT - ATTENDING COMMENTS
Thank you for the courtesy of this referral.  I'll sign off at this time.     Yohan Muñoz MD  [phone]

## 2020-03-10 NOTE — PROGRESS NOTE ADULT - ASSESSMENT
63yo wheelchair-bound M, with PMH/o advanced MS, L hip disarticulation, and R hip fracture s/p IMN in 2018, p/w AMS s/p fall out of motorized chair, found to have acute cholecystitis, now s/p lap mukul, requiring ICU-level care d/t pre-op hypoxia with post-op course complicated by encephalopathy/delirium and HTN.      NEURO: Continues to be encephalopathic with minimal improvement. Neuro Dr. Lizarraga following, suspects metabolic encephalopathy with multifactorial etiology: infectious process vs. advanced MS vs. medication withdrawal. Home meds restarted yesterday 3/9. Continue pain management with Tylenol 650mg q6h.     CV: Remains hypertensive. D/c Hydralazine and start Amlodipine 10 daily with hold parameters. LE edema improved today.    Pulm: Stable, no acute respiratory issues.    GI: Now s/p lap mukul, POD #4. After unsuccessful re-attempt at placing NGT yesterday, patient motivated to swallow. Clear liquid diet can be advanced to regular diet today.    : Renal function normal. Monitor daily renal function, electrolytes and replete prn.    ID: Presented to hospital with sepsis 2/2 acute cholecystitis, now s/p lap mukul. Continue with Zosyn, today is 4/5. Pt remains afebrile and leukocytosis resolved. Blood Cx: NGTD Bile Cx: preliminarily growing Lactobacillus sp.    HEME: Hb relatively stable today though overall decreased from baseline, likely 2/2 blood draws vs. expected surgical changes. Continue to monitor daily H/H. C/w ppx Lovenox.    ENDO: Stable, no acute issues.    DISPO: C/w ICU level of care.

## 2020-03-10 NOTE — PROGRESS NOTE ADULT - SUBJECTIVE AND OBJECTIVE BOX
Neurology follow up note    THANH [id number]      Interval History:    Patient feels ok     MEDICATIONS    acetaminophen   Tablet .. 650 milliGRAM(s) Oral every 6 hours PRN  chlorhexidine 2% Cloths 1 Application(s) Topical <User Schedule>  enoxaparin Injectable 40 milliGRAM(s) SubCutaneous at bedtime  escitalopram 20 milliGRAM(s) Oral daily  hydrALAZINE Injectable 10 milliGRAM(s) IV Push every 6 hours PRN  lactated ringers. 1000 milliLiter(s) IV Continuous <Continuous>  OXcarbazepine 300 milliGRAM(s) Oral two times a day  oxybutynin XL 15 milliGRAM(s) Oral <User Schedule>  pantoprazole  Injectable 40 milliGRAM(s) IV Push daily  piperacillin/tazobactam IVPB.. 3.375 Gram(s) IV Intermittent every 8 hours  potassium chloride    Tablet ER 40 milliEquivalent(s) Oral daily  potassium chloride  10 mEq/100 mL IVPB 10 milliEquivalent(s) IV Intermittent every 1 hour  pregabalin 150 milliGRAM(s) Oral three times a day  Symproic (naldemedine) 0.2 mg tablet 1 Tablet(s) 1 Tablet(s) Oral daily      Allergies    No Known Allergies    Intolerances            Vital Signs Last 24 Hrs  T(C): 37.2 (10 Mar 2020 07:49), Max: 37.5 (09 Mar 2020 12:00)  T(F): 98.9 (10 Mar 2020 07:49), Max: 99.5 (09 Mar 2020 12:00)  HR: 91 (10 Mar 2020 11:00) (88 - 125)  BP: 156/77 (10 Mar 2020 10:00) (153/71 - 184/89)  BP(mean): 108 (10 Mar 2020 10:00) (100 - 128)  RR: 26 (10 Mar 2020 11:00) (7 - 32)  SpO2: 97% (10 Mar 2020 11:00) (94% - 97%)    REVIEW OF SYSTEMS:  Limited or unable to obtain secondary to patient's poor mental status.      On Neurological Examination:    The patient is awake in bed.  Extraocular movements were intact.  Pupils equal, round and reactive bilaterally 3 mm to 2.    Speech:  The patient would say one to two words maximum, but no dysarthria was noted.  Intact bilateral nasolabial folds.  Motor:  Left upper, positive flexation, extension at the elbow and opening and closing of the hand would say overall 2/5.  Right upper and bilateral lower extremities to command, no movement.  Upon plantar stimulation, subtle movement of his feet.  The patient is normally wheelchair bound.  Positive edema was noted in bilateral lower extremities.    Follow simple commands    GENERAL Exam: Nontoxic , No Acute Distress   	  HEENT:  normocephalic, atraumatic  		  LUNGS:  Decreased bilaterally  	  HEART: Normal S1S2   No murmur RRR        	  GI/ ABDOMEN:  Soft  Non tender    EXTREMITIES:   No Edema  No Clubbing  No Cyanosis     MUSCULOSKELETAL:  decreased Range of Motion all 4 extremities   	   SKIN: Normal  No Ecchymosis               LABS:  CBC Full  -  ( 10 Mar 2020 05:09 )  WBC Count : 8.56 K/uL  RBC Count : 3.13 M/uL  Hemoglobin : 9.5 g/dL  Hematocrit : 28.6 %  Platelet Count - Automated : 196 K/uL  Mean Cell Volume : 91.4 fl  Mean Cell Hemoglobin : 30.4 pg  Mean Cell Hemoglobin Concentration : 33.2 gm/dL  Auto Neutrophil # : 6.25 K/uL  Auto Lymphocyte # : 1.26 K/uL  Auto Monocyte # : 0.73 K/uL  Auto Eosinophil # : 0.23 K/uL  Auto Basophil # : 0.04 K/uL  Auto Neutrophil % : 73.0 %  Auto Lymphocyte % : 14.7 %  Auto Monocyte % : 8.5 %  Auto Eosinophil % : 2.7 %  Auto Basophil % : 0.5 %      03-10    142  |  107  |  16  ----------------------------<  89  3.1<L>   |  23  |  0.60    Ca    8.1<L>      10 Mar 2020 05:09  Phos  3.1     03-10  Mg     1.8     03-10      Hemoglobin A1C:       Vitamin B12         RADIOLOGY      ANALYSIS AND PLAN:  This is a 64-year-old with episode of change in mental status, history of multiple sclerosis, neuropathic pain, and trigeminal neuralgia.  1.	For altered mental status, suspect most likely metabolic encephalopathy which could be secondary to a history of temperature, possibly underlying infectious type process, questionable this could be any type of medication withdrawal from Lyrica, questionable could be multiple sclerosis exacerbation secondary to underlying general medical issues along with temperature.  2.	I would recommend infectious workup as needed and antibiotics as needed.  3.	For history of multiple sclerosis, appears to be fairly advanced, would recommend supportive therapy.  4.	For neuropathic pain and trigeminal neuralgia, when possible recommend to re-institute patient's Lyrica and Trileptal.  5.	I would check out other causes of metabolic etiology.  6.	Monitor oral intake.  7.	Surgical followup as needed   Spoke with mother at bedside. 3/9/2020  Her name is Alisson.  Her contact number is [phone]  [facility], Physical therapy evaluation if possible and as tolerated.  less interactive today   OOB to chair/ambulation with assistance only if possible.    Greater than 35 minutes spent in direct patient care reviewing  the notes, lab data/ imaging , discussion with multidisciplinary team.

## 2020-03-10 NOTE — SWALLOW BEDSIDE ASSESSMENT ADULT - COMMENTS
Consult received and chart reviewed. Attempted to see the patient this AM for an initial assessment of swallow function, however, patient refusing to accept PO trials despite maximum verbal encouragement and prompting marked by turning his head away from teaspoon, cup, and straw presentations and maintaining a tight labial seal. Per nursing report, the patient has also been refusing PO meds. RN to discuss patient's baseline communicative and swallow function with patient's wife and contact this clinician to attempt swallow evaluation with patient's wife present. Defer PO diet to MD at this time and consider a nutritional consult to ensure adequate daily caloric intake.
Consult received and chart reviewed. The patient was seen this afternoon for an initial assessment of swallow function, at which time he was alert and cooperative. Patient's wife and mother present at bedside throughout this evaluation and reporting that the patient consumes a regular solid with thin liquid diet at home. Patient denying any pain pre/post today's evaluation and was agreeable to participate in today's evaluation.    Per charting, the patient is a "63 yo wheelchair bound male with PMhx of multiple sclerosis, left hip disarticulation and Right hip fracture s/p IM nail in 2018 who presents to the ED with AMS s/p fall  forward out of the motorized chair and landed on the ground striking the right side of his face.  Patient was found today by his wife with altered mental status with confusion and responsive to pain, EMS called, fever of 102 / 104 F noted upon arrival." Most recent CXR revealed, "Frontal expiratory view of the chest shows the heart to be similar in size. Feeding tube reaches the distal esophagus. The visualized lungs are clear and there is no evidence of pneumothorax nor pleural effusion." It should be known that the patient no longer has an NGT in place. Discussed results and recommendations from this evaluation with the patient, RN, RD, and medical team.

## 2020-03-10 NOTE — PHYSICAL THERAPY INITIAL EVALUATION ADULT - ADDITIONAL COMMENTS
Patient lives in private home with spouse.  Patient is dependent in all ADLs and is wheelchair bound, uses mechanical lift for transfers.  Patient was able to move L UE in order to assist in feeding.

## 2020-03-10 NOTE — PROGRESS NOTE ADULT - ASSESSMENT
63 yo M presented to the ER with altered mental status 2/2 to suspected cholecystitis. PMH Primary Progressive Multiple Sclerosis, Chronic pain.     sepsis sec to Acute  cholecystitis POA with metabolic encephalopathy   - sp cholecystectomy  - cw antibiotics  - ID and surgery fu     Primary Progressive MS:   - neuro fu  - management of agitation as per neuro     Depression: continue Lexapro 20mg daily

## 2020-03-10 NOTE — PROGRESS NOTE ADULT - ATTENDING COMMENTS
64M h/o advanced multiple sclerosis, presenting with fall and AMS, found to have acute gangrenous cholecystitis, POD3 s/p lap cholecystitis c/b postop encephalopathy +/- delirium thought 2/2 ?lyrica/oxycarbazapine withdrawal vs metabolic encephalopathy and intermittent episodes of hypertension.      Neuro: encephalopathy +/- delirium continues to slowly improve, suspect multifactorial w/ metabolic encepahopathy +/- ?lyrica/oxycarbazapine withdrawal; pain well controlled, limit opioid use   CV: BP remains elevated, will start standing amlodipine today, can dc IV hydralazine, LE swelling improved, hold off additional lasix at this time  Resp : no acute issues   GI: s/p lap mukul, now swallowing, can advance to full diet today  Renal: normal renal function, monitor I/Os, replete K+  ID: continue zosyn #4/5 for acute ?gangrenous cholecystitis; leukocytosis improved, remains afebrile  Heme: downtrending H/H noted, likely iatrogenic from blood draws, expected post-op changes, will trend; continue lovenox dvt ppx  --plan discussed with wife at bedside in detail; Pt eval ordered given improved mental status - stable for transfer to the floor.

## 2020-03-10 NOTE — PROGRESS NOTE ADULT - SUBJECTIVE AND OBJECTIVE BOX
Patient is a 64y old  Male who presents with a chief complaint of s/p fall, RUQ pain (10 Mar 2020 16:57)      INTERVAL HPI/OVERNIGHT EVENTS:  T(C): 37.4 (03-10-20 @ 20:24), Max: 37.6 (03-10-20 @ 15:30)  HR: 97 (03-10-20 @ 19:00) (87 - 99)  BP: 165/77 (03-10-20 @ 19:00) (156/75 - 176/78)  RR: 26 (03-10-20 @ 19:00) (7 - 32)  SpO2: 94% (03-10-20 @ 18:00) (94% - 97%)  Wt(kg): --  I&O's Summary    09 Mar 2020 07:01  -  10 Mar 2020 07:00  --------------------------------------------------------  IN: 2824 mL / OUT: 880 mL / NET: 1944 mL    10 Mar 2020 07:01  -  10 Mar 2020 21:30  --------------------------------------------------------  IN: 1735 mL / OUT: 600 mL / NET: 1135 mL        LABS:                        9.5    8.56  )-----------( 196      ( 10 Mar 2020 05:09 )             28.6     03-10    142  |  107  |  16  ----------------------------<  89  3.1<L>   |  23  |  0.60    Ca    8.1<L>      10 Mar 2020 05:09  Phos  3.1     03-10  Mg     1.8     03-10          CAPILLARY BLOOD GLUCOSE                MEDICATIONS  (STANDING):  amLODIPine   Tablet 10 milliGRAM(s) Oral daily  chlorhexidine 2% Cloths 1 Application(s) Topical <User Schedule>  enoxaparin Injectable 40 milliGRAM(s) SubCutaneous at bedtime  escitalopram 20 milliGRAM(s) Oral daily  lactated ringers. 1000 milliLiter(s) (75 mL/Hr) IV Continuous <Continuous>  OXcarbazepine 300 milliGRAM(s) Oral two times a day  oxybutynin XL 15 milliGRAM(s) Oral <User Schedule>  pantoprazole  Injectable 40 milliGRAM(s) IV Push daily  piperacillin/tazobactam IVPB.. 3.375 Gram(s) IV Intermittent every 8 hours  potassium chloride    Tablet ER 40 milliEquivalent(s) Oral daily  pregabalin 150 milliGRAM(s) Oral three times a day  Symproic (naldemedine) 0.2 mg tablet 1 Tablet(s) 1 Tablet(s) Oral daily    MEDICATIONS  (PRN):  acetaminophen   Tablet .. 650 milliGRAM(s) Oral every 6 hours PRN Mild Pain (1 - 3)          PHYSICAL EXAM:  GENERAL: NAD  CHEST/LUNG: Clear to percussion bilaterally; No rales, rhonchi, wheezing, or rubs  HEART: s1s2+  ABDOMEN: Soft, tender, Nondistended; Bowel sounds present  EXTREMITIES:  edema+    Care Discussed with Consultants/Other Providers [ ] YES  [ ] NO

## 2020-03-10 NOTE — PHYSICAL THERAPY INITIAL EVALUATION ADULT - PERTINENT HX OF CURRENT PROBLEM, REHAB EVAL
65 yo wheelchair bound male with PMhx of multiple sclerosis, left hip disarticulation and Right hip fracture s/p IM nail in 2018 who presents to the ED with AMS s/p fall  forward out of the motorized chair and landed on the ground striking the right side of his face.

## 2020-03-10 NOTE — CHART NOTE - NSCHARTNOTEFT_GEN_A_CORE
Assessment: Patient seen for nutrition follow up. Chart reviewed, hospital course noted.    Pt lethargic, unarousable at this time. Per RN, taking very little clear liquids. Questionable swallow issue however patient unable to be assessed by SLP this AM as he was refusing to participate in interview. Drinking small sips of clear liquid diet via metal straw. SLP to f/u when wife present to see if pt can tolerate po's. Per charge RN, patient has been refusing his lyrica medication and believes this is related to him refusing       Factors impacting intake: [ ] none [ ] nausea  [ ] vomiting [ ] diarrhea [ ] constipation  [ ]chewing problems [?] swallowing issues  [ ] other:     Diet, Clear Liquid (03-09-20 @ 12:42)    Intake: 25% of clear liquids this AM    Current Weight: Weight (kg): 108.9 (03-06 @ 14:26)  % Weight Change    Pertinent Medications: MEDICATIONS  (STANDING):  chlorhexidine 2% Cloths 1 Application(s) Topical <User Schedule>  enoxaparin Injectable 40 milliGRAM(s) SubCutaneous at bedtime  escitalopram 20 milliGRAM(s) Oral daily  lactated ringers. 1000 milliLiter(s) (75 mL/Hr) IV Continuous <Continuous>  OXcarbazepine 300 milliGRAM(s) Oral two times a day  oxybutynin XL 15 milliGRAM(s) Oral <User Schedule>  pantoprazole  Injectable 40 milliGRAM(s) IV Push daily  piperacillin/tazobactam IVPB.. 3.375 Gram(s) IV Intermittent every 8 hours  potassium chloride    Tablet ER 40 milliEquivalent(s) Oral daily  potassium chloride  10 mEq/100 mL IVPB 10 milliEquivalent(s) IV Intermittent every 1 hour  pregabalin 150 milliGRAM(s) Oral three times a day  Symproic (naldemedine) 0.2 mg tablet 1 Tablet(s) 1 Tablet(s) Oral daily    MEDICATIONS  (PRN):  acetaminophen   Tablet .. 650 milliGRAM(s) Oral every 6 hours PRN Mild Pain (1 - 3)  hydrALAZINE Injectable 10 milliGRAM(s) IV Push every 6 hours PRN hypertension    Pertinent Labs: 03-10 Na142 mmol/L Glu 89 mg/dL K+ 3.1 mmol/L<L> Cr  0.60 mg/dL BUN 16 mg/dL 03-10 Phos 3.1 mg/dL 03-07 Alb 2.9 g/dL<L>     CAPILLARY BLOOD GLUCOSE        Skin: face wound, R knee/shoulder abrasions (pt s/p fall), +3 R leg edema    Estimated Needs:   [X] no change since previous assessment  [ ] recalculated:     Previous Nutrition Diagnosis:   [ ] Inadequate Energy Intake [ ]Inadequate Oral Intake [ ] Excessive Energy Intake   [ ] Underweight [ ] Increased Nutrient Needs [ ] Overweight/Obesity   [X] Altered GI Function [ ] Unintended Weight Loss [ ] Food & Nutrition Related Knowledge Deficit [ ] Malnutrition     Nutrition Diagnosis is [X] ongoing  [ ] resolved [ ] not applicable     New Nutrition Diagnosis: [X] not applicable       Interventions:   Recommend  [ ] Change Diet To:  [ ] Nutrition Supplement  [ ] Nutrition Support: If po intake deemed contraindicated or patient unable to consume sufficient energy/protein to meet estimated needs via po, recommend initiating tube feeding: Osmolite 1.5 with prosource once daily via tolerated route beginning at 10 ml/hr and titrating to goal of 70 ml/hr x 20 hours which provides 1400 ml formula, 2100 kcal and 103 grams of protein.   [X] Other: SLP evaluation when patient agreeable. Advance diet as medically feasible to low fat, diet texture and fluid consistency per SLP. Encourage po intake.     Monitoring and Evaluation:   [ ] PO intake [ x ] Tolerance to diet prescription [ x ] weights [ x ] labs[ x ] follow up per protocol  [ ] other: Assessment: Patient seen for nutrition follow up. Chart reviewed, hospital course noted. 65 yo M with Hx MS, wheelchair bound presented with confusion and fall from wheelchair.  Found to have acute cholecystitis.  POD#4 post lap cholecystectomy    Pt lethargic, unarousable at this time. Per RN, taking very little clear liquids. Questionable swallow issue however patient unable to be assessed by SLP this AM as he was refusing to participate in interview. Drinking small sips of clear liquid diet via metal straw. SLP to f/u when wife present to see if pt can tolerate po's. Per charge RN, patient has been refusing his lyrica medication and believes this is related to him refusing meds/liquids.     Factors impacting intake: [ ] none [ ] nausea  [ ] vomiting [ ] diarrhea [ ] constipation  [ ]chewing problems [?] swallowing issues  [ ] other:     Diet, Clear Liquid (03-09-20 @ 12:42)    Intake: 25% of clear liquids this AM    Current Weight: Weight (kg): 108.9 (03-06 @ 14:26)  % Weight Change    Pertinent Medications: MEDICATIONS  (STANDING):  chlorhexidine 2% Cloths 1 Application(s) Topical <User Schedule>  enoxaparin Injectable 40 milliGRAM(s) SubCutaneous at bedtime  escitalopram 20 milliGRAM(s) Oral daily  lactated ringers. 1000 milliLiter(s) (75 mL/Hr) IV Continuous <Continuous>  OXcarbazepine 300 milliGRAM(s) Oral two times a day  oxybutynin XL 15 milliGRAM(s) Oral <User Schedule>  pantoprazole  Injectable 40 milliGRAM(s) IV Push daily  piperacillin/tazobactam IVPB.. 3.375 Gram(s) IV Intermittent every 8 hours  potassium chloride    Tablet ER 40 milliEquivalent(s) Oral daily  potassium chloride  10 mEq/100 mL IVPB 10 milliEquivalent(s) IV Intermittent every 1 hour  pregabalin 150 milliGRAM(s) Oral three times a day  Symproic (naldemedine) 0.2 mg tablet 1 Tablet(s) 1 Tablet(s) Oral daily    MEDICATIONS  (PRN):  acetaminophen   Tablet .. 650 milliGRAM(s) Oral every 6 hours PRN Mild Pain (1 - 3)  hydrALAZINE Injectable 10 milliGRAM(s) IV Push every 6 hours PRN hypertension    Pertinent Labs: 03-10 Na142 mmol/L Glu 89 mg/dL K+ 3.1 mmol/L<L> Cr  0.60 mg/dL BUN 16 mg/dL 03-10 Phos 3.1 mg/dL 03-07 Alb 2.9 g/dL<L>     CAPILLARY BLOOD GLUCOSE        Skin: face wound, R knee/shoulder abrasions (pt s/p fall), +3 R leg edema    Estimated Needs:   [X] no change since previous assessment  [ ] recalculated:     Previous Nutrition Diagnosis:   [ ] Inadequate Energy Intake [ ]Inadequate Oral Intake [ ] Excessive Energy Intake   [ ] Underweight [ ] Increased Nutrient Needs [ ] Overweight/Obesity   [X] Altered GI Function [ ] Unintended Weight Loss [ ] Food & Nutrition Related Knowledge Deficit [ ] Malnutrition     Nutrition Diagnosis is [X] ongoing  [ ] resolved [ ] not applicable     New Nutrition Diagnosis: [X] not applicable       Interventions:   Recommend  [ ] Change Diet To:  [ ] Nutrition Supplement  [ ] Nutrition Support: If po intake deemed contraindicated or patient unable to consume sufficient energy/protein to meet estimated needs via po, recommend initiating tube feeding: Osmolite 1.5 with NoCarb Prosource once daily via tolerated route beginning at 10 ml/hr and titrating to goal of 70 ml/hr x 20 hours which provides 1400 ml formula, 2100 kcal and 103 grams of protein (0.9 grams per kg, 19 kcal/kg).   [X] Other: SLP evaluation when patient agreeable. Advance diet as medically feasible to low fat, diet texture and fluid consistency per SLP. Encourage po intake.     Monitoring and Evaluation:   [X] PO intake [ x ] Tolerance to diet prescription [ x ] weights [ x ] labs[ x ] follow up per protocol  [X] other: swallow ability Assessment: Patient seen for nutrition follow up. Chart reviewed, hospital course noted. 63 yo M with Hx MS, wheelchair bound presented with confusion and fall from wheelchair.  Found to have acute cholecystitis.  POD#4 post lap cholecystectomy    Pt lethargic, unarousable at this time. Per RN, taking very little clear liquids. Questionable swallowing issue; as patient unable to be assessed by SLP this AM he was refusing to participate in interview. Drinking small sips of clear liquid diet via metal straw. SLP to f/u when wife present to see if pt can tolerate po's. Per charge RN, patient has been refusing his lyrica medication and believes this is related to him refusing meds/liquids. +BM 3/8.    Factors impacting intake: [ ] none [ ] nausea  [ ] vomiting [ ] diarrhea [ ] constipation  [ ]chewing problems [?] swallowing issues  [ ] other:     Diet, Clear Liquid (03-09-20 @ 12:42)    Intake: 25% of clear liquids this AM    Current Weight: Weight (kg): 108.9 (03-06 @ 14:26)  % Weight Change    Pertinent Medications: MEDICATIONS  (STANDING):  chlorhexidine 2% Cloths 1 Application(s) Topical <User Schedule>  enoxaparin Injectable 40 milliGRAM(s) SubCutaneous at bedtime  escitalopram 20 milliGRAM(s) Oral daily  lactated ringers. 1000 milliLiter(s) (75 mL/Hr) IV Continuous <Continuous>  OXcarbazepine 300 milliGRAM(s) Oral two times a day  oxybutynin XL 15 milliGRAM(s) Oral <User Schedule>  pantoprazole  Injectable 40 milliGRAM(s) IV Push daily  piperacillin/tazobactam IVPB.. 3.375 Gram(s) IV Intermittent every 8 hours  potassium chloride    Tablet ER 40 milliEquivalent(s) Oral daily  potassium chloride  10 mEq/100 mL IVPB 10 milliEquivalent(s) IV Intermittent every 1 hour  pregabalin 150 milliGRAM(s) Oral three times a day  Symproic (naldemedine) 0.2 mg tablet 1 Tablet(s) 1 Tablet(s) Oral daily    MEDICATIONS  (PRN):  acetaminophen   Tablet .. 650 milliGRAM(s) Oral every 6 hours PRN Mild Pain (1 - 3)  hydrALAZINE Injectable 10 milliGRAM(s) IV Push every 6 hours PRN hypertension    Pertinent Labs: 03-10 Na142 mmol/L Glu 89 mg/dL K+ 3.1 mmol/L<L> Cr  0.60 mg/dL BUN 16 mg/dL 03-10 Phos 3.1 mg/dL 03-07 Alb 2.9 g/dL<L>     CAPILLARY BLOOD GLUCOSE        Skin: face wound, R knee/shoulder abrasions (pt s/p fall), +3 R leg edema    Estimated Needs:   [X] no change since previous assessment  [ ] recalculated:     Previous Nutrition Diagnosis:   [ ] Inadequate Energy Intake [ ]Inadequate Oral Intake [ ] Excessive Energy Intake   [ ] Underweight [ ] Increased Nutrient Needs [ ] Overweight/Obesity   [X] Altered GI Function [ ] Unintended Weight Loss [ ] Food & Nutrition Related Knowledge Deficit [ ] Malnutrition     Nutrition Diagnosis is [X] ongoing  [ ] resolved [ ] not applicable     New Nutrition Diagnosis: [X] not applicable       Interventions:   Recommend  [ ] Change Diet To:  [ ] Nutrition Supplement  [X] Nutrition Support: If po intake deemed contraindicated or patient unable to consume sufficient energy/protein to meet estimated needs via po, recommend initiating tube feeding: Osmolite 1.5 with NoCarb Prosource once daily via tolerated route beginning at 10 ml/hr and titrating to goal of 70 ml/hr x 20 hours which provides 1400 ml formula, 2100 kcal and 103 grams of protein (0.9 grams per kg, 19 kcal/kg).   [X] Other: SLP evaluation when patient agreeable. Advance diet as medically feasible to low fat, diet texture and fluid consistency per SLP. Encourage po intake.     Monitoring and Evaluation:   [X] PO intake [ x ] Tolerance to diet prescription [ x ] weights [ x ] labs[ x ] follow up per protocol  [X] other: swallow ability

## 2020-03-10 NOTE — PROGRESS NOTE ADULT - SUBJECTIVE AND OBJECTIVE BOX
Postoperative Day #: 4    64y Male admitted with Sepsis  S/P Laparoscopic cholecystectomy  .    Patient seen and examined bedside resting comfortably.  Pt unable to answer most ROS. Was able to nod head to allow for exam, and to say no to pain.  pt started on clears- appears to have tolerated some.       T(F): 99.4 (03-10-20 @ 11:25), Max: 99.5 (03-09-20 @ 12:00)  HR: 91 (03-10-20 @ 11:00) (88 - 125)  BP: 156/77 (03-10-20 @ 10:00) (153/71 - 184/89)  RR: 26 (03-10-20 @ 11:00) (7 - 32)  SpO2: 97% (03-10-20 @ 11:00) (94% - 97%)  Wt(kg): --  CAPILLARY BLOOD GLUCOSE          PHYSICAL EXAM:  General: NAD,  Abdomen: Bandages in place.  NT.  BS+   Extremities: no pedal edema or calf tenderness noted       LABS:                        9.5    8.56  )-----------( 196      ( 10 Mar 2020 05:09 )             28.6     03-10    142  |  107  |  16  ----------------------------<  89  3.1<L>   |  23  |  0.60    Ca    8.1<L>      10 Mar 2020 05:09  Phos  3.1     03-10  Mg     1.8     03-10        I&O's Detail    09 Mar 2020 07:01  -  10 Mar 2020 07:00  --------------------------------------------------------  IN:    lactated ringers.: 1725 mL    Oral Fluid: 450 mL    Solution: 200 mL    Solution: 100 mL    Solution: 100 mL    Solution: 249 mL  Total IN: 2824 mL    OUT:    Voided: 880 mL  Total OUT: 880 mL    Total NET: 1944 mL          Impression: 64y Male admitted with Sepsis    PMH Femur fracture, left  MS (multiple sclerosis)      Plan:  -continue VTE prophylaxis- Lovenox   -Increase activity with PT, SCD  -Continue ICU care  -continue local wound care  -Continue clear liquid diet, and advance as tolerated  -will discuss with Dr Marcelino.

## 2020-03-10 NOTE — PROGRESS NOTE ADULT - SUBJECTIVE AND OBJECTIVE BOX
Post op Day: 4      64y Male admitted with Sepsis  S/P Lap cholecystectomy  .    Patient seen and examined bedside resting comfortably.  Pt non responsive.  Shook head no when asked about pain.  Nodded yes when asked if we could examine abdomen.      T(F): 99.4 (03-10-20 @ 11:25), Max: 99.5 (03-09-20 @ 12:00)  HR: 91 (03-10-20 @ 11:00) (88 - 125)  BP: 156/77 (03-10-20 @ 10:00) (153/71 - 184/89)  RR: 26 (03-10-20 @ 11:00) (7 - 32)  SpO2: 97% (03-10-20 @ 11:00) (94% - 97%)  Wt(kg): --  CAPILLARY BLOOD GLUCOSE          PHYSICAL EXAM:  General: NAD, WDWN.   Neuro:  Alert & oriented x 3  HEENT: NCAT, EOMI, conjunctiva clear  CV: +S1+S2 regular rate and rhythm  Lung: clear to ausculation bilaterally  Abdomen: soft, NT, ND. BS+  Extremities: no pedal edema or calf tenderness noted   :     LABS:                        9.5    8.56  )-----------( 196      ( 10 Mar 2020 05:09 )             28.6     03-10    142  |  107  |  16  ----------------------------<  89  3.1<L>   |  23  |  0.60    Ca    8.1<L>      10 Mar 2020 05:09  Phos  3.1     03-10  Mg     1.8     03-10        I&O's Detail    09 Mar 2020 07:01  -  10 Mar 2020 07:00  --------------------------------------------------------  IN:    lactated ringers.: 1725 mL    Oral Fluid: 450 mL    Solution: 200 mL    Solution: 100 mL    Solution: 100 mL    Solution: 249 mL  Total IN: 2824 mL    OUT:    Voided: 880 mL  Total OUT: 880 mL    Total NET: 1944 mL          Impression: 64y Male admitted with Sepsis    PMH Femur fracture, left  MS (multiple sclerosis)      Plan:  -continue VTE prophylaxis   -Increase activity with PT, OOB, Ambulate  -Patient instructed on and encouraged incentive spirometry use  -continue local wound care  -f/u AM labs  -will discuss with

## 2020-03-10 NOTE — PROGRESS NOTE ADULT - ASSESSMENT
Sepsis due to cholecystitis.  ?Gangrenous GB. S/P OR 3/6.  Patient apparently on chronic narcotics potentially masking abdominal symptoms PTA  Patient with what would appear to be fairly advanced MS. Suspect component of central temperature dysregulation accounts for fever to 104F.  Delirium due to acute medical issues, not much improvement  Current antibiotics adequate    3/8- mental status with some small improvement. Fevers down. WBC ~same range. Abdomen less tender.   3/9- ms about the same, afebrile, otherwise stable  3/10- MS appears to have normalized, making good progress

## 2020-03-10 NOTE — SWALLOW BEDSIDE ASSESSMENT ADULT - SWALLOW EVAL: RECOMMENDED FEEDING/EATING TECHNIQUES
maintain upright posture during/after eating for 30 mins/oral hygiene/alternate food with liquid/allow for swallow between intakes/crush medication (when feasible)/position upright (90 degrees)/small sips/bites

## 2020-03-10 NOTE — PROGRESS NOTE ADULT - SUBJECTIVE AND OBJECTIVE BOX
[facility], Division of Infectious Diseases  HARVINDER [facility] A. Lee  [phone]    Name: THANH ISRAEL  Age: 64y  Gender: Male  MRN: [id number]    Interval History--  Notes reviewed. Much more awake alert. Answers questions appropriately. Has no recollection of the last few days. For what it's worth, no complaints at present. Tolerated small amounts of PO earlier. Denies pain. No fevers, chills, or rigors. Eyes are bothering him "but thats what happens with the MS."    Past Medical History--  Femur fracture, left  MS (multiple sclerosis)  No significant past surgical history      For details regarding the patient's social history, family history, and other miscellaneous elements, please refer the initial infectious diseases consultation and/or the admitting history and physical examination for this admission.    Allergies    No Known Allergies    Intolerances        Medications--  Antibiotics:  piperacillin/tazobactam IVPB.. 3.375 Gram(s) IV Intermittent every 8 hours    Immunologic:    Other:  acetaminophen   Tablet .. PRN  amLODIPine   Tablet  chlorhexidine 2% Cloths  enoxaparin Injectable  escitalopram  lactated ringers.  OXcarbazepine  oxybutynin XL  pantoprazole  Injectable  potassium chloride    Tablet ER  pregabalin  Symproic (naldemedine) 0.2 mg tablet 1 Tablet(s)      Review of Systems--  A 10-point review of systems was obtained.   Review of systems otherwise negative except as previously noted.    Physical Examination--  Vital Signs: T(F): 99.6 (03-10-20 @ 15:30), Max: 99.6 (03-10-20 @ 15:30)  HR: 91 (03-10-20 @ 16:00)  BP: 173/79 (03-10-20 @ 16:00)  RR: 24 (03-10-20 @ 16:00)  SpO2: 94% (03-10-20 @ 16:00)  Wt(kg): --  General: Nontoxic-appearing Male in no acute distress.  HEENT: Facial trauma with scabs/ecchymoses R temporal/periorbital area. Anicteric. Conj pink/moist. Meena grossly clear, poor dentition.    Neck: Not rigid. No sense of mass.  Nodes: None palpable.  Lungs: Poor effort grossly clear.   Heart: Regular rate and rhythm. No Murmur. No rub. No gallop. No palpable thrill.  Abdomen: Bowel sounds present and normoactive. Soft. Moderatlely distended. Nontender today. No sense of mass. No organomegaly.  Extremities: No cyanosis or clubbing. 3+ LE edema R>L. Scars R leg c/w prior ?femur surgery.   Skin: Warm. Dry. Good turgor. No rash. No vasculitic stigmata.  Psychiatric: Appropriate mood and afffect for situation        Laboratory Studies--  CBC                        9.5    8.56  )-----------( 196      ( 10 Mar 2020 05:09 )             28.6     WBC Count: 9.73 K/uL (03-09-20 @ 05:14)  WBC Count: 12.25 K/uL (03-08-20 @ 05:58)  WBC Count: 10.17 K/uL (03-07-20 @ 05:42)  WBC Count: 15.03 K/uL (03-06-20 @ 09:09)      Chemistries  03-10    142  |  107  |  16  ----------------------------<  89  3.1<L>   |  23  |  0.60    Ca    8.1<L>      10 Mar 2020 05:09  Phos  3.1     03-10  Mg     1.8     03-10        Culture Data  Culture - Surgical Swab (collected 07 Mar 2020 11:00)  Source: .Surgical Swab gallbladder bile  Preliminary Report (09 Mar 2020 14:06):    Moderate Lactobacillus species Susceptibilites not performed.    Culture - Urine (collected 06 Mar 2020 12:35)  Source: .Urine Clean Catch (Midstream)  Final Report (07 Mar 2020 08:38):    No growth    Culture - Blood (collected 06 Mar 2020 12:34)  Source: .Blood Blood-Venous  Preliminary Report (07 Mar 2020 13:01):    No growth to date.    Culture - Blood (collected 06 Mar 2020 12:34)  Source: .Blood Blood-Venous  Preliminary Report (07 Mar 2020 13:01):    No growth to date.

## 2020-03-10 NOTE — PROGRESS NOTE ADULT - SUBJECTIVE AND OBJECTIVE BOX
Patient is a 64y old  Male who presents with a chief complaint of s/p fall, RUQ pain (10 Mar 2020 11:34)    24 hour events: Patient seen and examined at bedside. No acute events overnight. Continues to minimally participate with conversation but able to answer simple yes/no questions. Denies pain or any acute complaints at this time.      REVIEW OF SYSTEMS: Unable to obtain due to current medical condition.      T(F): 99.4 (03-10-20 @ 11:25), Max: 99.5 (03-09-20 @ 12:00)  HR: 91 (03-10-20 @ 11:00) (88 - 125)  BP: 156/77 (03-10-20 @ 10:00) (153/71 - 184/89)  RR: 26 (03-10-20 @ 11:00) (7 - 32)  SpO2: 97% (03-10-20 @ 11:00) (94% - 97%)      I&O's Summary    03-09 @ 07:01  -  03-10 @ 07:00  --------------------------------------------------------  IN: 2824 mL / OUT: 880 mL / NET: 1944 mL      PHYSICAL EXAM  General:   CNS:   HEENT:   Resp:   CVS:   Abd:   Ext:   Skin:     MEDICATIONS  piperacillin/tazobactam IVPB.. IV Intermittent    hydrALAZINE Injectable IV Push PRN        acetaminophen   Tablet .. Oral PRN  escitalopram Oral  OXcarbazepine Oral  pregabalin Oral      enoxaparin Injectable SubCutaneous    pantoprazole  Injectable IV Push    oxybutynin XL Oral    lactated ringers. IV Continuous  potassium chloride    Tablet ER Oral  potassium chloride  10 mEq/100 mL IVPB IV Intermittent      chlorhexidine 2% Cloths Topical    Symproic (naldemedine) 0.2 mg tablet 1 Tablet(s) Oral                          9.5    8.56  )-----------( 196      ( 10 Mar 2020 05:09 )             28.6       03-10    142  |  107  |  16  ----------------------------<  89  3.1<L>   |  23  |  0.60    Ca    8.1<L>      10 Mar 2020 05:09  Phos  3.1     03-10  Mg     1.8     03-10                .Surgical Swab gallbladder bile   Moderate Lactobacillus species Susceptibilites not performed. -- 03-07 @ 11:00  .Urine Clean Catch (Midstream)   No growth -- 03-06 @ 12:35  .Blood Blood-Venous   No growth to date. -- 03-06 @ 12:34        Radiology: ***  Bedside lung ultrasound: ***  Bedside ECHO: ***    CENTRAL LINE: Y/N          DATE INSERTED:              REMOVE: Y/N  LOMAS: Y/N                        DATE INSERTED:              REMOVE: Y/N  A-LINE: Y/N                       DATE INSERTED:              REMOVE: Y/N    GLOBAL ISSUE/BEST PRACTICE  Analgesia:   Sedation:   CAM-ICU:   HOB elevation: yes  Stress ulcer prophylaxis:   VTE prophylaxis:   Glycemic control:   Nutrition:     CODE STATUS: ***  [facility] discussion: Y Patient is a 64y old  Male who presents with a chief complaint of s/p fall, RUQ pain (10 Mar 2020 11:34)    24 hour events: Patient seen and examined at bedside. No acute events overnight. Continues to minimally participate with conversation but able to answer simple yes/no questions. Denies pain or any acute complaints at this time.      REVIEW OF SYSTEMS: Unable to obtain due to current medical condition.      T(F): 99.4 (03-10-20 @ 11:25), Max: 99.5 (03-09-20 @ 12:00)  HR: 91 (03-10-20 @ 11:00) (88 - 125)  BP: 156/77 (03-10-20 @ 10:00) (153/71 - 184/89)  RR: 26 (03-10-20 @ 11:00) (7 - 32)  SpO2: 97% (03-10-20 @ 11:00) (94% - 97%)      I&O's Summary    03-09 @ 07:01  -  03-10 @ 07:00  --------------------------------------------------------  IN: 2824 mL / OUT: 880 mL / NET: 1944 mL      PHYSICAL EXAM  General: awake, in NAD  CNS: unable to participate in conversation beyond yes/no questions, waxing/waning attention  HEENT: uncooperative with pupil exam, EOMI, dry mucous membranes  Resp: CTA b/l, no w/r/r  CVS: +S1S2, RRR, no m/r/g  Abd: soft, nontender, mild distension unchanged from yesterday, surgical dressings dry, intact with minimal dry blood staining  Ext: b/l LE with 1+ pitting edema   Skin: color normal for race, warm, dry, well perfused      MEDICATIONS  piperacillin/tazobactam IVPB.. IV Intermittent  hydrALAZINE Injectable IV Push PRN  acetaminophen   Tablet .. Oral PRN  escitalopram Oral  OXcarbazepine Oral  pregabalin Oral  enoxaparin Injectable SubCutaneous  pantoprazole  Injectable IV Push  oxybutynin XL Oral  lactated ringers. IV Continuous  potassium chloride    Tablet ER Oral  potassium chloride  10 mEq/100 mL IVPB IV Intermittent  chlorhexidine 2% Cloths Topical  Symproic (naldemedine) 0.2 mg tablet 1 Tablet(s) Oral                              9.5    8.56  )-----------( 196      ( 10 Mar 2020 05:09 )             28.6       03-10    142  |  107  |  16  ----------------------------<  89  3.1<L>   |  23  |  0.60    Ca    8.1<L>      10 Mar 2020 05:09  Phos  3.1     03-10  Mg     1.8     03-10      .Surgical Swab gallbladder bile   Moderate Lactobacillus species Susceptibilites not performed. -- 03-07 @ 11:00  .Urine Clean Catch (Midstream)   No growth -- 03-06 @ 12:35  .Blood Blood-Venous   No growth to date. -- 03-06 @ 12:34      Radiology: ***  Bedside lung ultrasound: ***  Bedside ECHO: ***    CENTRAL LINE: N  LOMAS: Y  A-LINE: N      GLOBAL ISSUE/BEST PRACTICE  Analgesia: Y  Sedation: N  HOB elevation: Y  Stress ulcer prophylaxis: Y   VTE prophylaxis: Y  Glycemic control: Y  Nutrition: Y    CODE STATUS: FULL CODE  [facility] discussion: Y Patient is a 64y old  Male who presents with a chief complaint of s/p fall, RUQ pain (10 Mar 2020 11:34)    24 hour events: Patient seen and examined at bedside. No acute events overnight. Continues to minimally participate with conversation but able to answer simple yes/no questions. Denies pain or any acute complaints at this time.      REVIEW OF SYSTEMS: Unable to obtain due to current medical condition.      T(F): 99.4 (03-10-20 @ 11:25), Max: 99.5 (03-09-20 @ 12:00)  HR: 91 (03-10-20 @ 11:00) (88 - 125)  BP: 156/77 (03-10-20 @ 10:00) (153/71 - 184/89)  RR: 26 (03-10-20 @ 11:00) (7 - 32)  SpO2: 97% (03-10-20 @ 11:00) (94% - 97%)      I&O's Summary    03-09 @ 07:01  -  03-10 @ 07:00  --------------------------------------------------------  IN: 2824 mL / OUT: 880 mL / NET: 1944 mL      PHYSICAL EXAM  General: awake, in NAD  CNS: unable to participate in conversation beyond yes/no questions, waxing/waning attention  HEENT: uncooperative with pupil exam, EOMI, dry mucous membranes  Resp: CTA b/l, no w/r/r  CVS: +S1S2, RRR, no m/r/g  Abd: soft, nontender, mild distension unchanged from yesterday, surgical dressings dry, intact with minimal dry blood staining  Ext: b/l LE with 1+ pitting edema   Skin: color normal for race, warm, dry, well perfused      MEDICATIONS  piperacillin/tazobactam IVPB.. IV Intermittent  hydrALAZINE Injectable IV Push PRN  acetaminophen   Tablet .. Oral PRN  escitalopram Oral  OXcarbazepine Oral  pregabalin Oral  enoxaparin Injectable SubCutaneous  pantoprazole  Injectable IV Push  oxybutynin XL Oral  lactated ringers. IV Continuous  potassium chloride    Tablet ER Oral  potassium chloride  10 mEq/100 mL IVPB IV Intermittent  chlorhexidine 2% Cloths Topical  Symproic (naldemedine) 0.2 mg tablet 1 Tablet(s) Oral                              9.5    8.56  )-----------( 196      ( 10 Mar 2020 05:09 )             28.6       03-10    142  |  107  |  16  ----------------------------<  89  3.1<L>   |  23  |  0.60    Ca    8.1<L>      10 Mar 2020 05:09  Phos  3.1     03-10  Mg     1.8     03-10      .Surgical Swab gallbladder bile   Moderate Lactobacillus species Susceptibilites not performed. -- 03-07 @ 11:00  .Urine Clean Catch (Midstream)   No growth -- 03-06 @ 12:35  .Blood Blood-Venous   No growth to date. -- 03-06 @ 12:34      CENTRAL LINE: N  LOMAS: Y  A-LINE: N      GLOBAL ISSUE/BEST PRACTICE  Analgesia: Y  Sedation: N  HOB elevation: Y  Stress ulcer prophylaxis: Y   VTE prophylaxis: Y  Glycemic control: Y  Nutrition: Y    CODE STATUS: FULL CODE  [facility] discussion: Y

## 2020-03-10 NOTE — SWALLOW BEDSIDE ASSESSMENT ADULT - SWALLOW EVAL: DIAGNOSIS
1. The patient demonstrated functional oral management of puree and thin liquid textures marked by adequate bolus collection, transfer, and posterior transport. 2. The patient demonstrated a mild oral dysphagia for solids marked by delayed bolus collection, transfer, and posterior transport. Trace lingual residue noted subsequent to deglutition which cleared with a liquid wash. 3. The patient demonstrated a mild pharyngeal dysphagia for puree, solid, and thin liquid textures marked by a delayed pharyngeal swallow trigger with reduced hyolaryngeal elevation upon digital palpation w/o evidence of airway penetration.

## 2020-03-10 NOTE — SWALLOW BEDSIDE ASSESSMENT ADULT - ASR SWALLOW ASPIRATION MONITOR
change of breathing pattern/gurgly voice/pneumonia/throat clearing/upper respiratory infection/oral hygiene/position upright (90Y)/cough/fever

## 2020-03-10 NOTE — PROGRESS NOTE ADULT - PROBLEM SELECTOR PLAN 1
Would limit antibiotics to 7 days maximum postop, today day 4  When taking PO reliably can switch to PO Augmentin 8765mg PO Q12H  Postop care per surgery

## 2020-03-11 ENCOUNTER — TRANSCRIPTION ENCOUNTER (OUTPATIENT)
Age: 65
End: 2020-03-11

## 2020-03-11 LAB
ALBUMIN SERPL ELPH-MCNC: 2.4 G/DL — LOW (ref 3.3–5)
ALP SERPL-CCNC: 58 U/L — SIGNIFICANT CHANGE UP (ref 40–120)
ALT FLD-CCNC: 107 U/L — HIGH (ref 12–78)
ANION GAP SERPL CALC-SCNC: 9 MMOL/L — SIGNIFICANT CHANGE UP (ref 5–17)
APTT BLD: 30.9 SEC — SIGNIFICANT CHANGE UP (ref 28.5–37)
AST SERPL-CCNC: 58 U/L — HIGH (ref 15–37)
BASOPHILS # BLD AUTO: 0.06 K/UL — SIGNIFICANT CHANGE UP (ref 0–0.2)
BASOPHILS NFR BLD AUTO: 0.6 % — SIGNIFICANT CHANGE UP (ref 0–2)
BILIRUB SERPL-MCNC: 1.2 MG/DL — SIGNIFICANT CHANGE UP (ref 0.2–1.2)
BUN SERPL-MCNC: 15 MG/DL — SIGNIFICANT CHANGE UP (ref 7–23)
CALCIUM SERPL-MCNC: 8 MG/DL — LOW (ref 8.5–10.1)
CHLORIDE SERPL-SCNC: 109 MMOL/L — HIGH (ref 96–108)
CO2 SERPL-SCNC: 25 MMOL/L — SIGNIFICANT CHANGE UP (ref 22–31)
CREAT SERPL-MCNC: 0.57 MG/DL — SIGNIFICANT CHANGE UP (ref 0.5–1.3)
CULTURE RESULTS: SIGNIFICANT CHANGE UP
CULTURE RESULTS: SIGNIFICANT CHANGE UP
EOSINOPHIL # BLD AUTO: 0.27 K/UL — SIGNIFICANT CHANGE UP (ref 0–0.5)
EOSINOPHIL NFR BLD AUTO: 2.6 % — SIGNIFICANT CHANGE UP (ref 0–6)
GLUCOSE SERPL-MCNC: 97 MG/DL — SIGNIFICANT CHANGE UP (ref 70–99)
HCT VFR BLD CALC: 27.6 % — LOW (ref 39–50)
HGB BLD-MCNC: 9.3 G/DL — LOW (ref 13–17)
IMM GRANULOCYTES NFR BLD AUTO: 0.6 % — SIGNIFICANT CHANGE UP (ref 0–1.5)
INR BLD: 1.46 RATIO — HIGH (ref 0.88–1.16)
LYMPHOCYTES # BLD AUTO: 1.26 K/UL — SIGNIFICANT CHANGE UP (ref 1–3.3)
LYMPHOCYTES # BLD AUTO: 11.9 % — LOW (ref 13–44)
MAGNESIUM SERPL-MCNC: 1.7 MG/DL — SIGNIFICANT CHANGE UP (ref 1.6–2.6)
MCHC RBC-ENTMCNC: 30.8 PG — SIGNIFICANT CHANGE UP (ref 27–34)
MCHC RBC-ENTMCNC: 33.7 GM/DL — SIGNIFICANT CHANGE UP (ref 32–36)
MCV RBC AUTO: 91.4 FL — SIGNIFICANT CHANGE UP (ref 80–100)
MONOCYTES # BLD AUTO: 0.86 K/UL — SIGNIFICANT CHANGE UP (ref 0–0.9)
MONOCYTES NFR BLD AUTO: 8.2 % — SIGNIFICANT CHANGE UP (ref 2–14)
NEUTROPHILS # BLD AUTO: 8.04 K/UL — HIGH (ref 1.8–7.4)
NEUTROPHILS NFR BLD AUTO: 76.1 % — SIGNIFICANT CHANGE UP (ref 43–77)
NRBC # BLD: 0 /100 WBCS — SIGNIFICANT CHANGE UP (ref 0–0)
PHOSPHATE SERPL-MCNC: 2.5 MG/DL — SIGNIFICANT CHANGE UP (ref 2.5–4.5)
PLATELET # BLD AUTO: 210 K/UL — SIGNIFICANT CHANGE UP (ref 150–400)
POTASSIUM SERPL-MCNC: 3.4 MMOL/L — LOW (ref 3.5–5.3)
POTASSIUM SERPL-SCNC: 3.4 MMOL/L — LOW (ref 3.5–5.3)
PROT SERPL-MCNC: 5.7 G/DL — LOW (ref 6–8.3)
PROTHROM AB SERPL-ACNC: 16.5 SEC — HIGH (ref 10–12.9)
RBC # BLD: 3.02 M/UL — LOW (ref 4.2–5.8)
RBC # FLD: 13.5 % — SIGNIFICANT CHANGE UP (ref 10.3–14.5)
SODIUM SERPL-SCNC: 143 MMOL/L — SIGNIFICANT CHANGE UP (ref 135–145)
SPECIMEN SOURCE: SIGNIFICANT CHANGE UP
SPECIMEN SOURCE: SIGNIFICANT CHANGE UP
WBC # BLD: 10.55 K/UL — HIGH (ref 3.8–10.5)
WBC # FLD AUTO: 10.55 K/UL — HIGH (ref 3.8–10.5)

## 2020-03-11 PROCEDURE — 99232 SBSQ HOSP IP/OBS MODERATE 35: CPT

## 2020-03-11 RX ADMIN — Medication 40 MILLIEQUIVALENT(S): at 15:02

## 2020-03-11 RX ADMIN — AMLODIPINE BESYLATE 10 MILLIGRAM(S): 2.5 TABLET ORAL at 05:17

## 2020-03-11 RX ADMIN — CHLORHEXIDINE GLUCONATE 1 APPLICATION(S): 213 SOLUTION TOPICAL at 05:16

## 2020-03-11 RX ADMIN — SODIUM CHLORIDE 75 MILLILITER(S): 9 INJECTION, SOLUTION INTRAVENOUS at 05:18

## 2020-03-11 RX ADMIN — OXCARBAZEPINE 300 MILLIGRAM(S): 300 TABLET, FILM COATED ORAL at 05:17

## 2020-03-11 RX ADMIN — ENOXAPARIN SODIUM 40 MILLIGRAM(S): 100 INJECTION SUBCUTANEOUS at 21:14

## 2020-03-11 RX ADMIN — Medication 15 MILLIGRAM(S): at 18:07

## 2020-03-11 RX ADMIN — Medication 150 MILLIGRAM(S): at 15:02

## 2020-03-11 RX ADMIN — PANTOPRAZOLE SODIUM 40 MILLIGRAM(S): 20 TABLET, DELAYED RELEASE ORAL at 15:01

## 2020-03-11 RX ADMIN — PIPERACILLIN AND TAZOBACTAM 25 GRAM(S): 4; .5 INJECTION, POWDER, LYOPHILIZED, FOR SOLUTION INTRAVENOUS at 05:16

## 2020-03-11 RX ADMIN — Medication 1 TABLET(S): at 18:06

## 2020-03-11 RX ADMIN — Medication 15 MILLIGRAM(S): at 05:17

## 2020-03-11 RX ADMIN — Medication 150 MILLIGRAM(S): at 21:14

## 2020-03-11 RX ADMIN — Medication 150 MILLIGRAM(S): at 05:17

## 2020-03-11 RX ADMIN — ESCITALOPRAM OXALATE 20 MILLIGRAM(S): 10 TABLET, FILM COATED ORAL at 12:57

## 2020-03-11 RX ADMIN — OXCARBAZEPINE 300 MILLIGRAM(S): 300 TABLET, FILM COATED ORAL at 18:06

## 2020-03-11 NOTE — PROGRESS NOTE ADULT - SUBJECTIVE AND OBJECTIVE BOX
CHARTING IN PROGRESS        Patient is a 64y old  Male who presents with a chief complaint of s/p fall, RUQ pain (10 Mar 2020 21:30)    24 hour events: ***    REVIEW OF SYSTEMS  Constitutional: No fever, chills, fatigue  Neuro: No headache, numbness, weakness  Resp: No cough, wheezing, shortness of breath  CVS: No chest pain, palpitations, leg swelling  GI: No abdominal pain, nausea, vomiting, diarrhea   : No dysuria, frequency, incontinence  Skin: No itching, burning, rashes, or lesions   Msk: No joint pain or swelling  Psych: No depression, anxiety, mood swings  Heme: No bleeding    T(F): 99.5 (03-11-20 @ 00:00), Max: 99.6 (03-10-20 @ 15:30)  HR: 90 (03-11-20 @ 07:00) (87 - 98)  BP: 152/73 (03-11-20 @ 07:00) (145/82 - 173/85)  RR: 22 (03-11-20 @ 07:00) (7 - 31)  SpO2: 93% (03-11-20 @ 07:00) (91% - 97%)  Wt(kg): --            I&O's Summary    03-10 @ 07:01  -  03-11 @ 07:00  --------------------------------------------------------  IN: 2910 mL / OUT: 1220 mL / NET: 1690 mL      PHYSICAL EXAM  General:   CNS:   HEENT:   Resp:   CVS:   Abd:   Ext:   Skin:     MEDICATIONS  piperacillin/tazobactam IVPB.. IV Intermittent    amLODIPine   Tablet Oral        acetaminophen   Tablet .. Oral PRN  escitalopram Oral  OXcarbazepine Oral  pregabalin Oral      enoxaparin Injectable SubCutaneous    pantoprazole  Injectable IV Push    oxybutynin XL Oral    lactated ringers. IV Continuous  potassium chloride    Tablet ER Oral      chlorhexidine 2% Cloths Topical    Symproic (naldemedine) 0.2 mg tablet 1 Tablet(s) Oral                          9.3    10.55 )-----------( 210      ( 11 Mar 2020 05:12 )             27.6       03-11    143  |  109<H>  |  15  ----------------------------<  97  3.4<L>   |  25  |  0.57    Ca    8.0<L>      11 Mar 2020 05:12  Phos  2.5     03-11  Mg     1.7     03-11    TPro  5.7<L>  /  Alb  2.4<L>  /  TBili  1.2  /  DBili  x   /  AST  58<H>  /  ALT  107<H>  /  AlkPhos  58  03-11          PT/INR - ( 11 Mar 2020 05:12 )   PT: 16.5 sec;   INR: 1.46 ratio         PTT - ( 11 Mar 2020 05:12 )  PTT:30.9 sec    .Surgical Swab gallbladder bile   Moderate Lactobacillus species Susceptibilites not performed. -- 03-07 @ 11:00  .Urine Clean Catch (Midstream)   No growth -- 03-06 @ 12:35  .Blood Blood-Venous   No growth to date. -- 03-06 @ 12:34        Radiology: ***  Bedside lung ultrasound: ***  Bedside ECHO: ***    CENTRAL LINE: Y/N          DATE INSERTED:              REMOVE: Y/N  LOMAS: Y/N                        DATE INSERTED:              REMOVE: Y/N  A-LINE: Y/N                       DATE INSERTED:              REMOVE: Y/N    GLOBAL ISSUE/BEST PRACTICE  Analgesia:   Sedation:   CAM-ICU:   HOB elevation: yes  Stress ulcer prophylaxis:   VTE prophylaxis:   Glycemic control:   Nutrition:     CODE STATUS: ***  [facility] discussion: Y Patient is a 64y old  Male who presents with a chief complaint of s/p fall, RUQ pain (10 Mar 2020 21:30)    24 hour events: ***    REVIEW OF SYSTEMS  Constitutional: No fever, chills, fatigue  Neuro: No headache, numbness, weakness  Resp: No cough, wheezing, shortness of breath  CVS: No chest pain, palpitations, leg swelling  GI: No abdominal pain, nausea, vomiting, diarrhea   : No dysuria, frequency, incontinence  Skin: No itching, burning, rashes, or lesions   Msk: No joint pain or swelling  Psych: No depression, anxiety, mood swings  Heme: No bleeding    T(F): 99.5 (03-11-20 @ 00:00), Max: 99.6 (03-10-20 @ 15:30)  HR: 90 (03-11-20 @ 07:00) (87 - 98)  BP: 152/73 (03-11-20 @ 07:00) (145/82 - 173/85)  RR: 22 (03-11-20 @ 07:00) (7 - 31)  SpO2: 93% (03-11-20 @ 07:00) (91% - 97%)  Wt(kg): --            I&O's Summary    03-10 @ 07:01  -  03-11 @ 07:00  --------------------------------------------------------  IN: 2910 mL / OUT: 1220 mL / NET: 1690 mL      PHYSICAL EXAM  General:   CNS:   HEENT:   Resp:   CVS:   Abd:   Ext:   Skin:     MEDICATIONS  piperacillin/tazobactam IVPB.. IV Intermittent    amLODIPine   Tablet Oral        acetaminophen   Tablet .. Oral PRN  escitalopram Oral  OXcarbazepine Oral  pregabalin Oral      enoxaparin Injectable SubCutaneous    pantoprazole  Injectable IV Push    oxybutynin XL Oral    lactated ringers. IV Continuous  potassium chloride    Tablet ER Oral      chlorhexidine 2% Cloths Topical    Symproic (naldemedine) 0.2 mg tablet 1 Tablet(s) Oral                          9.3    10.55 )-----------( 210      ( 11 Mar 2020 05:12 )             27.6       03-11    143  |  109<H>  |  15  ----------------------------<  97  3.4<L>   |  25  |  0.57    Ca    8.0<L>      11 Mar 2020 05:12  Phos  2.5     03-11  Mg     1.7     03-11    TPro  5.7<L>  /  Alb  2.4<L>  /  TBili  1.2  /  DBili  x   /  AST  58<H>  /  ALT  107<H>  /  AlkPhos  58  03-11          PT/INR - ( 11 Mar 2020 05:12 )   PT: 16.5 sec;   INR: 1.46 ratio         PTT - ( 11 Mar 2020 05:12 )  PTT:30.9 sec    .Surgical Swab gallbladder bile   Moderate Lactobacillus species Susceptibilites not performed. -- 03-07 @ 11:00  .Urine Clean Catch (Midstream)   No growth -- 03-06 @ 12:35  .Blood Blood-Venous   No growth to date. -- 03-06 @ 12:34        Radiology: ***  Bedside lung ultrasound: ***  Bedside ECHO: ***    CENTRAL LINE: Y/N          DATE INSERTED:              REMOVE: Y/N  LOMAS: Y/N                        DATE INSERTED:              REMOVE: Y/N  A-LINE: Y/N                       DATE INSERTED:              REMOVE: Y/N    GLOBAL ISSUE/BEST PRACTICE  Analgesia:   Sedation:   CAM-ICU:   HOB elevation: yes  Stress ulcer prophylaxis:   VTE prophylaxis:   Glycemic control:   Nutrition:     CODE STATUS: ***  [facility] discussion: Y

## 2020-03-11 NOTE — PROGRESS NOTE ADULT - ASSESSMENT
65 yo M presented to the ER with altered mental status 2/2 to suspected cholecystitis. PMH Primary Progressive Multiple Sclerosis, Chronic pain.     sepsis sec to Acute  cholecystitis POA with metabolic encephalopathy   - sp cholecystectomy  - cw antibiotics as per ID, switched to oral   - ID and surgery fu     Primary Progressive MS:   - neuro fu      Depression: continue Lexapro 20mg daily    dc planning as per surgery  medically stable for discharge

## 2020-03-11 NOTE — PROGRESS NOTE ADULT - SUBJECTIVE AND OBJECTIVE BOX
Patient is a 64y old  Male who presents with a chief complaint of s/p fall, RUQ pain (11 Mar 2020 12:27)      INTERVAL HPI/OVERNIGHT EVENTS:  T(C): 36.9 (03-11-20 @ 15:37), Max: 37.7 (03-11-20 @ 07:40)  HR: 83 (03-11-20 @ 15:37) (83 - 98)  BP: 132/75 (03-11-20 @ 15:37) (132/75 - 179/77)  RR: 18 (03-11-20 @ 15:37) (18 - 31)  SpO2: 97% (03-11-20 @ 15:37) (91% - 98%)  Wt(kg): --  I&O's Summary    10 Mar 2020 07:01  -  11 Mar 2020 07:00  --------------------------------------------------------  IN: 2910 mL / OUT: 1220 mL / NET: 1690 mL        LABS:                        9.3    10.55 )-----------( 210      ( 11 Mar 2020 05:12 )             27.6     03-11    143  |  109<H>  |  15  ----------------------------<  97  3.4<L>   |  25  |  0.57    Ca    8.0<L>      11 Mar 2020 05:12  Phos  2.5     03-11  Mg     1.7     03-11    TPro  5.7<L>  /  Alb  2.4<L>  /  TBili  1.2  /  DBili  x   /  AST  58<H>  /  ALT  107<H>  /  AlkPhos  58  03-11    PT/INR - ( 11 Mar 2020 05:12 )   PT: 16.5 sec;   INR: 1.46 ratio         PTT - ( 11 Mar 2020 05:12 )  PTT:30.9 sec    CAPILLARY BLOOD GLUCOSE                MEDICATIONS  (STANDING):  amLODIPine   Tablet 10 milliGRAM(s) Oral daily  amoxicillin  875 milliGRAM(s)/clavulanate 1 Tablet(s) Oral two times a day  chlorhexidine 2% Cloths 1 Application(s) Topical <User Schedule>  enoxaparin Injectable 40 milliGRAM(s) SubCutaneous at bedtime  escitalopram 20 milliGRAM(s) Oral daily  lactated ringers. 1000 milliLiter(s) (75 mL/Hr) IV Continuous <Continuous>  OXcarbazepine 300 milliGRAM(s) Oral two times a day  oxybutynin XL 15 milliGRAM(s) Oral <User Schedule>  pantoprazole  Injectable 40 milliGRAM(s) IV Push daily  potassium chloride    Tablet ER 40 milliEquivalent(s) Oral daily  pregabalin 150 milliGRAM(s) Oral three times a day  Symproic (naldemedine) 0.2 mg tablet 1 Tablet(s) 1 Tablet(s) Oral daily    MEDICATIONS  (PRN):  acetaminophen   Tablet .. 650 milliGRAM(s) Oral every 6 hours PRN Mild Pain (1 - 3)          PHYSICAL EXAM:  GENERAL: frail  CHEST/LUNG: Coarse breath sounds bilaterally   HEART: S1S2+  ABDOMEN: Soft, Nontender, Nondistended; Bowel sounds present  EXTREMITIES: edema+    Care Discussed with Consultants/Other Providers [+ ] YES  [ ] NO

## 2020-03-11 NOTE — DISCHARGE NOTE PROVIDER - NSDCCPCAREPLAN_GEN_ALL_CORE_FT
PRINCIPAL DISCHARGE DIAGNOSIS  Diagnosis: Cholecystitis  Assessment and Plan of Treatment:       SECONDARY DISCHARGE DIAGNOSES  Diagnosis: Delirium  Assessment and Plan of Treatment: Delirium    Diagnosis: Sepsis  Assessment and Plan of Treatment:

## 2020-03-11 NOTE — DISCHARGE NOTE PROVIDER - HOSPITAL COURSE
HPI from ED 3/6/20:    64 year old wheelchair bound male with PMhx of multiple sclerosis, left hip disarticulation and Right hip fracture s/p IM nail in 2018 who presents to the ED with AMS s/p fall  forward out of the motorized chair and landed on the ground striking the right side of his face.  Patient was found today by his wife with altered mental status with confusion and responsive to pain, EMS called, fever of 102/104 F noted upon arrival. Patient unable to provide any significant history at this time as he is A and O x 1 to person. Wife / daughter states patient with no history of memory issues in the past and his new mental status change is new. Noted that patient was lethargic last night but family denies any fevers, chills, nausea, vomiting or patient complaints of abdominal pain.        Hospital Course:    Labs drawn in ED revealed WBC 15.03, blood cultures drawn were negative after 5 days.  CT abdomen/pelvis/chest with findings of: trace bilateral pleural effusions and mild interlobular septal thickening, mild thickening of the proximal esophagus which contains a small amount of debris, markedly distended gallbladder. Correlate with ultrasound to evaluate for cholecystitis, underdistended urinary bladder with diffuse wall thickening, and mild presacral edema, nonspecific.    Negative maxillofacial and head CT.  Flu and RSV tests were negative.    Patient admitted, given a dose of zosyn, ceftriaxone, and azithromycin, and taken to OR the night of 3/6/20 for laparoscopic cholecystectomy which was performed without complication, with findings of a markedly distended thin walled ischemic-colored gallbladder with black bile.  Patient remained intubated and was transferred to the ICU for close monitoring (patient had an episode of hypoxia prior to the case, and prior CXR with some pulmonary congestion). HPI from ED 3/6/20:    64 year old wheelchair bound male with PMhx of multiple sclerosis, left hip disarticulation and Right hip fracture s/p IM nail in 2018 who presents to the ED with AMS s/p fall  forward out of the motorized chair and landed on the ground striking the right side of his face.  Patient was found today by his wife with altered mental status with confusion and responsive to pain, EMS called, fever of 102/104 F noted upon arrival. Patient unable to provide any significant history at this time as he is A and O x 1 to person. Wife / daughter states patient with no history of memory issues in the past and his new mental status change is new. Noted that patient was lethargic last night but family denies any fevers, chills, nausea, vomiting or patient complaints of abdominal pain.        Hospital Course:    Labs drawn in ED revealed WBC 15.03, blood cultures drawn were negative after 5 days.  CT abdomen/pelvis/chest with findings of: trace bilateral pleural effusions and mild interlobular septal thickening, mild thickening of the proximal esophagus which contains a small amount of debris, markedly distended gallbladder. Correlate with ultrasound to evaluate for cholecystitis, underdistended urinary bladder with diffuse wall thickening, and mild presacral edema, nonspecific.    HIDA scan also performed which was positive for acute cholecystitis.    Negative maxillofacial and head CT.  Flu and RSV tests were negative.    Patient admitted, given a dose of zosyn, ceftriaxone, and azithromycin, and taken to OR the night of 3/6/20 for laparoscopic cholecystectomy which was performed without complication, with findings of a markedly distended thin walled ischemic-colored gallbladder with black bile.  Patient remained intubated and was transferred to the ICU for close monitoring (patient had an episode of hypoxia prior to the case, and prior CXR with some pulmonary congestion), seen by ID, continued on zosyn.  Post-op course complicated by encephalopathy/delirium and HTN.  On POD#1, balloon of ET tube ruptured, patient alert and oxygenating well, extubated at that time.  Neurology and medicine consulted for management of AMS, MS, and HTN.  On POD#3, patient had some RLE edema, doppler was negative for DVT.  WBC trended down, patient remained afebrile, and delirium slowly improved.  On POD#4, patient was started on a clear liquid diet, transferred to the regular floor, evaluated by speech and swallow.  Diet advanced as tolerated, patient restarted on oral home MS meds.  On POD#5, antibiotics switched to PO augmentin per ID recommendation.  Upon discharge, patient is alert and oriented, tolerating regular diet, and pain is controlled.

## 2020-03-11 NOTE — DISCHARGE NOTE PROVIDER - CARE PROVIDER_API CALL
Yovany Marcelino)  [employer] [facility]  [address], [address]  [place] [address]  Phone: [phone]  Fax: [phone]  Follow Up Time:

## 2020-03-11 NOTE — DISCHARGE NOTE PROVIDER - NSDCFUADDINST_GEN_ALL_CORE_FT
Apply water proof ice pack 20 mins on, 20 mins off to help decrease pain and swelling. You may shower as usual but Do NOT remove steri-strips. Do not scrub or soak incision site. Pat dry. NO tub baths, NO swimming pools. No hot tubs.  No heavy lifting over 15 lbs.

## 2020-03-11 NOTE — PROGRESS NOTE ADULT - SUBJECTIVE AND OBJECTIVE BOX
Tmax 99.9  VSS  Alert and tolerating dysphagia diet  No complaints  Abd: nontender nondistended, dressings removed, healing well  WBC 10.55  P: change antibiotic to augmentin PO, regular diet, repeat WBC in AM, discharge tomorrow if OK with all consultants

## 2020-03-11 NOTE — PROGRESS NOTE ADULT - ATTENDING COMMENTS
64M h/o advanced multiple sclerosis, wheelchair bound, presenting with fall and AMS, found to have acute gangrenous cholecystitis, POD5 s/p lap cholecystitis c/b postop encephalopathy +/- delirium thought 2/2 ?lyrica/oxycarbazapine withdrawal vs metabolic encephalopathy and intermittent episodes of hypertension now greatly improved.      Neuro: encephalopathy +/- delirium much improved, suspect multifactorial w/ metabolic encepahopathy +/- ?lyrica/oxycarbazapine withdrawal; pain well controlled, limit opioid use   CV: continue amlodipine, if BP remains elevated can add ACE-i  Resp : no acute issues   GI: s/p lap mukul, now swallowing, tolerating full diet  Renal: normal renal function, monitor I/Os  ID: continue zosyn #5/5 for acute ?gangrenous cholecystitis can dc after todays doses complete  Heme: H/H stable, likely iatrogenic from blood draws, expected post-op changes, will trend; continue lovenox dvt ppx  --plan discussed with wife and mother at bedside in detail; Pt eval ordered given improved mental status - stable for transfer to the floor.

## 2020-03-11 NOTE — DISCHARGE NOTE PROVIDER - NSDCMRMEDTOKEN_GEN_ALL_CORE_FT
diclofenac sodium extended release: orally once a day  enoxaparin: 40 milligram(s) subcutaneous once a day continue for 30 days  escitalopram 20 mg oral tablet: 1 tab(s) orally once a day  OXcarbazepine 300 mg oral tablet: 1 tab(s) orally 2 times a day  oxyCODONE 10 mg oral tablet: 1 tab(s) orally every 4 hours, As needed, Moderate Pain (4 - 6)  oxyCODONE 5 mg oral tablet: 1 tab(s) orally every 4 hours, As needed, Mild Pain (1 - 3)  pregabalin 150 mg oral capsule: orally 3 times  Symproic 0.2 mg oral tablet: 1 tab(s) orally once a day diclofenac sodium extended release: orally once a day  escitalopram 20 mg oral tablet: 1 tab(s) orally once a day  OXcarbazepine 300 mg oral tablet: 1 tab(s) orally 2 times a day  pregabalin 150 mg oral capsule: orally 3 times  Symproic 0.2 mg oral tablet: 1 tab(s) orally once a day amoxicillin-clavulanate 875 mg-125 mg oral tablet: 1 tab(s) orally 2 times a day  diclofenac sodium extended release: orally once a day  escitalopram 20 mg oral tablet: 1 tab(s) orally once a day  OXcarbazepine 300 mg oral tablet: 1 tab(s) orally 2 times a day  pregabalin 150 mg oral capsule: orally 3 times  Symproic 0.2 mg oral tablet: 1 tab(s) orally once a day

## 2020-03-11 NOTE — PROGRESS NOTE ADULT - ASSESSMENT
63yo wheelchair-bound M, with PMH/o advanced MS, L hip disarticulation, and R hip fracture s/p IMN in 2018, p/w AMS s/p fall out of motorized chair, found to have acute cholecystitis, now s/p lap mukul, requiring ICU-level care d/t pre-op hypoxia with post-op course complicated by encephalopathy/delirium and HTN.      NEURO:    CV:    PULM:    GI:    :    ID:    HEME:    ENDO:    DISPO:

## 2020-03-11 NOTE — PROGRESS NOTE ADULT - SUBJECTIVE AND OBJECTIVE BOX
Neurology follow up note    THANH [id number]      Interval History:    Patient feels ok no new complaints.    MEDICATIONS    acetaminophen   Tablet .. 650 milliGRAM(s) Oral every 6 hours PRN  amLODIPine   Tablet 10 milliGRAM(s) Oral daily  chlorhexidine 2% Cloths 1 Application(s) Topical <User Schedule>  enoxaparin Injectable 40 milliGRAM(s) SubCutaneous at bedtime  escitalopram 20 milliGRAM(s) Oral daily  lactated ringers. 1000 milliLiter(s) IV Continuous <Continuous>  OXcarbazepine 300 milliGRAM(s) Oral two times a day  oxybutynin XL 15 milliGRAM(s) Oral <User Schedule>  pantoprazole  Injectable 40 milliGRAM(s) IV Push daily  piperacillin/tazobactam IVPB.. 3.375 Gram(s) IV Intermittent every 8 hours  potassium chloride    Tablet ER 40 milliEquivalent(s) Oral daily  pregabalin 150 milliGRAM(s) Oral three times a day  Symproic (naldemedine) 0.2 mg tablet 1 Tablet(s) 1 Tablet(s) Oral daily      Allergies    No Known Allergies    Intolerances            Vital Signs Last 24 Hrs  T(C): 36.9 (11 Mar 2020 08:46), Max: 37.7 (11 Mar 2020 07:40)  T(F): 98.4 (11 Mar 2020 08:46), Max: 99.9 (11 Mar 2020 07:40)  HR: 83 (11 Mar 2020 08:46) (83 - 98)  BP: 179/77 (11 Mar 2020 08:46) (145/82 - 179/77)  BP(mean): 105 (11 Mar 2020 07:00) (98 - 121)  RR: 20 (11 Mar 2020 08:46) (20 - 31)  SpO2: 98% (11 Mar 2020 08:46) (91% - 98%)      REVIEW OF SYSTEMS:    Constitutional: No fever, chills, fatigue, weakness  Eyes: no eye pain, visual disturbances, or discharge  ENT:  No difficulty hearing, tinnitus, vertigo; No sinus or throat pain  Neck: No pain or stiffness  Respiratory: No cough, dyspnea, wheezing   Cardiovascular: No chest pain, palpitations,   Gastrointestinal: No abdominal or epigastric pain. No nausea, vomiting  No diarrhea or constipation.   Genitourinary: No dysuria, frequency, hematuria or incontinence  Neurological: No headaches, lightheadedness, vertigo, numbness or tremors  Psychiatric: No depression, anxiety, mood swings or difficulty sleeping  Musculoskeletal: No joint pain or swelling; No muscle, back or extremity pain  Skin: No itching, burning, rashes or lesions   Lymph Nodes: No enlarged glands  Endocrine: No heat or cold intolerance; No hair loss   Allergy and Immunologic: No hives or eczema    On Neurological Examination:    Mental Status - Patient is alert, awake      Follow simple commands  Follow complex commands    Speech -   Fluent                             Cranial Nerves - Pupils 3 mm equal and reactive to light,   extraocular eye movements intact.   smile symmetric  intact bilateral NLF    Motor:  Left upper, positive flexation, extension at the elbow and opening and closing of the hand would say overall 2/5.  Right upper and bilateral lower extremities to command, no movement.  Upon plantar stimulation, subtle movement of his feet.  The patient is normally wheelchair bound.  Positive edema was noted in bilateral lower extremities.      With stimuli positive movement of all 4 extremities    Muscle tone - is normal all over.  No asymmetry is seen.      Sensory    Bilateral intact to light touch    GENERAL Exam: Nontoxic , No Acute Distress   	  HEENT:  normocephalic, atraumatic  		  LUNGS: Clear bilaterally    	  HEART: Normal S1S2   No murmur RRR        	  GI/ ABDOMEN:  Soft  Non tender    EXTREMITIES:   No Edema  No Clubbing  No Cyanosis     MUSCULOSKELETAL: Normal Range of Motion  	   SKIN: Normal  No Ecchymosis               LABS:  CBC Full  -  ( 11 Mar 2020 05:12 )  WBC Count : 10.55 K/uL  RBC Count : 3.02 M/uL  Hemoglobin : 9.3 g/dL  Hematocrit : 27.6 %  Platelet Count - Automated : 210 K/uL  Mean Cell Volume : 91.4 fl  Mean Cell Hemoglobin : 30.8 pg  Mean Cell Hemoglobin Concentration : 33.7 gm/dL  Auto Neutrophil # : 8.04 K/uL  Auto Lymphocyte # : 1.26 K/uL  Auto Monocyte # : 0.86 K/uL  Auto Eosinophil # : 0.27 K/uL  Auto Basophil # : 0.06 K/uL  Auto Neutrophil % : 76.1 %  Auto Lymphocyte % : 11.9 %  Auto Monocyte % : 8.2 %  Auto Eosinophil % : 2.6 %  Auto Basophil % : 0.6 %      03-11    143  |  109<H>  |  15  ----------------------------<  97  3.4<L>   |  25  |  0.57    Ca    8.0<L>      11 Mar 2020 05:12  Phos  2.5     03-11  Mg     1.7     03-11    TPro  5.7<L>  /  Alb  2.4<L>  /  TBili  1.2  /  DBili  x   /  AST  58<H>  /  ALT  107<H>  /  AlkPhos  58  03-11    Hemoglobin A1C:     LIVER FUNCTIONS - ( 11 Mar 2020 05:12 )  Alb: 2.4 g/dL / Pro: 5.7 g/dL / ALK PHOS: 58 U/L / ALT: 107 U/L / AST: 58 U/L / GGT: x           Vitamin B12   PT/INR - ( 11 Mar 2020 05:12 )   PT: 16.5 sec;   INR: 1.46 ratio         PTT - ( 11 Mar 2020 05:12 )  PTT:30.9 sec      RADIOLOGY    ANALYSIS AND PLAN:  This is a 64-year-old with episode of change in mental status, history of multiple sclerosis, neuropathic pain, and trigeminal neuralgia.  1.	For altered mental status, suspect most likely metabolic encephalopathy which could be secondary to a history of temperature, possibly underlying infectious type process, questionable this could be any type of medication withdrawal from Lyrica, questionable could be multiple sclerosis exacerbation secondary to underlying general medical issues along with temperature.  2.	I would recommend infectious workup as needed and antibiotics as needed.  3.	For history of multiple sclerosis, appears to be fairly advanced, would recommend supportive therapy.  4.	For neuropathic pain and trigeminal neuralgia, when possible recommend to re-institute patient's Lyrica and Trileptal.  5.	I would check out other causes of metabolic etiology.  6.	Monitor oral intake.  7.	Surgical followup as needed   Spoke with mother at bedside.   Her name is Alisson.  Her contact number is [phone]  [facility] Physical therapy evaluation if possible and as tolerated.  overall more interactive  today   OOB to chair/ambulation with assistance only if possible.    Greater than 30 minutes spent in direct patient care reviewing  the notes, lab data/ imaging , discussion with multidisciplinary team.

## 2020-03-12 ENCOUNTER — TRANSCRIPTION ENCOUNTER (OUTPATIENT)
Age: 65
End: 2020-03-12

## 2020-03-12 VITALS
TEMPERATURE: 98 F | DIASTOLIC BLOOD PRESSURE: 75 MMHG | RESPIRATION RATE: 20 BRPM | HEART RATE: 77 BPM | SYSTOLIC BLOOD PRESSURE: 147 MMHG | OXYGEN SATURATION: 95 %

## 2020-03-12 LAB
ANION GAP SERPL CALC-SCNC: 10 MMOL/L — SIGNIFICANT CHANGE UP (ref 5–17)
BUN SERPL-MCNC: 10 MG/DL — SIGNIFICANT CHANGE UP (ref 7–23)
CALCIUM SERPL-MCNC: 8.2 MG/DL — LOW (ref 8.5–10.1)
CHLORIDE SERPL-SCNC: 108 MMOL/L — SIGNIFICANT CHANGE UP (ref 96–108)
CO2 SERPL-SCNC: 25 MMOL/L — SIGNIFICANT CHANGE UP (ref 22–31)
CREAT SERPL-MCNC: 0.46 MG/DL — LOW (ref 0.5–1.3)
CULTURE RESULTS: SIGNIFICANT CHANGE UP
GLUCOSE SERPL-MCNC: 87 MG/DL — SIGNIFICANT CHANGE UP (ref 70–99)
HCT VFR BLD CALC: 28.5 % — LOW (ref 39–50)
HGB BLD-MCNC: 9.3 G/DL — LOW (ref 13–17)
MCHC RBC-ENTMCNC: 30.3 PG — SIGNIFICANT CHANGE UP (ref 27–34)
MCHC RBC-ENTMCNC: 32.6 GM/DL — SIGNIFICANT CHANGE UP (ref 32–36)
MCV RBC AUTO: 92.8 FL — SIGNIFICANT CHANGE UP (ref 80–100)
NRBC # BLD: 0 /100 WBCS — SIGNIFICANT CHANGE UP (ref 0–0)
PLATELET # BLD AUTO: 237 K/UL — SIGNIFICANT CHANGE UP (ref 150–400)
POTASSIUM SERPL-MCNC: 3.4 MMOL/L — LOW (ref 3.5–5.3)
POTASSIUM SERPL-SCNC: 3.4 MMOL/L — LOW (ref 3.5–5.3)
RBC # BLD: 3.07 M/UL — LOW (ref 4.2–5.8)
RBC # FLD: 13.7 % — SIGNIFICANT CHANGE UP (ref 10.3–14.5)
SODIUM SERPL-SCNC: 143 MMOL/L — SIGNIFICANT CHANGE UP (ref 135–145)
SPECIMEN SOURCE: SIGNIFICANT CHANGE UP
WBC # BLD: 10.22 K/UL — SIGNIFICANT CHANGE UP (ref 3.8–10.5)
WBC # FLD AUTO: 10.22 K/UL — SIGNIFICANT CHANGE UP (ref 3.8–10.5)

## 2020-03-12 PROCEDURE — 87040 BLOOD CULTURE FOR BACTERIA: CPT

## 2020-03-12 PROCEDURE — 70486 CT MAXILLOFACIAL W/O DYE: CPT

## 2020-03-12 PROCEDURE — 86803 HEPATITIS C AB TEST: CPT

## 2020-03-12 PROCEDURE — 80053 COMPREHEN METABOLIC PANEL: CPT

## 2020-03-12 PROCEDURE — 92526 ORAL FUNCTION THERAPY: CPT

## 2020-03-12 PROCEDURE — 31720 CLEARANCE OF AIRWAYS: CPT

## 2020-03-12 PROCEDURE — 71260 CT THORAX DX C+: CPT

## 2020-03-12 PROCEDURE — 88304 TISSUE EXAM BY PATHOLOGIST: CPT

## 2020-03-12 PROCEDURE — 74177 CT ABD & PELVIS W/CONTRAST: CPT

## 2020-03-12 PROCEDURE — 83735 ASSAY OF MAGNESIUM: CPT

## 2020-03-12 PROCEDURE — 84145 PROCALCITONIN (PCT): CPT

## 2020-03-12 PROCEDURE — 87086 URINE CULTURE/COLONY COUNT: CPT

## 2020-03-12 PROCEDURE — 84100 ASSAY OF PHOSPHORUS: CPT

## 2020-03-12 PROCEDURE — 86850 RBC ANTIBODY SCREEN: CPT

## 2020-03-12 PROCEDURE — 94002 VENT MGMT INPAT INIT DAY: CPT

## 2020-03-12 PROCEDURE — 36600 WITHDRAWAL OF ARTERIAL BLOOD: CPT

## 2020-03-12 PROCEDURE — 87631 RESP VIRUS 3-5 TARGETS: CPT

## 2020-03-12 PROCEDURE — 81001 URINALYSIS AUTO W/SCOPE: CPT

## 2020-03-12 PROCEDURE — 96374 THER/PROPH/DIAG INJ IV PUSH: CPT | Mod: XU

## 2020-03-12 PROCEDURE — 93005 ELECTROCARDIOGRAM TRACING: CPT

## 2020-03-12 PROCEDURE — 86900 BLOOD TYPING SEROLOGIC ABO: CPT

## 2020-03-12 PROCEDURE — 85027 COMPLETE CBC AUTOMATED: CPT

## 2020-03-12 PROCEDURE — 96361 HYDRATE IV INFUSION ADD-ON: CPT

## 2020-03-12 PROCEDURE — 82803 BLOOD GASES ANY COMBINATION: CPT

## 2020-03-12 PROCEDURE — 71045 X-RAY EXAM CHEST 1 VIEW: CPT

## 2020-03-12 PROCEDURE — 83605 ASSAY OF LACTIC ACID: CPT

## 2020-03-12 PROCEDURE — C1889: CPT

## 2020-03-12 PROCEDURE — 87070 CULTURE OTHR SPECIMN AEROBIC: CPT

## 2020-03-12 PROCEDURE — 83690 ASSAY OF LIPASE: CPT

## 2020-03-12 PROCEDURE — 99285 EMERGENCY DEPT VISIT HI MDM: CPT

## 2020-03-12 PROCEDURE — 78226 HEPATOBILIARY SYSTEM IMAGING: CPT

## 2020-03-12 PROCEDURE — 97162 PT EVAL MOD COMPLEX 30 MIN: CPT

## 2020-03-12 PROCEDURE — 85610 PROTHROMBIN TIME: CPT

## 2020-03-12 PROCEDURE — 85730 THROMBOPLASTIN TIME PARTIAL: CPT

## 2020-03-12 PROCEDURE — 36415 COLL VENOUS BLD VENIPUNCTURE: CPT

## 2020-03-12 PROCEDURE — 80048 BASIC METABOLIC PNL TOTAL CA: CPT

## 2020-03-12 PROCEDURE — 94760 N-INVAS EAR/PLS OXIMETRY 1: CPT

## 2020-03-12 PROCEDURE — 80076 HEPATIC FUNCTION PANEL: CPT

## 2020-03-12 PROCEDURE — 83880 ASSAY OF NATRIURETIC PEPTIDE: CPT

## 2020-03-12 PROCEDURE — 92610 EVALUATE SWALLOWING FUNCTION: CPT

## 2020-03-12 PROCEDURE — 70450 CT HEAD/BRAIN W/O DYE: CPT

## 2020-03-12 PROCEDURE — A9537: CPT

## 2020-03-12 PROCEDURE — 86901 BLOOD TYPING SEROLOGIC RH(D): CPT

## 2020-03-12 PROCEDURE — 96375 TX/PRO/DX INJ NEW DRUG ADDON: CPT

## 2020-03-12 PROCEDURE — 93971 EXTREMITY STUDY: CPT

## 2020-03-12 RX ORDER — OXYCODONE AND ACETAMINOPHEN 5; 325 MG/1; MG/1
1 TABLET ORAL ONCE
Refills: 0 | Status: DISCONTINUED | OUTPATIENT
Start: 2020-03-12 | End: 2020-03-12

## 2020-03-12 RX ADMIN — Medication 1 TABLET(S): at 05:16

## 2020-03-12 RX ADMIN — Medication 40 MILLIEQUIVALENT(S): at 12:38

## 2020-03-12 RX ADMIN — Medication 150 MILLIGRAM(S): at 05:16

## 2020-03-12 RX ADMIN — OXCARBAZEPINE 300 MILLIGRAM(S): 300 TABLET, FILM COATED ORAL at 05:16

## 2020-03-12 RX ADMIN — AMLODIPINE BESYLATE 10 MILLIGRAM(S): 2.5 TABLET ORAL at 05:16

## 2020-03-12 RX ADMIN — Medication 15 MILLIGRAM(S): at 05:16

## 2020-03-12 RX ADMIN — ESCITALOPRAM OXALATE 20 MILLIGRAM(S): 10 TABLET, FILM COATED ORAL at 12:37

## 2020-03-12 NOTE — DISCHARGE NOTE NURSING/CASE MANAGEMENT/SOCIAL WORK - PATIENT PORTAL LINK FT
You can access the FollowMyHealth Patient Portal offered by [facility] by registering at the following website: [email or web address]. By joining [employer]’s FollowMyHealth portal, you will also be able to view your health information using other applications (apps) compatible with our system.

## 2020-03-12 NOTE — PROGRESS NOTE ADULT - SUBJECTIVE AND OBJECTIVE BOX
Patient is a 64y old  Male who presents with a chief complaint of s/p fall, RUQ pain (11 Mar 2020 22:09)      INTERVAL HPI/OVERNIGHT EVENTS:  T(C): 36.8 (03-12-20 @ 08:00), Max: 37.4 (03-12-20 @ 04:45)  HR: 77 (03-12-20 @ 08:00) (77 - 98)  BP: 147/75 (03-12-20 @ 08:00) (132/75 - 167/81)  RR: 20 (03-12-20 @ 08:00) (18 - 20)  SpO2: 95% (03-12-20 @ 08:00) (95% - 98%)  Wt(kg): --  I&O's Summary    11 Mar 2020 07:01  -  12 Mar 2020 07:00  --------------------------------------------------------  IN: 0 mL / OUT: 950 mL / NET: -950 mL        LABS:                        9.3    10.22 )-----------( 237      ( 12 Mar 2020 06:40 )             28.5     03-12    143  |  108  |  10  ----------------------------<  87  3.4<L>   |  25  |  0.46<L>    Ca    8.2<L>      12 Mar 2020 06:40  Phos  2.5     03-11  Mg     1.7     03-11    TPro  5.7<L>  /  Alb  2.4<L>  /  TBili  1.2  /  DBili  x   /  AST  58<H>  /  ALT  107<H>  /  AlkPhos  58  03-11    PT/INR - ( 11 Mar 2020 05:12 )   PT: 16.5 sec;   INR: 1.46 ratio         PTT - ( 11 Mar 2020 05:12 )  PTT:30.9 sec    CAPILLARY BLOOD GLUCOSE                MEDICATIONS  (STANDING):  amLODIPine   Tablet 10 milliGRAM(s) Oral daily  amoxicillin  875 milliGRAM(s)/clavulanate 1 Tablet(s) Oral two times a day  chlorhexidine 2% Cloths 1 Application(s) Topical <User Schedule>  enoxaparin Injectable 40 milliGRAM(s) SubCutaneous at bedtime  escitalopram 20 milliGRAM(s) Oral daily  OXcarbazepine 300 milliGRAM(s) Oral two times a day  oxybutynin XL 15 milliGRAM(s) Oral <User Schedule>  pantoprazole  Injectable 40 milliGRAM(s) IV Push daily  potassium chloride    Tablet ER 40 milliEquivalent(s) Oral daily  pregabalin 150 milliGRAM(s) Oral three times a day  Symproic (naldemedine) 0.2 mg tablet 1 Tablet(s) 1 Tablet(s) Oral daily    MEDICATIONS  (PRN):  acetaminophen   Tablet .. 650 milliGRAM(s) Oral every 6 hours PRN Mild Pain (1 - 3)  oxycodone    5 mG/acetaminophen 325 mG 1 Tablet(s) Oral once PRN moderate to severe pain          PHYSICAL EXAM:  GENERAL: frail  CHEST/LUNG: Clear to percussion bilaterally; No rales, rhonchi, wheezing, or rubs  HEART: Regular rate and rhythm; No murmurs, rubs, or gallops  ABDOMEN: Soft, Nontender, Nondistended; Bowel sounds present  EXTREMITIES:  edema+    Care Discussed with Consultants/Other Providers [ ] YES  [ ] NO

## 2020-03-12 NOTE — PROGRESS NOTE ADULT - REASON FOR ADMISSION
s/p fall, RUQ pain

## 2020-03-14 ENCOUNTER — INPATIENT (INPATIENT)
Facility: HOSPITAL | Age: 65
LOS: 3 days | Discharge: HOME | DRG: 544 | End: 2020-03-18
Attending: INTERNAL MEDICINE | Admitting: INTERNAL MEDICINE
Payer: MEDICARE

## 2020-03-14 VITALS
WEIGHT: 229.94 LBS | OXYGEN SATURATION: 97 % | RESPIRATION RATE: 15 BRPM | TEMPERATURE: 98 F | SYSTOLIC BLOOD PRESSURE: 154 MMHG | HEIGHT: 76 IN | HEART RATE: 92 BPM | DIASTOLIC BLOOD PRESSURE: 84 MMHG

## 2020-03-14 DIAGNOSIS — S82.209A UNSPECIFIED FRACTURE OF SHAFT OF UNSPECIFIED TIBIA, INITIAL ENCOUNTER FOR CLOSED FRACTURE: ICD-10-CM

## 2020-03-14 LAB
ALBUMIN SERPL ELPH-MCNC: 2.5 G/DL — LOW (ref 3.3–5)
ALP SERPL-CCNC: 109 U/L — SIGNIFICANT CHANGE UP (ref 40–120)
ALT FLD-CCNC: 74 U/L — SIGNIFICANT CHANGE UP (ref 12–78)
ANION GAP SERPL CALC-SCNC: 10 MMOL/L — SIGNIFICANT CHANGE UP (ref 5–17)
APPEARANCE UR: ABNORMAL
APTT BLD: 33.1 SEC — SIGNIFICANT CHANGE UP (ref 28.5–37)
AST SERPL-CCNC: 32 U/L — SIGNIFICANT CHANGE UP (ref 15–37)
BACTERIA # UR AUTO: ABNORMAL
BASOPHILS # BLD AUTO: 0.04 K/UL — SIGNIFICANT CHANGE UP (ref 0–0.2)
BASOPHILS NFR BLD AUTO: 0.4 % — SIGNIFICANT CHANGE UP (ref 0–2)
BILIRUB SERPL-MCNC: 0.8 MG/DL — SIGNIFICANT CHANGE UP (ref 0.2–1.2)
BILIRUB UR-MCNC: NEGATIVE — SIGNIFICANT CHANGE UP
BLD GP AB SCN SERPL QL: SIGNIFICANT CHANGE UP
BUN SERPL-MCNC: 12 MG/DL — SIGNIFICANT CHANGE UP (ref 7–23)
CALCIUM SERPL-MCNC: 8.4 MG/DL — LOW (ref 8.5–10.1)
CHLORIDE SERPL-SCNC: 104 MMOL/L — SIGNIFICANT CHANGE UP (ref 96–108)
CO2 SERPL-SCNC: 22 MMOL/L — SIGNIFICANT CHANGE UP (ref 22–31)
COLOR SPEC: YELLOW — SIGNIFICANT CHANGE UP
CREAT SERPL-MCNC: 0.52 MG/DL — SIGNIFICANT CHANGE UP (ref 0.5–1.3)
DIFF PNL FLD: ABNORMAL
EOSINOPHIL # BLD AUTO: 0.42 K/UL — SIGNIFICANT CHANGE UP (ref 0–0.5)
EOSINOPHIL NFR BLD AUTO: 4.2 % — SIGNIFICANT CHANGE UP (ref 0–6)
EPI CELLS # UR: SIGNIFICANT CHANGE UP
FLU A RESULT: SIGNIFICANT CHANGE UP
FLU A RESULT: SIGNIFICANT CHANGE UP
FLUAV AG NPH QL: SIGNIFICANT CHANGE UP
FLUBV AG NPH QL: SIGNIFICANT CHANGE UP
GLUCOSE SERPL-MCNC: 82 MG/DL — SIGNIFICANT CHANGE UP (ref 70–99)
GLUCOSE UR QL: NEGATIVE — SIGNIFICANT CHANGE UP
HCT VFR BLD CALC: 31.8 % — LOW (ref 39–50)
HGB BLD-MCNC: 10.2 G/DL — LOW (ref 13–17)
IMM GRANULOCYTES NFR BLD AUTO: 0.8 % — SIGNIFICANT CHANGE UP (ref 0–1.5)
INR BLD: 1.21 RATIO — HIGH (ref 0.88–1.16)
KETONES UR-MCNC: ABNORMAL
LACTATE SERPL-SCNC: 1.3 MMOL/L — SIGNIFICANT CHANGE UP (ref 0.7–2)
LEUKOCYTE ESTERASE UR-ACNC: NEGATIVE — SIGNIFICANT CHANGE UP
LYMPHOCYTES # BLD AUTO: 1.44 K/UL — SIGNIFICANT CHANGE UP (ref 1–3.3)
LYMPHOCYTES # BLD AUTO: 14.4 % — SIGNIFICANT CHANGE UP (ref 13–44)
MCHC RBC-ENTMCNC: 30.5 PG — SIGNIFICANT CHANGE UP (ref 27–34)
MCHC RBC-ENTMCNC: 32.1 GM/DL — SIGNIFICANT CHANGE UP (ref 32–36)
MCV RBC AUTO: 95.2 FL — SIGNIFICANT CHANGE UP (ref 80–100)
MONOCYTES # BLD AUTO: 0.92 K/UL — HIGH (ref 0–0.9)
MONOCYTES NFR BLD AUTO: 9.2 % — SIGNIFICANT CHANGE UP (ref 2–14)
NEUTROPHILS # BLD AUTO: 7.09 K/UL — SIGNIFICANT CHANGE UP (ref 1.8–7.4)
NEUTROPHILS NFR BLD AUTO: 71 % — SIGNIFICANT CHANGE UP (ref 43–77)
NITRITE UR-MCNC: NEGATIVE — SIGNIFICANT CHANGE UP
NRBC # BLD: 0 /100 WBCS — SIGNIFICANT CHANGE UP (ref 0–0)
PH UR: 6 — SIGNIFICANT CHANGE UP (ref 5–8)
PLATELET # BLD AUTO: 394 K/UL — SIGNIFICANT CHANGE UP (ref 150–400)
POTASSIUM SERPL-MCNC: 4.6 MMOL/L — SIGNIFICANT CHANGE UP (ref 3.5–5.3)
POTASSIUM SERPL-SCNC: 4.6 MMOL/L — SIGNIFICANT CHANGE UP (ref 3.5–5.3)
PROT SERPL-MCNC: 6.3 G/DL — SIGNIFICANT CHANGE UP (ref 6–8.3)
PROT UR-MCNC: 30 MG/DL
PROTHROM AB SERPL-ACNC: 13.6 SEC — HIGH (ref 10–12.9)
RBC # BLD: 3.34 M/UL — LOW (ref 4.2–5.8)
RBC # FLD: 13.4 % — SIGNIFICANT CHANGE UP (ref 10.3–14.5)
RBC CASTS # UR COMP ASSIST: ABNORMAL /HPF (ref 0–4)
RSV RESULT: SIGNIFICANT CHANGE UP
RSV RNA RESP QL NAA+PROBE: SIGNIFICANT CHANGE UP
SODIUM SERPL-SCNC: 136 MMOL/L — SIGNIFICANT CHANGE UP (ref 135–145)
SP GR SPEC: 1.02 — SIGNIFICANT CHANGE UP (ref 1.01–1.02)
TSH SERPL-MCNC: 1.09 UIU/ML — SIGNIFICANT CHANGE UP (ref 0.36–3.74)
UROBILINOGEN FLD QL: 1
WBC # BLD: 9.99 K/UL — SIGNIFICANT CHANGE UP (ref 3.8–10.5)
WBC # FLD AUTO: 9.99 K/UL — SIGNIFICANT CHANGE UP (ref 3.8–10.5)
WBC UR QL: SIGNIFICANT CHANGE UP

## 2020-03-14 PROCEDURE — 99285 EMERGENCY DEPT VISIT HI MDM: CPT

## 2020-03-14 PROCEDURE — 73562 X-RAY EXAM OF KNEE 3: CPT | Mod: 26,RT

## 2020-03-14 PROCEDURE — 73590 X-RAY EXAM OF LOWER LEG: CPT | Mod: 26,RT

## 2020-03-14 PROCEDURE — 71045 X-RAY EXAM CHEST 1 VIEW: CPT | Mod: 26

## 2020-03-14 PROCEDURE — 73552 X-RAY EXAM OF FEMUR 2/>: CPT | Mod: 26,RT

## 2020-03-14 PROCEDURE — 71250 CT THORAX DX C-: CPT | Mod: 26

## 2020-03-14 PROCEDURE — 93970 EXTREMITY STUDY: CPT | Mod: 26

## 2020-03-14 PROCEDURE — 73630 X-RAY EXAM OF FOOT: CPT | Mod: 26,RT

## 2020-03-14 PROCEDURE — 74176 CT ABD & PELVIS W/O CONTRAST: CPT | Mod: 26

## 2020-03-14 PROCEDURE — 73610 X-RAY EXAM OF ANKLE: CPT | Mod: 26,RT

## 2020-03-14 PROCEDURE — 73700 CT LOWER EXTREMITY W/O DYE: CPT | Mod: 26,RT

## 2020-03-14 PROCEDURE — 93010 ELECTROCARDIOGRAM REPORT: CPT

## 2020-03-14 RX ORDER — ESCITALOPRAM OXALATE 10 MG/1
20 TABLET, FILM COATED ORAL DAILY
Refills: 0 | Status: DISCONTINUED | OUTPATIENT
Start: 2020-03-14 | End: 2020-03-18

## 2020-03-14 RX ORDER — ENOXAPARIN SODIUM 100 MG/ML
40 INJECTION SUBCUTANEOUS DAILY
Refills: 0 | Status: DISCONTINUED | OUTPATIENT
Start: 2020-03-14 | End: 2020-03-18

## 2020-03-14 RX ORDER — ACETAMINOPHEN 500 MG
650 TABLET ORAL EVERY 6 HOURS
Refills: 0 | Status: DISCONTINUED | OUTPATIENT
Start: 2020-03-14 | End: 2020-03-18

## 2020-03-14 RX ORDER — OXCARBAZEPINE 300 MG/1
300 TABLET, FILM COATED ORAL
Refills: 0 | Status: DISCONTINUED | OUTPATIENT
Start: 2020-03-14 | End: 2020-03-18

## 2020-03-14 RX ORDER — OXYCODONE AND ACETAMINOPHEN 5; 325 MG/1; MG/1
1 TABLET ORAL EVERY 6 HOURS
Refills: 0 | Status: DISCONTINUED | OUTPATIENT
Start: 2020-03-14 | End: 2020-03-18

## 2020-03-14 RX ADMIN — OXYCODONE AND ACETAMINOPHEN 1 TABLET(S): 5; 325 TABLET ORAL at 23:08

## 2020-03-14 RX ADMIN — Medication 150 MILLIGRAM(S): at 23:08

## 2020-03-14 NOTE — ED ADULT NURSE NOTE - NSIMPLEMENTINTERV_GEN_ALL_ED
Implemented All Fall with Harm Risk Interventions:  [place] to call system. Call bell, personal items and telephone within reach. Instruct patient to call for assistance. Room bathroom lighting operational. Non-slip footwear when patient is off stretcher. Physically safe environment: no spills, clutter or unnecessary equipment. Stretcher in lowest position, wheels locked, appropriate side rails in place. Provide visual cue, wrist band, yellow gown, etc. Monitor gait and stability. Monitor for mental status changes and reorient to person, place, and time. Review medications for side effects contributing to fall risk. Reinforce activity limits and safety measures with patient and family. Provide visual clues: red socks.

## 2020-03-14 NOTE — CHART NOTE - NSCHARTNOTEFT_GEN_A_CORE
Patient s/e at bedside. SILT, 0/5 TA/EHL/GS (baseline), Compartments soft and compressible, DP/PT 2+. Pain at rest is mild.    Will see patient in the am to recheck compartments and perform neurovascular checks

## 2020-03-14 NOTE — H&P ADULT - NSHPPHYSICALEXAM_GEN_ALL_CORE
General: WN/WD NAD  PERRLA  Neurology: A&Ox3, nonfocal, FROST x 4  Respiratory: CTA B/L  CV: RRR, S1S2, no murmurs, rubs or gallops  Abdominal: Soft, NT, ND +BS, Last BM  Extremities: RLE edema

## 2020-03-14 NOTE — ED PROVIDER NOTE - CLINICAL SUMMARY MEDICAL DECISION MAKING FREE TEXT BOX
lower extremity swelling r/o DVT, recent lap mukul, f/u US, right tibia abrasion and eccymosis and tenderness, r/o trauma, f/u xrays, ct scan, weakness, low grade fever r/o infection, f/u labs, FLU, RVP, chest xray, ct scan, re=eval

## 2020-03-14 NOTE — H&P ADULT - HISTORY OF PRESENT ILLNESS
64 male presents to ER from home by ambulance with report of lower extremity swelling, right > left. Patient recently admitted to [place] 3/6/20 - 3/12/20 for fever, AMS sepsis, had laproscopic cholecystectomy, was on Zosyn and sent home with Augemntin. Patient states yesterday he had a low grade fever 100.3F, for past 2 days no appetite, has had increased swelling of right lower extremity swelling and pain, worse when being moved, patient states he has a history of MS and does not ambulate.

## 2020-03-14 NOTE — CONSULT NOTE ADULT - SUBJECTIVE AND OBJECTIVE BOX
64y Male who presents to the ED today with c/o right knee pain, swelling and unable to bear weight s/p MF on 3/6 from wheelchair. Pt denies any other injuries. Patient is in moderate pain from injury, has chronic BLLE edema and no active plantarflexion/dorsiflexion. Had a previous distal femur fracture which was managed with a retrograde IMN by Dr. Nayak in 2018. He is nonambulatory.     PAST MEDICAL & SURGICAL HISTORY:  Femur fracture, left  MS (multiple sclerosis)  No significant past surgical history    Home Medications:  diclofenac sodium extended release: orally once a day (07 Mar 2020 17:06)  escitalopram 20 mg oral tablet: 1 tab(s) orally once a day (07 Mar 2020 17:06)  OXcarbazepine 300 mg oral tablet: 1 tab(s) orally 2 times a day (07 Mar 2020 17:06)  pregabalin 150 mg oral capsule: orally 3 times (07 Mar 2020 17:06)  Symproic 0.2 mg oral tablet: 1 tab(s) orally once a day (07 Mar 2020 17:06)    No Known Allergies      T(C): 36.6 (03-14-20 @ 16:20), Max: 36.6 (03-14-20 @ 16:20)  HR: 92 (03-14-20 @ 16:20) (92 - 92)  BP: 154/84 (03-14-20 @ 16:20) (154/84 - 154/84)  RR: 15 (03-14-20 @ 16:20) (15 - 15)  SpO2: 97% (03-14-20 @ 16:20) (97% - 97%)    PE  Gen: NAD  RLE:  Skin intact, superficial abrasion over tibial tubercle, healed.  TTP over knee  LROM of knee d/t pain  0/5 TA/EHL/GS (baseline)  SILT L2-S1  DP/PT 2+  Dopplerable pulses  Compartments firm, but compressible  No pain on passive stretch  RLE edema    RUE: No bony tenderness or deformity, skin intact  LUE: No bony tenderness or deformity, skin intact  LLE: No bony tenderness or deformity, skin intact  Spine: No tenderness or stepoffs, skin intact      Imaging  XR R Knee: Demonstrating proximal tibia fracture, prior distal femur fracture (healed)    A/P 64y Male with a R tibial shaft fracture.    Plan:  NWB RLE with bulky king knee immobilizer.  Please keep iced and elevated at all times  Please check compartments q2h  Please notify orthopaedic resident  immediately if severe pain, severe swelling, numbness/tingling or toes changing color.   Will discuss plan with attending regarding plan 64y Male who presents to the ED today with c/o right knee pain, swelling and unable to bear weight s/p MF on 3/6 from wheelchair. Pt denies any other injuries. Patient is in moderate pain from injury, has chronic BLLE edema and no active plantarflexion/dorsiflexion. Had a previous distal femur fracture which was managed with a retrograde IMN by Dr. Nayak in 2018. He is nonambulatory.     PAST MEDICAL & SURGICAL HISTORY:  Femur fracture, left  MS (multiple sclerosis)  No significant past surgical history    Home Medications:  diclofenac sodium extended release: orally once a day (07 Mar 2020 17:06)  escitalopram 20 mg oral tablet: 1 tab(s) orally once a day (07 Mar 2020 17:06)  OXcarbazepine 300 mg oral tablet: 1 tab(s) orally 2 times a day (07 Mar 2020 17:06)  pregabalin 150 mg oral capsule: orally 3 times (07 Mar 2020 17:06)  Symproic 0.2 mg oral tablet: 1 tab(s) orally once a day (07 Mar 2020 17:06)    No Known Allergies      T(C): 36.6 (03-14-20 @ 16:20), Max: 36.6 (03-14-20 @ 16:20)  HR: 92 (03-14-20 @ 16:20) (92 - 92)  BP: 154/84 (03-14-20 @ 16:20) (154/84 - 154/84)  RR: 15 (03-14-20 @ 16:20) (15 - 15)  SpO2: 97% (03-14-20 @ 16:20) (97% - 97%)    PE  Gen: NAD  RLE:  Skin intact, superficial abrasion over tibial tubercle, healed.  TTP over knee  LROM of knee d/t pain  0/5 TA/EHL/GS (baseline)  SILT L2-S1  DP/PT 2+  Dopplerable pulses  Compartments firm, but compressible  No pain on passive stretch  RLE edema    RUE: No bony tenderness or deformity, skin intact  LUE: No bony tenderness or deformity, skin intact  LLE: No bony tenderness or deformity, skin intact  Spine: No tenderness or stepoffs, skin intact      Imaging  XR R Knee: Demonstrating proximal tibia fracture, prior distal femur fracture (healed)    A/P 64y Male with a R tibial shaft fracture.    Plan:  NWB RLE with bulky king knee immobilizer.  Please keep iced and elevated at all times  Please check compartments q4h  Please notify orthopaedic resident  immediately if severe pain, numbness/tingling develop  Will recommend nonoperative management with NWB RLE in tiffany brace locked in extension given nonambulatory status and acceptable alignment of fracture, ok to give patient diet  Will observe patient's compartments/neurovascular status while inpatient  Plan discussed with attending Dr. Nayak who agrees with above plan 64y Male who presents to the ED today with c/o right knee pain, swelling and unable to bear weight s/p MF on 3/6 from wheelchair. Pt denies any other injuries. Patient is in moderate pain from injury, has chronic BLLE edema and no active plantarflexion/dorsiflexion. Had a previous distal femur fracture which was managed with a retrograde IMN by Dr. Nayak in 2018. He is nonambulatory.     PAST MEDICAL & SURGICAL HISTORY:  Femur fracture, left  MS (multiple sclerosis)  No significant past surgical history    Home Medications:  diclofenac sodium extended release: orally once a day (07 Mar 2020 17:06)  escitalopram 20 mg oral tablet: 1 tab(s) orally once a day (07 Mar 2020 17:06)  OXcarbazepine 300 mg oral tablet: 1 tab(s) orally 2 times a day (07 Mar 2020 17:06)  pregabalin 150 mg oral capsule: orally 3 times (07 Mar 2020 17:06)  Symproic 0.2 mg oral tablet: 1 tab(s) orally once a day (07 Mar 2020 17:06)    No Known Allergies      T(C): 36.6 (03-14-20 @ 16:20), Max: 36.6 (03-14-20 @ 16:20)  HR: 92 (03-14-20 @ 16:20) (92 - 92)  BP: 154/84 (03-14-20 @ 16:20) (154/84 - 154/84)  RR: 15 (03-14-20 @ 16:20) (15 - 15)  SpO2: 97% (03-14-20 @ 16:20) (97% - 97%)    PE  Gen: NAD  RLE:  Skin intact, superficial abrasion over tibial tubercle, healed.  TTP over knee  LROM of knee d/t pain  0/5 TA/EHL/GS (baseline)  SILT L2-S1  DP/PT 2+  Dopplerable pulses  Compartments firm, but compressible  No pain on passive stretch  Significant RLE edema, chronic    RUE: No bony tenderness or deformity, skin intact  LUE: No bony tenderness or deformity, skin intact  LLE: No bony tenderness or deformity, skin intact  Spine: No tenderness or stepoffs, skin intact      Imaging  XR R Knee: Demonstrating proximal tibia fracture, prior distal femur fracture (healed)    A/P 64y Male with a R tibial shaft fracture.    Plan:  NWB RLE with bulky king knee immobilizer.  Please keep iced and elevated at all times  Please check compartments q4h  Please notify orthopaedic resident  immediately if severe pain, numbness/tingling develop  Will recommend nonoperative management with NWB RLE in tiffany brace locked in extension given nonambulatory status and acceptable alignment of fracture, ok to give patient diet  Will observe patient's compartments/neurovascular status while inpatient  Appreciate input from medicine team re: etiology of patient's marked BLLE edema  Plan discussed with attending Dr. Nayak who agrees with above plan

## 2020-03-14 NOTE — ED PROVIDER NOTE - OBJECTIVE STATEMENT
64 male presents to ER from home by ambulance with report of lower extremity swelling, right > left. Patient recently admitted to [place] 3/6/20 - 3/12/20 for fever, AMS sepsis, had laproscopic cholecystectomy, was on Zosyn and sent home with Augemntin. Patient states yesterday he had a low grade fever 100.3F, for past 2 days no appetite, has had increased swelling of right lower extremity swelling and pain, worse when being moved, patient states he has a history of MS and does not ambulate.

## 2020-03-14 NOTE — H&P ADULT - ASSESSMENT
64 male presents to ER from home by ambulance with report of lower extremity swelling, right > left. Patient recently admitted to [place] 3/6/20 - 3/12/20 for fever, AMS sepsis, had laproscopic cholecystectomy, was on Zosyn and sent home with Augemntin. Patient states yesterday he had a low grade fever 100.3F, for past 2 days no appetite, has had increased swelling of right lower extremity swelling and pain, worse when being moved, patient states he has a history of MS and does not ambulate.

## 2020-03-14 NOTE — H&P ADULT - NSHPLABSRESULTS_GEN_ALL_CORE
Lab Results:  CBC  CBC Full  -  ( 15 Mar 2020 06:44 )  WBC Count : 6.91 K/uL  RBC Count : 3.17 M/uL  Hemoglobin : 9.6 g/dL  Hematocrit : 29.7 %  Platelet Count - Automated : 403 K/uL  Mean Cell Volume : 93.7 fl  Mean Cell Hemoglobin : 30.3 pg  Mean Cell Hemoglobin Concentration : 32.3 gm/dL  Auto Neutrophil # : x  Auto Lymphocyte # : x  Auto Monocyte # : x  Auto Eosinophil # : x  Auto Basophil # : x  Auto Neutrophil % : x  Auto Lymphocyte % : x  Auto Monocyte % : x  Auto Eosinophil % : x  Auto Basophil % : x    .		Differential:	[] Automated		[] Manual  Chemistry                        9.6    6.91  )-----------( 403      ( 15 Mar 2020 06:44 )             29.7     03-15    138  |  102  |  10  ----------------------------<  76  3.6   |  27  |  0.44<L>    Ca    8.5      15 Mar 2020 06:44    TPro  6.2  /  Alb  2.6<L>  /  TBili  0.9  /  DBili  x   /  AST  19  /  ALT  62  /  AlkPhos  118  03-15    LIVER FUNCTIONS - ( 15 Mar 2020 06:44 )  Alb: 2.6 g/dL / Pro: 6.2 g/dL / ALK PHOS: 118 U/L / ALT: 62 U/L / AST: 19 U/L / GGT: x           PT/INR - ( 14 Mar 2020 20:48 )   PT: 13.6 sec;   INR: 1.21 ratio         PTT - ( 14 Mar 2020 20:48 )  PTT:33.1 sec  Urinalysis Basic - ( 14 Mar 2020 21:43 )    Color: Yellow / Appearance: Slightly Turbid / S.020 / pH: x  Gluc: x / Ketone: Large  / Bili: Negative / Urobili: 1   Blood: x / Protein: 30 mg/dL / Nitrite: Negative   Leuk Esterase: Negative / RBC: 3-5 /HPF / WBC 0-2   Sq Epi: x / Non Sq Epi: Occasional / Bacteria: Occasional            MICROBIOLOGY/CULTURES:      RADIOLOGY RESULTS: reviewed

## 2020-03-14 NOTE — ED ADULT NURSE NOTE - OBJECTIVE STATEMENT
63 y/o male pmh of MS, wheelchair bound, unable to ambulate, recently here s/p mukul, presents to the ed for right leg swelling and pain. endorses low gradefever. denies nausea vomiting sob headache abdominal pain and dysuria

## 2020-03-14 NOTE — ED ADULT TRIAGE NOTE - CHIEF COMPLAINT QUOTE
brought in by EMS from home for complaints of right lower leg swelling and bilateral leg pain. patient recently discharged from the hospital status post cholecystectomy

## 2020-03-15 DIAGNOSIS — G35 MULTIPLE SCLEROSIS: ICD-10-CM

## 2020-03-15 DIAGNOSIS — R50.9 FEVER, UNSPECIFIED: ICD-10-CM

## 2020-03-15 DIAGNOSIS — S82.209A UNSPECIFIED FRACTURE OF SHAFT OF UNSPECIFIED TIBIA, INITIAL ENCOUNTER FOR CLOSED FRACTURE: ICD-10-CM

## 2020-03-15 LAB
ALBUMIN SERPL ELPH-MCNC: 2.6 G/DL — LOW (ref 3.3–5)
ALP SERPL-CCNC: 118 U/L — SIGNIFICANT CHANGE UP (ref 40–120)
ALT FLD-CCNC: 62 U/L — SIGNIFICANT CHANGE UP (ref 12–78)
ANION GAP SERPL CALC-SCNC: 9 MMOL/L — SIGNIFICANT CHANGE UP (ref 5–17)
AST SERPL-CCNC: 19 U/L — SIGNIFICANT CHANGE UP (ref 15–37)
BILIRUB SERPL-MCNC: 0.9 MG/DL — SIGNIFICANT CHANGE UP (ref 0.2–1.2)
BUN SERPL-MCNC: 10 MG/DL — SIGNIFICANT CHANGE UP (ref 7–23)
CALCIUM SERPL-MCNC: 8.5 MG/DL — SIGNIFICANT CHANGE UP (ref 8.5–10.1)
CHLORIDE SERPL-SCNC: 102 MMOL/L — SIGNIFICANT CHANGE UP (ref 96–108)
CO2 SERPL-SCNC: 27 MMOL/L — SIGNIFICANT CHANGE UP (ref 22–31)
CREAT SERPL-MCNC: 0.44 MG/DL — LOW (ref 0.5–1.3)
CULTURE RESULTS: NO GROWTH — SIGNIFICANT CHANGE UP
GLUCOSE SERPL-MCNC: 76 MG/DL — SIGNIFICANT CHANGE UP (ref 70–99)
HCT VFR BLD CALC: 29.7 % — LOW (ref 39–50)
HGB BLD-MCNC: 9.6 G/DL — LOW (ref 13–17)
MCHC RBC-ENTMCNC: 30.3 PG — SIGNIFICANT CHANGE UP (ref 27–34)
MCHC RBC-ENTMCNC: 32.3 GM/DL — SIGNIFICANT CHANGE UP (ref 32–36)
MCV RBC AUTO: 93.7 FL — SIGNIFICANT CHANGE UP (ref 80–100)
NRBC # BLD: 0 /100 WBCS — SIGNIFICANT CHANGE UP (ref 0–0)
PLATELET # BLD AUTO: 403 K/UL — HIGH (ref 150–400)
POTASSIUM SERPL-MCNC: 3.6 MMOL/L — SIGNIFICANT CHANGE UP (ref 3.5–5.3)
POTASSIUM SERPL-SCNC: 3.6 MMOL/L — SIGNIFICANT CHANGE UP (ref 3.5–5.3)
PROT SERPL-MCNC: 6.2 G/DL — SIGNIFICANT CHANGE UP (ref 6–8.3)
RBC # BLD: 3.17 M/UL — LOW (ref 4.2–5.8)
RBC # FLD: 13.1 % — SIGNIFICANT CHANGE UP (ref 10.3–14.5)
SODIUM SERPL-SCNC: 138 MMOL/L — SIGNIFICANT CHANGE UP (ref 135–145)
SPECIMEN SOURCE: SIGNIFICANT CHANGE UP
WBC # BLD: 6.91 K/UL — SIGNIFICANT CHANGE UP (ref 3.8–10.5)
WBC # FLD AUTO: 6.91 K/UL — SIGNIFICANT CHANGE UP (ref 3.8–10.5)

## 2020-03-15 PROCEDURE — 73610 X-RAY EXAM OF ANKLE: CPT | Mod: 26,LT

## 2020-03-15 RX ORDER — DULOXETINE HYDROCHLORIDE 30 MG/1
60 CAPSULE, DELAYED RELEASE ORAL
Refills: 0 | Status: DISCONTINUED | OUTPATIENT
Start: 2020-03-15 | End: 2020-03-18

## 2020-03-15 RX ORDER — OXYBUTYNIN CHLORIDE 5 MG
5 TABLET ORAL THREE TIMES A DAY
Refills: 0 | Status: DISCONTINUED | OUTPATIENT
Start: 2020-03-15 | End: 2020-03-18

## 2020-03-15 RX ORDER — DICLOFENAC SODIUM 75 MG/1
0 TABLET, DELAYED RELEASE ORAL
Qty: 0 | Refills: 0 | DISCHARGE

## 2020-03-15 RX ORDER — NALDEMEDINE 0.2 MG/1
1 TABLET ORAL
Qty: 0 | Refills: 0 | DISCHARGE

## 2020-03-15 RX ADMIN — Medication 150 MILLIGRAM(S): at 07:24

## 2020-03-15 RX ADMIN — DULOXETINE HYDROCHLORIDE 60 MILLIGRAM(S): 30 CAPSULE, DELAYED RELEASE ORAL at 17:11

## 2020-03-15 RX ADMIN — OXYCODONE AND ACETAMINOPHEN 1 TABLET(S): 5; 325 TABLET ORAL at 21:49

## 2020-03-15 RX ADMIN — OXYCODONE AND ACETAMINOPHEN 1 TABLET(S): 5; 325 TABLET ORAL at 22:50

## 2020-03-15 RX ADMIN — OXCARBAZEPINE 300 MILLIGRAM(S): 300 TABLET, FILM COATED ORAL at 07:24

## 2020-03-15 RX ADMIN — Medication 150 MILLIGRAM(S): at 21:49

## 2020-03-15 RX ADMIN — OXYCODONE AND ACETAMINOPHEN 1 TABLET(S): 5; 325 TABLET ORAL at 00:30

## 2020-03-15 RX ADMIN — OXYCODONE AND ACETAMINOPHEN 1 TABLET(S): 5; 325 TABLET ORAL at 14:32

## 2020-03-15 RX ADMIN — Medication 150 MILLIGRAM(S): at 13:40

## 2020-03-15 RX ADMIN — OXYCODONE AND ACETAMINOPHEN 1 TABLET(S): 5; 325 TABLET ORAL at 07:32

## 2020-03-15 RX ADMIN — ESCITALOPRAM OXALATE 20 MILLIGRAM(S): 10 TABLET, FILM COATED ORAL at 11:44

## 2020-03-15 RX ADMIN — OXCARBAZEPINE 300 MILLIGRAM(S): 300 TABLET, FILM COATED ORAL at 17:11

## 2020-03-15 RX ADMIN — Medication 5 MILLIGRAM(S): at 21:50

## 2020-03-15 RX ADMIN — OXYCODONE AND ACETAMINOPHEN 1 TABLET(S): 5; 325 TABLET ORAL at 07:24

## 2020-03-15 RX ADMIN — Medication 5 MILLIGRAM(S): at 13:40

## 2020-03-15 RX ADMIN — OXYCODONE AND ACETAMINOPHEN 1 TABLET(S): 5; 325 TABLET ORAL at 13:40

## 2020-03-15 RX ADMIN — ENOXAPARIN SODIUM 40 MILLIGRAM(S): 100 INJECTION SUBCUTANEOUS at 11:44

## 2020-03-15 NOTE — CHART NOTE - NSCHARTNOTEFT_GEN_A_CORE
Radiologist read right ankle fractures on xray and CT. Reviewed right ankle xrays and CT with attending Dr. Nayak. Dr. Nayak agrees right ankle fractures are old/chronic in nature. Patient also NTTP over the right ankle.    No acute orthopedic surgical intervention needed at this time.  Continue current nonoperative management for proximal tibia/fibula fractures.  Medical management per primary team

## 2020-03-15 NOTE — CONSULT NOTE ADULT - PROBLEM SELECTOR RECOMMENDATION 3
per medicine.  Thank you for consulting us and involving us in the management of this most interesting and challenging case.     Please Call with any further questions

## 2020-03-15 NOTE — PROGRESS NOTE ADULT - ASSESSMENT
Problem/Plan - 1:  ·  Problem: Tibial fracture.  Plan: ortho and vascular fu  pain control  management as per ortho.     Problem/Plan - 2:  ·  Problem: Fever.  Plan: unclear etiology   ID fu.     Problem/Plan - 3:  ·  Problem: MS (multiple sclerosis).  Plan: cw home meds.

## 2020-03-15 NOTE — CONSULT NOTE ADULT - ASSESSMENT
64 y.o. nonambulatory male s/p  fall presents with tibia and fibula fractures of the right leg. There is no evidence for compartment syndromes of either leg. There is no limb ischemia or PAD of either leg. I  recommend RLE elevation above the heart level to decrease post traumatic edema and continue DVT prophylaxis with low dose Lovenox. The patient was given informed consent.

## 2020-03-15 NOTE — CONSULT NOTE ADULT - PROBLEM SELECTOR RECOMMENDATION 9
no obv infectious process with no noted fevers, no leukocytosis, RLE swelling appears to be secondary to fracture so recommend no abx

## 2020-03-15 NOTE — PROGRESS NOTE ADULT - SUBJECTIVE AND OBJECTIVE BOX
Orthopedics      Patient seen and examined at bedside. Feeling well. Pain controlled. No n/v. No acute events overnight.    Vital Signs Last 24 Hrs  T(C): 36.9 (03-15-20 @ 00:05), Max: 37.2 (03-14-20 @ 22:02)  T(F): 98.4 (03-15-20 @ 00:05), Max: 99 (03-14-20 @ 22:02)  HR: 87 (03-15-20 @ 00:05) (87 - 93)  BP: 132/72 (03-15-20 @ 00:05) (132/72 - 168/84)  BP(mean): --  RR: 19 (03-15-20 @ 00:05) (15 - 19)  SpO2: 96% (03-15-20 @ 00:05) (96% - 98%)                        10.2   9.99  )-----------( 394      ( 14 Mar 2020 19:17 )             31.8     14 Mar 2020 19:39    136    |  104    |  12     ----------------------------<  82     4.6     |  22     |  0.52     Ca    8.4        14 Mar 2020 19:39    TPro  6.3    /  Alb  2.5    /  TBili  0.8    /  DBili  x      /  AST  32     /  ALT  74     /  AlkPhos  109    14 Mar 2020 19:39    PT/INR - ( 14 Mar 2020 20:48 )   PT: 13.6 sec;   INR: 1.21 ratio         PTT - ( 14 Mar 2020 20:48 )  PTT:33.1 sec    Exam:  Gen: NAD, resting comfortably  RLE:  NTTP calves b/l  EHL/FHL/TA/GS 0/5 baseline  SILT L2-S1  Compartments soft and compressible  No pain on passive stretch  2+ Pulses palpable      A/P: 64yM with R proximal tibia shaft fx  -FU AM labs  -Pain control  -NWB RLE in BJKI  -Must keep extremity iced/elevated  -q4h compartment checks  -DVT ppx  -Incentive Spirometry  -Elevation to extremity  -Medical management appreciated  -No surgical intervention at this time Orthopedics      Patient seen and examined at bedside. Feeling well. Pain controlled. No n/v. No acute events overnight.    Vital Signs Last 24 Hrs  T(C): 36.9 (03-15-20 @ 00:05), Max: 37.2 (03-14-20 @ 22:02)  T(F): 98.4 (03-15-20 @ 00:05), Max: 99 (03-14-20 @ 22:02)  HR: 87 (03-15-20 @ 00:05) (87 - 93)  BP: 132/72 (03-15-20 @ 00:05) (132/72 - 168/84)  BP(mean): --  RR: 19 (03-15-20 @ 00:05) (15 - 19)  SpO2: 96% (03-15-20 @ 00:05) (96% - 98%)                        10.2   9.99  )-----------( 394      ( 14 Mar 2020 19:17 )             31.8     14 Mar 2020 19:39    136    |  104    |  12     ----------------------------<  82     4.6     |  22     |  0.52     Ca    8.4        14 Mar 2020 19:39    TPro  6.3    /  Alb  2.5    /  TBili  0.8    /  DBili  x      /  AST  32     /  ALT  74     /  AlkPhos  109    14 Mar 2020 19:39    PT/INR - ( 14 Mar 2020 20:48 )   PT: 13.6 sec;   INR: 1.21 ratio         PTT - ( 14 Mar 2020 20:48 )  PTT:33.1 sec    Exam:  Gen: NAD, resting comfortably  RLE:  NTTP calves b/l  EHL/FHL/TA/GS 0/5 baseline  SILT L2-S1  Compartments soft and compressible  No pain on passive stretch  2+ Pulses palpable      A/P: 64yM with R proximal tibia shaft fx  -FU AM labs  -Pain control  -NWB RLE in BJKI  -Will need tiffany brace locked in extension (ordered, script in chart)  -Must keep extremity iced/elevated  -q4h compartment checks  -DVT ppx  -Incentive Spirometry  -Elevation to extremity  -Medical management appreciated  -No surgical intervention at this time

## 2020-03-15 NOTE — CONSULT NOTE ADULT - ASSESSMENT
64 male presents to ER from home by ambulance with report of lower extremity swelling, right > left. Patient recently admitted to [place] 3/6/20 - 3/12/20 for fever, AMS sepsis, had laparoscopic cholecystectomy fever for past 2 days no appetite, has had increased swelling of right lower extremity swelling and pain    CT- with Generalized bone demineralization limits osseous evaluation.  Comminuted proximal tibial fracture which likely begins posteriorly at the tibial plateau.   Comminuted, impacted fibular head and neck fracture.  Nondisplaced fracture at the medial malleolus.  Nondisplaced fracture at the distal fibula.

## 2020-03-15 NOTE — CONSULT NOTE ADULT - SUBJECTIVE AND OBJECTIVE BOX
HPI:  64 male presents to ER from home by ambulance with report of lower extremity swelling, right > left. Patient recently admitted to [place] 3/6/20 - 3/12/20 for fever, AMS sepsis, had laproscopic cholecystectomy, was on Zosyn and sent home with Augemntin. Patient states yesterday he had a low grade fever 100.3F, for past 2 days no appetite, has had increased swelling of right lower extremity swelling and pain, worse when being moved, patient states he has a history of MS and does not ambulate. (14 Mar 2020 20:51)      PAST MEDICAL & SURGICAL HISTORY:  Femur fracture, left  MS (multiple sclerosis)  No significant past surgical history      Antimicrobials      Immunological      Other  acetaminophen   Tablet .. 650 milliGRAM(s) Oral every 6 hours PRN  DULoxetine 60 milliGRAM(s) Oral two times a day  enoxaparin Injectable 40 milliGRAM(s) SubCutaneous daily  escitalopram 20 milliGRAM(s) Oral daily  OXcarbazepine 300 milliGRAM(s) Oral two times a day  oxybutynin 5 milliGRAM(s) Oral three times a day  oxycodone    5 mG/acetaminophen 325 mG 1 Tablet(s) Oral every 6 hours PRN  pregabalin 150 milliGRAM(s) Oral three times a day      Allergies    No Known Allergies    Intolerances        SOCIAL HISTORY:  Social History:  -ve (14 Mar 2020 20:51)      FAMILY HISTORY:  No pertinent family history in first degree relatives      ROS:    EYES:  Negative  blurry vision or double vision  GASTROINTESTINAL:  Negative for nausea, vomiting, diarrhea  -otherwise negative except for subjective    Vital Signs Last 24 Hrs  T(C): 36.6 (15 Mar 2020 15:42), Max: 37.2 (14 Mar 2020 22:02)  T(F): 97.8 (15 Mar 2020 15:42), Max: 99 (14 Mar 2020 22:02)  HR: 86 (15 Mar 2020 15:42) (82 - 95)  BP: 148/83 (15 Mar 2020 15:42) (124/79 - 168/84)  BP(mean): --  RR: 18 (15 Mar 2020 15:42) (18 - 19)  SpO2: 100% (15 Mar 2020 15:42) (96% - 100%)    PE:  WDWN in no distress  HEENT:  NC, PERRL, sclerae anicteric, conjunctivae clear, EOMI.    Neck:  Supple, no adenopathy  Lungs:  No accessory muscle use, breathing comfrotably  Cor:  distant  Abd:  nondistended, normal in appearance  Extrem:  No cyanosis, sig swelling to RLE  Skin:  No rashes, no sig erythema  Neuro: alert    LABS:                        9.6    6.91  )-----------( 403      ( 15 Mar 2020 06:44 )             29.7       WBC Count: 6.91 K/uL (03-15-20 @ 06:44)  WBC Count: 9.99 K/uL (20 @ 19:17)  WBC Count: 10.22 K/uL (20 @ 06:40)  WBC Count: 10.55 K/uL (20 @ 05:12)  WBC Count: 8.56 K/uL (03-10-20 @ 05:09)  WBC Count: 9.73 K/uL (20 @ 05:14)      03-15    138  |  102  |  10  ----------------------------<  76  3.6   |  27  |  0.44<L>    Ca    8.5      15 Mar 2020 06:44    TPro  6.2  /  Alb  2.6<L>  /  TBili  0.9  /  DBili  x   /  AST  19  /  ALT  62  /  AlkPhos  118  03-15      Creatinine, Serum: 0.44 mg/dL (03-15-20 @ 06:44)  Creatinine, Serum: 0.52 mg/dL (20 @ 19:39)  Creatinine, Serum: 0.46 mg/dL (20 @ 06:40)  Creatinine, Serum: 0.57 mg/dL (20 @ 05:12)  Creatinine, Serum: 0.60 mg/dL (03-10-20 @ 05:09)  Creatinine, Serum: 0.61 mg/dL (20 @ 05:14)      Urinalysis Basic - ( 14 Mar 2020 21:43 )    Color: Yellow / Appearance: Slightly Turbid / S.020 / pH: x  Gluc: x / Ketone: Large  / Bili: Negative / Urobili: 1   Blood: x / Protein: 30 mg/dL / Nitrite: Negative   Leuk Esterase: Negative / RBC: 3-5 /HPF / WBC 0-2   Sq Epi: x / Non Sq Epi: Occasional / Bacteria: Occasional    MICROBIOLOGY:      RADIOLOGY & ADDITIONAL STUDIES:    --< from: CT Lower Extremity No Cont, Right (20 @ 21:09) >  EXAM:  CT LWR EXT RT                            PROCEDURE DATE:  2020          INTERPRETATION:  Clinical information: Right knee pain, ankle pain. Emesis.    Comparison: None.    Technique:   CT scan of the lower extremity from the right distal femoral shaft of the metatarsals.  Intravenous contrast: None.  3D Reconstructions: Created    Findings:    Generalized bone demineralization limits osseous evaluation.    There is an intramedullary lashaun with 3 distal interlocking screws which transfixes a comminuted right distal femoral fracture with sclerotic margins without significant osseous bridging.     There is a mildly comminuted fracture at the right proximal tibia which likely begins at the plateau posteriorly just lateral to the midline. There is a prominent obliquely oriented component at the proximal shaft with 3 mm of lateral displacement of the major distal fracture fragment. There is also an impacted mildly comminuted fracture at the right fibular head and neck.     There una transverse fracture at the medial malleolus. There is also a nondisplaced fracture at the distal fibula.    There is a small knee joint effusion. There is diffuse subcutaneous edema/hemorrhage which extends to the dorsal foot.     Impression:   Generalized bone demineralization limits osseous evaluation.  Comminuted proximal tibial fracture which likely begins posteriorly at the tibial plateau.   Comminuted, impacted fibular head and neck fracture.  Nondisplaced fracture at the medial malleolus.  Nondisplaced fracture at the distal fibula.    < from: CT Chest No Cont (20 @ 20:56) >    EXAM:  CT ABDOMEN AND PELVIS                          EXAM:  CT CHEST                            PROCEDURE DATE:  2020          INTERPRETATION:  CLINICAL INFORMATION: Low-grade fever, not eating, laparoscopic cholecystectomy.    COMPARISON: 2020    PROCEDURE:   CT of the Chest, Abdomen and Pelvis was performed without intravenous contrast.   Intravenous contrast: None.  Oral contrast: None.  Sagittal and coronal reformats were performed.    FINDINGS:    CHEST:     LUNGS AND LARGE AIRWAYS: Mucus is seen within the posterior proximal trachea (2-18). Mucous/debris is also seen in the posterior trachea just above the level of the dario. Minimal bilateral dependent atelectasis.  PLEURA: No pleural effusion.  VESSELS: Within normal limits.  HEART: Heart size is normal. No pericardial effusion.  MEDIASTINUM AND NICHOLAS: No lymphadenopathy.  CHEST WALL AND LOWER NECK: Within normal limits.    ABDOMEN AND PELVIS:    LIVER: Within normal limits.  BILE DUCTS: Normal caliber.  GALLBLADDER: Absent. Postsurgical changes are seen within the gallbladder bed without significant collection.  SPLEEN: Within normal limits.  PANCREAS: Within normal limits.  ADRENALS: Within normal limits.  KIDNEYS/URETERS: Within normal limits.    BLADDER:Punctate pocket of gas within the anterior bladder likely from recent instrumentation/catheterization.  REPRODUCTIVE ORGANS: Remarkable prostate    BOWEL: No evidence of small bowel dilatation or obstruction. Minimal stool within the colon. Collapsedstomach. Appendix is normal.  PERITONEUM: No ascites.  VESSELS: Within normal limits.  RETROPERITONEUM/LYMPH NODES: No lymphadenopathy.    ABDOMINAL WALL: Small fat-containing periumbilical hernia.  BONES: Multiple healed left-sided rib fractures. Chronic intertrochanteric fracture of the LEFT proximal femur with nonunion and pseudoarthrosis.    IMPRESSION:   Status post cholecystectomy with postoperative changes within the gallbladder fossa. No significant fluid collection.  Chronic intertrochanteric fracture of the LEFT femur with nonunion and pseudoarthrosis.

## 2020-03-15 NOTE — CONSULT NOTE ADULT - SUBJECTIVE AND OBJECTIVE BOX
History of Present Illness:  64y.o. nonambulatory Male with a history of MS is 9 days s/p LAP chol.  The day prior to his surgery he feel out of his wheelchair and subsequently developed pain and swelling of his right leg. The patient was admitted yesterday that confirmed a fracture of the proximal tibia, neck of the fibula, and medial malleolus.  He had a prior  fracture of bilateral femurs and ORIF of right femur. I was requested to evaluate his LE circulation. He denies ischemic pedal pain at rest or digital ulcers.    PAST MEDICAL & SURGICAL HISTORY:  Femur fracture, left  MS (multiple sclerosis)  Right femoral lashaun  GB surgery    Allergies    No Known Allergies    Intolerances        MEDICATIONS  (STANDING):  enoxaparin Injectable 40 milliGRAM(s) SubCutaneous daily  escitalopram 20 milliGRAM(s) Oral daily  OXcarbazepine 300 milliGRAM(s) Oral two times a day  pregabalin 150 milliGRAM(s) Oral three times a day    MEDICATIONS  (PRN):  acetaminophen   Tablet .. 650 milliGRAM(s) Oral every 6 hours PRN Temp greater or equal to 38C (100.4F), Mild Pain (1 - 3)  oxycodone    5 mG/acetaminophen 325 mG 1 Tablet(s) Oral every 6 hours PRN Moderate Pain (4 - 6)      Social History:  Smoking History: denies  Alcohol Use: social    REVIEW OF SYSTEMS:  CONSTITUTIONAL: No fevers or chills  EYES/ENT: No visual changes;  No vertigo or throat pain   NECK: No pain or stiffness  RESPIRATORY: No cough, wheezing, hemoptysis; No shortness of breath  CARDIOVASCULAR: No chest pain or palpitations  GASTROINTESTINAL: No abdominal or epigastric pain. No nausea, vomiting, or hematemesis; No diarrhea or constipation. No melena or hematochezia.  GENITOURINARY: No dysuria, frequency or hematuria  NEUROLOGICAL: No numbness or weakness  SKIN: No itching, burning, rashes, or lesions   Vascular:  No lower extremity claudication, pedal rest pain or digital ulcers.+++ edema of right > left feet and pain just below right knee.  All other review of systems is negative unless indicated above.    PHYSICAL EXAM:  General:  On exam, the patient is alert nontoxic appearing Male in no acute distress.  Vital Signs Last 24 Hrs  T(C): 36.9 (15 Mar 2020 07:35), Max: 37.2 (14 Mar 2020 22:02)  T(F): 98.4 (15 Mar 2020 07:35), Max: 99 (14 Mar 2020 22:02)  HR: 95 (15 Mar 2020 07:35) (82 - 95)  BP: 152/87 (15 Mar 2020 07:35) (124/79 - 168/84)  BP(mean): --  RR: 18 (15 Mar 2020 07:35) (15 - 19)  SpO2: 99% (15 Mar 2020 07:35) (96% - 99%)    Neck:  4+/4+ bilateral carotid pulses; no carotid bruit, no palpable cervical masses.  Heart:  Regular, no murmurs, rubs or gallops.    Lungs:  Clear to auscultation.    Chest:  No chest wall deformities  Symmetrical chest expansion.   Abdomen: Soft and nontender.  No palpable masses.  No rebound, guarding or rigidity.  Extremities:  There is 2+ edema of right foot  and 1+ edema of left foot. There is mild edema of the right calf but the calf and thighs of both legs are soft. No pain on passive motion of the lower extremities. There is no evidence for compartment syndrome. Both  feet are warm, pink with normal capillary refill times.  There are no digital ulcers or heel decubiti.  The calf and thigh muscles are soft and nontender.  There are no palpable cords or limb cellulitis.  Erasto's sign  is negative bilaterally.  There is no lower extremity  cyanosis, or rubor.  On examination of the peripheral pulses:  Left leg femoral pulse is  4/4  , popliteal pulse is  4/4   ,PT Pulse is 4/4   , DP Pulse is 4/4   Right leg femoral pulse is  4/4  ,popliteal pulse is  4/4  , PT Pulse is 4/4  , DP Pulse is 4/4   Neurological:  There are no  sensory deficits in either lower extremity. He has bilateral foot drops.                          9.6    6.91  )-----------( 403      ( 15 Mar 2020 06:44 )             29.7     03-15    138  |  102  |  10  ----------------------------<  76  3.6   |  27  |  0.44<L>    Ca    8.5      15 Mar 2020 06:44    TPro  6.2  /  Alb  2.6<L>  /  TBili  0.9  /  DBili  x   /  AST  19  /  ALT  62  /  AlkPhos  118  03-15        PT/INR - ( 14 Mar 2020 20:48 )   PT: 13.6 sec;   INR: 1.21 ratio         PTT - ( 14 Mar 2020 20:48 )  PTT:33.1 sec    Radiology:

## 2020-03-15 NOTE — PROGRESS NOTE ADULT - SUBJECTIVE AND OBJECTIVE BOX
Patient is a 64y old  Male who presents with a chief complaint of LE swelling (15 Mar 2020 09:10)      INTERVAL HPI/OVERNIGHT EVENTS:  T(C): 36.6 (03-15-20 @ 15:42), Max: 37.2 (20 @ 22:02)  HR: 86 (03-15-20 @ 15:42) (82 - 95)  BP: 148/83 (03-15-20 @ 15:42) (124/79 - 168/84)  RR: 18 (03-15-20 @ 15:42) (18 - 19)  SpO2: 100% (03-15-20 @ 15:42) (96% - 100%)  Wt(kg): --  I&O's Summary      LABS:                        9.6    6.91  )-----------( 403      ( 15 Mar 2020 06:44 )             29.7     03-15    138  |  102  |  10  ----------------------------<  76  3.6   |  27  |  0.44<L>    Ca    8.5      15 Mar 2020 06:44    TPro  6.2  /  Alb  2.6<L>  /  TBili  0.9  /  DBili  x   /  AST  19  /  ALT  62  /  AlkPhos  118  03-15    PT/INR - ( 14 Mar 2020 20:48 )   PT: 13.6 sec;   INR: 1.21 ratio         PTT - ( 14 Mar 2020 20:48 )  PTT:33.1 sec  Urinalysis Basic - ( 14 Mar 2020 21:43 )    Color: Yellow / Appearance: Slightly Turbid / S.020 / pH: x  Gluc: x / Ketone: Large  / Bili: Negative / Urobili: 1   Blood: x / Protein: 30 mg/dL / Nitrite: Negative   Leuk Esterase: Negative / RBC: 3-5 /HPF / WBC 0-2   Sq Epi: x / Non Sq Epi: Occasional / Bacteria: Occasional      CAPILLARY BLOOD GLUCOSE            Urinalysis Basic - ( 14 Mar 2020 21:43 )    Color: Yellow / Appearance: Slightly Turbid / S.020 / pH: x  Gluc: x / Ketone: Large  / Bili: Negative / Urobili: 1   Blood: x / Protein: 30 mg/dL / Nitrite: Negative   Leuk Esterase: Negative / RBC: 3-5 /HPF / WBC 0-2   Sq Epi: x / Non Sq Epi: Occasional / Bacteria: Occasional        MEDICATIONS  (STANDING):  DULoxetine 60 milliGRAM(s) Oral two times a day  enoxaparin Injectable 40 milliGRAM(s) SubCutaneous daily  escitalopram 20 milliGRAM(s) Oral daily  OXcarbazepine 300 milliGRAM(s) Oral two times a day  oxybutynin 5 milliGRAM(s) Oral three times a day  pregabalin 150 milliGRAM(s) Oral three times a day    MEDICATIONS  (PRN):  acetaminophen   Tablet .. 650 milliGRAM(s) Oral every 6 hours PRN Temp greater or equal to 38C (100.4F), Mild Pain (1 - 3)  oxycodone    5 mG/acetaminophen 325 mG 1 Tablet(s) Oral every 6 hours PRN Moderate Pain (4 - 6)          PHYSICAL EXAM:  GENERAL: NAD, well-groomed, well-developed  HEAD:  Atraumatic, Normocephalic  CHEST/LUNG: Clear to percussion bilaterally; No rales, rhonchi, wheezing, or rubs  HEART: Regular rate and rhythm; No murmurs, rubs, or gallops  ABDOMEN: Soft, Nontender, Nondistended; Bowel sounds present  EXTREMITIES:  edema+    Care Discussed with Consultants/Other Providers [ ] YES  [ ] NO

## 2020-03-16 LAB
ALBUMIN SERPL ELPH-MCNC: 2.5 G/DL — LOW (ref 3.3–5)
ALP SERPL-CCNC: 117 U/L — SIGNIFICANT CHANGE UP (ref 40–120)
ALT FLD-CCNC: 44 U/L — SIGNIFICANT CHANGE UP (ref 12–78)
ANION GAP SERPL CALC-SCNC: 8 MMOL/L — SIGNIFICANT CHANGE UP (ref 5–17)
AST SERPL-CCNC: 10 U/L — LOW (ref 15–37)
BILIRUB SERPL-MCNC: 0.7 MG/DL — SIGNIFICANT CHANGE UP (ref 0.2–1.2)
BUN SERPL-MCNC: 10 MG/DL — SIGNIFICANT CHANGE UP (ref 7–23)
CALCIUM SERPL-MCNC: 8.5 MG/DL — SIGNIFICANT CHANGE UP (ref 8.5–10.1)
CHLORIDE SERPL-SCNC: 102 MMOL/L — SIGNIFICANT CHANGE UP (ref 96–108)
CO2 SERPL-SCNC: 30 MMOL/L — SIGNIFICANT CHANGE UP (ref 22–31)
CREAT SERPL-MCNC: 0.39 MG/DL — LOW (ref 0.5–1.3)
GLUCOSE SERPL-MCNC: 81 MG/DL — SIGNIFICANT CHANGE UP (ref 70–99)
HCT VFR BLD CALC: 28.6 % — LOW (ref 39–50)
HGB BLD-MCNC: 9.4 G/DL — LOW (ref 13–17)
MCHC RBC-ENTMCNC: 30.6 PG — SIGNIFICANT CHANGE UP (ref 27–34)
MCHC RBC-ENTMCNC: 32.9 GM/DL — SIGNIFICANT CHANGE UP (ref 32–36)
MCV RBC AUTO: 93.2 FL — SIGNIFICANT CHANGE UP (ref 80–100)
NRBC # BLD: 0 /100 WBCS — SIGNIFICANT CHANGE UP (ref 0–0)
PLATELET # BLD AUTO: 441 K/UL — HIGH (ref 150–400)
POTASSIUM SERPL-MCNC: 3.4 MMOL/L — LOW (ref 3.5–5.3)
POTASSIUM SERPL-SCNC: 3.4 MMOL/L — LOW (ref 3.5–5.3)
PROT SERPL-MCNC: 6 G/DL — SIGNIFICANT CHANGE UP (ref 6–8.3)
RAPID RVP RESULT: SIGNIFICANT CHANGE UP
RBC # BLD: 3.07 M/UL — LOW (ref 4.2–5.8)
RBC # FLD: 13.2 % — SIGNIFICANT CHANGE UP (ref 10.3–14.5)
SODIUM SERPL-SCNC: 140 MMOL/L — SIGNIFICANT CHANGE UP (ref 135–145)
WBC # BLD: 6.46 K/UL — SIGNIFICANT CHANGE UP (ref 3.8–10.5)
WBC # FLD AUTO: 6.46 K/UL — SIGNIFICANT CHANGE UP (ref 3.8–10.5)

## 2020-03-16 RX ORDER — POTASSIUM CHLORIDE 20 MEQ
20 PACKET (EA) ORAL ONCE
Refills: 0 | Status: COMPLETED | OUTPATIENT
Start: 2020-03-16 | End: 2020-03-16

## 2020-03-16 RX ADMIN — Medication 5 MILLIGRAM(S): at 06:32

## 2020-03-16 RX ADMIN — Medication 5 MILLIGRAM(S): at 14:05

## 2020-03-16 RX ADMIN — ENOXAPARIN SODIUM 40 MILLIGRAM(S): 100 INJECTION SUBCUTANEOUS at 14:04

## 2020-03-16 RX ADMIN — OXYCODONE AND ACETAMINOPHEN 1 TABLET(S): 5; 325 TABLET ORAL at 07:30

## 2020-03-16 RX ADMIN — Medication 150 MILLIGRAM(S): at 14:21

## 2020-03-16 RX ADMIN — OXYCODONE AND ACETAMINOPHEN 1 TABLET(S): 5; 325 TABLET ORAL at 06:32

## 2020-03-16 RX ADMIN — DULOXETINE HYDROCHLORIDE 60 MILLIGRAM(S): 30 CAPSULE, DELAYED RELEASE ORAL at 17:53

## 2020-03-16 RX ADMIN — ESCITALOPRAM OXALATE 20 MILLIGRAM(S): 10 TABLET, FILM COATED ORAL at 14:05

## 2020-03-16 RX ADMIN — OXYCODONE AND ACETAMINOPHEN 1 TABLET(S): 5; 325 TABLET ORAL at 14:21

## 2020-03-16 RX ADMIN — OXYCODONE AND ACETAMINOPHEN 1 TABLET(S): 5; 325 TABLET ORAL at 22:34

## 2020-03-16 RX ADMIN — Medication 20 MILLIEQUIVALENT(S): at 08:37

## 2020-03-16 RX ADMIN — OXCARBAZEPINE 300 MILLIGRAM(S): 300 TABLET, FILM COATED ORAL at 17:53

## 2020-03-16 RX ADMIN — Medication 150 MILLIGRAM(S): at 21:34

## 2020-03-16 RX ADMIN — OXCARBAZEPINE 300 MILLIGRAM(S): 300 TABLET, FILM COATED ORAL at 06:32

## 2020-03-16 RX ADMIN — Medication 5 MILLIGRAM(S): at 21:35

## 2020-03-16 RX ADMIN — DULOXETINE HYDROCHLORIDE 60 MILLIGRAM(S): 30 CAPSULE, DELAYED RELEASE ORAL at 06:32

## 2020-03-16 RX ADMIN — OXYCODONE AND ACETAMINOPHEN 1 TABLET(S): 5; 325 TABLET ORAL at 21:34

## 2020-03-16 RX ADMIN — Medication 150 MILLIGRAM(S): at 06:32

## 2020-03-16 RX ADMIN — OXYCODONE AND ACETAMINOPHEN 1 TABLET(S): 5; 325 TABLET ORAL at 15:15

## 2020-03-16 NOTE — PROGRESS NOTE ADULT - SUBJECTIVE AND OBJECTIVE BOX
Orthopedics      Patient seen and examined at bedside. Feeling well. Pain controlled. No n/v. No acute events overnight.    Vital Signs Last 24 Hrs  T(C): 36.7 (03-16-20 @ 05:41), Max: 37.1 (03-15-20 @ 23:40)  T(F): 98 (03-16-20 @ 05:41), Max: 98.8 (03-15-20 @ 23:40)  HR: 76 (03-16-20 @ 05:41) (76 - 95)  BP: 150/75 (03-16-20 @ 05:41) (137/86 - 153/80)  BP(mean): --  RR: 19 (03-16-20 @ 05:41) (18 - 19)  SpO2: 96% (03-16-20 @ 05:41) (96% - 100%)                        9.4    6.46  )-----------( 441      ( 16 Mar 2020 06:33 )             28.6     16 Mar 2020 06:33    140    |  102    |  10     ----------------------------<  81     3.4     |  30     |  0.39     Ca    8.5        16 Mar 2020 06:33    TPro  6.0    /  Alb  2.5    /  TBili  0.7    /  DBili  x      /  AST  10     /  ALT  44     /  AlkPhos  117    16 Mar 2020 06:33    PT/INR - ( 14 Mar 2020 20:48 )   PT: 13.6 sec;   INR: 1.21 ratio         PTT - ( 14 Mar 2020 20:48 )  PTT:33.1 sec    Exam:  Gen: NAD, resting comfortably  RLE:  Dressing c/d/i  NTTP calves b/l  EHL/FHL/TA/GS 0/5 (baseline)  SILT L2-S1  Compartments soft and compressible  No pain on passive stretch  2+ Pulses palpable      A/P: 64yM with R proximal tibia/fibula fracture  -NWB in tiffany brace locked in extension  -FU AM labs  -Pain control  -DVT ppx  -Incentive Spirometry  -Elevation to extremity  -Medical management appreciated  -No acute orthopaedic surgical intervention at this time, please re-consult as needed  -Ortho Stable for DC  -Case discussed with attending Dr. Nayak who agrees with above plan

## 2020-03-16 NOTE — CHART NOTE - NSCHARTNOTEFT_GEN_A_CORE
Order received from ortho to provide patient with tiffany knee brace locked in full extension to control motion following tibial fracture.  Pt has MS and states that he fell out of wheelchair .  Knee immobilizer removed and replaced with tiffany knee brace locked in extension  Pt instructed to don and doff  Written instructions left at bedside  Nurse Valeri aware of application of brace.  Follow up if needed        Marshall Katz CO [facility] p&o  [phone]

## 2020-03-16 NOTE — PROGRESS NOTE ADULT - ASSESSMENT
Problem/Plan - 1:  ·  Problem: Tibial fracture.  Plan: ortho and vascular fu  pain control  management as per ortho.     Problem/Plan - 2:  ·  Problem: Fever.  Plan: unclear etiology   ID fu.   monitor off antibiotics     Problem/Plan - 3:  ·  Problem: MS (multiple sclerosis).  Plan: cw home meds.     dc planning likely tomorrow

## 2020-03-16 NOTE — PROGRESS NOTE ADULT - SUBJECTIVE AND OBJECTIVE BOX
Patient is a 64y old  Male who presents with a chief complaint of LE swelling (16 Mar 2020 07:25)      INTERVAL HPI/OVERNIGHT EVENTS:  T(C): 36.9 (20 @ 16:36), Max: 37.1 (03-15-20 @ 23:40)  HR: 74 (20 @ 16:36) (74 - 82)  BP: 135/82 (20 @ 16:36) (133/85 - 154/79)  RR: 18 (20 @ 16:36) (18 - 19)  SpO2: 97% (20 @ 16:36) (96% - 100%)  Wt(kg): --  I&O's Summary    15 Mar 2020 07:  -  16 Mar 2020 07:00  --------------------------------------------------------  IN: 100 mL / OUT: 700 mL / NET: -600 mL    16 Mar 2020 07:01  -  16 Mar 2020 16:45  --------------------------------------------------------  IN: 0 mL / OUT: 1200 mL / NET: -1200 mL        LABS:                        9.4    6.46  )-----------( 441      ( 16 Mar 2020 06:33 )             28.6     03-16    140  |  102  |  10  ----------------------------<  81  3.4<L>   |  30  |  0.39<L>    Ca    8.5      16 Mar 2020 06:33    TPro  6.0  /  Alb  2.5<L>  /  TBili  0.7  /  DBili  x   /  AST  10<L>  /  ALT  44  /  AlkPhos  117  03-16    PT/INR - ( 14 Mar 2020 20:48 )   PT: 13.6 sec;   INR: 1.21 ratio         PTT - ( 14 Mar 2020 20:48 )  PTT:33.1 sec  Urinalysis Basic - ( 14 Mar 2020 21:43 )    Color: Yellow / Appearance: Slightly Turbid / S.020 / pH: x  Gluc: x / Ketone: Large  / Bili: Negative / Urobili: 1   Blood: x / Protein: 30 mg/dL / Nitrite: Negative   Leuk Esterase: Negative / RBC: 3-5 /HPF / WBC 0-2   Sq Epi: x / Non Sq Epi: Occasional / Bacteria: Occasional      CAPILLARY BLOOD GLUCOSE            Urinalysis Basic - ( 14 Mar 2020 21:43 )    Color: Yellow / Appearance: Slightly Turbid / S.020 / pH: x  Gluc: x / Ketone: Large  / Bili: Negative / Urobili: 1   Blood: x / Protein: 30 mg/dL / Nitrite: Negative   Leuk Esterase: Negative / RBC: 3-5 /HPF / WBC 0-2   Sq Epi: x / Non Sq Epi: Occasional / Bacteria: Occasional        MEDICATIONS  (STANDING):  DULoxetine 60 milliGRAM(s) Oral two times a day  enoxaparin Injectable 40 milliGRAM(s) SubCutaneous daily  escitalopram 20 milliGRAM(s) Oral daily  OXcarbazepine 300 milliGRAM(s) Oral two times a day  oxybutynin 5 milliGRAM(s) Oral three times a day  pregabalin 150 milliGRAM(s) Oral three times a day    MEDICATIONS  (PRN):  acetaminophen   Tablet .. 650 milliGRAM(s) Oral every 6 hours PRN Temp greater or equal to 38C (100.4F), Mild Pain (1 - 3)  oxycodone    5 mG/acetaminophen 325 mG 1 Tablet(s) Oral every 6 hours PRN Moderate Pain (4 - 6)          PHYSICAL EXAM:  GENERAL: NAD, well-groomed, well-developed  HEAD:  Atraumatic, Normocephalic  CHEST/LUNG: Clear to percussion bilaterally; No rales, rhonchi, wheezing, or rubs  HEART: Regular rate and rhythm; No murmurs, rubs, or gallops  ABDOMEN: Soft, Nontender, Nondistended; Bowel sounds present  EXTREMITIES: brace +    Care Discussed with Consultants/Other Providers [ ] YES  [ ] NO

## 2020-03-17 ENCOUNTER — TRANSCRIPTION ENCOUNTER (OUTPATIENT)
Age: 65
End: 2020-03-17

## 2020-03-17 LAB
ALBUMIN SERPL ELPH-MCNC: 2.6 G/DL — LOW (ref 3.3–5)
ALP SERPL-CCNC: 142 U/L — HIGH (ref 40–120)
ALT FLD-CCNC: 38 U/L — SIGNIFICANT CHANGE UP (ref 12–78)
ANION GAP SERPL CALC-SCNC: 6 MMOL/L — SIGNIFICANT CHANGE UP (ref 5–17)
AST SERPL-CCNC: 13 U/L — LOW (ref 15–37)
BILIRUB SERPL-MCNC: 0.6 MG/DL — SIGNIFICANT CHANGE UP (ref 0.2–1.2)
BUN SERPL-MCNC: 12 MG/DL — SIGNIFICANT CHANGE UP (ref 7–23)
CALCIUM SERPL-MCNC: 8.7 MG/DL — SIGNIFICANT CHANGE UP (ref 8.5–10.1)
CHLORIDE SERPL-SCNC: 102 MMOL/L — SIGNIFICANT CHANGE UP (ref 96–108)
CO2 SERPL-SCNC: 31 MMOL/L — SIGNIFICANT CHANGE UP (ref 22–31)
CREAT SERPL-MCNC: 0.47 MG/DL — LOW (ref 0.5–1.3)
GLUCOSE SERPL-MCNC: 97 MG/DL — SIGNIFICANT CHANGE UP (ref 70–99)
HCT VFR BLD CALC: 30.1 % — LOW (ref 39–50)
HGB BLD-MCNC: 9.9 G/DL — LOW (ref 13–17)
MCHC RBC-ENTMCNC: 30.6 PG — SIGNIFICANT CHANGE UP (ref 27–34)
MCHC RBC-ENTMCNC: 32.9 GM/DL — SIGNIFICANT CHANGE UP (ref 32–36)
MCV RBC AUTO: 92.9 FL — SIGNIFICANT CHANGE UP (ref 80–100)
NRBC # BLD: 0 /100 WBCS — SIGNIFICANT CHANGE UP (ref 0–0)
PLATELET # BLD AUTO: 470 K/UL — HIGH (ref 150–400)
POTASSIUM SERPL-MCNC: 3.6 MMOL/L — SIGNIFICANT CHANGE UP (ref 3.5–5.3)
POTASSIUM SERPL-SCNC: 3.6 MMOL/L — SIGNIFICANT CHANGE UP (ref 3.5–5.3)
PROT SERPL-MCNC: 6.2 G/DL — SIGNIFICANT CHANGE UP (ref 6–8.3)
RBC # BLD: 3.24 M/UL — LOW (ref 4.2–5.8)
RBC # FLD: 13.5 % — SIGNIFICANT CHANGE UP (ref 10.3–14.5)
SODIUM SERPL-SCNC: 139 MMOL/L — SIGNIFICANT CHANGE UP (ref 135–145)
WBC # BLD: 8.43 K/UL — SIGNIFICANT CHANGE UP (ref 3.8–10.5)
WBC # FLD AUTO: 8.43 K/UL — SIGNIFICANT CHANGE UP (ref 3.8–10.5)

## 2020-03-17 RX ORDER — OXYCODONE AND ACETAMINOPHEN 5; 325 MG/1; MG/1
2 TABLET ORAL EVERY 6 HOURS
Refills: 0 | Status: DISCONTINUED | OUTPATIENT
Start: 2020-03-17 | End: 2020-03-18

## 2020-03-17 RX ADMIN — OXYCODONE AND ACETAMINOPHEN 2 TABLET(S): 5; 325 TABLET ORAL at 17:52

## 2020-03-17 RX ADMIN — ESCITALOPRAM OXALATE 20 MILLIGRAM(S): 10 TABLET, FILM COATED ORAL at 11:17

## 2020-03-17 RX ADMIN — Medication 5 MILLIGRAM(S): at 21:12

## 2020-03-17 RX ADMIN — Medication 5 MILLIGRAM(S): at 13:16

## 2020-03-17 RX ADMIN — OXYCODONE AND ACETAMINOPHEN 1 TABLET(S): 5; 325 TABLET ORAL at 11:58

## 2020-03-17 RX ADMIN — OXYCODONE AND ACETAMINOPHEN 2 TABLET(S): 5; 325 TABLET ORAL at 19:05

## 2020-03-17 RX ADMIN — OXYCODONE AND ACETAMINOPHEN 2 TABLET(S): 5; 325 TABLET ORAL at 23:54

## 2020-03-17 RX ADMIN — ENOXAPARIN SODIUM 40 MILLIGRAM(S): 100 INJECTION SUBCUTANEOUS at 11:17

## 2020-03-17 RX ADMIN — OXYCODONE AND ACETAMINOPHEN 1 TABLET(S): 5; 325 TABLET ORAL at 12:58

## 2020-03-17 RX ADMIN — Medication 150 MILLIGRAM(S): at 21:12

## 2020-03-17 RX ADMIN — DULOXETINE HYDROCHLORIDE 60 MILLIGRAM(S): 30 CAPSULE, DELAYED RELEASE ORAL at 05:41

## 2020-03-17 RX ADMIN — OXCARBAZEPINE 300 MILLIGRAM(S): 300 TABLET, FILM COATED ORAL at 17:48

## 2020-03-17 RX ADMIN — Medication 150 MILLIGRAM(S): at 05:41

## 2020-03-17 RX ADMIN — OXYCODONE AND ACETAMINOPHEN 1 TABLET(S): 5; 325 TABLET ORAL at 05:40

## 2020-03-17 RX ADMIN — DULOXETINE HYDROCHLORIDE 60 MILLIGRAM(S): 30 CAPSULE, DELAYED RELEASE ORAL at 17:53

## 2020-03-17 RX ADMIN — Medication 150 MILLIGRAM(S): at 13:16

## 2020-03-17 RX ADMIN — OXCARBAZEPINE 300 MILLIGRAM(S): 300 TABLET, FILM COATED ORAL at 05:41

## 2020-03-17 RX ADMIN — Medication 5 MILLIGRAM(S): at 05:41

## 2020-03-17 NOTE — OCCUPATIONAL THERAPY INITIAL EVALUATION ADULT - RANGE OF MOTION EXAMINATION, UPPER EXTREMITY
LUE dominant. Decreased gross and fine motor coordination LUE; unable RUE. PROM WFL RUE. LUE wrist/elbow WFL, digits WFL except 2nd digit limited flexion. Left shoulder AROM approx 0-30/ PROM 0-90

## 2020-03-17 NOTE — OCCUPATIONAL THERAPY INITIAL EVALUATION ADULT - STRENGTHENING, PT EVAL
pt will improve LUE strength by 1/2 grade throughout all joints to facilitate increased functional independence in feeding within 1 week

## 2020-03-17 NOTE — OCCUPATIONAL THERAPY INITIAL EVALUATION ADULT - ADDITIONAL COMMENTS
Pt reported he lives in a private home with his wife. He has a Sure[employer]ands lift which he used PTA for transferring OOB to w/c, to commode, to shower w/c. HHA 8a-3p who assisted with ADLs; wife assists when HHA leaves. Pt reported PTA he required assistance for all ADLs except was able to use urinal for toileting, and able to feed self independently using LUE post setup.

## 2020-03-17 NOTE — PHYSICAL THERAPY INITIAL EVALUATION ADULT - PERTINENT HX OF CURRENT PROBLEM, REHAB EVAL
64 male presents to ER from home by ambulance with report of lower extremity swelling, right > left. Patient recently admitted to [facility] 3/6/20 - 3/12/20 for fever, AMS sepsis, had laproscopic cholecystectomy, was on Zosyn and sent home with Augemntin. Patient states yesterday he had a low grade fever 100.3F, for past 2 days no appetite, has had increased swelling of right lower extremity swelling and pain, worse when being moved, patient states he has a history of MS and does not ambulate.

## 2020-03-17 NOTE — DISCHARGE NOTE NURSING/CASE MANAGEMENT/SOCIAL WORK - NSSCTYPOFSERV_GEN_ALL_CORE
Home Care Agency   [facility]  [phone]  Physical Therapy, Occupational Therapy, Visiting Nurse    PT, OT and RN

## 2020-03-17 NOTE — PROGRESS NOTE ADULT - ASSESSMENT
Problem/Plan - 1:  ·  Problem: Tibial fracture.  Plan: ortho and vascular fu  pain control  management as per ortho.     Problem/Plan - 2:  ·  Problem: Fever.  Plan: unclear etiology   ID fu.   monitor off antibiotics     Problem/Plan - 3:  ·  Problem: MS (multiple sclerosis).  Plan: cw home meds.     dc planning likely tomorrow after home care situation is sorted out

## 2020-03-17 NOTE — PROGRESS NOTE ADULT - SUBJECTIVE AND OBJECTIVE BOX
Patient is a 64y old  Male who presents with a chief complaint of LE swelling (16 Mar 2020 16:44)      INTERVAL HPI/OVERNIGHT EVENTS:  T(C): 36.6 (03-17-20 @ 15:46), Max: 37 (03-16-20 @ 20:09)  HR: 83 (03-17-20 @ 15:46) (74 - 83)  BP: 135/76 (03-17-20 @ 15:46) (130/78 - 157/89)  RR: 18 (03-17-20 @ 15:46) (18 - 19)  SpO2: 98% (03-17-20 @ 15:46) (95% - 98%)  Wt(kg): --  I&O's Summary    16 Mar 2020 07:01  -  17 Mar 2020 07:00  --------------------------------------------------------  IN: 0 mL / OUT: 1800 mL / NET: -1800 mL    17 Mar 2020 07:01  -  17 Mar 2020 16:25  --------------------------------------------------------  IN: 120 mL / OUT: 0 mL / NET: 120 mL        LABS:                        9.9    8.43  )-----------( 470      ( 17 Mar 2020 06:30 )             30.1     03-17    139  |  102  |  12  ----------------------------<  97  3.6   |  31  |  0.47<L>    Ca    8.7      17 Mar 2020 06:30    TPro  6.2  /  Alb  2.6<L>  /  TBili  0.6  /  DBili  x   /  AST  13<L>  /  ALT  38  /  AlkPhos  142<H>  03-17        CAPILLARY BLOOD GLUCOSE                MEDICATIONS  (STANDING):  DULoxetine 60 milliGRAM(s) Oral two times a day  enoxaparin Injectable 40 milliGRAM(s) SubCutaneous daily  escitalopram 20 milliGRAM(s) Oral daily  OXcarbazepine 300 milliGRAM(s) Oral two times a day  oxybutynin 5 milliGRAM(s) Oral three times a day  pregabalin 150 milliGRAM(s) Oral three times a day    MEDICATIONS  (PRN):  acetaminophen   Tablet .. 650 milliGRAM(s) Oral every 6 hours PRN Temp greater or equal to 38C (100.4F), Mild Pain (1 - 3)  oxycodone    5 mG/acetaminophen 325 mG 1 Tablet(s) Oral every 6 hours PRN Moderate Pain (4 - 6)          PHYSICAL EXAM:  GENERAL: NAD, well-groomed, well-developed  HEAD:  Atraumatic, Normocephalic  CHEST/LUNG: Clear to percussion bilaterally; No rales, rhonchi, wheezing, or rubs  HEART: Regular rate and rhythm; No murmurs, rubs, or gallops  ABDOMEN: Soft, Nontender, Nondistended; Bowel sounds present  EXTREMITIES:  2+ Peripheral Pulses, No clubbing, cyanosis, or edema  LYMPH: No lymphadenopathy noted  SKIN: No rashes or lesions    Care Discussed with Consultants/Other Providers [ ] YES  [ ] NO

## 2020-03-17 NOTE — PHYSICAL THERAPY INITIAL EVALUATION ADULT - ADDITIONAL COMMENTS
Patient resides in private home, is wheelchair bound, uses sure hands lift to transfer to wheelchair and commode.  Patient has HHA during day, his mother and wife assist as needed

## 2020-03-17 NOTE — DISCHARGE NOTE NURSING/CASE MANAGEMENT/SOCIAL WORK - CASE MANAGER'S NAME
Home Care Agency   [facility]  [phone]  Physical Therapy, Occupational Therapy, Visiting Nurse  Vivienne Ram RN

## 2020-03-17 NOTE — DISCHARGE NOTE NURSING/CASE MANAGEMENT/SOCIAL WORK - NSSCNAMETXT_GEN_ALL_CORE
Home Care Agency   [facility]  [phone]  Physical Therapy, Occupational Therapy, Visiting Nurse  [facility]

## 2020-03-17 NOTE — OCCUPATIONAL THERAPY INITIAL EVALUATION ADULT - PERTINENT HX OF CURRENT PROBLEM, REHAB EVAL
Pt is a 65 y/o male with h/o MS presented with report of lower extremity swelling, right > left. Patient recently admitted to [facility] 3/6/20 - 3/12/20 for fever, AMS sepsis, had laproscopic cholecystectomy. Patient states yesterday he had a low grade fever 100.3F, for past 2 days no appetite, has had increased swelling of right lower extremity swelling and pain, worse when being moved, patient states he has a history of MS and does not ambulate. Found to have Right proximal tibial fx.

## 2020-03-17 NOTE — OCCUPATIONAL THERAPY INITIAL EVALUATION ADULT - LEVEL OF INDEPENDENCE: EATING, OT EVAL
hand-over-hand assistance to grasp, therapist support at left wrist to promote stability/maximum assist (25% patients effort)

## 2020-03-17 NOTE — OCCUPATIONAL THERAPY INITIAL EVALUATION ADULT - MANUAL MUSCLE TESTING RESULTS, REHAB EVAL
RUE 0/5 proximally, 1/5 hand. LUE 4/5 elbow flex/ext; 3+/5 wrist flex/ext; shoulder 2-/5; digits 3/5

## 2020-03-17 NOTE — DISCHARGE NOTE NURSING/CASE MANAGEMENT/SOCIAL WORK - NSTRANSFERBELONGINGSDISPO_GEN_A_NUR
Please inform mom the carnitine levels (free and total) were NORMAL; I informed the Genetics Nurse at [facility], and she advised these results to be forwarded to the [address] [place] [address]. with patient

## 2020-03-17 NOTE — PROVIDER CONTACT NOTE (OTHER) - ASSESSMENT
patient A+Ox4, right leg Austin brace with positive pedal pulses and foot drop. color even throughout, skin assess

## 2020-03-18 ENCOUNTER — TRANSCRIPTION ENCOUNTER (OUTPATIENT)
Age: 65
End: 2020-03-18

## 2020-03-18 VITALS
OXYGEN SATURATION: 98 % | HEART RATE: 77 BPM | SYSTOLIC BLOOD PRESSURE: 134 MMHG | DIASTOLIC BLOOD PRESSURE: 84 MMHG | TEMPERATURE: 98 F | RESPIRATION RATE: 19 BRPM

## 2020-03-18 PROCEDURE — 86850 RBC ANTIBODY SCREEN: CPT

## 2020-03-18 PROCEDURE — 73562 X-RAY EXAM OF KNEE 3: CPT

## 2020-03-18 PROCEDURE — 97162 PT EVAL MOD COMPLEX 30 MIN: CPT

## 2020-03-18 PROCEDURE — 73552 X-RAY EXAM OF FEMUR 2/>: CPT

## 2020-03-18 PROCEDURE — 87631 RESP VIRUS 3-5 TARGETS: CPT

## 2020-03-18 PROCEDURE — 74176 CT ABD & PELVIS W/O CONTRAST: CPT

## 2020-03-18 PROCEDURE — 71250 CT THORAX DX C-: CPT

## 2020-03-18 PROCEDURE — 99285 EMERGENCY DEPT VISIT HI MDM: CPT | Mod: 25

## 2020-03-18 PROCEDURE — 93005 ELECTROCARDIOGRAM TRACING: CPT

## 2020-03-18 PROCEDURE — 87040 BLOOD CULTURE FOR BACTERIA: CPT

## 2020-03-18 PROCEDURE — 80053 COMPREHEN METABOLIC PANEL: CPT

## 2020-03-18 PROCEDURE — 87581 M.PNEUMON DNA AMP PROBE: CPT

## 2020-03-18 PROCEDURE — 86901 BLOOD TYPING SEROLOGIC RH(D): CPT

## 2020-03-18 PROCEDURE — 83605 ASSAY OF LACTIC ACID: CPT

## 2020-03-18 PROCEDURE — 84443 ASSAY THYROID STIM HORMONE: CPT

## 2020-03-18 PROCEDURE — 97165 OT EVAL LOW COMPLEX 30 MIN: CPT

## 2020-03-18 PROCEDURE — 71045 X-RAY EXAM CHEST 1 VIEW: CPT

## 2020-03-18 PROCEDURE — 81001 URINALYSIS AUTO W/SCOPE: CPT

## 2020-03-18 PROCEDURE — 73700 CT LOWER EXTREMITY W/O DYE: CPT

## 2020-03-18 PROCEDURE — 85730 THROMBOPLASTIN TIME PARTIAL: CPT

## 2020-03-18 PROCEDURE — 36415 COLL VENOUS BLD VENIPUNCTURE: CPT

## 2020-03-18 PROCEDURE — 93970 EXTREMITY STUDY: CPT

## 2020-03-18 PROCEDURE — 87633 RESP VIRUS 12-25 TARGETS: CPT

## 2020-03-18 PROCEDURE — 73630 X-RAY EXAM OF FOOT: CPT

## 2020-03-18 PROCEDURE — 73590 X-RAY EXAM OF LOWER LEG: CPT

## 2020-03-18 PROCEDURE — 85610 PROTHROMBIN TIME: CPT

## 2020-03-18 PROCEDURE — 87798 DETECT AGENT NOS DNA AMP: CPT

## 2020-03-18 PROCEDURE — 87486 CHLMYD PNEUM DNA AMP PROBE: CPT

## 2020-03-18 PROCEDURE — 73610 X-RAY EXAM OF ANKLE: CPT

## 2020-03-18 PROCEDURE — 86900 BLOOD TYPING SEROLOGIC ABO: CPT

## 2020-03-18 PROCEDURE — 85027 COMPLETE CBC AUTOMATED: CPT

## 2020-03-18 PROCEDURE — 87086 URINE CULTURE/COLONY COUNT: CPT

## 2020-03-18 RX ADMIN — ENOXAPARIN SODIUM 40 MILLIGRAM(S): 100 INJECTION SUBCUTANEOUS at 11:24

## 2020-03-18 RX ADMIN — OXCARBAZEPINE 300 MILLIGRAM(S): 300 TABLET, FILM COATED ORAL at 06:08

## 2020-03-18 RX ADMIN — Medication 150 MILLIGRAM(S): at 06:08

## 2020-03-18 RX ADMIN — OXYCODONE AND ACETAMINOPHEN 1 TABLET(S): 5; 325 TABLET ORAL at 09:18

## 2020-03-18 RX ADMIN — OXYCODONE AND ACETAMINOPHEN 1 TABLET(S): 5; 325 TABLET ORAL at 08:16

## 2020-03-18 RX ADMIN — Medication 5 MILLIGRAM(S): at 06:08

## 2020-03-18 RX ADMIN — ESCITALOPRAM OXALATE 20 MILLIGRAM(S): 10 TABLET, FILM COATED ORAL at 11:24

## 2020-03-18 RX ADMIN — OXYCODONE AND ACETAMINOPHEN 2 TABLET(S): 5; 325 TABLET ORAL at 00:54

## 2020-03-18 RX ADMIN — DULOXETINE HYDROCHLORIDE 60 MILLIGRAM(S): 30 CAPSULE, DELAYED RELEASE ORAL at 06:08

## 2020-03-18 NOTE — DISCHARGE NOTE PROVIDER - CARE PROVIDER_API CALL
Gatito Conti)  Internal Medicine  [address] [place] [address]  Phone: [phone]  Fax: [phone]  Follow Up Time:

## 2020-03-18 NOTE — DISCHARGE NOTE PROVIDER - NSDCMRMEDTOKEN_GEN_ALL_CORE_FT
tiffany brace: 1 McLennan brace  GANGA: 999  ICD-10: Z46.89  diclofenac sodium 75 mg oral delayed release tablet: 1 tab(s) orally 2 times a day  DULoxetine 60 mg oral delayed release capsule: 1 cap(s) orally 2 times a day  escitalopram 20 mg oral tablet: 1 tab(s) orally once a day  nystatin-triamcinolone 100,000 units/g-0.1% topical cream: Apply topically to affected area 2 times a day  OXcarbazepine 300 mg oral tablet: 1 tab(s) orally 2 times a day  oxybutynin 15 mg/24 hr oral tablet, extended release: 1 tab(s) orally 2 times a day  oxyCODONE-acetaminophen 10 mg-325 mg oral tablet: 1 tab(s) orally every 4 to 6 hours, As Needed  pregabalin 150 mg oral capsule: 1 cap(s) orally 3 times a day

## 2020-03-18 NOTE — DISCHARGE NOTE PROVIDER - HOSPITAL COURSE
64 male presents to ER from home by ambulance with report of lower extremity swelling, right > left. Patient recently admitted to [place] 3/6/20 - 3/12/20 for fever, AMS sepsis, had laproscopic cholecystectomy, was on Zosyn and sent home with Augemntin. Patient states yesterday he had a low grade fever 100.3F, for past 2 days no appetite, has had increased swelling of right lower extremity swelling and pain, worse when being moved, patient states he has a history of MS and does not ambulate. seen by ortho, sp brace to the leg, Physical therapy consult and provided instructions to the pt for outpt physical therapy

## 2020-06-02 ENCOUNTER — EMERGENCY (EMERGENCY)
Facility: HOSPITAL | Age: 65
LOS: 1 days | Discharge: HOME | End: 2020-06-02
Attending: EMERGENCY MEDICINE | Admitting: EMERGENCY MEDICINE
Payer: MEDICARE

## 2020-06-02 VITALS
SYSTOLIC BLOOD PRESSURE: 150 MMHG | OXYGEN SATURATION: 96 % | RESPIRATION RATE: 18 BRPM | HEIGHT: 76 IN | TEMPERATURE: 99 F | DIASTOLIC BLOOD PRESSURE: 96 MMHG | WEIGHT: 235.01 LBS | HEART RATE: 64 BPM

## 2020-06-02 VITALS
TEMPERATURE: 99 F | HEART RATE: 70 BPM | RESPIRATION RATE: 18 BRPM | OXYGEN SATURATION: 96 % | SYSTOLIC BLOOD PRESSURE: 140 MMHG | DIASTOLIC BLOOD PRESSURE: 88 MMHG

## 2020-06-02 PROCEDURE — 73610 X-RAY EXAM OF ANKLE: CPT | Mod: 26,RT

## 2020-06-02 PROCEDURE — 99284 EMERGENCY DEPT VISIT MOD MDM: CPT | Mod: 25

## 2020-06-02 PROCEDURE — 73562 X-RAY EXAM OF KNEE 3: CPT | Mod: 26,RT

## 2020-06-02 PROCEDURE — 73590 X-RAY EXAM OF LOWER LEG: CPT

## 2020-06-02 PROCEDURE — 99284 EMERGENCY DEPT VISIT MOD MDM: CPT

## 2020-06-02 PROCEDURE — 73610 X-RAY EXAM OF ANKLE: CPT

## 2020-06-02 PROCEDURE — 73562 X-RAY EXAM OF KNEE 3: CPT

## 2020-06-02 PROCEDURE — 73590 X-RAY EXAM OF LOWER LEG: CPT | Mod: 26,RT

## 2020-06-02 PROCEDURE — 73551 X-RAY EXAM OF FEMUR 1: CPT | Mod: 26,RT

## 2020-06-02 PROCEDURE — 73551 X-RAY EXAM OF FEMUR 1: CPT

## 2020-06-02 RX ORDER — OXYCODONE AND ACETAMINOPHEN 5; 325 MG/1; MG/1
2 TABLET ORAL ONCE
Refills: 0 | Status: DISCONTINUED | OUTPATIENT
Start: 2020-06-02 | End: 2020-06-02

## 2020-06-02 RX ADMIN — OXYCODONE AND ACETAMINOPHEN 2 TABLET(S): 5; 325 TABLET ORAL at 17:56

## 2020-06-02 NOTE — ED ADULT NURSE NOTE - NSIMPLEMENTINTERV_GEN_ALL_ED
Implemented All Fall with Harm Risk Interventions:  [employer] to call system. Call bell, personal items and telephone within reach. Instruct patient to call for assistance. Room bathroom lighting operational. Non-slip footwear when patient is off stretcher. Physically safe environment: no spills, clutter or unnecessary equipment. Stretcher in lowest position, wheels locked, appropriate side rails in place. Provide visual cue, wrist band, yellow gown, etc. Monitor gait and stability. Monitor for mental status changes and reorient to person, place, and time. Review medications for side effects contributing to fall risk. Reinforce activity limits and safety measures with patient and family. Provide visual clues: red socks.

## 2020-06-02 NOTE — ED PROVIDER NOTE - CROS ED CARDIOVAS ALL NEG
Bed: ED27  Expected date: 1/30/19  Expected time:   Means of arrival: Ambulance  Comments:  Romelia [phone]   negative...

## 2020-06-02 NOTE — ED PROVIDER NOTE - SKIN, MLM
Skin normal color for race, warm, dry and intact. No evidence of rash. + bilateral peripheral edema right >L (unchanged). No erythema or increased warmth to right LE. cap refill < 2 sec

## 2020-06-02 NOTE — ED PROVIDER NOTE - OBJECTIVE STATEMENT
65 yo male with h/o MS (non-ambulatory at baseline), recent admission 3/2020 for multiple fractures of right LE (+ proximal tibia, fibular head and neck, medial malleous, distal fibula). Patient was treated with a bulky king dressing and dc home. Patient noticed increased deformity to right knee over the past few weeks, send a picture to his orthopedist Dr. Nayak who instructed patient to come to ED at this time. + edema to right LE, unchanged. No new fall or trauma. Denies fever, chills, chest pain, sob, abd pain, N/V, UE/LE weakness or paresthesias.

## 2020-06-02 NOTE — CONSULT NOTE ADULT - SUBJECTIVE AND OBJECTIVE BOX
64y Male who presents to the ED today with c/o continued right knee pain since fall on March 6, 2020 from wheelchair.  Presents today for skin check and evaluation of healing fracture.  No new falls or trauma, does not ambulate secondary to MS and is conchita lift dependent/wheelchair bound. Patient was treated nonoperatively with tiffany knee immobilizer - states that he stopped wearing the brace a little over one month ago and sits mostly in his wheelchair with leg in flexed position. Pt denies any other injuries. Patient is in mild pain from injury, has chronic BLLE edema. Had a previous distal femur fracture which was managed with a retrograde IMN by Dr. Nayak in 2018. He is nonambulatory.     PAST MEDICAL & SURGICAL HISTORY:  Femur fracture, left  MS (multiple sclerosis)  No significant past surgical history      Home Medications:  diclofenac sodium 75 mg oral delayed release tablet: 1 tab(s) orally 2 times a day (16 Mar 2020 15:44)  DULoxetine 60 mg oral delayed release capsule: 1 cap(s) orally 2 times a day (16 Mar 2020 15:44)  escitalopram 20 mg oral tablet: 1 tab(s) orally once a day (16 Mar 2020 15:44)  nystatin-triamcinolone 100,000 units/g-0.1% topical cream: Apply topically to affected area 2 times a day (16 Mar 2020 15:44)  OXcarbazepine 300 mg oral tablet: 1 tab(s) orally 2 times a day (16 Mar 2020 15:44)  oxybutynin 15 mg/24 hr oral tablet, extended release: 1 tab(s) orally 2 times a day (16 Mar 2020 15:44)  oxyCODONE-acetaminophen 10 mg-325 mg oral tablet: 1 tab(s) orally every 4 to 6 hours, As Needed (16 Mar 2020 15:44)  pregabalin 150 mg oral capsule: 1 cap(s) orally 3 times a day (16 Mar 2020 15:44)    No Known Allergies    Vital Signs Last 24 Hrs  T(C): 37.2 (02 Jun 2020 16:16), Max: 37.2 (02 Jun 2020 16:16)  T(F): 99 (02 Jun 2020 16:16), Max: 99 (02 Jun 2020 16:16)  HR: 64 (02 Jun 2020 16:16) (64 - 64)  BP: 150/96 (02 Jun 2020 16:16) (150/96 - 150/96)  BP(mean): --  RR: 18 (02 Jun 2020 16:16) (18 - 18)  SpO2: 96% (02 Jun 2020 16:16) (96% - 96%)    PE  Gen: NAD  RLE:  Skin intact, +bony prominence at fracture site but no skin tenting or blanching, no skin tenting, well healed scar from prior superficial abrasion  TTP over fracture site  No active motor function of lower extremity secondary to MS  SILT L2-S1  DP/PT 2+  Dopplerable pulses  Compartments soft   B/l LE edema    Secondary Survey: No TTP over bony prominences, SILT, palpable pulses, full/painless range of motion, compartments soft     Imaging  XR R Knee: Demonstrating proximal tibia fracture healing with callus formation, prior distal femur fracture (healed)    A/P 64y Male with a R tibial shaft fracture.    Plan:  Pain control  DVT ppx  Patient is NWB at baseline, no longer needs brace at this time  Pain should continue to improve as fracture continues to heal  Patient instructed to keep an eye on fracture site and if any changes he will call office for follow up appointment, otherwise follow up will be on an as needed basis  Plan discussed with attending Dr. Nayak who agrees with above plan

## 2020-06-02 NOTE — ED PROVIDER NOTE - CLINICAL SUMMARY MEDICAL DECISION MAKING FREE TEXT BOX
65 yo male with h/o MS (non-ambulatory at baseline), recent admission 3/2020 for multiple fractures of right LE (+ proximal tibia, fibular head and neck, medial malleous, distal fibula). Patient was treated with a bulky king dressing and dc home. Patient noticed increased deformity to right knee over the past few weeks, send a picture to his orthopedist Dr. Nayak who instructed patient to come to ED at this time. + edema to right LE, unchanged. No new fall or trauma. A/P: xrays, ortho consult. xrays with no acute fractures. As per ortho patient can be discharged and follow up outpatient.

## 2020-06-02 NOTE — ED ADULT TRIAGE NOTE - PATIENT ON (OXYGEN DELIVERY METHOD)
room air

## 2020-06-02 NOTE — ED PROVIDER NOTE - PROGRESS NOTE DETAILS
Spoke with ortho resident about dvt ppx in consult note, states patient does not need to start anticoagulation at this time, however to continue taking aspirin

## 2020-06-02 NOTE — ED ADULT NURSE REASSESSMENT NOTE - NS ED NURSE REASSESS COMMENT FT1
Pt is Aox3 in NAD. Pt  complaint of right leg pain.  ortho at bedside  Safety maintained, Bed locked in lowest position. Call bell in reach.

## 2020-06-02 NOTE — ED PROVIDER NOTE - ATTENDING CONTRIBUTION TO CARE
I have personally performed a face to face diagnostic evaluation on this patient.  I have reviewed the PA note and agree with the history, exam, and plan of care, except as noted.  History and Exam by me shows patient told to go to ER by orthopedist Dr. Nayak for evaluation of right proximal tibial fracture, patient was at [facility] 3/2020 for the same, has PMH of MS and is non ambulatory, patient was treated with bulky king and dc home, patient noted increased deformity or right knee for the past week and increased pain, sent a picture of his leg to orthopedist and told to go to the ER, patient alert and oriented, right knee deformity, (+)pedal edema, no signs of infection, cap refill, f/u xrays, pain control, call orthopedics.

## 2020-06-02 NOTE — ED ADULT NURSE REASSESSMENT NOTE - NS ED NURSE REASSESS COMMENT FT1
Patient transferred to the stretcher with the conchita lift.  He tolerated procedure well.  XRay notified.

## 2020-06-02 NOTE — ED PROVIDER NOTE - PATIENT PORTAL LINK FT
You can access the FollowMyHealth Patient Portal offered by [facility] by registering at the following website: [email or web address]. By joining [employer]’s FollowMyHealth portal, you will also be able to view your health information using other applications (apps) compatible with our system.

## 2020-06-02 NOTE — ED ADULT NURSE NOTE - OBJECTIVE STATEMENT
Patient has history of MS and he sustained an injury to his right leg in a fall in March, tib-fib fracture, medial malleolus fracture, he was splinted and treated by ortho at that time.  Now a deformity is noted, right side of shin is quite swollen, orthopedic doctor instructed him to come to the ER.    Patient is wheelchair bound, conchita lift brought to the ER to transfer him to the stretcher,   Battery noted to be dead, Dr. Huang aware of delay.

## 2020-08-17 ENCOUNTER — APPOINTMENT (OUTPATIENT)
Dept: ORTHOPEDIC SURGERY | Facility: CLINIC | Age: 65
End: 2020-08-17
Payer: MEDICARE

## 2020-08-17 VITALS
HEART RATE: 57 BPM | HEIGHT: 76 IN | WEIGHT: 230 LBS | SYSTOLIC BLOOD PRESSURE: 141 MMHG | DIASTOLIC BLOOD PRESSURE: 87 MMHG | BODY MASS INDEX: 28.01 KG/M2

## 2020-08-17 VITALS — TEMPERATURE: 98 F

## 2020-08-17 DIAGNOSIS — M79.604 PAIN IN RIGHT LEG: ICD-10-CM

## 2020-08-17 DIAGNOSIS — M80.861K: ICD-10-CM

## 2020-08-17 DIAGNOSIS — S82.201A UNSPECIFIED FRACTURE OF SHAFT OF RIGHT TIBIA, INITIAL ENCOUNTER FOR CLOSED FRACTURE: ICD-10-CM

## 2020-08-17 PROCEDURE — 99214 OFFICE O/P EST MOD 30 MIN: CPT

## 2020-08-17 NOTE — PHYSICAL EXAM
[de-identified] : Physical Exam:\par General: Well appearing, no acute distress, A&O\par Neurologic: A&Ox3, No focal deficits\par Head: NCAT without abrasions, lacerations, or ecchymosis to head, face, or scalp\par Respiratory: Equal chest wall expansion bilaterally, no accessory muscle use\par Lymphatic: No lymphadenopathy palpated\par Skin: Warm and dry\par Psychiatric: Normal mood and affect\par \par RLE:\par No motor from the waist distal.\par SILT s/s/sp/dp/t\par Significant bilateral foot swelling\par brisk capillary refill [de-identified] : Plain films of the right tibia were obtained on August 11 by Dr. Nayak.  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

## 2020-08-17 NOTE — DISCUSSION/SUMMARY
[de-identified] : 65-year-old male with right proximal tibia hypertrophic nonunion.  We discussed the options available at the present time include continued observation, custom brace fitting, nonunion repair versus amputation.  We discussed that with a nonfunctional leg, amputation is a very reasonable option as it will not impair his sitting balance and will definitively treat this issue in 1 stage surgery.  We discussed that another option would be bracing and conservative management.  The final option would be nonunion repair.  The patient and his wife are very interested in repair.  I would recommend a bone health laboratory evaluation, a DEXA scan and likely an aspiration of the nonunion site prior to any surgical intervention.  They will obtain the labs and DEXA and we will discuss at that point.  We did discuss that given the chronicity of the nonunion and the gross motion at the site coupled with his poor bone quality, surgical treatment will likely have significant risks.\par \par It was explained to the patient that any surgical procedure carries with it the risks of loss of limb or loss of life. Medical complications include but are not limited to death or disability from a heart attack, stroke, GI bleeding, thrombophlebitis and pulmonary embolism, sepsis, adverse reactions including death due to blood transfusions, allergy or adverse drug reaction. There are other rare, unknown and uncommon systemic conditions that could also adversely affect the systemic outcome. Local complications include but are not limited to wound dehiscence, deep infection, failure of fixation or reconstruction, damage to nerves and vessels which could be temporary or permanent, as well as other rare, uncommon and unknown local complications that may necessitate re-operation, more complex orthopaedic reconstructions or amputation.\par \par Despite the risks, he wishes to proceed if possible.\par \par The patient was given the opportunity to ask questions and all questions were answered to their satisfaction.\par \par Angel Dc MD\par Orthopaedic Trauma Surgeon\par [facility] [place]\par Director Orthopaedic Trauma, [facility]\par \par \par \par

## 2020-08-17 NOTE — HISTORY OF PRESENT ILLNESS
[de-identified] : The patient is a pleasant 65-year-old gentleman who presents with his wife today for evaluation of right leg pain.  He has a longstanding diagnosis of multiple sclerosis.  Several months ago he suffered a low-energy injury and was diagnosed with a proximal tibia fracture.  It was attempted to be treated nonoperatively but the deformity continued to worsen and he has a mobile nonunion of the right proximal tibia.  He is unable to weight-bear on his legs due to the severity of his multiple sclerosis but he does have pain with Daniel lift transfers.  He is unable to transfer on his own.  The patient states the pain is made worse with activity and relieved with rest. Aching, 4/10. No pain at rest [Bending] : worsened by bending [Recumbency] : relieved by recumbency [Rest] : relieved by rest [Weight Bearing] : worsened by weight bearing [Lifting] : worsened by lifting

## 2020-10-09 NOTE — PATIENT PROFILE ADULT - NSASFUNCLEVELADLTOILET_GEN_A_NUR
MRI results reviewed with patient's children.  Goals of care discussed with them.  Based on his previous wishes they request patient be made DNR.  We will continue rest of therapy for now.  They are considering withdrawal of life support when some other family members had a chance to visit the patient.   3 = assistive equipment and person

## 2021-03-12 ENCOUNTER — EMERGENCY (EMERGENCY)
Facility: HOSPITAL | Age: 66
LOS: 1 days | Discharge: HOME | End: 2021-03-12
Attending: EMERGENCY MEDICINE | Admitting: EMERGENCY MEDICINE
Payer: MEDICARE

## 2021-03-12 VITALS
HEIGHT: 76 IN | HEART RATE: 83 BPM | DIASTOLIC BLOOD PRESSURE: 94 MMHG | SYSTOLIC BLOOD PRESSURE: 177 MMHG | OXYGEN SATURATION: 100 % | WEIGHT: 229.94 LBS | RESPIRATION RATE: 15 BRPM | TEMPERATURE: 99 F

## 2021-03-12 LAB
APPEARANCE UR: CLEAR — SIGNIFICANT CHANGE UP
BACTERIA # UR AUTO: ABNORMAL
BILIRUB UR-MCNC: NEGATIVE — SIGNIFICANT CHANGE UP
COLOR SPEC: YELLOW — SIGNIFICANT CHANGE UP
DIFF PNL FLD: ABNORMAL
EPI CELLS # UR: SIGNIFICANT CHANGE UP
GLUCOSE UR QL: NEGATIVE — SIGNIFICANT CHANGE UP
KETONES UR-MCNC: ABNORMAL
LEUKOCYTE ESTERASE UR-ACNC: NEGATIVE — SIGNIFICANT CHANGE UP
NITRITE UR-MCNC: NEGATIVE — SIGNIFICANT CHANGE UP
PH UR: 7 — SIGNIFICANT CHANGE UP (ref 5–8)
PROT UR-MCNC: NEGATIVE — SIGNIFICANT CHANGE UP
RBC CASTS # UR COMP ASSIST: ABNORMAL /HPF (ref 0–4)
SP GR SPEC: 1.01 — SIGNIFICANT CHANGE UP (ref 1.01–1.02)
UROBILINOGEN FLD QL: NEGATIVE — SIGNIFICANT CHANGE UP
WBC UR QL: NEGATIVE — SIGNIFICANT CHANGE UP

## 2021-03-12 PROCEDURE — 81001 URINALYSIS AUTO W/SCOPE: CPT

## 2021-03-12 PROCEDURE — 51702 INSERT TEMP BLADDER CATH: CPT

## 2021-03-12 PROCEDURE — 99284 EMERGENCY DEPT VISIT MOD MDM: CPT

## 2021-03-12 PROCEDURE — 87086 URINE CULTURE/COLONY COUNT: CPT

## 2021-03-12 PROCEDURE — 99283 EMERGENCY DEPT VISIT LOW MDM: CPT | Mod: 25

## 2021-03-12 RX ORDER — DOCUSATE SODIUM 100 MG
100 CAPSULE ORAL ONCE
Refills: 0 | Status: COMPLETED | OUTPATIENT
Start: 2021-03-12 | End: 2021-03-12

## 2021-03-12 RX ORDER — GLYCERIN ADULT
1 SUPPOSITORY, RECTAL RECTAL
Qty: 7 | Refills: 0
Start: 2021-03-12

## 2021-03-12 RX ORDER — GLYCERIN ADULT
1 SUPPOSITORY, RECTAL RECTAL ONCE
Refills: 0 | Status: COMPLETED | OUTPATIENT
Start: 2021-03-12 | End: 2021-03-12

## 2021-03-12 RX ORDER — MULTIVIT WITH MIN/MFOLATE/K2 340-15/3 G
1 POWDER (GRAM) ORAL ONCE
Refills: 0 | Status: COMPLETED | OUTPATIENT
Start: 2021-03-12 | End: 2021-03-12

## 2021-03-12 RX ORDER — MINERAL OIL
133 OIL (ML) MISCELLANEOUS
Qty: 931 | Refills: 0
Start: 2021-03-12 | End: 2021-03-18

## 2021-03-12 RX ORDER — DOCUSATE SODIUM 100 MG
1 CAPSULE ORAL
Qty: 21 | Refills: 0
Start: 2021-03-12 | End: 2021-03-18

## 2021-03-12 RX ORDER — POLYETHYLENE GLYCOL 3350 17 G/17G
1 POWDER, FOR SOLUTION ORAL
Qty: 119 | Refills: 0
Start: 2021-03-12 | End: 2021-03-18

## 2021-03-12 RX ADMIN — Medication 1 SUPPOSITORY(S): at 18:09

## 2021-03-12 RX ADMIN — Medication 1 BOTTLE: at 18:09

## 2021-03-12 RX ADMIN — Medication 100 MILLIGRAM(S): at 18:09

## 2021-03-12 NOTE — ED PROVIDER NOTE - PATIENT PORTAL LINK FT
You can access the FollowMyHealth Patient Portal offered by [facility] by registering at the following website: [email or web address]. By joining [facility]’s FollowMyHealth portal, you will also be able to view your health information using other applications (apps) compatible with our system.

## 2021-03-12 NOTE — ED PROVIDER NOTE - CARE PROVIDER_API CALL
Mann Ricks (DO)  Internal Medicine  [address] [place] [address]  Phone: [phone]  Fax: [phone]  Follow Up Time: Urgent    Raman Wang  UROLOGY  [address], [address]  [place] [address]  Phone: [phone]  Fax: [phone]  Follow Up Time: Urgent

## 2021-03-12 NOTE — ED PROVIDER NOTE - PROGRESS NOTE DETAILS
Pt refusing XR, ordered to view gas pattern. Pt states "I know it's just constipation. I don't need an X-ray" After meyers inserted, 1050 cc urine output, and pt reports symptomatic relief. Pt requesting another CHRISTINA and manual disimpaction. Large amount of stool manually removed. Pt passing stool in ED, BS+ x 4 and abd soft nd nt. Pt adamantly requesting removal of meyers catheter. Explained to pt that in context of urinary retention, risk of removing catheter associated with recuurence of retention and possible renal failure, pt verbalizes understanding and still requesting meyers be removed. UA negative. Will DC pt on glycerin suppositories, mineral oil enema and Miralax. Strict ED return precautions discussed and pt verbalizes agreement and understanding. Pt stable for DC home via ambulance as per pt and wife request.     Patient is alert and competent. Discussed at length that leaving prior to continuing treatment could result in, but not limited to, further deterioration and/or death. Patient expressed understanding and still wants to sign out against medical advice. After meyers inserted, 1050 cc urine output, and pt reports symptomatic relief. Pt requesting another CHRISTINA and manual disimpaction. Large amount of stool manually removed. Pt passing stool in ED, BS+ x 4 and abd soft nd nt. Pt adamantly requesting removal of meyers catheter. Explained to pt that in context of urinary retention, risk of removing catheter associated with recurrence of retention and possible renal failure, pt verbalizes understanding and still requesting meyers be removed. UA negative. Will DC pt on the following as per attending: glycerin suppositories, mineral oil enema, colace and Miralax. Strict ED return precautions discussed and pt verbalizes agreement and understanding. Pt stable for DC home via ambulance as per pt and wife request.     Patient is alert and competent. Discussed at length that leaving prior to continuing treatment could result in, but not limited to, further deterioration and/or death. Patient expressed understanding and still wants to sign out against medical advice.

## 2021-03-12 NOTE — ED PROVIDER NOTE - OBJECTIVE STATEMENT
66 yo M PMHx MS, chronic pain presents to ED c/o constipation x 8 days. Pt reports hx chronic constipation, states that he often goes 5-6 days without moving his bowels. Reports constipation has lingered despite OTC medication and now pt is having diffuse abdominal discomfort. Pt states that his HHA disimpacted him earlier today and gave him a fleet enema but still with no relief. Pt denies N/V, fever/chills, urinary complaints. Pt states that constipation 2/2 chronic percocet use. 64 yo M PMHx MS, chronic pain presents to ED c/o constipation x 8 days. Pt reports hx chronic constipation, states that he often goes 5-6 days without moving his bowels. Reports constipation has lingered despite OTC medication and now pt is having diffuse abdominal discomfort. Pt states that his HHA disimpacted him earlier today and gave him a fleet enema but still with no relief. Pt denies N/V, fever/chills, urinary complaints. Pt states that constipation 2/2 daily percocet use for his chronic pain.

## 2021-03-12 NOTE — ED PROVIDER NOTE - CARE PLAN
Principal Discharge DX:	Constipation   Principal Discharge DX:	Constipation  Secondary Diagnosis:	Urinary retention

## 2021-03-12 NOTE — ED PROVIDER NOTE - ATTENDING CONTRIBUTION TO CARE
66 yo male who has hx of ms, chronic pain on percocet chronic constipation presents to er with increasedd pain worse than usual with constipation no bm for 7 days despite enema and disimpaction by home care aide  he also staes he has not voided since this am  on eval  chronically ill male not abmulatory with marked deformity to rle sp fx 1 year ago  heent nc at [facility]   cor rrr  chest cta  abd marked distntion llq ? bladder vs stool vs other mass very tender  pt has contractions of hands and feet with edema  on neuro weak  plan supp mag citrate meyers cath if no relief depsite above PA exam care and tx will ct 66 yo male who has hx of ms, chronic pain on percocet chronic constipation presents to er with increased pain worse than usual with constipation no bm for 7 days despite enema and disimpaction by home care aide  he also staes he has not voided since this am  on eval  chronically ill male not ambulatory with marked deformity to rle sp fx 1 year ago  heent nc at [facility]   cor rrr  chest cta  abd marked distention llq ? bladder vs stool vs other mass very tender  pt has contractions of hands and feet with edema  on neuro weak  plan supp mag citrate meyers cath if no relief despite above PA exam care and tx will ct

## 2021-03-12 NOTE — ED PROVIDER NOTE - NSFOLLOWUPINSTRUCTIONS_ED_ALL_ED_FT
You are leaving against medical advice. You have verbalized understanding of the risks associated with the course of action, including but not limited to organ failure, coma/disability and ultimately death. Take medications as prescribed. Follow up with UROLOGY (Felipe) and GASTROENTEROLOGY (Rambo) within the next 1-3 days. Return to the ED immediately for new or worsening symptoms.      Acute Urinary Retention, Male    Acute urinary retention is a condition in which a person is unable to pass urine. This can last for a short time or for a long time. If left untreated, it can result in kidney damage or other serious complications.      What are the causes?  This condition may be caused by:  •Obstruction or narrowing of the tube that drains the bladder (urethra). This may be caused by surgery or problems with nearby organs, such as the prostate gland, which can press or squeeze the urethra.      •Problems with the nerves in the bladder. These can be caused by diseases, such as multiple sclerosis, or by spinal cord injuries.      •Certain medicines.      •Tumors in the area of the pelvis, bladder, or urethra.      •Diabetes.      •Degenerative cognitive conditions such as delirium or dementia.      •Bladder or urinary tract infection.      •Constipation.      •Blood in the urine (hematuria).      •Injury to the bladder or urethra.       •Psychological (psychogenic) conditions. Someone may hold his urine due to trauma or because he does not want to use the bathroom.        What increases the risk?    This condition is more likely to develop in older men. As men age, their prostate may become larger and may start pressing or squeezing on the bladder or the urethra.      What are the signs or symptoms?  Symptoms of this condition include:  •Trouble urinating.      •Pain in the lower abdomen.      Symptoms usually come on slowly over a long period of time.      How is this diagnosed?  This condition is diagnosed based on a physical exam and a medical history. You may also have other tests, including:  •An ultrasound of the bladder or kidneys or both.      •Blood tests.      •A urine analysis.      •Additional tests may be needed such as an MRI, kidney, or bladder function tests.        How is this treated?  Treatment for this condition may include:  •Medicines.      •Placing a thin, sterile tube (catheter) into the bladder to drain urine out of the body. This is called an indwelling urinary catheter. After being inserted, the catheter is held in place with a small balloon that is filled with sterile water. Urine drains from the catheter into a collection bag outside of the body.      •Behavioral therapy.      •Treatment for any underlying conditions.      •If needed, you may be treated in the hospital for kidney function problems or to manage other complications.        Follow these instructions at home:    •Take over-the-counter and prescription medicines only as told by your health care provider. Avoid certain medicines, such as decongestants, antihistamines, and some prescription medicines. Do not take any medicine unless your health care provider has approved.      •If you were given an indwelling urinary catheter, take care of it as told by your health care provider.      •Drink enough fluid to keep your urine clear or pale yellow.      •If you were prescribed an antibiotic, take it as told by your health care provider. Do not stop taking the antibiotic even if you start to feel better.      • Do not use any products that contain nicotine or tobacco, such as cigarettes and e-cigarettes. If you need help quitting, ask your health care provider.      •Monitor any changes in your symptoms. Tell your health care provider about any changes.      •If instructed, monitor your blood pressure at home. Report changes as told by your health care provider.      •Keep all follow-up visits as told by your health care provider. This is important.        Contact a health care provider if:    •You have uncomfortable bladder contractions that you cannot control (spasms) or you leak urine with the spasms.        Get help right away if:    •You have chills or fever.      •You have blood in your urine.    •You have a catheter and:  •Your catheter stops draining urine.      •Your catheter falls out.          Summary    •Acute urinary retention is a condition in which a person is unable to pass urine. If left untreated, it can result in kidney damage or other serious complications.      •The cause of this condition may include an enlarged prostate. As men age, their prostate gland may become larger and may start pressing or squeezing on the bladder or the urethra.      •Treatment for this condition may include medicines and placement of an indwelling urinary catheter.      •Monitor any changes in your symptoms. Tell your health care provider about any changes.      This information is not intended to replace advice given to you by your health care provider. Make sure you discuss any questions you have with your health care provider.      Document Revised: 11/30/2018 Document Reviewed: 01/19/2018    [employer] Patient Education © 2021 [employer]        Constipation    WHAT YOU NEED TO KNOW:    What is constipation? Constipation is when you have hard, dry bowel movements, or you go longer than usual between bowel movements.     What causes constipation?   •Not enough water or high-fiber foods      •Lack of physical activity      •Certain medicines, such as opioid pain medicine, antidepressants, or high blood pressure medicine      •A medical condition such as hemorrhoids, diabetes, or a stroke      What are the signs and symptoms of constipation?   •Difficulty pushing out your bowel movement      •Pain or bleeding during your bowel movement      •A feeling that you did not finish having your bowel movement      •Nausea      •Bloating      •Headache      How is constipation treated? Medicine can help you have a bowel movement more easily. Medicines may increase moisture in your bowel movement or increase the motion of your intestines.   •A suppository may be used to help soften your bowel movements. This may make them easier to pass. A suppository is guided into your rectum through your anus.  Suppository for Constipation           •Laxatives can help stimulate your bowels to have a bowel movement. Your provider may recommend you only use laxatives for a short time. Long-term use may make your bowels dependent on the medicine.      •An enema is liquid medicine used to clear bowel movement from your rectum. The medicine is put into your rectum through your anus.  Enemas           What can I do to prevent constipation?   •Drink liquids as directed. You may need to drink extra liquids to help soften and move your bowels. Ask how much liquid to drink each day and which liquids are best for you.      •Eat high-fiber foods. This may help decrease constipation by adding bulk to your bowel movements. High-fiber foods include fruit, vegetables, whole-grain breads and cereals, and beans. Your healthcare provider or dietitian can help you create a high-fiber meal plan. Your provider may also recommend a fiber supplement if you cannot get enough fiber from food.              •Exercise regularly. Regular physical activity can help stimulate your intestines. Walking is a good exercise to prevent or relieve constipation. Ask which exercises are best for you.  Walking for Exercise           •Schedule a time each day to have a bowel movement. This may help train your body to have regular bowel movements. Bend forward while you are on the toilet to help move the bowel movement out. Sit on the toilet for at least 10 minutes, even if you do not have a bowel movement.      •Talk to your healthcare provider about your medicines. Certain medicines, such as opioids, can cause constipation. Your provider may be able to make medicine changes. For example, he or she may change the kind of medicine, or change when you take it.      When should I call my doctor?   •You have blood in your bowel movements.      •You have a fever and abdominal pain with the constipation.      •Your constipation gets worse.       •You start to vomit.      •You have questions or concerns about your condition or care.      CARE AGREEMENT:    You have the right to help plan your care. Learn about your health condition and how it may be treated. Discuss treatment options with your healthcare providers to decide what care you want to receive. You always have the right to refuse treatment.

## 2021-03-12 NOTE — ED PROVIDER NOTE - CLINICAL SUMMARY MEDICAL DECISION MAKING FREE TEXT BOX
66 yo M hx MS chronic constipation chronic opioid use p/w constipation x 8 days-- +BS noted on exam, (pt refused XR), suppository/laxative, reassess

## 2021-03-13 VITALS
DIASTOLIC BLOOD PRESSURE: 95 MMHG | OXYGEN SATURATION: 98 % | TEMPERATURE: 98 F | HEART RATE: 87 BPM | SYSTOLIC BLOOD PRESSURE: 162 MMHG | RESPIRATION RATE: 16 BRPM

## 2021-03-13 LAB
CULTURE RESULTS: NO GROWTH — SIGNIFICANT CHANGE UP
SPECIMEN SOURCE: SIGNIFICANT CHANGE UP

## 2021-03-16 NOTE — ED POST DISCHARGE NOTE - DETAILS
Pt reports symptomatic relief, states "I'm doing so much better than when I came in. You guys did a great job." Pt will continue with outpatient follow up.

## 2021-08-13 NOTE — PROGRESS NOTE ADULT - SUBJECTIVE AND OBJECTIVE BOX
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 She underwent the first stage and did well and we were to complete the excision today and the lesion looks like it has expanded. It almost appears as large as the original. If this were to be primarily excised this would lead to elevation and peaking of the brow on the right.    We took pictures today and compared the current lesion to the previous one. It does make me pause about surgical treatment and leads me to think if we should consider laser again. I will speak with Shawn Coats to see what he thinks. We will need to think creatively about this.            Neurology follow up note    THANH [id number]      Interval History:    Patient feels ok more interactive     MEDICATIONS    chlorhexidine 2% Cloths 1 Application(s) Topical <User Schedule>  enoxaparin Injectable 40 milliGRAM(s) SubCutaneous at bedtime  escitalopram 20 milliGRAM(s) Oral daily  hydrALAZINE Injectable 10 milliGRAM(s) IV Push every 6 hours  HYDROmorphone  Injectable 1 milliGRAM(s) IV Push every 4 hours PRN  HYDROmorphone  Injectable 0.5 milliGRAM(s) IV Push every 4 hours PRN  HYDROmorphone  Injectable 0.25 milliGRAM(s) IV Push every 4 hours PRN  lactated ringers. 1000 milliLiter(s) IV Continuous <Continuous>  OXcarbazepine 300 milliGRAM(s) Oral two times a day  oxybutynin XL 15 milliGRAM(s) Oral <User Schedule>  pantoprazole  Injectable 40 milliGRAM(s) IV Push daily  piperacillin/tazobactam IVPB.. 3.375 Gram(s) IV Intermittent every 8 hours  pregabalin 150 milliGRAM(s) Oral three times a day  Symproic (naldemedine) 0.2 mg tablet 1 Tablet(s) 1 Tablet(s) Oral daily      Allergies    No Known Allergies    Intolerances            Vital Signs Last 24 Hrs  T(C): 36.9 (08 Mar 2020 07:35), Max: 38.1 (07 Mar 2020 20:48)  T(F): 98.5 (08 Mar 2020 07:35), Max: 100.5 (07 Mar 2020 20:48)  HR: 91 (08 Mar 2020 09:00) (72 - 109)  BP: 199/99 (08 Mar 2020 09:00) (101/57 - 207/97)  BP(mean): 139 (08 Mar 2020 09:00) (73 - 139)  RR: 24 (08 Mar 2020 09:00) (11 - 31)  SpO2: 97% (08 Mar 2020 09:00) (94% - 99%)      REVIEW OF SYSTEMS:  Limited or unable to obtain secondary to patient's poor mental status.      On Neurological Examination:    The patient is awake in bed.  Extraocular movements were intact.  Pupils equal, round and reactive bilaterally 3 mm to 2.    Speech:  The patient would say one to two words maximum, but no dysarthria was noted.  Intact bilateral nasolabial folds.  Motor:  Left upper, positive flexation, extension at the elbow and opening and closing of the hand would say overall 2/5.  Right upper and bilateral lower extremities to command, no movement.  Upon plantar stimulation, subtle movement of his feet.  The patient is normally wheelchair bound.  Positive edema was noted in bilateral lower extremities.    Follow simple commands  Follow complex commands    GENERAL Exam: Nontoxic , No Acute Distress   	  HEENT:  normocephalic, atraumatic  		  LUNGS:  Decreased bilaterally  	  HEART: Normal S1S2   No murmur RRR        	  GI/ ABDOMEN:  Soft  Non tender    EXTREMITIES:   No Edema  No Clubbing  No Cyanosis     MUSCULOSKELETAL:  decreased Range of Motion all 4 extremities   	   SKIN: Normal  No Ecchymosis               LABS:  CBC Full  -  ( 08 Mar 2020 05:58 )  WBC Count : 12.25 K/uL  RBC Count : 3.41 M/uL  Hemoglobin : 10.4 g/dL  Hematocrit : 30.6 %  Platelet Count - Automated : 131 K/uL  Mean Cell Volume : 89.7 fl  Mean Cell Hemoglobin : 30.5 pg  Mean Cell Hemoglobin Concentration : 34.0 gm/dL  Auto Neutrophil # : 10.25 K/uL  Auto Lymphocyte # : 1.21 K/uL  Auto Monocyte # : 0.63 K/uL  Auto Eosinophil # : 0.07 K/uL  Auto Basophil # : 0.02 K/uL  Auto Neutrophil % : 83.6 %  Auto Lymphocyte % : 9.9 %  Auto Monocyte % : 5.1 %  Auto Eosinophil % : 0.6 %  Auto Basophil % : 0.2 %      03-08    140  |  106  |  17  ----------------------------<  112<H>  3.3<L>   |  23  |  0.69    Ca    8.4<L>      08 Mar 2020 07:02  Phos  2.2     03-08  Mg     1.9     03-08    TPro  6.4  /  Alb  2.9<L>  /  TBili  0.7  /  DBili  .30<H>  /  AST  23  /  ALT  28  /  AlkPhos  67  03-07    Hemoglobin A1C:     LIVER FUNCTIONS - ( 07 Mar 2020 05:42 )  Alb: 2.9 g/dL / Pro: 6.4 g/dL / ALK PHOS: 67 U/L / ALT: 28 U/L / AST: 23 U/L / GGT: x           Vitamin B12   PT/INR - ( 06 Mar 2020 18:52 )   PT: 17.6 sec;   INR: 1.55 ratio         PTT - ( 06 Mar 2020 18:52 )  PTT:31.4 sec      RADIOLOGY    ANALYSIS AND PLAN:  This is a 64-year-old with episode of change in mental status, history of multiple sclerosis, neuropathic pain, and trigeminal neuralgia.  1.	For altered mental status, suspect most likely metabolic encephalopathy which could be secondary to a history of temperature, possibly underlying infectious type process, questionable this could be any type of medication withdrawal from Lyrica, questionable could be multiple sclerosis exacerbation secondary to underlying general medical issues along with temperature.  2.	I would recommend infectious workup as needed and antibiotics as needed.  3.	For history of multiple sclerosis, appears to be fairly advanced, would recommend supportive therapy.  4.	For neuropathic pain and trigeminal neuralgia, when possible recommend to re-institute patient's Lyrica and Trileptal.  5.	I would check out other causes of metabolic etiology.  6.	Monitor oral intake.  7.	Surgical followup as needed   8.	Spoke with spouse at bedside.  Her name is Alisson.  Her contact number is [phone] does appear more interactive today   9.	Advance directives were discussed with family in great detail.    Physical therapy evaluation if possible and as tolerated.  OOB to chair/ambulation with assistance only if possible.    Greater than 45 minutes spent in direct patient care reviewing  the notes, lab data/ imaging , discussion with multidisciplinary team.

## 2021-08-30 NOTE — PHYSICAL THERAPY INITIAL EVALUATION ADULT - CRITERIA FOR SKILLED THERAPEUTIC INTERVENTIONS
Will schedule cardiac testing  Please call if there is a problem  impairments found/but close or at his baseline/rehab potential/predicted duration of therapy intervention

## 2021-10-05 NOTE — DISCHARGE NOTE ADULT - FUNCTIONAL SCREEN CURRENT LEVEL: BATHING, MLM
Care Management Follow Up    Length of Stay (days): 27    Expected Discharge Date: 10/07/2021     Concerns to be Addressed: discharge planning     Patient plan of care discussed at interdisciplinary rounds: Yes    Anticipated Discharge Disposition: Group Home, Assisted living     Anticipated Discharge Services:    Anticipated Discharge DME:      Patient/family educated on Medicare website which has current facility and service quality ratings:    Education Provided on the Discharge Plan:    Patient/Family in Agreement with the Plan: other (see comments) (pt not in agreement w/plan)    Referrals Placed by CM/SW: [facility])  Private pay costs discussed: payment of rent at group home or AL    Additional Information:  SW spoke to Haley ([phone])  at [facility]. They are able to accept pt but he will need $500 rent deposit. He has agreed to go to this facility. Writer contacted Lencho Figueroa - [phone]  are RNCM  To update her regarding possible facility. She will inquire into [facility] having assistance for pts. She provided the name of pt Geovany Christine financial worker Em Sharp ([phone]).Pt case #  [id number] Writer LM requesting call back regarding financial assistance.    NESTOR Lee     3:00 pm COLLINS met with pt in pt room using [employer] for  services to speak to pt in [facility]. Pt agreeable to meet with writer. SW asked pt about visit from Haley from [facility]. Pt quite loudly emphatically declined to go to [facility]. He was stating they will take all of his money and he needs it for car insurance, his car and other expenses.SW asked him if he can return to his sons house. He reported that his son and daughter in law have left town and the house is locked and he cannot return there. Writer asked pt what he would like to do for temporary housing until public housing had a space for him.  He stated he can stay at the hospital and will  find an apartment in 30 days. Writer explained that we must find a temporary place. Pt again began to shout quite loudly in english at writer that he does not want people in his business and that he has bills to pay and will find an apartment on his own, in English, pt yelled that Haley at [employer] steals money from [place] people when they live there. Writer attempted to explain cost of care at assisted living/senior living to pt using  line. Pt yelled in English that he will find apt himself.  Writer left voicemail for Shoua from [facility]. Writer was in an email chain with Ching Bowling - infection prevention at [facility] and [facility]. Updating them on work done for pt discharge plan.     NESTOR Lee         (4) completely dependent

## 2021-12-10 ENCOUNTER — TRANSCRIPTION ENCOUNTER (OUTPATIENT)
Age: 66
End: 2021-12-10

## 2021-12-10 ENCOUNTER — INPATIENT (INPATIENT)
Facility: HOSPITAL | Age: 66
LOS: 4 days | Discharge: HOME | DRG: 872 | End: 2021-12-15
Attending: HOSPITALIST | Admitting: STUDENT IN AN ORGANIZED HEALTH CARE EDUCATION/TRAINING PROGRAM
Payer: MEDICARE

## 2021-12-10 VITALS
RESPIRATION RATE: 18 BRPM | TEMPERATURE: 100 F | OXYGEN SATURATION: 94 % | DIASTOLIC BLOOD PRESSURE: 77 MMHG | HEIGHT: 76 IN | WEIGHT: 240.08 LBS | SYSTOLIC BLOOD PRESSURE: 121 MMHG | HEART RATE: 118 BPM

## 2021-12-10 DIAGNOSIS — Z29.9 ENCOUNTER FOR PROPHYLACTIC MEASURES, UNSPECIFIED: ICD-10-CM

## 2021-12-10 DIAGNOSIS — N39.0 URINARY TRACT INFECTION, SITE NOT SPECIFIED: ICD-10-CM

## 2021-12-10 DIAGNOSIS — R60.9 EDEMA, UNSPECIFIED: ICD-10-CM

## 2021-12-10 DIAGNOSIS — F41.9 ANXIETY DISORDER, UNSPECIFIED: ICD-10-CM

## 2021-12-10 DIAGNOSIS — I10 ESSENTIAL (PRIMARY) HYPERTENSION: ICD-10-CM

## 2021-12-10 DIAGNOSIS — G35 MULTIPLE SCLEROSIS: ICD-10-CM

## 2021-12-10 DIAGNOSIS — M79.2 NEURALGIA AND NEURITIS, UNSPECIFIED: ICD-10-CM

## 2021-12-10 DIAGNOSIS — N32.81 OVERACTIVE BLADDER: ICD-10-CM

## 2021-12-10 DIAGNOSIS — R29.898 OTHER SYMPTOMS AND SIGNS INVOLVING THE MUSCULOSKELETAL SYSTEM: ICD-10-CM

## 2021-12-10 DIAGNOSIS — Z90.49 ACQUIRED ABSENCE OF OTHER SPECIFIED PARTS OF DIGESTIVE TRACT: Chronic | ICD-10-CM

## 2021-12-10 LAB
ALBUMIN SERPL ELPH-MCNC: 3.1 G/DL — LOW (ref 3.3–5)
ALP SERPL-CCNC: 92 U/L — SIGNIFICANT CHANGE UP (ref 40–120)
ALT FLD-CCNC: 13 U/L — SIGNIFICANT CHANGE UP (ref 12–78)
ANION GAP SERPL CALC-SCNC: 8 MMOL/L — SIGNIFICANT CHANGE UP (ref 5–17)
APPEARANCE UR: CLEAR — SIGNIFICANT CHANGE UP
APTT BLD: 35 SEC — SIGNIFICANT CHANGE UP (ref 27.5–35.5)
AST SERPL-CCNC: 11 U/L — LOW (ref 15–37)
BASOPHILS # BLD AUTO: 0.04 K/UL — SIGNIFICANT CHANGE UP (ref 0–0.2)
BASOPHILS NFR BLD AUTO: 0.2 % — SIGNIFICANT CHANGE UP (ref 0–2)
BILIRUB SERPL-MCNC: 0.7 MG/DL — SIGNIFICANT CHANGE UP (ref 0.2–1.2)
BILIRUB UR-MCNC: NEGATIVE — SIGNIFICANT CHANGE UP
BUN SERPL-MCNC: 14 MG/DL — SIGNIFICANT CHANGE UP (ref 7–23)
CALCIUM SERPL-MCNC: 8.7 MG/DL — SIGNIFICANT CHANGE UP (ref 8.5–10.1)
CHLORIDE SERPL-SCNC: 98 MMOL/L — SIGNIFICANT CHANGE UP (ref 96–108)
CO2 SERPL-SCNC: 26 MMOL/L — SIGNIFICANT CHANGE UP (ref 22–31)
COLOR SPEC: YELLOW — SIGNIFICANT CHANGE UP
CREAT SERPL-MCNC: 0.62 MG/DL — SIGNIFICANT CHANGE UP (ref 0.5–1.3)
DIFF PNL FLD: ABNORMAL
EOSINOPHIL # BLD AUTO: 0.07 K/UL — SIGNIFICANT CHANGE UP (ref 0–0.5)
EOSINOPHIL NFR BLD AUTO: 0.4 % — SIGNIFICANT CHANGE UP (ref 0–6)
GLUCOSE SERPL-MCNC: 104 MG/DL — HIGH (ref 70–99)
GLUCOSE UR QL: NEGATIVE — SIGNIFICANT CHANGE UP
HCT VFR BLD CALC: 36.9 % — LOW (ref 39–50)
HGB BLD-MCNC: 12.7 G/DL — LOW (ref 13–17)
IMM GRANULOCYTES NFR BLD AUTO: 0.6 % — SIGNIFICANT CHANGE UP (ref 0–1.5)
INR BLD: 1.43 RATIO — HIGH (ref 0.88–1.16)
KETONES UR-MCNC: ABNORMAL
LACTATE SERPL-SCNC: 0.6 MMOL/L — LOW (ref 0.7–2)
LEUKOCYTE ESTERASE UR-ACNC: ABNORMAL
LYMPHOCYTES # BLD AUTO: 0.6 K/UL — LOW (ref 1–3.3)
LYMPHOCYTES # BLD AUTO: 3.7 % — LOW (ref 13–44)
MCHC RBC-ENTMCNC: 30.5 PG — SIGNIFICANT CHANGE UP (ref 27–34)
MCHC RBC-ENTMCNC: 34.4 GM/DL — SIGNIFICANT CHANGE UP (ref 32–36)
MCV RBC AUTO: 88.5 FL — SIGNIFICANT CHANGE UP (ref 80–100)
MONOCYTES # BLD AUTO: 0.87 K/UL — SIGNIFICANT CHANGE UP (ref 0–0.9)
MONOCYTES NFR BLD AUTO: 5.4 % — SIGNIFICANT CHANGE UP (ref 2–14)
NEUTROPHILS # BLD AUTO: 14.38 K/UL — HIGH (ref 1.8–7.4)
NEUTROPHILS NFR BLD AUTO: 89.7 % — HIGH (ref 43–77)
NITRITE UR-MCNC: NEGATIVE — SIGNIFICANT CHANGE UP
NRBC # BLD: 0 /100 WBCS — SIGNIFICANT CHANGE UP (ref 0–0)
PH UR: 5 — SIGNIFICANT CHANGE UP (ref 5–8)
PLATELET # BLD AUTO: 254 K/UL — SIGNIFICANT CHANGE UP (ref 150–400)
POTASSIUM SERPL-MCNC: 3.7 MMOL/L — SIGNIFICANT CHANGE UP (ref 3.5–5.3)
POTASSIUM SERPL-SCNC: 3.7 MMOL/L — SIGNIFICANT CHANGE UP (ref 3.5–5.3)
PROT SERPL-MCNC: 7.1 G/DL — SIGNIFICANT CHANGE UP (ref 6–8.3)
PROT UR-MCNC: 15
PROTHROM AB SERPL-ACNC: 16.4 SEC — HIGH (ref 10.6–13.6)
RAPID RVP RESULT: SIGNIFICANT CHANGE UP
RBC # BLD: 4.17 M/UL — LOW (ref 4.2–5.8)
RBC # FLD: 13.3 % — SIGNIFICANT CHANGE UP (ref 10.3–14.5)
SARS-COV-2 RNA SPEC QL NAA+PROBE: SIGNIFICANT CHANGE UP
SODIUM SERPL-SCNC: 132 MMOL/L — LOW (ref 135–145)
SP GR SPEC: 1.01 — SIGNIFICANT CHANGE UP (ref 1.01–1.02)
UROBILINOGEN FLD QL: NEGATIVE — SIGNIFICANT CHANGE UP
WBC # BLD: 16.06 K/UL — HIGH (ref 3.8–10.5)
WBC # FLD AUTO: 16.06 K/UL — HIGH (ref 3.8–10.5)

## 2021-12-10 PROCEDURE — 99285 EMERGENCY DEPT VISIT HI MDM: CPT

## 2021-12-10 PROCEDURE — 99223 1ST HOSP IP/OBS HIGH 75: CPT | Mod: GC

## 2021-12-10 PROCEDURE — 74176 CT ABD & PELVIS W/O CONTRAST: CPT | Mod: 26

## 2021-12-10 PROCEDURE — 70450 CT HEAD/BRAIN W/O DYE: CPT | Mod: 26

## 2021-12-10 PROCEDURE — 93010 ELECTROCARDIOGRAM REPORT: CPT

## 2021-12-10 PROCEDURE — 12345: CPT | Mod: NC,GC

## 2021-12-10 PROCEDURE — 71045 X-RAY EXAM CHEST 1 VIEW: CPT | Mod: 26

## 2021-12-10 PROCEDURE — 93970 EXTREMITY STUDY: CPT | Mod: 26

## 2021-12-10 RX ORDER — NYSTATIN/TRIAMCINOLONE ACET
1 OINTMENT (GRAM) TOPICAL
Qty: 0 | Refills: 0 | DISCHARGE

## 2021-12-10 RX ORDER — SODIUM CHLORIDE 9 MG/ML
1000 INJECTION INTRAMUSCULAR; INTRAVENOUS; SUBCUTANEOUS ONCE
Refills: 0 | Status: COMPLETED | OUTPATIENT
Start: 2021-12-10 | End: 2021-12-10

## 2021-12-10 RX ORDER — MYCOPHENOLATE MOFETIL 250 MG/1
500 CAPSULE ORAL
Refills: 0 | Status: DISCONTINUED | OUTPATIENT
Start: 2021-12-10 | End: 2021-12-10

## 2021-12-10 RX ORDER — CEFTRIAXONE 500 MG/1
1000 INJECTION, POWDER, FOR SOLUTION INTRAMUSCULAR; INTRAVENOUS EVERY 24 HOURS
Refills: 0 | Status: COMPLETED | OUTPATIENT
Start: 2021-12-10 | End: 2021-12-12

## 2021-12-10 RX ORDER — SENNA PLUS 8.6 MG/1
2 TABLET ORAL AT BEDTIME
Refills: 0 | Status: DISCONTINUED | OUTPATIENT
Start: 2021-12-10 | End: 2021-12-11

## 2021-12-10 RX ORDER — VALSARTAN 80 MG/1
160 TABLET ORAL DAILY
Refills: 0 | Status: DISCONTINUED | OUTPATIENT
Start: 2021-12-10 | End: 2021-12-15

## 2021-12-10 RX ORDER — ESCITALOPRAM OXALATE 10 MG/1
1 TABLET, FILM COATED ORAL
Qty: 0 | Refills: 0 | DISCHARGE

## 2021-12-10 RX ORDER — ENOXAPARIN SODIUM 100 MG/ML
40 INJECTION SUBCUTANEOUS DAILY
Refills: 0 | Status: DISCONTINUED | OUTPATIENT
Start: 2021-12-10 | End: 2021-12-15

## 2021-12-10 RX ORDER — OXYBUTYNIN CHLORIDE 5 MG
15 TABLET ORAL DAILY
Refills: 0 | Status: DISCONTINUED | OUTPATIENT
Start: 2021-12-10 | End: 2021-12-15

## 2021-12-10 RX ORDER — DULOXETINE HYDROCHLORIDE 30 MG/1
1 CAPSULE, DELAYED RELEASE ORAL
Qty: 0 | Refills: 0 | DISCHARGE

## 2021-12-10 RX ORDER — PIPERACILLIN AND TAZOBACTAM 4; .5 G/20ML; G/20ML
3.38 INJECTION, POWDER, LYOPHILIZED, FOR SOLUTION INTRAVENOUS ONCE
Refills: 0 | Status: COMPLETED | OUTPATIENT
Start: 2021-12-10 | End: 2021-12-10

## 2021-12-10 RX ORDER — SODIUM CHLORIDE 9 MG/ML
1000 INJECTION, SOLUTION INTRAVENOUS
Refills: 0 | Status: DISCONTINUED | OUTPATIENT
Start: 2021-12-10 | End: 2021-12-13

## 2021-12-10 RX ORDER — OXCARBAZEPINE 300 MG/1
300 TABLET, FILM COATED ORAL
Refills: 0 | Status: DISCONTINUED | OUTPATIENT
Start: 2021-12-10 | End: 2021-12-15

## 2021-12-10 RX ORDER — ACETAMINOPHEN 500 MG
650 TABLET ORAL EVERY 6 HOURS
Refills: 0 | Status: DISCONTINUED | OUTPATIENT
Start: 2021-12-10 | End: 2021-12-15

## 2021-12-10 RX ORDER — DICLOFENAC SODIUM 75 MG/1
1 TABLET, DELAYED RELEASE ORAL
Qty: 0 | Refills: 0 | DISCHARGE

## 2021-12-10 RX ORDER — IBUPROFEN 200 MG
600 TABLET ORAL ONCE
Refills: 0 | Status: COMPLETED | OUTPATIENT
Start: 2021-12-10 | End: 2021-12-10

## 2021-12-10 RX ORDER — POLYETHYLENE GLYCOL 3350 17 G/17G
17 POWDER, FOR SOLUTION ORAL DAILY
Refills: 0 | Status: DISCONTINUED | OUTPATIENT
Start: 2021-12-10 | End: 2021-12-11

## 2021-12-10 RX ORDER — ESCITALOPRAM OXALATE 10 MG/1
20 TABLET, FILM COATED ORAL DAILY
Refills: 0 | Status: DISCONTINUED | OUTPATIENT
Start: 2021-12-10 | End: 2021-12-15

## 2021-12-10 RX ORDER — OXYCODONE AND ACETAMINOPHEN 5; 325 MG/1; MG/1
2 TABLET ORAL EVERY 8 HOURS
Refills: 0 | Status: DISCONTINUED | OUTPATIENT
Start: 2021-12-10 | End: 2021-12-13

## 2021-12-10 RX ADMIN — SODIUM CHLORIDE 1000 MILLILITER(S): 9 INJECTION INTRAMUSCULAR; INTRAVENOUS; SUBCUTANEOUS at 05:21

## 2021-12-10 RX ADMIN — ENOXAPARIN SODIUM 40 MILLIGRAM(S): 100 INJECTION SUBCUTANEOUS at 18:16

## 2021-12-10 RX ADMIN — PIPERACILLIN AND TAZOBACTAM 200 GRAM(S): 4; .5 INJECTION, POWDER, LYOPHILIZED, FOR SOLUTION INTRAVENOUS at 04:39

## 2021-12-10 RX ADMIN — OXCARBAZEPINE 300 MILLIGRAM(S): 300 TABLET, FILM COATED ORAL at 18:15

## 2021-12-10 RX ADMIN — Medication 600 MILLIGRAM(S): at 05:21

## 2021-12-10 RX ADMIN — OXYCODONE AND ACETAMINOPHEN 2 TABLET(S): 5; 325 TABLET ORAL at 18:38

## 2021-12-10 RX ADMIN — Medication 150 MILLIGRAM(S): at 18:15

## 2021-12-10 RX ADMIN — Medication 650 MILLIGRAM(S): at 12:06

## 2021-12-10 RX ADMIN — SODIUM CHLORIDE 1000 MILLILITER(S): 9 INJECTION INTRAMUSCULAR; INTRAVENOUS; SUBCUTANEOUS at 04:39

## 2021-12-10 RX ADMIN — SODIUM CHLORIDE 75 MILLILITER(S): 9 INJECTION, SOLUTION INTRAVENOUS at 07:10

## 2021-12-10 RX ADMIN — CEFTRIAXONE 100 MILLIGRAM(S): 500 INJECTION, POWDER, FOR SOLUTION INTRAMUSCULAR; INTRAVENOUS at 07:10

## 2021-12-10 RX ADMIN — PIPERACILLIN AND TAZOBACTAM 3.38 GRAM(S): 4; .5 INJECTION, POWDER, LYOPHILIZED, FOR SOLUTION INTRAVENOUS at 05:21

## 2021-12-10 RX ADMIN — Medication 600 MILLIGRAM(S): at 04:37

## 2021-12-10 RX ADMIN — Medication 15 MILLIGRAM(S): at 18:16

## 2021-12-10 RX ADMIN — Medication 150 MILLIGRAM(S): at 22:30

## 2021-12-10 NOTE — DISCHARGE NOTE PROVIDER - NSDCMRMEDTOKEN_GEN_ALL_CORE_FT
Forteo 600 mcg/2.4 mL subcutaneous solution: 1 application subcutaneous once a day  mycophenolate mofetil 500 mg oral tablet: 1 tab(s) orally 2 times a day  OXcarbazepine 300 mg oral tablet: 1 tab(s) orally 2 times a day  oxybutynin 15 mg/24 hr oral tablet, extended release: 1 tab(s) orally 2 times a day  oxyCODONE-acetaminophen 10 mg-325 mg oral tablet: 1 tab(s) orally 3 times a day, As Needed  pregabalin 150 mg oral capsule: 1 cap(s) orally 3 times a day   acetaminophen 325 mg oral tablet: 2 tab(s) orally every 6 hours, As needed, Temp greater or equal to 38C (100.4F)  cefpodoxime 200 mg oral tablet: 1 tab(s) orally every 12 hours  escitalopram 20 mg oral tablet: 1 tab(s) orally once a day  Forteo 600 mcg/2.4 mL subcutaneous solution: 1 application subcutaneous once a day  mycophenolate mofetil 500 mg oral tablet: 1 tab(s) orally 2 times a day  OXcarbazepine 300 mg oral tablet: 1 tab(s) orally 2 times a day  oxybutynin 15 mg/24 hr oral tablet, extended release: 1 tab(s) orally 2 times a day  oxyCODONE-acetaminophen 10 mg-325 mg oral tablet: 1 tab(s) orally 3 times a day, As Needed  pregabalin 150 mg oral capsule: 1 cap(s) orally 3 times a day  valsartan 160 mg oral tablet: 1 tab(s) orally once a day   acetaminophen 325 mg oral tablet: 2 tab(s) orally every 6 hours, As needed, Temp greater or equal to 38C (100.4F)  cefpodoxime 200 mg oral tablet: 1 tab(s) orally every 12 hours  escitalopram 20 mg oral tablet: 1 tab(s) orally once a day  Forteo 600 mcg/2.4 mL subcutaneous solution: 1 application subcutaneous once a day  mycophenolate mofetil 500 mg oral tablet: 1 tab(s) orally 2 times a day  OXcarbazepine 300 mg oral tablet: 1 tab(s) orally 2 times a day  oxybutynin 15 mg/24 hr oral tablet, extended release: 1 tab(s) orally 2 times a day  oxyCODONE-acetaminophen 10 mg-325 mg oral tablet: 1 tab(s) orally 3 times a day, As Needed  pregabalin 150 mg oral capsule: 1 cap(s) orally 3 times a day  saccharomyces boulardii lyo 250 mg oral capsule: 1 cap(s) orally 2 times a day  valsartan 160 mg oral tablet: 1 tab(s) orally once a day  vancomycin 125 mg oral capsule: 1 cap(s) orally every 6 hours

## 2021-12-10 NOTE — ED ADULT NURSE NOTE - NURSING MUSC HAND SYMPTOMS LEFT
Blake Pratt   36 year old/ 1985      Last Recorded Vitals  Blood pressure (!) 149/105, pulse (!) 107, temperature 97.6 °F (36.4 °C), temperature source Oral, resp. rate 16, height 6' (1.829 m), weight 104.3 kg (230 lb), SpO2 97 %.  Body mass index is 31.19 kg/m².      HPI:   36-year-old male present to ED with ankle pain.  Patient was playing roller hockey earlier today when everted his left ankle then another player fell on him causing him to everted the ankle further.  Patient noticed acute swelling to the left lateral ankle and pain upon weightbearing.  Denies any other injury.  Denies knee or hip pain.  Denies foot pain.  Denies tingling numbing sensation to the toes.    REVIEW OF SYSTEMS  Constitutional:  No fever  Skin:  No rash.    Musculoskeletal: left ankle pain  Hematologic:  No unusual bruising or bleeding.   Neurologic: No numbness, weakness or paresthesia     All systems otherwise negative      PAST MEDICAL HISTORY:  Patient denies any pertinent medical Hx.     PAST SURGICAL HISTORY:  Patient denies any pertinent surgical Hx.      FAMILY HISTORY:  FH reviewed with patient and not pertient to today's visit.     SOCIAL HISTORY:   The patient has no history of tobacco use.       PHYSICAL EXAMINATION:   VITAL SIGNS:  Reviewed.      GENERAL:  In no apparent distress.    HEAD:  No signs of head trauma.  VASCULAR:  +2 Dorsalis Pedis pulse.  MUSCULOSKELETAL: Tender to palpation over lateral aspect of left ankle. Positive soft tissue swelling. Pain with inversion of the ankle. No tenderness to palpation over the foot. No tenderness to palpation over the anterior tibial area or knee.   NEUROLOGIC EXAM: Normal sensation in foot and ankle.    ED SUMMARY / MDM:  The patient had an x-ray of the ankle. Oblique fracture to left distal fibula noted.     Splint Note:  A short leg post mold splint was placed by the tech under my supervision. The splint was checked by myself and was found to be in proper  position. Patient affected limb has normal sensation and normal distal pulses.    There is no tenderness to the left proximal fibula upon multiple palpation. No fracture was seen. The patient's symptoms of pain appear to be due to an ankle sprain  The patient was put into an aircast under my supervision. The patient was given crutches. Was advised to remain nonweightbearing. The patient should follow up with ortho.    Case endorsed to Dr. Norris              No past medical history on file.   No past surgical history on file.   Social History     Socioeconomic History   • Marital status: Single     Spouse name: Not on file   • Number of children: Not on file   • Years of education: Not on file   • Highest education level: Not on file   Occupational History   • Not on file   Tobacco Use   • Smoking status: Not on file   Substance and Sexual Activity   • Alcohol use: Not on file   • Drug use: Not on file   • Sexual activity: Not on file   Other Topics Concern   • Not on file   Social History Narrative   • Not on file     Social Determinants of Health     Financial Resource Strain:    • Social Determinants: Financial Resource Strain: Not on file   Food Insecurity:    • Social Determinants: Food Insecurity: Not on file   Transportation Needs:    • Lack of Transportation (Medical): Not on file   • Lack of Transportation (Non-Medical): Not on file   Physical Activity:    • Days of Exercise per Week: Not on file   • Minutes of Exercise per Session: Not on file   Stress:    • Social Determinants: Stress: Not on file   Social Connections:    • Social Determinants: Social Connections: Not on file   Intimate Partner Violence:    • Social Determinants: Intimate Partner Violence Past Fear: Not on file   • Social Determinants: Intimate Partner Violence Current Fear: Not on file      No family history on file.       Labs   No results found for this visit on 11/07/21.       Imaging  XR ANKLE MIN 3 VIEWS LEFT   Final Result    Addendum 1 of 1   Addendum:    Oblique fracture through the left distal FIBULA which extends above the   level of the syndesmosis.  No definite additional fractures are    identified.    Mild soft tissue swelling about the ankle joint.  Ankle mortise appears   grossly intact.      Electronically Signed by: MARSHALL PAN M.D.    Signed on: 11/7/2021 1:50 PM          Final       Oblique fracture through the left distal tibia which extends above the   level of the syndesmosis.  No definite additional fractures are identified.    Mild soft tissue swelling about the ankle joint.  Ankle mortise appears   grossly intact.         Electronically Signed by: MARSHALL PAN M.D.    Signed on: 11/7/2021 1:27 PM                   1. Other closed fracture of distal end of left fibula, initial encounter                                Donaldo Amaya PA-C  11/07/21 1519    MD Attestation  Patient was seen by the APC.  I agree with management and plan. -MD Tay Vizcaino MD  11/08/21 5131     limited movement/swelling

## 2021-12-10 NOTE — H&P ADULT - NSHPREVIEWOFSYSTEMS_GEN_ALL_CORE
General: no fever, chills, weight gain or weight loss, changes in appetite  HEENT: no nasal congestion, cough, rhinorrhea, sore throat, headache, changes in vision  Cardio: no palpitations, pallor, chest pain or discomfort  Pulm: no shortness of breath  GI: no vomiting, diarrhea, abdominal pain, constipation   /Renal: no dysuria, foul smelling urine, increased frequency, flank pain  MSK: no back or extremity pain, no edema, joint pain or swelling, gait changes  Endo: no temperature intolerance  Heme: no bruising or abnormal bleeding  Skin: no rash General: + fever/chills, no weight gain or weight loss, changes in appetite  HEENT: no nasal congestion, cough, rhinorrhea, sore throat, headache, changes in vision  Cardio: no palpitations, pallor, chest pain or discomfort  Pulm: no shortness of breath  GI: no vomiting, diarrhea, abdominal pain, constipation   /Renal: no dysuria, foul smelling urine, increased frequency, flank pain  MSK: + inability to move L arm; no back or extremity pain, no joint pain or swelling  Heme: no bruising or abnormal bleeding  Skin: no rash

## 2021-12-10 NOTE — PATIENT PROFILE ADULT - VISION (WITH CORRECTIVE LENSES IF THE PATIENT USUALLY WEARS THEM):
Patient wears 1 pair of eye glasses at home/Partially impaired: cannot see medication labels or newsprint, but can see obstacles in path, and the surrounding layout; can count fingers at arm's length

## 2021-12-10 NOTE — DISCHARGE NOTE PROVIDER - NSDCCPCAREPLAN_GEN_ALL_CORE_FT
PRINCIPAL DISCHARGE DIAGNOSIS  Diagnosis: Acute UTI  Assessment and Plan of Treatment: A urinary tract infection (UTI) is an infection of any part of the urinary tract, which includes the kidneys, ureters, bladder, and urethra. You were treated with IV antibioitics. Continue to take _________. Do not stop taking the antibiotic even if you start to feel better. Follow up with your PCP within 1 week of discharge.      SECONDARY DISCHARGE DIAGNOSES  Diagnosis: Neuropathic pain  Assessment and Plan of Treatment: You have a history of left foot neuropathic pain. Continue to take Lyrica and Percocet, as prescribed    Diagnosis: Overactive bladder  Assessment and Plan of Treatment: You have a history of overactive bladder. Continue to take Oxybutynin.    Diagnosis: Multiple sclerosis  Assessment and Plan of Treatment: You have a history of multiple sclerosis. Continue to take Trileptal and Cellcept. Follow up with your PCP and neurologist regularly for further management.     PRINCIPAL DISCHARGE DIAGNOSIS  Diagnosis: Acute UTI  Assessment and Plan of Treatment: A urinary tract infection (UTI) is an infection of any part of the urinary tract, which includes the kidneys, ureters, bladder, and urethra. You were treated with IV antibioitics. Complete course of antibiotics as prescribed.  Do not stop taking the antibiotic even if you start to feel better. Follow up with your PCP within 1 week of discharge or sooner if you have any concerns or questions   C Diff Colitis: Continue PO Vanco as prescribed. F/y with PCP. Diarrhea has resolved. Hand hygiene is recommedned. F/u with ID Dr. Baird. Number attched, for any concern or questions about C diff      SECONDARY DISCHARGE DIAGNOSES  Diagnosis: Multiple sclerosis  Assessment and Plan of Treatment: You have a history of multiple sclerosis. Continue to take Trileptal and Cellcept. Follow up with your PCP and neurologist regularly for further management.    Diagnosis: Neuropathic pain  Assessment and Plan of Treatment: You have a history of left foot neuropathic pain. Continue to take Lyrica and Percocet, as prescribed    Diagnosis: Overactive bladder  Assessment and Plan of Treatment: You have a history of overactive bladder. Continue to take Oxybutynin.

## 2021-12-10 NOTE — DISCHARGE NOTE PROVIDER - CARE PROVIDER_API CALL
Gatito Conti)  Internal Medicine  [address] [place] [address]  Phone: [phone]  Fax: [phone]  Follow Up Time:    Gatito Conti)  Internal Medicine  [address] [place] [address]  Phone: [phone]  Fax: [phone]  Follow Up Time:     Zoe Baird)  Internal Medicine  [address] [place] [address], [address] [address]  Phone: [phone]  Fax: [phone]  Follow Up Time:

## 2021-12-10 NOTE — H&P ADULT - PROBLEM SELECTOR PLAN 4
Chronic  - Continue Oxybutynin Chronic L foot neuropathic pain  - Continue Pregabalin 150mg tid  - Continue Percocet tid PRN

## 2021-12-10 NOTE — PROGRESS NOTE ADULT - PROBLEM SELECTOR PLAN 2
new onset left arm weakness likely in setting of infection  - F/u CT head  - had similar presentation in the past  - neuro consult New onset left arm weakness likely in setting of infection  - F/u CT head  - Had similar presentation in the past  - Neuro consulted, f/u recs  - PT consulted, f/u recs New onset left arm weakness likely in setting of infection  - F/u CT head  - Had similar presentation in the past  - Neuro (Dr. Lizarraga) consulted, recs appreciated; likely secondary to multiple sclerosis exacerbation secondary to underlying sepsis type process  - PT consulted, f/u recs

## 2021-12-10 NOTE — H&P ADULT - PROBLEM SELECTOR PLAN 1
Patient presents with fever, leukocytosis likely 2/2 UTI  - Admit to [facility]  - CT renal stone hunt:   - Given Motrin 600mg x1, IV Zosyn x1, 1L IV NS x1 in ED  - Continue________  - Tylenol 650 mg PO q6h PRN fever or mild pain  - Trend WBC and monitor for fever  - F/u urine and blood cultures, lactate  - ID (Dr. Bone) consulted, f/u recs Patient presents with fever, leukocytosis likely 2/2 UTI  - Admit to [facility]  - CT renal stone hunt: pending  - Given Motrin 600mg x1, IV Zosyn x1, 1L IV NS x1 in ED  - Start Rocephin 1g IV qd  - Trend WBC and monitor for fever  - F/u urine and blood cultures  - ID (Dr. Bone) consulted, f/u recs Patient presents with fever, leukocytosis likely 2/2 UTI  - Admit to F  - CT renal stone hunt: pending  - UA: large ketone, 15 protein, trace LE, mod blood, 11-25 RBCs, mod bacteria  - Given Motrin 600mg x1, IV Zosyn x1, 1L IV NS x1 in ED  - Start Rocephin 1g IV qd  - Trend WBC and monitor for fever  - F/u urine and blood cultures  - ID (Dr. Bone) consulted, f/u recs Patient presents with fever, leukocytosis and left arm weakness  - sepsis POA - fever, leukocytosis, HR >90, Urinary source  - Admit to F  - CT renal stone hunt: pending  - UA: large ketone, 15 protein, trace LE, mod blood, 11-25 RBCs, mod bacteria  - Given Motrin 600mg x1, IV Zosyn x1, 1L IV NS x1 in ED  - Start Rocephin 1g IV qd  - Trend WBC and monitor for fever  - F/u urine and blood cultures  - ID (Dr. Bone) consulted, f/u recs

## 2021-12-10 NOTE — PROGRESS NOTE ADULT - PROBLEM SELECTOR PLAN 4
New onset peripheral edema  - b/l UE dopplers ordered, f/u results Chronic, stable on admission  - Continue home medication Valsartan with hold parameters  - Monitor routine hemodynamics

## 2021-12-10 NOTE — PROGRESS NOTE ADULT - SUBJECTIVE AND OBJECTIVE BOX
Patient is a 66y old  Male who presents with a chief complaint of UTI, left arm weakness (10 Dec 2021 08:13)      INTERVAL HPI/OVERNIGHT EVENTS:    MEDICATIONS  (STANDING):  cefTRIAXone   IVPB 1000 milliGRAM(s) IV Intermittent every 24 hours  enoxaparin Injectable 40 milliGRAM(s) SubCutaneous daily  lactated ringers. 1000 milliLiter(s) (75 mL/Hr) IV Continuous <Continuous>  OXcarbazepine 300 milliGRAM(s) Oral two times a day  oxybutynin 15 milliGRAM(s) Oral daily  pregabalin 150 milliGRAM(s) Oral three times a day  senna 2 Tablet(s) Oral at bedtime    MEDICATIONS  (PRN):  acetaminophen     Tablet .. 650 milliGRAM(s) Oral every 6 hours PRN Temp greater or equal to 38C (100.4F)  oxycodone    5 mG/acetaminophen 325 mG 2 Tablet(s) Oral every 8 hours PRN Moderate Pain (4 - 6)  polyethylene glycol 3350 17 Gram(s) Oral daily PRN Constipation      Allergies    No Known Allergies    Intolerances        REVIEW OF SYSTEMS:  CONSTITUTIONAL: No fever or chills  HEENT:  No headache, no sore throat  RESPIRATORY: No cough, wheezing, or shortness of breath  CARDIOVASCULAR: No chest pain, palpitations  GASTROINTESTINAL: No abd pain, nausea, vomiting, or diarrhea  GENITOURINARY: No dysuria, frequency, or hematuria  NEUROLOGICAL: no focal weakness or dizziness  MUSCULOSKELETAL: no myalgias     Vital Signs Last 24 Hrs  T(C): 37 (10 Dec 2021 07:31), Max: 39.3 (10 Dec 2021 05:00)  T(F): 98.6 (10 Dec 2021 07:31), Max: 102.8 (10 Dec 2021 05:00)  HR: 90 (10 Dec 2021 07:31) (90 - 118)  BP: 104/55 (10 Dec 2021 07:31) (104/55 - 122/68)  BP(mean): --  RR: 18 (10 Dec 2021 07:31) (18 - 19)  SpO2: 95% (10 Dec 2021 07:31) (94% - 95%)    PHYSICAL EXAM:  GENERAL: NAD  HEENT:  anicteric, moist mucous membranes  CHEST/LUNG:  CTA b/l, no rales, wheezes, or rhonchi  HEART:  RRR, S1, S2  ABDOMEN:  BS+, soft, nontender, nondistended  EXTREMITIES: no edema, cyanosis, or calf tenderness  NERVOUS SYSTEM: answers questions and follows commands appropriately    LABS:                        12.7   16.06 )-----------( 254      ( 10 Dec 2021 04:57 )             36.9     CBC Full  -  ( 10 Dec 2021 04:57 )  WBC Count : 16.06 K/uL  Hemoglobin : 12.7 g/dL  Hematocrit : 36.9 %  Platelet Count - Automated : 254 K/uL  Mean Cell Volume : 88.5 fl  Mean Cell Hemoglobin : 30.5 pg  Mean Cell Hemoglobin Concentration : 34.4 gm/dL  Auto Neutrophil # : 14.38 K/uL  Auto Lymphocyte # : 0.60 K/uL  Auto Monocyte # : 0.87 K/uL  Auto Eosinophil # : 0.07 K/uL  Auto Basophil # : 0.04 K/uL  Auto Neutrophil % : 89.7 %  Auto Lymphocyte % : 3.7 %  Auto Monocyte % : 5.4 %  Auto Eosinophil % : 0.4 %  Auto Basophil % : 0.2 %    10 Dec 2021 04:57    132    |  98     |  14     ----------------------------<  104    3.7     |  26     |  0.62     Ca    8.7        10 Dec 2021 04:57    TPro  7.1    /  Alb  3.1    /  TBili  0.7    /  DBili  x      /  AST  11     /  ALT  13     /  AlkPhos  92     10 Dec 2021 04:57    PT/INR - ( 10 Dec 2021 04:57 )   PT: 16.4 sec;   INR: 1.43 ratio         PTT - ( 10 Dec 2021 04:57 )  PTT:35.0 sec  Urinalysis Basic - ( 10 Dec 2021 04:56 )    Color: Yellow / Appearance: Clear / S.010 / pH: x  Gluc: x / Ketone: Large  / Bili: Negative / Urobili: Negative   Blood: x / Protein: 15 / Nitrite: Negative   Leuk Esterase: Trace / RBC: 11-25 /HPF / WBC 3-5   Sq Epi: x / Non Sq Epi: Few / Bacteria: Moderate      CAPILLARY BLOOD GLUCOSE              RADIOLOGY & ADDITIONAL TESTS:    Personally reviewed.     Consultant(s) Notes Reviewed:  [x] YES  [ ] NO     Patient is a 66y old  Male who presents with a chief complaint of UTI, left arm weakness (10 Dec 2021 08:13)      INTERVAL HPI/OVERNIGHT EVENTS: Patient seen and examined at bedside. Patient complains of constipation and buttock pain due to poor positioning, otherwise states he is doing ok. Reports able to flex and extend wrist slightly, which is improved from yesterday. Denies chest pain, SOB, headache, dizziness, cough, N/V/D.    MEDICATIONS  (STANDING):  cefTRIAXone   IVPB 1000 milliGRAM(s) IV Intermittent every 24 hours  enoxaparin Injectable 40 milliGRAM(s) SubCutaneous daily  lactated ringers. 1000 milliLiter(s) (75 mL/Hr) IV Continuous <Continuous>  OXcarbazepine 300 milliGRAM(s) Oral two times a day  oxybutynin 15 milliGRAM(s) Oral daily  pregabalin 150 milliGRAM(s) Oral three times a day  senna 2 Tablet(s) Oral at bedtime    MEDICATIONS  (PRN):  acetaminophen     Tablet .. 650 milliGRAM(s) Oral every 6 hours PRN Temp greater or equal to 38C (100.4F)  oxycodone    5 mG/acetaminophen 325 mG 2 Tablet(s) Oral every 8 hours PRN Moderate Pain (4 - 6)  polyethylene glycol 3350 17 Gram(s) Oral daily PRN Constipation      Allergies    No Known Allergies    Intolerances        REVIEW OF SYSTEMS:  CONSTITUTIONAL: + fever, + chills yesterday afternoon  HEENT:  No headache, no sore throat  RESPIRATORY: No cough, wheezing, or shortness of breath  CARDIOVASCULAR: No chest pain, palpitations  GASTROINTESTINAL: No abd pain, nausea, vomiting, or diarrhea  GENITOURINARY: No dysuria, frequency, or hematuria  NEUROLOGICAL: + weakness of LUE, chronic paralysis of RUE and b/l LE, no dizziness  MUSCULOSKELETAL: no myalgias     Vital Signs Last 24 Hrs  T(C): 37 (10 Dec 2021 07:31), Max: 39.3 (10 Dec 2021 05:00)  T(F): 98.6 (10 Dec 2021 07:31), Max: 102.8 (10 Dec 2021 05:00)  HR: 90 (10 Dec 2021 07:31) (90 - 118)  BP: 104/55 (10 Dec 2021 07:31) (104/55 - 122/68)  BP(mean): --  RR: 18 (10 Dec 2021 07:31) (18 - 19)  SpO2: 95% (10 Dec 2021 07:31) (94% - 95%)    PHYSICAL EXAM:  GENERAL: NAD  HEENT: EOMI, PERRL, anicteric, moist mucous membranes  CHEST/LUNG:  CTA b/l, no rales, wheezes, or rhonchi  HEART:  RRR, S1, S2  ABDOMEN:  BS+, soft, nondistended + mild periumbilical TTP  EXTREMITIES: + nonpitting edema in all 4 extremities, +3 pulses in b/l UE; no cyanosis, or calf tenderness  NERVOUS SYSTEM: answers questions and follows commands appropriately  MSK: 1/5 muscle strength in LUE, 0/5 muscle strength in RUE and b/l LE    LABS:                        12.7   16.06 )-----------( 254      ( 10 Dec 2021 04:57 )             36.9     CBC Full  -  ( 10 Dec 2021 04:57 )  WBC Count : 16.06 K/uL  Hemoglobin : 12.7 g/dL  Hematocrit : 36.9 %  Platelet Count - Automated : 254 K/uL  Mean Cell Volume : 88.5 fl  Mean Cell Hemoglobin : 30.5 pg  Mean Cell Hemoglobin Concentration : 34.4 gm/dL  Auto Neutrophil # : 14.38 K/uL  Auto Lymphocyte # : 0.60 K/uL  Auto Monocyte # : 0.87 K/uL  Auto Eosinophil # : 0.07 K/uL  Auto Basophil # : 0.04 K/uL  Auto Neutrophil % : 89.7 %  Auto Lymphocyte % : 3.7 %  Auto Monocyte % : 5.4 %  Auto Eosinophil % : 0.4 %  Auto Basophil % : 0.2 %    10 Dec 2021 04:57    132    |  98     |  14     ----------------------------<  104    3.7     |  26     |  0.62     Ca    8.7        10 Dec 2021 04:57    TPro  7.1    /  Alb  3.1    /  TBili  0.7    /  DBili  x      /  AST  11     /  ALT  13     /  AlkPhos  92     10 Dec 2021 04:57    PT/INR - ( 10 Dec 2021 04:57 )   PT: 16.4 sec;   INR: 1.43 ratio         PTT - ( 10 Dec 2021 04:57 )  PTT:35.0 sec  Urinalysis Basic - ( 10 Dec 2021 04:56 )    Color: Yellow / Appearance: Clear / S.010 / pH: x  Gluc: x / Ketone: Large  / Bili: Negative / Urobili: Negative   Blood: x / Protein: 15 / Nitrite: Negative   Leuk Esterase: Trace / RBC: 11-25 /HPF / WBC 3-5   Sq Epi: x / Non Sq Epi: Few / Bacteria: Moderate      CAPILLARY BLOOD GLUCOSE              RADIOLOGY & ADDITIONAL TESTS:    < from: CT Renal Stone Hunt (12.10.21 @ 06:56) >  ACC: [id number] EXAM:  CT RENAL STONE HUNT                          PROCEDURE DATE:  12/10/2021        INTERPRETATION:  CLINICAL INFORMATION: Fever, leukocytosis. Urinary tract   infection.    COMPARISON: CT scan abdomen pelvis 3/14/2020.    CONTRAST/COMPLICATIONS:  IV Contrast: NONE  Oral Contrast: NONE  Complications: None reported at time of study completion    PROCEDURE:  CT of the Abdomen and Pelvis was performed.  Sagittal and coronal reformats were performed.    FINDINGS:    LOWER CHEST:Bibasilar dependent atelectatic changes.    Streak artifact degrades image quality, limiting evaluation.  The evaluation of the solid organ parenchyma is limited without   intravenous contrast.    LIVER: Within normal limits.  BILE DUCTS: Normal caliber.  GALLBLADDER: Prior cholecystectomy.  SPLEEN: Within normal limits.  PANCREAS: Within normal limits.  ADRENALS: Within normal limits.  KIDNEYS/URETERS:  Few, nonobstructing intrarenal calcifications right kidney, measuring up   to 5 mm at the midpole.  No hydroureteronephrosis or obstructing ureteral calculus noted.  Mild bilateral perinephric stranding, similar to prior exam.    BLADDER: Within normal limits.  REPRODUCTIVE ORGANS: Prostate not enlarged.    BOWEL: Evaluation of the stomach islimited without distention.  There is moderate to large amount of colonic fecal retention with dense   stool throughout the transverse and descending colon.  No bowel obstruction.  There is a redundant sigmoid colon, with probable mild segmental bowel   wall thickening involving the sigmoid colon. The evaluation of the bowel   wall is limited without intravenous contrast.  No pericolic inflammatory change is noted.   Appendix within normal limits.  PERITONEUM: No ascites.    VESSELS: Atherosclerotic changes.  RETROPERITONEUM/LYMPH NODES: No enlarged retroperitoneal lymphadenopathy.    ABDOMINAL WALL: Tiny fat-containing umbilical hernia.    BONES:  Chronic fracture deformity left intertrochanteric femur with nonosseous   union and varus angulation of the femoral head.  Partially imaged ORIF right proximal femur with intramedullary lashaun and   screw fixation.  Chronic fracture deformity with heterotopic right inferior pubic ramus.  Degenerative changes spine.    IMPRESSION:    Nonobstructingintrarenal calcifications right kidney.    Moderate to large colonic fecal retention.    Other findings as discussed above.      --- End of Report ---        MAIA FRANKS MD; Attending Radiologist  This document has been electronically signed. Dec 10 2021  8:21AM    < end of copied text >      Personally reviewed.     Consultant(s) Notes Reviewed:  [x] YES  [ ] NO

## 2021-12-10 NOTE — H&P ADULT - ASSESSMENT
66 year old male w PMHx MS, L foot neuropathic pain admitted for UTI. 66 year old male w PMHx MS, L foot neuropathic pain admitted for sepsis secondary to UTI with acute left arm weakness.

## 2021-12-10 NOTE — H&P ADULT - NSHPPHYSICALEXAM_GEN_ALL_CORE
LOS:     VITALS:   T(C): 37.6 (12-10-21 @ 04:04), Max: 37.6 (12-10-21 @ 04:04)  HR: 118 (12-10-21 @ 04:04) (118 - 118)  BP: 121/77 (12-10-21 @ 04:04) (121/77 - 121/77)  RR: 18 (12-10-21 @ 04:04) (18 - 18)  SpO2: 94% (12-10-21 @ 04:04) (94% - 94%)    GENERAL: NAD, lying in bed comfortably  HEAD:  Atraumatic, Normocephalic  EYES: EOMI, PERRLA, conjunctiva and sclera clear  ENT: Moist mucous membranes  NECK: Supple, No JVD  CHEST/LUNG: Clear to auscultation bilaterally; No rales, rhonchi, wheezing, or rubs. Unlabored respirations  HEART: Regular rate and rhythm; No murmurs, rubs, or gallops  ABDOMEN: BSx4; Soft, nontender, nondistended  EXTREMITIES:  2+ Peripheral Pulses, brisk capillary refill. No clubbing, cyanosis, or edema  NERVOUS SYSTEM:  A&Ox3, no focal deficits   SKIN: No rashes or lesions VITALS:   T(C): 37.6 (12-10-21 @ 04:04), Max: 37.6 (12-10-21 @ 04:04)  HR: 118 (12-10-21 @ 04:04) (118 - 118)  BP: 121/77 (12-10-21 @ 04:04) (121/77 - 121/77)  RR: 18 (12-10-21 @ 04:04) (18 - 18)  SpO2: 94% (12-10-21 @ 04:04) (94% - 94%)    GENERAL: NAD, lying in bed comfortably  HEAD:  Atraumatic, Normocephalic  EYES: EOMI, PERRLA, conjunctiva and sclera clear  ENT: Moist mucous membranes  NECK: Supple, No JVD  CHEST/LUNG: Clear to auscultation bilaterally; No rales, rhonchi, wheezing, or rubs. Unlabored respirations  HEART: Regular rate and rhythm; No murmurs, rubs, or gallops  ABDOMEN: BSx4; Soft, nontender, nondistended  EXTREMITIES: significant b/l LE edema, R>L; inability to move 4 extremities  NERVOUS SYSTEM:  A&Ox3, no focal deficits T(C): 37.6 (12-10-21 @ 04:04), Max: 37.6 (12-10-21 @ 04:04)  HR: 118 (12-10-21 @ 04:04) (118 - 118)  BP: 121/77 (12-10-21 @ 04:04) (121/77 - 121/77)  RR: 18 (12-10-21 @ 04:04) (18 - 18)  SpO2: 94% (12-10-21 @ 04:04) (94% - 94%)    GENERAL: NAD, lying in bed comfortably  HEAD:  Atraumatic, Normocephalic  EYES: EOMI, PERRLA, conjunctiva and sclera clear  ENT: Moist mucous membranes  NECK: Supple, No JVD  CHEST/LUNG: Clear to auscultation bilaterally; No rales, rhonchi, wheezing, or rubs. Unlabored respirations  HEART: Regular rate and rhythm; No murmurs, rubs, or gallops  ABDOMEN: BSx4; Soft, nontender, nondistended  EXTREMITIES: significant b/l LE edema, R>L; inability to move all 4 extremities - 1/5 left hand  strength  NERVOUS SYSTEM:  A&Ox3, no focal deficits

## 2021-12-10 NOTE — DISCHARGE NOTE PROVIDER - PROVIDER TOKENS
PROVIDER:[TOKEN:[[id number]]] PROVIDER:[TOKEN:[[id number]]],PROVIDER:[TOKEN:[[id number]]]

## 2021-12-10 NOTE — H&P ADULT - PROBLEM SELECTOR PLAN 3
Chronic L foot neuropathic pain  - Continue Pregabalin 150mg tid  - Continue Percocet tid PRN - Patient unable to move b/l LE or R UE at baseline  - Continue Oxcarbazepine 300mg bid  - Will hold recently started cellcept in setting of infection  - Consulted Neuro (Dr. Torres), f/u recs

## 2021-12-10 NOTE — PROGRESS NOTE ADULT - ASSESSMENT
66 year old male w PMHx MS, L foot neuropathic pain admitted for sepsis secondary to UTI with acute left arm weakness.

## 2021-12-10 NOTE — PROGRESS NOTE ADULT - PROBLEM SELECTOR PLAN 3
- Patient unable to move b/l LE or R UE at baseline  - Continue Oxcarbazepine 300mg bid  - Will hold recently started cellcept in setting of infection  - Consulted Neuro (Dr. Torres), f/u recs Patient unable to move b/l LE or R UE at baseline  - Continue Oxcarbazepine 300mg bid  - Will hold recently started cellcept in setting of infection  - Consulted Neuro (Dr. Torres), f/u recs Patient unable to move b/l LE or R UE at baseline  - Continue Oxcarbazepine 300mg bid  - Will hold recently started cellcept in setting of infection  - Neuro (Dr. Lizarraga) consulted, recs appreciated; likely secondary to multiple sclerosis exacerbation secondary to underlying sepsis type process

## 2021-12-10 NOTE — ED PROVIDER NOTE - OBJECTIVE STATEMENT
67 yo male pmhx MS baseline paralysis in right arm and below waist, chronic pain, BIBEMS for having fever 103.5 at home/chills with some associated left arm paralysis.  Last time this happened to him, he had a UTI.  No nausea/vomiting.  No URI symptoms.  triple vaccinated for covid.  Given 2 excedrin by his wife.  Recently finished course of amoxicillin for tooth infection

## 2021-12-10 NOTE — H&P ADULT - PROBLEM SELECTOR PLAN 2
- Patient unable to move b/l LE or R UE at baseline  - Continue Oxcarbazepine 300mg bid  - Consulted Neuro (Dr. Torres), f/u recs new onset left arm weakness likely in setting of infection  - had similar presentation in the past  - neuro consult

## 2021-12-10 NOTE — CONSULT NOTE ADULT - SUBJECTIVE AND OBJECTIVE BOX
history of multiple sclerosis, weakness, trigeminal neuralgia, and neuropathic pain.  For episode of weakness, most likely secondary to multiple sclerosis exacerbation secondary to underlying sepsis type process.  Continue antibiotics.  Sepsis workup.  I do not feel that steroids are needed at present.    For history of trigeminal neuralgia, continue the patient on Trileptal.  For history of multiple sclerosis, supportive therapy.  For neuropathic pain, continue the patient on Lyrica.  spoke to spouse 
[facility] DIVISION of INFECTIOUS DISEASE  Al Day MD PhD, Ying Pedroza MD, Zoe Baird MD, Kenneth Castellanos MD, Javier Vick MD  and providing coverage with Jamee Benson MD and Miranda Heller MD  Providing Infectious Disease Consultations at [facility]    Office# [phone] to schedule follow up appointments  Answering Service for urgent calls or New Consults [phone]  Cell# to text for urgent issues Al Day [phone]     HPI:  66 year old male w PMHx MS, L foot neuropathic pain presents to ED from home c/o fever of 103.5 and inability to move his L arm. At baseline, patient is unable to move his b/l LE or his R arm. Earlier today, patient had a low grade fever ~100F, and by 3am his temp was 103.5F and he was unable to move his L arm. Patient states that he had a similar episode in 2020 when he spiked a fever and was unable to move his L arm. Strength in his L arm was regained after a few hours with antibiotic treatment. He was started on cell cept for his MS about 2 months ago. Does not feel as though it is doing much for him.    Of note, patient completed a course of Azithromycin 500mg on  for a tooth infection. Patient admits to fever/chills, diminished strength in L arm. Denies headache, dizziness, chest pain, changes in vision, cough, sob, nausea/vomiting, increased urinary frequency, hematuria, dysuria, constipation, diarrhea, hematochezia.      PAST MEDICAL & SURGICAL HISTORY:  MS (multiple sclerosis)    Femur fracture, right    Tibia fracture    History of cholecystectomy  Mar 2020        Antimicrobials  cefTRIAXone   IVPB 1000 milliGRAM(s) IV Intermittent every 24 hours      Immunological      Other  acetaminophen     Tablet .. 650 milliGRAM(s) Oral every 6 hours PRN  enoxaparin Injectable 40 milliGRAM(s) SubCutaneous daily  lactated ringers. 1000 milliLiter(s) IV Continuous <Continuous>  OXcarbazepine 300 milliGRAM(s) Oral two times a day  oxybutynin 15 milliGRAM(s) Oral daily  oxycodone    5 mG/acetaminophen 325 mG 2 Tablet(s) Oral every 8 hours PRN  polyethylene glycol 3350 17 Gram(s) Oral daily PRN  pregabalin 150 milliGRAM(s) Oral three times a day  senna 2 Tablet(s) Oral at bedtime      Allergies    No Known Allergies    Intolerances        SOCIAL HISTORY:  Social History:  Tobacco: denies  EtOH: denies  Recreational drug use: smoked 1/2 joint marijuana daily  Lives with: wife at home  Ambulates: wheelchair-bound  ADLs: w assistance   Vaccinations: COVID Pfizer 3/3 Oct 2021 (10 Dec 2021 05:41)      FAMILY HISTORY:  No pertinent family history in first degree relatives        ROS:    EYES:  Negative  blurry vision or double vision  GASTROINTESTINAL:  Negative for nausea, vomiting, diarrhea  -otherwise negative except for subjective    Vital Signs Last 24 Hrs  T(C): 37.8 (10 Dec 2021 12:04), Max: 39.3 (10 Dec 2021 05:00)  T(F): 100.1 (10 Dec 2021 12:04), Max: 102.8 (10 Dec 2021 05:00)  HR: 92 (10 Dec 2021 10:11) (90 - 118)  BP: 109/60 (10 Dec 2021 10:11) (104/55 - 122/68)  BP(mean): --  RR: 18 (10 Dec 2021 10:11) (18 - 19)  SpO2: 96% (10 Dec 2021 10:11) (94% - 96%)    PE:  WDWN in no distress  HEENT:  NC, PERRL, sclerae anicteric, conjunctivae clear, EOMI.  Sinuses nontender, no nasal exudate.  No buccal or pharyngeal lesions, erythema or exudate  Neck:  Supple, no adenopathy  Lungs:  No accessory muscle use, breathing comfortably  Cor:  distant  Abd:  Symmetric, normoactive BS.  Soft, nontender, no masses, guarding or rebound.  Liver and spleen not enlarged  Extrem:  No cyanosis  Skin:  No rashes.        LABS:                        12.7   16.06 )-----------( 254      ( 10 Dec 2021 04:57 )             36.9       WBC Count: 16.06 K/uL (12-10- @ 04:57)      12-10    132<L>  |  98  |  14  ----------------------------<  104<H>  3.7   |  26  |  0.62    Ca    8.7      10 Dec 2021 04:57    TPro  7.1  /  Alb  3.1<L>  /  TBili  0.7  /  DBili  x   /  AST  11<L>  /  ALT  13  /  AlkPhos  92  12-10      Creatinine, Serum: 0.62 mg/dL (12-10-21 @ 04:57)      Urinalysis Basic - ( 10 Dec 2021 04:56 )    Color: Yellow / Appearance: Clear / S.010 / pH: x  Gluc: x / Ketone: Large  / Bili: Negative / Urobili: Negative   Blood: x / Protein: 15 / Nitrite: Negative   Leuk Esterase: Trace / RBC: 11-25 /HPF / WBC 3-5   Sq Epi: x / Non Sq Epi: Few / Bacteria: Moderate              MICROBIOLOGY:      RADIOLOGY & ADDITIONAL STUDIES:    --< from: CT Renal Stone Hunt (12.10.21 @ 06:56) >    ACC: [id number] EXAM:  CT RENAL STONE HUNT                          PROCEDURE DATE:  12/10/2021          INTERPRETATION:  CLINICAL INFORMATION: Fever, leukocytosis. Urinary tract   infection.    COMPARISON: CT scan abdomen pelvis 3/14/2020.    CONTRAST/COMPLICATIONS:  IV Contrast: NONE  Oral Contrast: NONE  Complications: None reported at time of study completion    PROCEDURE:  CT of the Abdomen and Pelvis was performed.  Sagittal and coronal reformats were performed.    FINDINGS:    LOWER CHEST:Bibasilar dependent atelectatic changes.    Streak artifact degrades image quality, limiting evaluation.  The evaluation of the solid organ parenchyma is limited without   intravenous contrast.    LIVER: Within normal limits.  BILE DUCTS: Normal caliber.  GALLBLADDER: Prior cholecystectomy.  SPLEEN: Within normal limits.  PANCREAS: Within normal limits.  ADRENALS: Within normal limits.  KIDNEYS/URETERS:  Few, nonobstructing intrarenal calcifications right kidney, measuring up   to 5 mm at the midpole.  No hydroureteronephrosis or obstructing ureteral calculus noted.  Mild bilateral perinephric stranding, similar to prior exam.    BLADDER: Within normal limits.  REPRODUCTIVE ORGANS: Prostate not enlarged.    BOWEL: Evaluation of the stomach islimited without distention.  There is moderate to large amount of colonic fecal retention with dense   stool throughout the transverse and descending colon.  No bowel obstruction.  There is a redundant sigmoid colon, with probable mild segmental bowel   wall thickening involving the sigmoid colon. The evaluation of the bowel   wall is limited without intravenous contrast.  No pericolic inflammatory change is noted.   Appendix within normal limits.  PERITONEUM: No ascites.    VESSELS: Atherosclerotic changes.  RETROPERITONEUM/LYMPH NODES: No enlarged retroperitoneal lymphadenopathy.    ABDOMINAL WALL: Tiny fat-containing umbilical hernia.    BONES:  Chronic fracture deformity left intertrochanteric femur with nonosseous   union and varus angulation of the femoral head.  Partially imaged ORIF right proximal femur with intramedullary lashaun and   screw fixation.  Chronic fracture deformity with heterotopic right inferior pubic ramus.  Degenerative changes spine.    IMPRESSION:    Nonobstructing intrarenal calcifications right kidney.    Moderate to large colonic fecal retention.    Other findings as discussed above.

## 2021-12-10 NOTE — H&P ADULT - HISTORY OF PRESENT ILLNESS
66 year old male w PMHx ____ presents to ED from home c/o c_______    ED course:  Vitals:  Labs:  EKG:  CXR:  CT head:  CT a/p:  Given  66 year old male w PMHx MS, L foot neuropathic pain presents to ED from home c/o fever of 103.5 and inability to move his L arm. At baseline, patient is unable to move his b/l LE or his R arm. Earlier today, patient had a low grade fever ~100F, and by 3am his temp was 103.5F and he was unable to move his L arm. Patient states that he had a similar episode in March 2020 when he spiked a fever and was unable to move his L arm. Strength in his L arm was regained after a few hours.    Of note, patient completed a course of Azithromycin 500mg on 12/9 for a tooth infection. Patient admits to fever/chills, diminished strength in L arm. Denies headache, dizziness, chest pain, changes in vision, cough, sob, nausea/vomiting, increased urinary frequency, hematuria, dysuria, constipation, diarrhea, hematochezia.    ED course:  Vitals: T 99.7F, , /77, RR 18, SpO2 94% on RA  Labs: WBC 16.06, RBC 4.17, H/H 12.7/36.9, PT 16.4, INR 1.43, lactate 0.6, Na 132, gluc 104, albumin 3.1, AST 11  UA: large ketone, 15 protein, trace LE, mod blood, 11-25 RBCs, mod bacteria  CT renal stone hunt: pending  Given: Motrin 600mg x1, IV Zosyn x1, 1L IV NS x1 66 year old male w PMHx MS, L foot neuropathic pain presents to ED from home c/o fever of 103.5 and inability to move his L arm. At baseline, patient is unable to move his b/l LE or his R arm. Earlier today, patient had a low grade fever ~100F, and by 3am his temp was 103.5F and he was unable to move his L arm. Patient states that he had a similar episode in March 2020 when he spiked a fever and was unable to move his L arm. Strength in his L arm was regained after a few hours with antibiotic treatment. He was started on cell cept for his MS about 2 months ago. Does not feel as though it is doing much for him.    Of note, patient completed a course of Azithromycin 500mg on 12/9 for a tooth infection. Patient admits to fever/chills, diminished strength in L arm. Denies headache, dizziness, chest pain, changes in vision, cough, sob, nausea/vomiting, increased urinary frequency, hematuria, dysuria, constipation, diarrhea, hematochezia.    ED course:  Vitals: Tmax 102.8 F, , /77, RR 18, SpO2 94% on RA  Labs: WBC 16.06, RBC 4.17, H/H 12.7/36.9, PT 16.4, INR 1.43, lactate 0.6, Na 132, gluc 104, albumin 3.1, AST 11  UA: large ketone, 15 protein, trace LE, mod blood, 11-25 RBCs, mod bacteria  CT renal stone hunt: pending  Given: Motrin 600mg x1, IV Zosyn x1, 1L IV NS x1

## 2021-12-10 NOTE — ED PROVIDER NOTE - NS ED ROS FT
Constitutional: + Fever, - Chills, - Anorexia, - Fatigue, - Night sweats  Eyes: - Discharge, - Irritation, - Redness, - Visual changes, - Light sensitivity, - Pain  EARS: - Ear Pain, - Tinnitus, - Decreased hearing  NOSE: - Congestion, - Bloody nose  MOUTH/THROAT: - Vocal Changes, - Drooling, - Sore throat  NECK: - Lumps, - Stiffness, - Pain  CV: - Palpitations, - Chest Pain, - Edema, - Syncope  RESP:  - Cough, - Shortness of Breath, - Dyspnea on Exertion, - Trouble speaking, - Pleuritic pain - Wheezing  GI: - Diarrhea, - Constipation, - Bloody stools, - Nausea, - Vomiting, - Abdominal Pain  : - Dysuria, -Frequency, - Hematuria, - Hesitancy, - Incontinence, - Saddle Anesthesia, - Abnormal discharge  MSK: - Myalgias, - Arthralgias, - Weakness, - Deformities, - Injuries  SKIN: - Color change, - Rash, - Swelling, - Ecchymosis, - Abrasion, - laceration  NEURO: - Change in behavior, - Dec. Alertness, - Headache, - Dizziness, - Change in speech, - Weakness, - Seizure-like activity, - Difficulty ambulating

## 2021-12-10 NOTE — ED ADULT NURSE NOTE - OBJECTIVE STATEMENT
Pt with spouse; presents to the ED for eval of fever 103.5 at home with associated chills.  As per wife, gave Excedrin at home. Denies n/v. Pt denies cough. pt states triple vaccinated for covid.  Also states recently completed course of amoxicillin for tooth infection. reports hx of MS.

## 2021-12-10 NOTE — ED ADULT TRIAGE NOTE - CHIEF COMPLAINT QUOTE
fever and chills at home, took Excedrin at home per ems. paralyzed from waist down and right arm, chronic per ems

## 2021-12-10 NOTE — CONSULT NOTE ADULT - ASSESSMENT
66 year old male known to our group from 3/2020 amd w PMHx MS, L foot neuropathic pain presented to ED from home c/o fever of 103.5 and new onset inability to move his L arm. similar episode in March 2020 when he spiked a fever and was unable to move his L arm. Strength in his L arm was regained after a few hours started on cell cept for his MS about 2 months ago. recent abx for dental infection    RECOMMENDATIONS  Febrile, leukocytosis, abn UA, no abn on CXR and no sig abns on abd CT so highest suspicion is for pyelonephritis with weakness due to impact on underlying MS  -agree with Ceftriaxone for now, no concerning micro on review back 3 years.     Thank you for consulting us and involving us in the management of this most interesting and challenging case.  We will follow along in the care of this patient. Please call us at [phone] or text me directly on my cell# at [phone] with any concerns.    Over the weekend Dr Heller will be covering this patient so please contact him with any questions, concerns or new micro data.

## 2021-12-10 NOTE — H&P ADULT - NSHPSOCIALHISTORY_GEN_ALL_CORE
Tobacco:   EtOH:   Recreational drug use:  Lives with:  Ambulates:  ADLs:  Occupation:  Vaccinations: Tobacco: denies  EtOH: denies  Recreational drug use: smoked 1/2 joint marijuana daily  Lives with: wife at home  Ambulates: wheelchair-bound  ADLs: w assistance   Vaccinations: COVID Pfizer 3/3 Oct 2021

## 2021-12-10 NOTE — PATIENT PROFILE ADULT - FALL HARM RISK - HARM RISK INTERVENTIONS

## 2021-12-10 NOTE — DISCHARGE NOTE PROVIDER - HOSPITAL COURSE
ADMISSION H+P:    HPI:  66 year old male w PMHx MS, L foot neuropathic pain presents to ED from home c/o fever of 103.5 and inability to move his L arm. At baseline, patient is unable to move his b/l LE or his R arm. Earlier today, patient had a low grade fever ~100F, and by 3am his temp was 103.5F and he was unable to move his L arm. Patient states that he had a similar episode in March 2020 when he spiked a fever and was unable to move his L arm. Strength in his L arm was regained after a few hours with antibiotic treatment. He was started on cell cept for his MS about 2 months ago. Does not feel as though it is doing much for him.    Of note, patient completed a course of Azithromycin 500mg on 12/9 for a tooth infection. Patient admits to fever/chills, diminished strength in L arm. Denies headache, dizziness, chest pain, changes in vision, cough, sob, nausea/vomiting, increased urinary frequency, hematuria, dysuria, constipation, diarrhea, hematochezia.    ED course:  Vitals: Tmax 102.8 F, , /77, RR 18, SpO2 94% on RA  Labs: WBC 16.06, RBC 4.17, H/H 12.7/36.9, PT 16.4, INR 1.43, lactate 0.6, Na 132, gluc 104, albumin 3.1, AST 11  UA: large ketone, 15 protein, trace LE, mod blood, 11-25 RBCs, mod bacteria  CT renal stone hunt: pending  Given: Motrin 600mg x1, IV Zosyn x1, 1L IV NS x1 (10 Dec 2021 05:41)      ---  HOSPITAL COURSE: Patient was admitted to [facility] for sepsis 2/2 UTI and acute left upper extremity weakness. ID (Dr. Bone) was consulted; patient was treated with IV Rocephin _______. UCx ________. BCx _______. Neurology (Dr. Torres) was consulted for acute left upper extremity weakness given history of MS; __________.     Patient was medically optimized and improved clinically throughout hospital course. Patient seen and examined on day of discharge.    Vital Signs    Physical Exam:  General: well-developed, well-nourished, NAD  HEENT: normocephalic, atraumatic, EOMI, moist mucous membranes   Neck: supple, non-tender, no masses  Neurology: AAOx3, sensation intact  Respiratory: clear to auscultation bilaterally; no wheezes, rhonchi, or rales  CV: regular rate and rhythm, soft S1/S2, no murmurs, rubs, or gallops  Abdominal: soft, non-tender, non-distended, bowel sounds present  Extremities: bilateral lower extremity edema R>L  Musculoskeletal: no joint erythema or warmth  Skin: warm, dry, normal color    Patient is medically stable for discharge to ____ with outpatient follow up.  ---  CONSULTANTS:     ---  TIME SPENT:   I, the attending physician, was physically present for the key portions of the evaluation and management (E/M) service provided. The total amount of time spent reviewing the hospital course, laboratory values, imaging findings, assessing/counseling the patient, discussing with consultant physicians, social work, nursing staff was -- minutes.     ---  FINAL DISCHARGE DIAGNOSIS LIST:  Please see last daily progress note for final discharge diagnoses    ---  Primary care provider was made aware of plan for discharge:  [    ] NO     [     ] YES       ADMISSION H+P:    HPI:  66 year old male w PMHx MS, L foot neuropathic pain presents to ED from home c/o fever of 103.5 and inability to move his L arm. At baseline, patient is unable to move his b/l LE or his R arm. Earlier today, patient had a low grade fever ~100F, and by 3am his temp was 103.5F and he was unable to move his L arm. Patient states that he had a similar episode in March 2020 when he spiked a fever and was unable to move his L arm. Strength in his L arm was regained after a few hours with antibiotic treatment. He was started on cell cept for his MS about 2 months ago. Does not feel as though it is doing much for him.    Of note, patient completed a course of Azithromycin 500mg on 12/9 for a tooth infection. Patient admits to fever/chills, diminished strength in L arm. Denies headache, dizziness, chest pain, changes in vision, cough, sob, nausea/vomiting, increased urinary frequency, hematuria, dysuria, constipation, diarrhea, hematochezia.    ED course:  Vitals: Tmax 102.8 F, , /77, RR 18, SpO2 94% on RA  Labs: WBC 16.06, RBC 4.17, H/H 12.7/36.9, PT 16.4, INR 1.43, lactate 0.6, Na 132, gluc 104, albumin 3.1, AST 11  UA: large ketone, 15 protein, trace LE, mod blood, 11-25 RBCs, mod bacteria  CT renal stone hunt: pending  Given: Motrin 600mg x1, IV Zosyn x1, 1L IV NS x1 (10 Dec 2021 05:41)      ---  HOSPITAL COURSE: Patient was admitted to [facility] for sepsis 2/2 UTI and acute left upper extremity weakness. CT renal stone hunt showed non obstructing intrarenal calcifications R kidney and moderate to large colonic fecal retention. ID (Dr. Bone) was consulted; patient was treated with IV Rocephin _______. UCx ________. BCx _______. Neurology (Dr. Torres) was consulted for acute left upper extremity weakness given history of MS; __________.     Patient was medically optimized and improved clinically throughout hospital course. Patient seen and examined on day of discharge.    (Updated through 12/10)    Vital Signs    Physical Exam:  General: well-developed, well-nourished, NAD  HEENT: normocephalic, atraumatic, EOMI, moist mucous membranes   Neck: supple, non-tender, no masses  Neurology: AAOx3, sensation intact  Respiratory: clear to auscultation bilaterally; no wheezes, rhonchi, or rales  CV: regular rate and rhythm, soft S1/S2, no murmurs, rubs, or gallops  Abdominal: soft, non-tender, non-distended, bowel sounds present  Extremities: bilateral lower extremity edema R>L  Musculoskeletal: no joint erythema or warmth  Skin: warm, dry, normal color    Patient is medically stable for discharge to ____ with outpatient follow up.  ---  CONSULTANTS:     ---  TIME SPENT:   I, the attending physician, was physically present for the key portions of the evaluation and management (E/M) service provided. The total amount of time spent reviewing the hospital course, laboratory values, imaging findings, assessing/counseling the patient, discussing with consultant physicians, social work, nursing staff was -- minutes.     ---  FINAL DISCHARGE DIAGNOSIS LIST:  Please see last daily progress note for final discharge diagnoses    ---  Primary care provider was made aware of plan for discharge:  [    ] NO     [     ] YES       ADMISSION H+P:    HPI:  66 year old male w PMHx MS, L foot neuropathic pain presents to ED from home c/o fever of 103.5 and inability to move his L arm. At baseline, patient is unable to move his b/l LE or his R arm. Earlier today, patient had a low grade fever ~100F, and by 3am his temp was 103.5F and he was unable to move his L arm. Patient states that he had a similar episode in March 2020 when he spiked a fever and was unable to move his L arm. Strength in his L arm was regained after a few hours with antibiotic treatment. He was started on cell cept for his MS about 2 months ago. Does not feel as though it is doing much for him.    Of note, patient completed a course of Azithromycin 500mg on 12/9 for a tooth infection. Patient admits to fever/chills, diminished strength in L arm. Denies headache, dizziness, chest pain, changes in vision, cough, sob, nausea/vomiting, increased urinary frequency, hematuria, dysuria, constipation, diarrhea, hematochezia.    ED course:  Vitals: Tmax 102.8 F, , /77, RR 18, SpO2 94% on RA  Labs: WBC 16.06, RBC 4.17, H/H 12.7/36.9, PT 16.4, INR 1.43, lactate 0.6, Na 132, gluc 104, albumin 3.1, AST 11  UA: large ketone, 15 protein, trace LE, mod blood, 11-25 RBCs, mod bacteria  CT renal stone hunt: pending  Given: Motrin 600mg x1, IV Zosyn x1, 1L IV NS x1 (10 Dec 2021 05:41)      ---  HOSPITAL COURSE: Patient was admitted to [facility] for sepsis 2/2 UTI and acute left upper extremity weakness. CT renal stone hunt showed non obstructing intrarenal calcifications R kidney and moderate to large colonic fecal retention. ID (Dr. Bone) was consulted; patient was treated with IV Rocephin. Cultures cam back negative.  Neurology Dr. Huffman saw patient  was consulted for acute left upper extremity weakness given history of MS; and suggested  probably due to MS exacerbation due to infection. Symptoms, diarrhea improved. Neuro and ID cleared him for discharge and out patient follow up with his doctors.     Patient was medically optimized and improved clinically throughout hospital course. Patient seen and examined on day of discharge.    Vital Signs: Stable     Physical Exam:  General: well-developed, well-nourished, NAD  HEENT: normocephalic, atraumatic, EOMI, moist mucous membranes   Neck: supple, non-tender, no masses  Neurology: AAOx3, sensation intact  Respiratory: clear to auscultation bilaterally; no wheezes, rhonchi, or rales  CV: regular rate and rhythm, soft S1/S2, no murmurs, rubs, or gallops  Abdominal: soft, non-tender, non-distended, bowel sounds present  Extremities: bilateral lower extremity edema R>L  Musculoskeletal: no joint erythema or warmth  Skin: warm, dry, normal color    Patient is medically stable for discharge to home  with outpatient follow up.  ---  CONSULTANTS:     ---  TIME SPENT:   I, the attending physician, was physically present for the key portions of the evaluation and management (E/M) service provided. The total amount of time spent reviewing the hospital course, laboratory values, imaging findings, assessing/counseling the patient, discussing with consultant physicians, social work, nursing staff was 36 minutes.     ---  FINAL DISCHARGE DIAGNOSIS LIST:  Please see last daily progress note for final discharge diagnoses    ---  Primary care provider was made aware of plan for discharge:  [    ] NO     [     ] YES

## 2021-12-10 NOTE — ED PROVIDER NOTE - PHYSICAL EXAMINATION
Gen: Alert, NAD  Head/eyes: NC/AT, PERRL  ENT: airway patent  Neck: supple, no tenderness/meningismus/JVD, Trachea midline  Pulm/lung: Bilateral clear BS, normal resp effort, no wheeze/stridor/retractions  CV/heart: RRR, no M/R/G, +2 dist pulses (radial, pedal DP/PT, popliteal)  GI/Abd: soft, NT/ND, +BS, no guarding/rebound tenderness  Musculoskeletal: no edema/erythema/cyanosis, FROM in all extremities, no C/T/L spine ttp  Skin: no rash, no vesicles, no petechaie, no ecchymosis, +b/l LE swelling, +b/l LE deformity from chronic fractures in LE  Neuro: AAOx3, paralysis below waist, right arm paralysis

## 2021-12-10 NOTE — PROGRESS NOTE ADULT - PROBLEM SELECTOR PLAN 1
Patient presents with fever, leukocytosis and left arm weakness  - Sepsis POA - fever, leukocytosis, HR >90, Urinary source  - CT renal stone hunt: Nonobstructing intrarenal calcifications right kidney. Moderate to large colonic fecal retention.  - UA: large ketone, 15 protein, trace LE, mod blood, 11-25 RBCs, mod bacteria  - S/P Motrin 600mg x1, IV Zosyn x1, 1L IV NS x1 in ED  - C/w Rocephin 1g IV qd  - Trend WBC and monitor for fever  - F/u urine and blood cultures  - ID (Dr. oBne) consulted, f/u recs. Patient presents with fever, leukocytosis and left arm weakness  - Sepsis POA - fever, leukocytosis, HR >90, Urinary source  - CT renal stone hunt: Nonobstructing intrarenal calcifications right kidney. Moderate to large colonic fecal retention.  - UA: large ketone, 15 protein, trace LE, mod blood, 11-25 RBCs, mod bacteria  - S/P Motrin 600mg x1, IV Zosyn x1, 1L IV NS x1 in ED  - C/w Rocephin 1g IV qd  - Trend WBC and monitor for fever  - F/u urine and blood cultures  - ID (Dr. Bone) consulted, recs appreciated

## 2021-12-11 DIAGNOSIS — E87.6 HYPOKALEMIA: ICD-10-CM

## 2021-12-11 LAB
ALBUMIN SERPL ELPH-MCNC: 2.3 G/DL — LOW (ref 3.3–5)
ALP SERPL-CCNC: 66 U/L — SIGNIFICANT CHANGE UP (ref 40–120)
ALT FLD-CCNC: 11 U/L — LOW (ref 12–78)
ANION GAP SERPL CALC-SCNC: 5 MMOL/L — SIGNIFICANT CHANGE UP (ref 5–17)
ANION GAP SERPL CALC-SCNC: 8 MMOL/L — SIGNIFICANT CHANGE UP (ref 5–17)
AST SERPL-CCNC: 9 U/L — LOW (ref 15–37)
BASOPHILS # BLD AUTO: 0.05 K/UL — SIGNIFICANT CHANGE UP (ref 0–0.2)
BASOPHILS NFR BLD AUTO: 0.4 % — SIGNIFICANT CHANGE UP (ref 0–2)
BILIRUB SERPL-MCNC: 0.5 MG/DL — SIGNIFICANT CHANGE UP (ref 0.2–1.2)
BUN SERPL-MCNC: 13 MG/DL — SIGNIFICANT CHANGE UP (ref 7–23)
BUN SERPL-MCNC: 13 MG/DL — SIGNIFICANT CHANGE UP (ref 7–23)
CALCIUM SERPL-MCNC: 8.3 MG/DL — LOW (ref 8.5–10.1)
CALCIUM SERPL-MCNC: 8.3 MG/DL — LOW (ref 8.5–10.1)
CHLORIDE SERPL-SCNC: 100 MMOL/L — SIGNIFICANT CHANGE UP (ref 96–108)
CHLORIDE SERPL-SCNC: 101 MMOL/L — SIGNIFICANT CHANGE UP (ref 96–108)
CO2 SERPL-SCNC: 27 MMOL/L — SIGNIFICANT CHANGE UP (ref 22–31)
CO2 SERPL-SCNC: 28 MMOL/L — SIGNIFICANT CHANGE UP (ref 22–31)
CREAT SERPL-MCNC: 0.55 MG/DL — SIGNIFICANT CHANGE UP (ref 0.5–1.3)
CREAT SERPL-MCNC: 0.56 MG/DL — SIGNIFICANT CHANGE UP (ref 0.5–1.3)
EOSINOPHIL # BLD AUTO: 0.01 K/UL — SIGNIFICANT CHANGE UP (ref 0–0.5)
EOSINOPHIL NFR BLD AUTO: 0.1 % — SIGNIFICANT CHANGE UP (ref 0–6)
GLUCOSE SERPL-MCNC: 107 MG/DL — HIGH (ref 70–99)
GLUCOSE SERPL-MCNC: 112 MG/DL — HIGH (ref 70–99)
HCT VFR BLD CALC: 32.3 % — LOW (ref 39–50)
HGB BLD-MCNC: 11 G/DL — LOW (ref 13–17)
IMM GRANULOCYTES NFR BLD AUTO: 0.4 % — SIGNIFICANT CHANGE UP (ref 0–1.5)
LYMPHOCYTES # BLD AUTO: 1.24 K/UL — SIGNIFICANT CHANGE UP (ref 1–3.3)
LYMPHOCYTES # BLD AUTO: 11 % — LOW (ref 13–44)
MCHC RBC-ENTMCNC: 30.2 PG — SIGNIFICANT CHANGE UP (ref 27–34)
MCHC RBC-ENTMCNC: 34.1 GM/DL — SIGNIFICANT CHANGE UP (ref 32–36)
MCV RBC AUTO: 88.7 FL — SIGNIFICANT CHANGE UP (ref 80–100)
MONOCYTES # BLD AUTO: 0.91 K/UL — HIGH (ref 0–0.9)
MONOCYTES NFR BLD AUTO: 8.1 % — SIGNIFICANT CHANGE UP (ref 2–14)
NEUTROPHILS # BLD AUTO: 8.99 K/UL — HIGH (ref 1.8–7.4)
NEUTROPHILS NFR BLD AUTO: 80 % — HIGH (ref 43–77)
NRBC # BLD: 0 /100 WBCS — SIGNIFICANT CHANGE UP (ref 0–0)
PLATELET # BLD AUTO: 196 K/UL — SIGNIFICANT CHANGE UP (ref 150–400)
POTASSIUM SERPL-MCNC: 2.9 MMOL/L — CRITICAL LOW (ref 3.5–5.3)
POTASSIUM SERPL-MCNC: 3.5 MMOL/L — SIGNIFICANT CHANGE UP (ref 3.5–5.3)
POTASSIUM SERPL-SCNC: 2.9 MMOL/L — CRITICAL LOW (ref 3.5–5.3)
POTASSIUM SERPL-SCNC: 3.5 MMOL/L — SIGNIFICANT CHANGE UP (ref 3.5–5.3)
PROT SERPL-MCNC: 5.9 G/DL — LOW (ref 6–8.3)
RBC # BLD: 3.64 M/UL — LOW (ref 4.2–5.8)
RBC # FLD: 13.4 % — SIGNIFICANT CHANGE UP (ref 10.3–14.5)
SODIUM SERPL-SCNC: 134 MMOL/L — LOW (ref 135–145)
SODIUM SERPL-SCNC: 135 MMOL/L — SIGNIFICANT CHANGE UP (ref 135–145)
WBC # BLD: 11.25 K/UL — HIGH (ref 3.8–10.5)
WBC # FLD AUTO: 11.25 K/UL — HIGH (ref 3.8–10.5)

## 2021-12-11 PROCEDURE — 99233 SBSQ HOSP IP/OBS HIGH 50: CPT | Mod: GC

## 2021-12-11 RX ORDER — SACCHAROMYCES BOULARDII 250 MG
250 POWDER IN PACKET (EA) ORAL
Refills: 0 | Status: DISCONTINUED | OUTPATIENT
Start: 2021-12-11 | End: 2021-12-15

## 2021-12-11 RX ORDER — POTASSIUM CHLORIDE 20 MEQ
10 PACKET (EA) ORAL
Refills: 0 | Status: COMPLETED | OUTPATIENT
Start: 2021-12-11 | End: 2021-12-11

## 2021-12-11 RX ORDER — POTASSIUM CHLORIDE 20 MEQ
40 PACKET (EA) ORAL EVERY 4 HOURS
Refills: 0 | Status: COMPLETED | OUTPATIENT
Start: 2021-12-11 | End: 2021-12-11

## 2021-12-11 RX ADMIN — OXYCODONE AND ACETAMINOPHEN 2 TABLET(S): 5; 325 TABLET ORAL at 20:47

## 2021-12-11 RX ADMIN — Medication 15 MILLIGRAM(S): at 12:15

## 2021-12-11 RX ADMIN — OXYCODONE AND ACETAMINOPHEN 2 TABLET(S): 5; 325 TABLET ORAL at 11:09

## 2021-12-11 RX ADMIN — Medication 100 MILLIEQUIVALENT(S): at 12:15

## 2021-12-11 RX ADMIN — Medication 150 MILLIGRAM(S): at 14:25

## 2021-12-11 RX ADMIN — OXYCODONE AND ACETAMINOPHEN 2 TABLET(S): 5; 325 TABLET ORAL at 21:40

## 2021-12-11 RX ADMIN — CEFTRIAXONE 100 MILLIGRAM(S): 500 INJECTION, POWDER, FOR SOLUTION INTRAMUSCULAR; INTRAVENOUS at 06:30

## 2021-12-11 RX ADMIN — Medication 250 MILLIGRAM(S): at 18:18

## 2021-12-11 RX ADMIN — ENOXAPARIN SODIUM 40 MILLIGRAM(S): 100 INJECTION SUBCUTANEOUS at 12:15

## 2021-12-11 RX ADMIN — OXYCODONE AND ACETAMINOPHEN 2 TABLET(S): 5; 325 TABLET ORAL at 12:09

## 2021-12-11 RX ADMIN — Medication 150 MILLIGRAM(S): at 21:06

## 2021-12-11 RX ADMIN — Medication 100 MILLIEQUIVALENT(S): at 11:01

## 2021-12-11 RX ADMIN — Medication 40 MILLIEQUIVALENT(S): at 14:25

## 2021-12-11 RX ADMIN — OXCARBAZEPINE 300 MILLIGRAM(S): 300 TABLET, FILM COATED ORAL at 06:30

## 2021-12-11 RX ADMIN — Medication 40 MILLIEQUIVALENT(S): at 11:09

## 2021-12-11 RX ADMIN — VALSARTAN 160 MILLIGRAM(S): 80 TABLET ORAL at 06:30

## 2021-12-11 RX ADMIN — Medication 150 MILLIGRAM(S): at 06:29

## 2021-12-11 RX ADMIN — Medication 100 MILLIEQUIVALENT(S): at 14:25

## 2021-12-11 RX ADMIN — ESCITALOPRAM OXALATE 20 MILLIGRAM(S): 10 TABLET, FILM COATED ORAL at 12:15

## 2021-12-11 RX ADMIN — OXCARBAZEPINE 300 MILLIGRAM(S): 300 TABLET, FILM COATED ORAL at 18:18

## 2021-12-11 NOTE — PROGRESS NOTE ADULT - ASSESSMENT
66 year old male w PMHx MS, L foot neuropathic pain admitted for sepsis secondary to UTI with acute left arm weakness, on Rocephin, now improving.

## 2021-12-11 NOTE — PROGRESS NOTE ADULT - PROBLEM SELECTOR PLAN 2
Improving, new onset left arm weakness likely in setting of infection  - CT head: negative   - Had similar presentation in the past  - Neuro (Dr. Lizarraga) consulted, recs appreciated; likely secondary to multiple sclerosis exacerbation secondary to underlying sepsis type process  - PT consulted, f/u recs

## 2021-12-11 NOTE — PROGRESS NOTE ADULT - SUBJECTIVE AND OBJECTIVE BOX
Neurology follow up note    THANH [id number]      Interval History:    Patient feels ok no new complaints.    Allergies    No Known Allergies    Intolerances        MEDICATIONS    acetaminophen     Tablet .. 650 milliGRAM(s) Oral every 6 hours PRN  cefTRIAXone   IVPB 1000 milliGRAM(s) IV Intermittent every 24 hours  enoxaparin Injectable 40 milliGRAM(s) SubCutaneous daily  escitalopram 20 milliGRAM(s) Oral daily  lactated ringers. 1000 milliLiter(s) IV Continuous <Continuous>  OXcarbazepine 300 milliGRAM(s) Oral two times a day  oxybutynin 15 milliGRAM(s) Oral daily  oxycodone    5 mG/acetaminophen 325 mG 2 Tablet(s) Oral every 8 hours PRN  polyethylene glycol 3350 17 Gram(s) Oral daily PRN  pregabalin 150 milliGRAM(s) Oral three times a day  senna 2 Tablet(s) Oral at bedtime  valsartan 160 milliGRAM(s) Oral daily              Vital Signs Last 24 Hrs  T(C): 37.5 (11 Dec 2021 04:46), Max: 38.7 (10 Dec 2021 10:11)  T(F): 99.5 (11 Dec 2021 04:46), Max: 101.6 (10 Dec 2021 10:11)  HR: 96 (11 Dec 2021 04:46) (92 - 107)  BP: 122/81 (11 Dec 2021 06:26) (106/68 - 131/67)  BP(mean): --  RR: 18 (11 Dec 2021 04:46) (18 - 18)  SpO2: 92% (11 Dec 2021 04:46) (92% - 96%)    REVIEW OF SYSTEMS:  Constitutional:  At present, he does not feel fever but did have fevers at home.  Eyes:  No double vision or blurry vision.  Ears:  No ringing in the ears.  Neck:  No neck pain.  Respiratory:  No shortness of breath.  Cardiovascular:  No chest pain.  Abdomen:  No nausea, vomiting, or abdominal pain.  Extremities/Neurological:  Positive history of neuropathic pain.    PHYSICAL EXAMINATION:  HEENT:  Head:  Normocephalic, atraumatic.  Eyes:  No scleral icterus.  Ears:  Hearing bilaterally intact.  NECK:  Supple.  RESPIRATORY:  Good air entry bilaterally.  CARDIOVASCULAR:  S1 and S2 heard.  ABDOMEN:  Soft and nontender.  EXTREMITIES:  No clubbing or cyanosis was noted.      NEUROLOGIC:  The patient is awake and alert.  Extraocular movements were intact.  Speech was fluent.  Smile was symmetric.  Motor:  Right upper and bilateral lower extremities were 0/5, which is his baseline.  Left upper, slight flexation at the elbow, slight flexation of the hand, baseline is that he is able to elevate his left arm roughly about 30 to 40 degrees off the bed.  Bilateral lower extremities, swelling was noted.              LABS:  CBC Full  -  ( 10 Dec 2021 04:57 )  WBC Count : 16.06 K/uL  RBC Count : 4.17 M/uL  Hemoglobin : 12.7 g/dL  Hematocrit : 36.9 %  Platelet Count - Automated : 254 K/uL  Mean Cell Volume : 88.5 fl  Mean Cell Hemoglobin : 30.5 pg  Mean Cell Hemoglobin Concentration : 34.4 gm/dL  Auto Neutrophil # : 14.38 K/uL  Auto Lymphocyte # : 0.60 K/uL  Auto Monocyte # : 0.87 K/uL  Auto Eosinophil # : 0.07 K/uL  Auto Basophil # : 0.04 K/uL  Auto Neutrophil % : 89.7 %  Auto Lymphocyte % : 3.7 %  Auto Monocyte % : 5.4 %  Auto Eosinophil % : 0.4 %  Auto Basophil % : 0.2 %    Urinalysis Basic - ( 10 Dec 2021 04:56 )    Color: Yellow / Appearance: Clear / S.010 / pH: x  Gluc: x / Ketone: Large  / Bili: Negative / Urobili: Negative   Blood: x / Protein: 15 / Nitrite: Negative   Leuk Esterase: Trace / RBC: 11-25 /HPF / WBC 3-5   Sq Epi: x / Non Sq Epi: Few / Bacteria: Moderate      12-10    132<L>  |  98  |  14  ----------------------------<  104<H>  3.7   |  26  |  0.62    Ca    8.7      10 Dec 2021 04:57    TPro  7.1  /  Alb  3.1<L>  /  TBili  0.7  /  DBili  x   /  AST  11<L>  /  ALT  13  /  AlkPhos  92  12-10    Hemoglobin A1C:     LIVER FUNCTIONS - ( 10 Dec 2021 04:57 )  Alb: 3.1 g/dL / Pro: 7.1 g/dL / ALK PHOS: 92 U/L / ALT: 13 U/L / AST: 11 U/L / GGT: x           Vitamin B12   PT/INR - ( 10 Dec 2021 04:57 )   PT: 16.4 sec;   INR: 1.43 ratio         PTT - ( 10 Dec 2021 04:57 )  PTT:35.0 sec      RADIOLOGY      ANALYSIS AND PLAN:  This is a 66-year-old with weakness and a history of multiple sclerosis.  For episode of weakness, suspect most likely multiple sclerosis exacerbation secondary to underlying infectious type process  with a history of temperatures.  I would recommend infectious workup and antibiotics as needed.  For history of multiple sclerosis, appears to be fairly advanced, I would recommend supportive therapy.  For history of neuropathic pain and trigeminal neuralgia, continue the patient on his Lyrica and Trileptal.  At present, I would not recommend any type of steroids, infectious workup is ongoing.    Spoke with the spouse, Alisson, at [phone].      Greater than 40 minutes of time was spent with the patient, plan of care, reviewing data, and speaking to the multidisciplinary healthcare team with greater than 50% of that time in counseling and care coordination.    Thank you for the courtesy of this consultation.   Neurology follow up note    THANH [id number]      Interval History:    Patient feels ok no new complaints.    Allergies    No Known Allergies    Intolerances        MEDICATIONS    acetaminophen     Tablet .. 650 milliGRAM(s) Oral every 6 hours PRN  cefTRIAXone   IVPB 1000 milliGRAM(s) IV Intermittent every 24 hours  enoxaparin Injectable 40 milliGRAM(s) SubCutaneous daily  escitalopram 20 milliGRAM(s) Oral daily  lactated ringers. 1000 milliLiter(s) IV Continuous <Continuous>  OXcarbazepine 300 milliGRAM(s) Oral two times a day  oxybutynin 15 milliGRAM(s) Oral daily  oxycodone    5 mG/acetaminophen 325 mG 2 Tablet(s) Oral every 8 hours PRN  polyethylene glycol 3350 17 Gram(s) Oral daily PRN  pregabalin 150 milliGRAM(s) Oral three times a day  senna 2 Tablet(s) Oral at bedtime  valsartan 160 milliGRAM(s) Oral daily              Vital Signs Last 24 Hrs  T(C): 37.5 (11 Dec 2021 04:46), Max: 38.7 (10 Dec 2021 10:11)  T(F): 99.5 (11 Dec 2021 04:46), Max: 101.6 (10 Dec 2021 10:11)  HR: 96 (11 Dec 2021 04:46) (92 - 107)  BP: 122/81 (11 Dec 2021 06:26) (106/68 - 131/67)  BP(mean): --  RR: 18 (11 Dec 2021 04:46) (18 - 18)  SpO2: 92% (11 Dec 2021 04:46) (92% - 96%)    REVIEW OF SYSTEMS:  Constitutional:  At present, he does not feel fever but did have fevers at home.  Eyes:  No double vision or blurry vision.  Ears:  No ringing in the ears.  Neck:  No neck pain.  Respiratory:  No shortness of breath.  Cardiovascular:  No chest pain.  Abdomen:  No nausea, vomiting, or abdominal pain.  Extremities/Neurological:  Positive history of neuropathic pain.    PHYSICAL EXAMINATION:  HEENT:  Head:  Normocephalic, atraumatic.  Eyes:  No scleral icterus.  Ears:  Hearing bilaterally intact.  NECK:  Supple.  RESPIRATORY:  Good air entry bilaterally.  CARDIOVASCULAR:  S1 and S2 heard.  ABDOMEN:  Soft and nontender.  EXTREMITIES:  No clubbing or cyanosis was noted.      NEUROLOGIC:  The patient is awake and alert.  Extraocular movements were intact.  Speech was fluent.  Smile was symmetric.  Motor:  Right upper and bilateral lower extremities were 0/5, which is his baseline.  Left upper, slight flexation at the elbow, slight flexation of the hand, baseline is that he is able to elevate his left arm roughly about 30 to 40 degrees off the bed.  Bilateral lower extremities, swelling was noted.              LABS:  CBC Full  -  ( 10 Dec 2021 04:57 )  WBC Count : 16.06 K/uL  RBC Count : 4.17 M/uL  Hemoglobin : 12.7 g/dL  Hematocrit : 36.9 %  Platelet Count - Automated : 254 K/uL  Mean Cell Volume : 88.5 fl  Mean Cell Hemoglobin : 30.5 pg  Mean Cell Hemoglobin Concentration : 34.4 gm/dL  Auto Neutrophil # : 14.38 K/uL  Auto Lymphocyte # : 0.60 K/uL  Auto Monocyte # : 0.87 K/uL  Auto Eosinophil # : 0.07 K/uL  Auto Basophil # : 0.04 K/uL  Auto Neutrophil % : 89.7 %  Auto Lymphocyte % : 3.7 %  Auto Monocyte % : 5.4 %  Auto Eosinophil % : 0.4 %  Auto Basophil % : 0.2 %    Urinalysis Basic - ( 10 Dec 2021 04:56 )    Color: Yellow / Appearance: Clear / S.010 / pH: x  Gluc: x / Ketone: Large  / Bili: Negative / Urobili: Negative   Blood: x / Protein: 15 / Nitrite: Negative   Leuk Esterase: Trace / RBC: 11-25 /HPF / WBC 3-5   Sq Epi: x / Non Sq Epi: Few / Bacteria: Moderate      12-10    132<L>  |  98  |  14  ----------------------------<  104<H>  3.7   |  26  |  0.62    Ca    8.7      10 Dec 2021 04:57    TPro  7.1  /  Alb  3.1<L>  /  TBili  0.7  /  DBili  x   /  AST  11<L>  /  ALT  13  /  AlkPhos  92  12-10    Hemoglobin A1C:     LIVER FUNCTIONS - ( 10 Dec 2021 04:57 )  Alb: 3.1 g/dL / Pro: 7.1 g/dL / ALK PHOS: 92 U/L / ALT: 13 U/L / AST: 11 U/L / GGT: x           Vitamin B12   PT/INR - ( 10 Dec 2021 04:57 )   PT: 16.4 sec;   INR: 1.43 ratio         PTT - ( 10 Dec 2021 04:57 )  PTT:35.0 sec      RADIOLOGY      ANALYSIS AND PLAN:  This is a 66-year-old with weakness and a history of multiple sclerosis.  For episode of weakness, suspect most likely multiple sclerosis exacerbation secondary to underlying infectious type process  with a history of temperatures.  I would recommend infectious workup and antibiotics as needed.  For history of multiple sclerosis, appears to be fairly advanced, I would recommend supportive therapy.  For history of neuropathic pain and trigeminal neuralgia, continue the patient on his Lyrica and Trileptal.  At present, I would not recommend any type of steroids, infectious workup is ongoing.  monitor diarrhea as needed  increase ROM of left arm today     Spoke with the spouse, Alisson, at [phone] 12/10 today went to voicemail unable to leave message       Greater than 40 minutes of time was spent with the patient, plan of care, reviewing data, and speaking to the multidisciplinary healthcare team with greater than 50% of that time in counseling and care coordination.    Thank you for the courtesy of this consultation.

## 2021-12-11 NOTE — PROGRESS NOTE ADULT - SUBJECTIVE AND OBJECTIVE BOX
Patient is a 66y old  Male who presents with a chief complaint of UTI, left arm weakness (11 Dec 2021 07:47)    INTERVAL HPI/OVERNIGHT EVENTS: Patient seen and examined at bedside. Patient states his left arm weakness has resolved and function has returned to baseline. He states that he feels much better. Patient requests discharge, however understand urine culture is pending. No other complaints at this time. Denies fever, chills, chest pain, shortness of breath, abdominal pain, nausea, vomiting.     MEDICATIONS  (STANDING):  cefTRIAXone   IVPB 1000 milliGRAM(s) IV Intermittent every 24 hours  enoxaparin Injectable 40 milliGRAM(s) SubCutaneous daily  escitalopram 20 milliGRAM(s) Oral daily  lactated ringers. 1000 milliLiter(s) (75 mL/Hr) IV Continuous <Continuous>  OXcarbazepine 300 milliGRAM(s) Oral two times a day  oxybutynin 15 milliGRAM(s) Oral daily  potassium chloride    Tablet ER 40 milliEquivalent(s) Oral every 4 hours  potassium chloride  10 mEq/100 mL IVPB 10 milliEquivalent(s) IV Intermittent every 1 hour  pregabalin 150 milliGRAM(s) Oral three times a day  saccharomyces boulardii 250 milliGRAM(s) Oral two times a day  senna 2 Tablet(s) Oral at bedtime  valsartan 160 milliGRAM(s) Oral daily    MEDICATIONS  (PRN):  acetaminophen     Tablet .. 650 milliGRAM(s) Oral every 6 hours PRN Temp greater or equal to 38C (100.4F)  oxycodone    5 mG/acetaminophen 325 mG 2 Tablet(s) Oral every 8 hours PRN Moderate Pain (4 - 6)  polyethylene glycol 3350 17 Gram(s) Oral daily PRN Constipation      Allergies    No Known Allergies    Intolerances        REVIEW OF SYSTEMS:  CONSTITUTIONAL: No fever or chills  HEENT:  No headache, no sore throat  RESPIRATORY: No cough, wheezing, or shortness of breath  CARDIOVASCULAR: No chest pain, palpitations  GASTROINTESTINAL: No abd pain, nausea, vomiting, or diarrhea  GENITOURINARY: No dysuria, frequency, or hematuria  NEUROLOGICAL: no focal weakness or dizziness  MUSCULOSKELETAL: no myalgias     Vital Signs Last 24 Hrs  T(C): 37.5 (11 Dec 2021 04:46), Max: 37.8 (10 Dec 2021 12:04)  T(F): 99.5 (11 Dec 2021 04:46), Max: 100.1 (10 Dec 2021 12:04)  HR: 96 (11 Dec 2021 04:46) (94 - 107)  BP: 122/81 (11 Dec 2021 06:26) (106/68 - 131/67)  BP(mean): --  RR: 18 (11 Dec 2021 04:46) (18 - 18)  SpO2: 92% (11 Dec 2021 04:46) (92% - 95%)    PHYSICAL EXAM:  GENERAL: NAD  HEENT: EOMI, PERRL, anicteric, moist mucous membranes  CHEST/LUNG:  CTA b/l, no rales, wheezes, or rhonchi  HEART:  RRR, S1, S2  ABDOMEN:  BS+, soft, nondistended + mild periumbilical TTP  EXTREMITIES: + nonpitting edema in all 4 extremities, +3 pulses in b/l UE; no cyanosis, or calf tenderness  NERVOUS SYSTEM: answers questions and follows commands appropriately  MSK: 3/5 muscle strength in LUE, 0/5 muscle strength in RUE and b/l LE    LABS:                        11.0   11.25 )-----------( 196      ( 11 Dec 2021 08:40 )             32.3     CBC Full  -  ( 11 Dec 2021 08:40 )  WBC Count : 11.25 K/uL  Hemoglobin : 11.0 g/dL  Hematocrit : 32.3 %  Platelet Count - Automated : 196 K/uL  Mean Cell Volume : 88.7 fl  Mean Cell Hemoglobin : 30.2 pg  Mean Cell Hemoglobin Concentration : 34.1 gm/dL  Auto Neutrophil # : 8.99 K/uL  Auto Lymphocyte # : 1.24 K/uL  Auto Monocyte # : 0.91 K/uL  Auto Eosinophil # : 0.01 K/uL  Auto Basophil # : 0.05 K/uL  Auto Neutrophil % : 80.0 %  Auto Lymphocyte % : 11.0 %  Auto Monocyte % : 8.1 %  Auto Eosinophil % : 0.1 %  Auto Basophil % : 0.4 %    11 Dec 2021 08:40    135    |  100    |  13     ----------------------------<  112    2.9     |  27     |  0.55     Ca    8.3        11 Dec 2021 08:40  Mg     1.6       11 Dec 2021 08:40    TPro  5.9    /  Alb  2.3    /  TBili  0.5    /  DBili  x      /  AST  9      /  ALT  11     /  AlkPhos  66     11 Dec 2021 08:40    PT/INR - ( 10 Dec 2021 04:57 )   PT: 16.4 sec;   INR: 1.43 ratio         PTT - ( 10 Dec 2021 04:57 )  PTT:35.0 sec  Urinalysis Basic - ( 10 Dec 2021 04:56 )    Color: Yellow / Appearance: Clear / S.010 / pH: x  Gluc: x / Ketone: Large  / Bili: Negative / Urobili: Negative   Blood: x / Protein: 15 / Nitrite: Negative   Leuk Esterase: Trace / RBC: 11-25 /HPF / WBC 3-5   Sq Epi: x / Non Sq Epi: Few / Bacteria: Moderate      CAPILLARY BLOOD GLUCOSE            Culture - Blood (collected 12-10-21 @ 09:05)  Source: .Blood Blood-Peripheral  Preliminary Report (21 @ 10:01):    No growth to date.    Culture - Blood (collected 12-10-21 @ 09:05)  Source: .Blood Blood-Peripheral  Preliminary Report (21 @ 10:01):    No growth to date.        RADIOLOGY & ADDITIONAL TESTS:    Personally reviewed.     Consultant(s) Notes Reviewed:  [x] YES  [ ] NO

## 2021-12-11 NOTE — PROGRESS NOTE ADULT - PROBLEM SELECTOR PLAN 4
Patient unable to move b/l LE or R UE at baseline  - Continue Oxcarbazepine 300mg bid  - Will hold recently started cellcept in setting of infection  - Neuro (Dr. Lizarraga) consulted, recs appreciated; likely secondary to multiple sclerosis exacerbation secondary to underlying sepsis type process

## 2021-12-11 NOTE — PROGRESS NOTE ADULT - PROBLEM SELECTOR PLAN 5
Chronic, stable  - Continue home medication Valsartan with hold parameters  - Monitor routine hemodynamics

## 2021-12-11 NOTE — PROGRESS NOTE ADULT - ATTENDING COMMENTS
66 year old male w PMHx MS, L foot neuropathic pain admitted for sepsis secondary to UTI with acute left arm weakness.    Patient seen and examined at bedside. States he feels well, LUE weakness "75-80%" improved. Patient denied any chest pain, SOB, abd pain, urinary frequency, dysuria. Complains of 2 loose BMs, but not watery, added probiotic. Continue IV abx, f/u cultures.

## 2021-12-11 NOTE — PROGRESS NOTE ADULT - PROBLEM SELECTOR PLAN 1
Patient presents with fever, leukocytosis and left arm weakness  - Sepsis POA - fever, leukocytosis, HR >90, Urinary source  - CT renal stone hunt: Nonobstructing intrarenal calcifications right kidney. Moderate to large colonic fecal retention.  - UA: large ketone, 15 protein, trace LE, mod blood, 11-25 RBCs, mod bacteria  - S/P Motrin 600mg x1, IV Zosyn x1, 1L IV NS x1 in ED  - C/w Rocephin 1g IV qd, day 2  - Trend WBC and monitor for fever  - F/u urine and blood cultures  - ID (Dr. Bone) consulted, recs appreciated

## 2021-12-12 DIAGNOSIS — R19.7 DIARRHEA, UNSPECIFIED: ICD-10-CM

## 2021-12-12 LAB
ALBUMIN SERPL ELPH-MCNC: 2.2 G/DL — LOW (ref 3.3–5)
ALP SERPL-CCNC: 54 U/L — SIGNIFICANT CHANGE UP (ref 40–120)
ALT FLD-CCNC: 12 U/L — SIGNIFICANT CHANGE UP (ref 12–78)
ANION GAP SERPL CALC-SCNC: 8 MMOL/L — SIGNIFICANT CHANGE UP (ref 5–17)
AST SERPL-CCNC: 6 U/L — LOW (ref 15–37)
BASOPHILS # BLD AUTO: 0.04 K/UL — SIGNIFICANT CHANGE UP (ref 0–0.2)
BASOPHILS NFR BLD AUTO: 0.6 % — SIGNIFICANT CHANGE UP (ref 0–2)
BILIRUB SERPL-MCNC: 0.4 MG/DL — SIGNIFICANT CHANGE UP (ref 0.2–1.2)
BUN SERPL-MCNC: 10 MG/DL — SIGNIFICANT CHANGE UP (ref 7–23)
C DIFF BY PCR RESULT: DETECTED
C DIFF TOX GENS STL QL NAA+PROBE: SIGNIFICANT CHANGE UP
CALCIUM SERPL-MCNC: 7.8 MG/DL — LOW (ref 8.5–10.1)
CHLORIDE SERPL-SCNC: 103 MMOL/L — SIGNIFICANT CHANGE UP (ref 96–108)
CO2 SERPL-SCNC: 25 MMOL/L — SIGNIFICANT CHANGE UP (ref 22–31)
CREAT SERPL-MCNC: 0.43 MG/DL — LOW (ref 0.5–1.3)
CULTURE RESULTS: NO GROWTH — SIGNIFICANT CHANGE UP
EOSINOPHIL # BLD AUTO: 0.12 K/UL — SIGNIFICANT CHANGE UP (ref 0–0.5)
EOSINOPHIL NFR BLD AUTO: 1.8 % — SIGNIFICANT CHANGE UP (ref 0–6)
GLUCOSE SERPL-MCNC: 83 MG/DL — SIGNIFICANT CHANGE UP (ref 70–99)
HCT VFR BLD CALC: 31.5 % — LOW (ref 39–50)
HGB BLD-MCNC: 10.8 G/DL — LOW (ref 13–17)
IMM GRANULOCYTES NFR BLD AUTO: 0.3 % — SIGNIFICANT CHANGE UP (ref 0–1.5)
LYMPHOCYTES # BLD AUTO: 0.99 K/UL — LOW (ref 1–3.3)
LYMPHOCYTES # BLD AUTO: 14.9 % — SIGNIFICANT CHANGE UP (ref 13–44)
MAGNESIUM SERPL-MCNC: 1.8 MG/DL — SIGNIFICANT CHANGE UP (ref 1.6–2.6)
MCHC RBC-ENTMCNC: 30.8 PG — SIGNIFICANT CHANGE UP (ref 27–34)
MCHC RBC-ENTMCNC: 34.3 GM/DL — SIGNIFICANT CHANGE UP (ref 32–36)
MCV RBC AUTO: 89.7 FL — SIGNIFICANT CHANGE UP (ref 80–100)
MONOCYTES # BLD AUTO: 0.57 K/UL — SIGNIFICANT CHANGE UP (ref 0–0.9)
MONOCYTES NFR BLD AUTO: 8.6 % — SIGNIFICANT CHANGE UP (ref 2–14)
NEUTROPHILS # BLD AUTO: 4.92 K/UL — SIGNIFICANT CHANGE UP (ref 1.8–7.4)
NEUTROPHILS NFR BLD AUTO: 73.8 % — SIGNIFICANT CHANGE UP (ref 43–77)
NRBC # BLD: 0 /100 WBCS — SIGNIFICANT CHANGE UP (ref 0–0)
PHOSPHATE SERPL-MCNC: 2.2 MG/DL — LOW (ref 2.5–4.5)
PLATELET # BLD AUTO: 199 K/UL — SIGNIFICANT CHANGE UP (ref 150–400)
POTASSIUM SERPL-MCNC: 3.7 MMOL/L — SIGNIFICANT CHANGE UP (ref 3.5–5.3)
POTASSIUM SERPL-SCNC: 3.7 MMOL/L — SIGNIFICANT CHANGE UP (ref 3.5–5.3)
PROT SERPL-MCNC: 5.7 G/DL — LOW (ref 6–8.3)
RBC # BLD: 3.51 M/UL — LOW (ref 4.2–5.8)
RBC # FLD: 13.6 % — SIGNIFICANT CHANGE UP (ref 10.3–14.5)
SODIUM SERPL-SCNC: 136 MMOL/L — SIGNIFICANT CHANGE UP (ref 135–145)
SPECIMEN SOURCE: SIGNIFICANT CHANGE UP
WBC # BLD: 6.66 K/UL — SIGNIFICANT CHANGE UP (ref 3.8–10.5)
WBC # FLD AUTO: 6.66 K/UL — SIGNIFICANT CHANGE UP (ref 3.8–10.5)

## 2021-12-12 PROCEDURE — 99233 SBSQ HOSP IP/OBS HIGH 50: CPT | Mod: GC

## 2021-12-12 RX ORDER — CEFTRIAXONE 500 MG/1
1000 INJECTION, POWDER, FOR SOLUTION INTRAMUSCULAR; INTRAVENOUS EVERY 24 HOURS
Refills: 0 | Status: DISCONTINUED | OUTPATIENT
Start: 2021-12-13 | End: 2021-12-12

## 2021-12-12 RX ORDER — POTASSIUM PHOSPHATE, MONOBASIC POTASSIUM PHOSPHATE, DIBASIC 236; 224 MG/ML; MG/ML
15 INJECTION, SOLUTION INTRAVENOUS ONCE
Refills: 0 | Status: COMPLETED | OUTPATIENT
Start: 2021-12-12 | End: 2021-12-12

## 2021-12-12 RX ORDER — VANCOMYCIN HCL 1 G
125 VIAL (EA) INTRAVENOUS EVERY 6 HOURS
Refills: 0 | Status: DISCONTINUED | OUTPATIENT
Start: 2021-12-12 | End: 2021-12-15

## 2021-12-12 RX ADMIN — OXCARBAZEPINE 300 MILLIGRAM(S): 300 TABLET, FILM COATED ORAL at 05:36

## 2021-12-12 RX ADMIN — Medication 250 MILLIGRAM(S): at 05:36

## 2021-12-12 RX ADMIN — OXYCODONE AND ACETAMINOPHEN 2 TABLET(S): 5; 325 TABLET ORAL at 14:13

## 2021-12-12 RX ADMIN — Medication 150 MILLIGRAM(S): at 05:36

## 2021-12-12 RX ADMIN — OXCARBAZEPINE 300 MILLIGRAM(S): 300 TABLET, FILM COATED ORAL at 18:57

## 2021-12-12 RX ADMIN — OXYCODONE AND ACETAMINOPHEN 2 TABLET(S): 5; 325 TABLET ORAL at 23:03

## 2021-12-12 RX ADMIN — ESCITALOPRAM OXALATE 20 MILLIGRAM(S): 10 TABLET, FILM COATED ORAL at 12:48

## 2021-12-12 RX ADMIN — ENOXAPARIN SODIUM 40 MILLIGRAM(S): 100 INJECTION SUBCUTANEOUS at 12:48

## 2021-12-12 RX ADMIN — Medication 125 MILLIGRAM(S): at 20:09

## 2021-12-12 RX ADMIN — Medication 150 MILLIGRAM(S): at 21:58

## 2021-12-12 RX ADMIN — OXYCODONE AND ACETAMINOPHEN 2 TABLET(S): 5; 325 TABLET ORAL at 13:13

## 2021-12-12 RX ADMIN — POTASSIUM PHOSPHATE, MONOBASIC POTASSIUM PHOSPHATE, DIBASIC 62.5 MILLIMOLE(S): 236; 224 INJECTION, SOLUTION INTRAVENOUS at 20:08

## 2021-12-12 RX ADMIN — OXYCODONE AND ACETAMINOPHEN 2 TABLET(S): 5; 325 TABLET ORAL at 21:58

## 2021-12-12 RX ADMIN — Medication 150 MILLIGRAM(S): at 15:01

## 2021-12-12 RX ADMIN — VALSARTAN 160 MILLIGRAM(S): 80 TABLET ORAL at 05:36

## 2021-12-12 RX ADMIN — Medication 250 MILLIGRAM(S): at 18:57

## 2021-12-12 RX ADMIN — Medication 15 MILLIGRAM(S): at 12:48

## 2021-12-12 RX ADMIN — CEFTRIAXONE 100 MILLIGRAM(S): 500 INJECTION, POWDER, FOR SOLUTION INTRAMUSCULAR; INTRAVENOUS at 08:36

## 2021-12-12 NOTE — PROGRESS NOTE ADULT - PROBLEM SELECTOR PLAN 4
Patient unable to move b/l LE or R UE at baseline  - Continue Oxcarbazepine 300mg bid  - Will hold recently started cellcept in setting of infection  - Neuro (Dr. Lizarraga) consulted, recs appreciated; likely secondary to multiple sclerosis exacerbation secondary to underlying sepsis type process K 3.7 today, monitor BMP

## 2021-12-12 NOTE — PROGRESS NOTE ADULT - PROBLEM SELECTOR PLAN 5
Chronic, stable  - Continue home medication Valsartan with hold parameters  - Monitor routine hemodynamics Patient unable to move b/l LE or R UE at baseline  - Continue Oxcarbazepine 300mg bid  - Will hold recently started cellcept in setting of infection  - Neuro (Dr. Lizarraga) consulted, recs appreciated; likely secondary to multiple sclerosis exacerbation secondary to underlying sepsis type process

## 2021-12-12 NOTE — PROGRESS NOTE ADULT - PROBLEM SELECTOR PLAN 3
K 2.9 today  - Given KCl 10 mEq IV x3 doses, KCl 40 mEq PO x2 doses  - F/u BMP @ 14:00 - Patient endorsing 5 water BMs since yesterday. Patient denies history of C. diff, however, will check c. diff PCR. Doubt C. diff, patient likely with overflow diarrhea as large fecal retention noted on CT scan - Patient endorsing 5 water BMs since yesterday. Patient denies history of C. diff, however, will check c. diff PCR. Differential includes overflow diarrhea as moderate to severe fecal retention noted on CT

## 2021-12-12 NOTE — PROGRESS NOTE ADULT - ATTENDING COMMENTS
66 year old male w PMHx MS, L foot neuropathic pain admitted for sepsis secondary to UTI with acute left arm weakness.    Patient seen and examined at bedside. States he feels well, LUE weakness "90%" improved. Patient denied any chest pain, SOB, abd pain, urinary frequency, dysuria. Complains of 5 loose BMs since yesterday, some watery, will check c. diff. However, likely overflow diarrhea as moderate to severe fecal retention noted on CT. Continue IV abx, f/u cultures. 66 year old male w PMHx MS, L foot neuropathic pain admitted for sepsis secondary to UTI with acute left arm weakness.    Patient seen and examined at bedside. States he feels well, LUE weakness "90%" improved. Patient denied any chest pain, SOB, abd pain, urinary frequency, dysuria. Complains of 5 loose BMs since yesterday, some watery, will check c. diff. Possible overflow diarrhea as moderate to severe fecal retention noted on CT. Continue IV abx, f/u cultures.

## 2021-12-12 NOTE — PROGRESS NOTE ADULT - SUBJECTIVE AND OBJECTIVE BOX
Patient is a 66y old  Male who presents with a chief complaint of UTI, left arm weakness    INTERVAL HPI/OVERNIGHT EVENTS: Patient seen and examined at bedside. Patient states his left arm weakness has resolved and function has returned to baseline.   Patient looking forward to going home. No other complaints at this time. Denies fever, chills, chest pain, shortness of breath, abdominal pain, nausea, vomiting.     MEDICATIONS  (STANDING):  cefTRIAXone   IVPB 1000 milliGRAM(s) IV Intermittent every 24 hours  enoxaparin Injectable 40 milliGRAM(s) SubCutaneous daily  escitalopram 20 milliGRAM(s) Oral daily  lactated ringers. 1000 milliLiter(s) (75 mL/Hr) IV Continuous <Continuous>  OXcarbazepine 300 milliGRAM(s) Oral two times a day  oxybutynin 15 milliGRAM(s) Oral daily  potassium chloride    Tablet ER 40 milliEquivalent(s) Oral every 4 hours  potassium chloride  10 mEq/100 mL IVPB 10 milliEquivalent(s) IV Intermittent every 1 hour  pregabalin 150 milliGRAM(s) Oral three times a day  saccharomyces boulardii 250 milliGRAM(s) Oral two times a day  senna 2 Tablet(s) Oral at bedtime  valsartan 160 milliGRAM(s) Oral daily    MEDICATIONS  (PRN):  acetaminophen     Tablet .. 650 milliGRAM(s) Oral every 6 hours PRN Temp greater or equal to 38C (100.4F)  oxycodone    5 mG/acetaminophen 325 mG 2 Tablet(s) Oral every 8 hours PRN Moderate Pain (4 - 6)  polyethylene glycol 3350 17 Gram(s) Oral daily PRN Constipation      Allergies    No Known Allergies    Intolerances        REVIEW OF SYSTEMS:  CONSTITUTIONAL: No fever or chills  HEENT:  No headache, no sore throat  RESPIRATORY: No cough, wheezing, or shortness of breath  CARDIOVASCULAR: No chest pain, palpitations  GASTROINTESTINAL: No abd pain, nausea, vomiting, or diarrhea  GENITOURINARY: No dysuria, frequency, or hematuria  NEUROLOGICAL: no focal weakness or dizziness  MUSCULOSKELETAL: no myalgias     Vital Signs Last 24 Hrs  T(C): 36.8 (12 Dec 2021 05:22), Max: 37.1 (11 Dec 2021 19:44)  T(F): 98.2 (12 Dec 2021 05:22), Max: 98.8 (11 Dec 2021 19:44)  HR: 73 (12 Dec 2021 05:22) (70 - 75)  BP: 135/76 (12 Dec 2021 05:22) (112/68 - 135/76)  BP(mean): --  RR: 18 (12 Dec 2021 05:22) (18 - 20)  SpO2: 95% (12 Dec 2021 05:22) (95% - 95%)    PHYSICAL EXAM:  GENERAL: NAD  HEENT: EOMI, PERRL, anicteric, moist mucous membranes  CHEST/LUNG:  CTA b/l, no rales, wheezes, or rhonchi  HEART:  RRR, S1, S2  ABDOMEN:  BS+, soft, nondistended + mild periumbilical TTP  EXTREMITIES: + nonpitting edema in all 4 extremities, +3 pulses in b/l UE; no cyanosis, or calf tenderness  NERVOUS SYSTEM: answers questions and follows commands appropriately  MSK: 3/5 muscle strength in LUE, 0/5 muscle strength in RUE and b/l LE    LABS:                        11.0   11. )-----------( 196      ( 11 Dec 2021 08:40 )             32.3     CBC Full  -  ( 11 Dec 2021 08:40 )  WBC Count : 11.25 K/uL  Hemoglobin : 11.0 g/dL  Hematocrit : 32.3 %  Platelet Count - Automated : 196 K/uL  Mean Cell Volume : 88.7 fl  Mean Cell Hemoglobin : 30.2 pg  Mean Cell Hemoglobin Concentration : 34.1 gm/dL  Auto Neutrophil # : 8.99 K/uL  Auto Lymphocyte # : 1.24 K/uL  Auto Monocyte # : 0.91 K/uL  Auto Eosinophil # : 0.01 K/uL  Auto Basophil # : 0.05 K/uL  Auto Neutrophil % : 80.0 %  Auto Lymphocyte % : 11.0 %  Auto Monocyte % : 8.1 %  Auto Eosinophil % : 0.1 %  Auto Basophil % : 0.4 %    11 Dec 2021 08:40    135    |  100    |  13     ----------------------------<  112    2.9     |  27     |  0.55     Ca    8.3        11 Dec 2021 08:40  Mg     1.6       11 Dec 2021 08:40    TPro  5.9    /  Alb  2.3    /  TBili  0.5    /  DBili  x      /  AST  9      /  ALT  11     /  AlkPhos  66     11 Dec 2021 08:40    PT/INR - ( 10 Dec 2021 04:57 )   PT: 16.4 sec;   INR: 1.43 ratio         PTT - ( 10 Dec 2021 04:57 )  PTT:35.0 sec  Urinalysis Basic - ( 10 Dec 2021 04:56 )    Color: Yellow / Appearance: Clear / S.010 / pH: x  Gluc: x / Ketone: Large  / Bili: Negative / Urobili: Negative   Blood: x / Protein: 15 / Nitrite: Negative   Leuk Esterase: Trace / RBC: 11-25 /HPF / WBC 3-5   Sq Epi: x / Non Sq Epi: Few / Bacteria: Moderate      CAPILLARY BLOOD GLUCOSE            Culture - Blood (collected 12-10-21 @ 09:05)  Source: .Blood Blood-Peripheral  Preliminary Report (21 @ 10:01):    No growth to date.    Culture - Blood (collected 12-10-21 @ 09:05)  Source: .Blood Blood-Peripheral  Preliminary Report (21 @ 10:01):    No growth to date.        RADIOLOGY & ADDITIONAL TESTS:    Personally reviewed.     Consultant(s) Notes Reviewed:  [x] YES  [ ] NO     Patient is a 66y old  Male who presents with a chief complaint of UTI, left arm weakness    INTERVAL HPI/OVERNIGHT EVENTS: Patient seen and examined at bedside. Patient states his left arm weakness has resolved and function has returned to baseline.   Patient looking forward to going home.   Patient does endorse 5 episodes of loose BM yesterday.   No other complaints at this time. Denies fever, chills, chest pain, shortness of breath, abdominal pain, nausea, vomiting.     MEDICATIONS  (STANDING):  cefTRIAXone   IVPB 1000 milliGRAM(s) IV Intermittent every 24 hours  enoxaparin Injectable 40 milliGRAM(s) SubCutaneous daily  escitalopram 20 milliGRAM(s) Oral daily  lactated ringers. 1000 milliLiter(s) (75 mL/Hr) IV Continuous <Continuous>  OXcarbazepine 300 milliGRAM(s) Oral two times a day  oxybutynin 15 milliGRAM(s) Oral daily  potassium chloride    Tablet ER 40 milliEquivalent(s) Oral every 4 hours  potassium chloride  10 mEq/100 mL IVPB 10 milliEquivalent(s) IV Intermittent every 1 hour  pregabalin 150 milliGRAM(s) Oral three times a day  saccharomyces boulardii 250 milliGRAM(s) Oral two times a day  senna 2 Tablet(s) Oral at bedtime  valsartan 160 milliGRAM(s) Oral daily    MEDICATIONS  (PRN):  acetaminophen     Tablet .. 650 milliGRAM(s) Oral every 6 hours PRN Temp greater or equal to 38C (100.4F)  oxycodone    5 mG/acetaminophen 325 mG 2 Tablet(s) Oral every 8 hours PRN Moderate Pain (4 - 6)  polyethylene glycol 3350 17 Gram(s) Oral daily PRN Constipation      Allergies    No Known Allergies    Intolerances        REVIEW OF SYSTEMS:  CONSTITUTIONAL: No fever or chills  HEENT:  No headache, no sore throat  RESPIRATORY: No cough, wheezing, or shortness of breath  CARDIOVASCULAR: No chest pain, palpitations  GASTROINTESTINAL: No abd pain, nausea, vomiting, or diarrhea  GENITOURINARY: No dysuria, frequency, or hematuria  NEUROLOGICAL: no focal weakness or dizziness  MUSCULOSKELETAL: no myalgias     Vital Signs Last 24 Hrs  T(C): 36.8 (12 Dec 2021 05:22), Max: 37.1 (11 Dec 2021 19:44)  T(F): 98.2 (12 Dec 2021 05:22), Max: 98.8 (11 Dec 2021 19:44)  HR: 73 (12 Dec 2021 05:22) (70 - 75)  BP: 135/76 (12 Dec 2021 05:22) (112/68 - 135/76)  BP(mean): --  RR: 18 (12 Dec 2021 05:22) (18 - 20)  SpO2: 95% (12 Dec 2021 05:22) (95% - 95%)    PHYSICAL EXAM:  GENERAL: NAD  HEENT: EOMI, PERRL, anicteric, moist mucous membranes  CHEST/LUNG:  CTA b/l, no rales, wheezes, or rhonchi  HEART:  RRR, S1, S2  ABDOMEN:  BS+, soft, nondistended + mild periumbilical TTP  EXTREMITIES: + nonpitting edema in all 4 extremities, +3 pulses in b/l UE; no cyanosis, or calf tenderness  NERVOUS SYSTEM: answers questions and follows commands appropriately  MSK: 4/5 muscle strength in LUE, 0/5 muscle strength in RUE and b/l LE; baseline      LABS:                         10.8   6.66  )-----------( 199      ( 12 Dec 2021 09:35 )             31.5     12-12    136  |  103  |  10  ----------------------------<  83  3.7   |  25  |  0.43<L>    Ca    7.8<L>      12 Dec 2021 09:35  Phos  2.2     12-12  Mg     1.8     12-12    TPro  5.7<L>  /  Alb  2.2<L>  /  TBili  0.4  /  DBili  x   /  AST  6<L>  /  ALT  12  /  AlkPhos  54  12-12        Culture - Blood (collected 12-10-21 @ 09:05)  Source: .Blood Blood-Peripheral  Preliminary Report (12-11-21 @ 10:01):    No growth to date.    Culture - Blood (collected 12-10-21 @ 09:05)  Source: .Blood Blood-Peripheral  Preliminary Report (12-11-21 @ 10:01):    No growth to date.        RADIOLOGY & ADDITIONAL TESTS:    Personally reviewed.     Consultant(s) Notes Reviewed:  [x] YES  [ ] NO

## 2021-12-12 NOTE — CHART NOTE - NSCHARTNOTEFT_GEN_A_CORE
Called by RN, patient found to be positive for C. diff. Continue isolation precautions, start oral vancomycin, will d/c IV abx. Discussed with ID on call- Dr. Heller.

## 2021-12-12 NOTE — PROGRESS NOTE ADULT - SUBJECTIVE AND OBJECTIVE BOX
Neurology follow up note    THANH [id number]      Interval History:    Patient feels ok no new complaints.    Allergies    No Known Allergies    Intolerances        MEDICATIONS    acetaminophen     Tablet .. 650 milliGRAM(s) Oral every 6 hours PRN  cefTRIAXone   IVPB 1000 milliGRAM(s) IV Intermittent every 24 hours  enoxaparin Injectable 40 milliGRAM(s) SubCutaneous daily  escitalopram 20 milliGRAM(s) Oral daily  lactated ringers. 1000 milliLiter(s) IV Continuous <Continuous>  OXcarbazepine 300 milliGRAM(s) Oral two times a day  oxybutynin 15 milliGRAM(s) Oral daily  oxycodone    5 mG/acetaminophen 325 mG 2 Tablet(s) Oral every 8 hours PRN  pregabalin 150 milliGRAM(s) Oral three times a day  saccharomyces boulardii 250 milliGRAM(s) Oral two times a day  valsartan 160 milliGRAM(s) Oral daily              Vital Signs Last 24 Hrs  T(C): 36.8 (12 Dec 2021 05:22), Max: 37.1 (11 Dec 2021 19:44)  T(F): 98.2 (12 Dec 2021 05:22), Max: 98.8 (11 Dec 2021 19:44)  HR: 73 (12 Dec 2021 05:22) (70 - 75)  BP: 135/76 (12 Dec 2021 05:22) (112/68 - 135/76)  BP(mean): --  RR: 18 (12 Dec 2021 05:22) (18 - 20)  SpO2: 95% (12 Dec 2021 05:22) (95% - 95%)      REVIEW OF SYSTEMS:  Constitutional:  At present, he does not feel fever but did have fevers at home.  Eyes:  No double vision or blurry vision.  Ears:  No ringing in the ears.  Neck:  No neck pain.  Respiratory:  No shortness of breath.  Cardiovascular:  No chest pain.  Abdomen:  No nausea, vomiting, or abdominal pain.  Extremities/Neurological:  Positive history of neuropathic pain.    PHYSICAL EXAMINATION:  HEENT:  Head:  Normocephalic, atraumatic.  Eyes:  No scleral icterus.  Ears:  Hearing bilaterally intact.  NECK:  Supple.  RESPIRATORY:  Good air entry bilaterally.  CARDIOVASCULAR:  S1 and S2 heard.  ABDOMEN:  Soft and nontender.  EXTREMITIES:  No clubbing or cyanosis was noted.      NEUROLOGIC:  The patient is awake and alert.  Extraocular movements were intact.  Speech was fluent.  Smile was symmetric.  Motor:  Right upper and bilateral lower extremities were 0/5, which is his baseline.  Left upper, slight flexation at the elbow, slight flexation of the hand, baseline is that he is able to elevate his left arm roughly about 30 to 40 degrees off the bed.  Bilateral lower extremities, swelling was noted.            LABS:  CBC Full  -  ( 11 Dec 2021 08:40 )  WBC Count : 11.25 K/uL  RBC Count : 3.64 M/uL  Hemoglobin : 11.0 g/dL  Hematocrit : 32.3 %  Platelet Count - Automated : 196 K/uL  Mean Cell Volume : 88.7 fl  Mean Cell Hemoglobin : 30.2 pg  Mean Cell Hemoglobin Concentration : 34.1 gm/dL  Auto Neutrophil # : 8.99 K/uL  Auto Lymphocyte # : 1.24 K/uL  Auto Monocyte # : 0.91 K/uL  Auto Eosinophil # : 0.01 K/uL  Auto Basophil # : 0.05 K/uL  Auto Neutrophil % : 80.0 %  Auto Lymphocyte % : 11.0 %  Auto Monocyte % : 8.1 %  Auto Eosinophil % : 0.1 %  Auto Basophil % : 0.4 %      12-11    134<L>  |  101  |  13  ----------------------------<  107<H>  3.5   |  28  |  0.56    Ca    8.3<L>      11 Dec 2021 14:02  Mg     1.6     12-11    TPro  5.9<L>  /  Alb  2.3<L>  /  TBili  0.5  /  DBili  x   /  AST  9<L>  /  ALT  11<L>  /  AlkPhos  66  12-11    Hemoglobin A1C:     LIVER FUNCTIONS - ( 11 Dec 2021 08:40 )  Alb: 2.3 g/dL / Pro: 5.9 g/dL / ALK PHOS: 66 U/L / ALT: 11 U/L / AST: 9 U/L / GGT: x           Vitamin B12         RADIOLOGY      ANALYSIS AND PLAN:  This is a 66-year-old with weakness and a history of multiple sclerosis.  For episode of weakness, suspect most likely multiple sclerosis exacerbation secondary to underlying infectious type process  with a history of temperatures.  I would recommend infectious workup and antibiotics as needed.  For history of multiple sclerosis, appears to be fairly advanced, I would recommend supportive therapy.  For history of neuropathic pain and trigeminal neuralgia, continue the patient on his Lyrica and Trileptal.  At present, I would not recommend any type of steroids, infectious workup is ongoing.  monitor diarrhea as needed  increase ROM of left arm today     Spoke with the spouse, Alisson, at [phone] 12/10 today went to voicemail unable to leave message       Greater than 40 minutes of time was spent with the patient, plan of care, reviewing data, and speaking to the multidisciplinary healthcare team with greater than 50% of that time in counseling and care coordination.    Thank you for the courtesy of this consultation.   Neurology follow up note    THANH [id number]      Interval History:    Patient feels ok no new complaints.    Allergies    No Known Allergies    Intolerances        MEDICATIONS    acetaminophen     Tablet .. 650 milliGRAM(s) Oral every 6 hours PRN  cefTRIAXone   IVPB 1000 milliGRAM(s) IV Intermittent every 24 hours  enoxaparin Injectable 40 milliGRAM(s) SubCutaneous daily  escitalopram 20 milliGRAM(s) Oral daily  lactated ringers. 1000 milliLiter(s) IV Continuous <Continuous>  OXcarbazepine 300 milliGRAM(s) Oral two times a day  oxybutynin 15 milliGRAM(s) Oral daily  oxycodone    5 mG/acetaminophen 325 mG 2 Tablet(s) Oral every 8 hours PRN  pregabalin 150 milliGRAM(s) Oral three times a day  saccharomyces boulardii 250 milliGRAM(s) Oral two times a day  valsartan 160 milliGRAM(s) Oral daily              Vital Signs Last 24 Hrs  T(C): 36.8 (12 Dec 2021 05:22), Max: 37.1 (11 Dec 2021 19:44)  T(F): 98.2 (12 Dec 2021 05:22), Max: 98.8 (11 Dec 2021 19:44)  HR: 73 (12 Dec 2021 05:22) (70 - 75)  BP: 135/76 (12 Dec 2021 05:22) (112/68 - 135/76)  BP(mean): --  RR: 18 (12 Dec 2021 05:22) (18 - 20)  SpO2: 95% (12 Dec 2021 05:22) (95% - 95%)      REVIEW OF SYSTEMS:  Constitutional:  At present, he does not feel fever but did have fevers at home.  Eyes:  No double vision or blurry vision.  Ears:  No ringing in the ears.  Neck:  No neck pain.  Respiratory:  No shortness of breath.  Cardiovascular:  No chest pain.  Abdomen:  No nausea, vomiting, or abdominal pain.  Extremities/Neurological:  Positive history of neuropathic pain.    PHYSICAL EXAMINATION:  HEENT:  Head:  Normocephalic, atraumatic.  Eyes:  No scleral icterus.  Ears:  Hearing bilaterally intact.  NECK:  Supple.  RESPIRATORY:  Good air entry bilaterally.  CARDIOVASCULAR:  S1 and S2 heard.  ABDOMEN:  Soft and nontender.  EXTREMITIES:  No clubbing or cyanosis was noted.      NEUROLOGIC:  The patient is awake and alert.  Extraocular movements were intact.  Speech was fluent.  Smile was symmetric.  Motor:  Right upper and bilateral lower extremities were 0/5, which is his baseline.  Left upper, slight flexation at the elbow, slight flexation of the hand, baseline is that he is able to elevate his left arm roughly about 30 to 40 degrees off the bed.  Bilateral lower extremities, swelling was noted.            LABS:  CBC Full  -  ( 11 Dec 2021 08:40 )  WBC Count : 11.25 K/uL  RBC Count : 3.64 M/uL  Hemoglobin : 11.0 g/dL  Hematocrit : 32.3 %  Platelet Count - Automated : 196 K/uL  Mean Cell Volume : 88.7 fl  Mean Cell Hemoglobin : 30.2 pg  Mean Cell Hemoglobin Concentration : 34.1 gm/dL  Auto Neutrophil # : 8.99 K/uL  Auto Lymphocyte # : 1.24 K/uL  Auto Monocyte # : 0.91 K/uL  Auto Eosinophil # : 0.01 K/uL  Auto Basophil # : 0.05 K/uL  Auto Neutrophil % : 80.0 %  Auto Lymphocyte % : 11.0 %  Auto Monocyte % : 8.1 %  Auto Eosinophil % : 0.1 %  Auto Basophil % : 0.4 %      12-11    134<L>  |  101  |  13  ----------------------------<  107<H>  3.5   |  28  |  0.56    Ca    8.3<L>      11 Dec 2021 14:02  Mg     1.6     12-11    TPro  5.9<L>  /  Alb  2.3<L>  /  TBili  0.5  /  DBili  x   /  AST  9<L>  /  ALT  11<L>  /  AlkPhos  66  12-11    Hemoglobin A1C:     LIVER FUNCTIONS - ( 11 Dec 2021 08:40 )  Alb: 2.3 g/dL / Pro: 5.9 g/dL / ALK PHOS: 66 U/L / ALT: 11 U/L / AST: 9 U/L / GGT: x           Vitamin B12         RADIOLOGY      ANALYSIS AND PLAN:  This is a 66-year-old with weakness and a history of multiple sclerosis.  For episode of weakness, suspect most likely multiple sclerosis exacerbation secondary to underlying infectious type process  with a history of temperatures.  I would recommend infectious workup and antibiotics as needed.  For history of multiple sclerosis, appears to be fairly advanced, I would recommend supportive therapy.  For history of neuropathic pain and trigeminal neuralgia, continue the patient on his Lyrica and Trileptal.  At present, I would not recommend any type of steroids, infectious workup is ongoing.  monitor diarrhea as needed  increase ROM of left arm today does feel better 12/12     Spoke with the spouse, Alisson, at [phone] 12/12       Greater than 34 minutes of time was spent with the patient, plan of care, reviewing data, and speaking to the multidisciplinary healthcare team with greater than 50% of that time in counseling and care coordination.    Thank you for the courtesy of this consultation.

## 2021-12-12 NOTE — PROGRESS NOTE ADULT - PROBLEM SELECTOR PLAN 7
Chronic L foot neuropathic pain  - Continue Pregabalin 150mg tid  - Continue Percocet tid PRN New onset peripheral edema of upper extremities   - B/L UE dopplers: negative for DVT

## 2021-12-12 NOTE — PROGRESS NOTE ADULT - PROBLEM SELECTOR PLAN 1
Patient presents with fever, leukocytosis and left arm weakness  - Sepsis POA - fever, leukocytosis, HR >90, Urinary source  - CT renal stone hunt: Nonobstructing intrarenal calcifications right kidney. Moderate to large colonic fecal retention.  - UA: large ketone, 15 protein, trace LE, mod blood, 11-25 RBCs, mod bacteria  - S/P Motrin 600mg x1, IV Zosyn x1, 1L IV NS x1 in ED  - C/w Rocephin 1g IV qd, day 2  - Trend WBC and monitor for fever  - F/u urine and blood cultures  - ID (Dr. Bone) consulted, recs appreciated Patient presents with fever, leukocytosis and left arm weakness  - Sepsis POA - fever, leukocytosis, HR >90, Urinary source  - CT renal stone hunt: Nonobstructing intrarenal calcifications right kidney. Moderate to large colonic fecal retention.  - UA: large ketone, 15 protein, trace LE, mod blood, 11-25 RBCs, mod bacteria  - S/P Motrin 600mg x1, IV Zosyn x1, 1L IV NS x1 in ED  - C/w Rocephin 1g IV qd, day 2  - Trend WBC and monitor for fever  - F/u urine and blood cultures  - ID (Dr. Bone) consulted, recs appreciated  -Continues to have loose BM on Abx. Placed on isolation. FU stool studies/ C diff PCR Patient presents with fever, leukocytosis and left arm weakness  - Sepsis POA - fever, leukocytosis, HR >90, Urinary source  - CT renal stone hunt: Nonobstructing intrarenal calcifications right kidney. Moderate to large colonic fecal retention.  - UA: large ketone, 15 protein, trace LE, mod blood, 11-25 RBCs, mod bacteria  - S/P Motrin 600mg x1, IV Zosyn x1, 1L IV NS x1 in ED  - C/w Rocephin 1g IV qd  - Trend WBC and monitor for fever  - F/u urine and blood cultures  - ID (Dr. Bone) consulted, recs appreciated  -Continues to have loose BM on Abx. Placed on isolation. FU stool studies/ C diff PCR

## 2021-12-12 NOTE — PROGRESS NOTE ADULT - PROBLEM SELECTOR PLAN 6
New onset peripheral edema of upper extremities   - B/L UE dopplers: negative for DVT Chronic, stable  - Continue home medication Valsartan with hold parameters  - Monitor routine hemodynamics

## 2021-12-13 LAB
ANION GAP SERPL CALC-SCNC: 6 MMOL/L — SIGNIFICANT CHANGE UP (ref 5–17)
BASOPHILS # BLD AUTO: 0.03 K/UL — SIGNIFICANT CHANGE UP (ref 0–0.2)
BASOPHILS NFR BLD AUTO: 0.5 % — SIGNIFICANT CHANGE UP (ref 0–2)
BUN SERPL-MCNC: 8 MG/DL — SIGNIFICANT CHANGE UP (ref 7–23)
CALCIUM SERPL-MCNC: 8 MG/DL — LOW (ref 8.5–10.1)
CHLORIDE SERPL-SCNC: 104 MMOL/L — SIGNIFICANT CHANGE UP (ref 96–108)
CO2 SERPL-SCNC: 29 MMOL/L — SIGNIFICANT CHANGE UP (ref 22–31)
CREAT SERPL-MCNC: 0.51 MG/DL — SIGNIFICANT CHANGE UP (ref 0.5–1.3)
EOSINOPHIL # BLD AUTO: 0.19 K/UL — SIGNIFICANT CHANGE UP (ref 0–0.5)
EOSINOPHIL NFR BLD AUTO: 3.2 % — SIGNIFICANT CHANGE UP (ref 0–6)
GLUCOSE SERPL-MCNC: 105 MG/DL — HIGH (ref 70–99)
HCT VFR BLD CALC: 31.9 % — LOW (ref 39–50)
HGB BLD-MCNC: 11.2 G/DL — LOW (ref 13–17)
IMM GRANULOCYTES NFR BLD AUTO: 0.3 % — SIGNIFICANT CHANGE UP (ref 0–1.5)
LYMPHOCYTES # BLD AUTO: 1.06 K/UL — SIGNIFICANT CHANGE UP (ref 1–3.3)
LYMPHOCYTES # BLD AUTO: 17.9 % — SIGNIFICANT CHANGE UP (ref 13–44)
MAGNESIUM SERPL-MCNC: 1.7 MG/DL — SIGNIFICANT CHANGE UP (ref 1.6–2.6)
MCHC RBC-ENTMCNC: 31 PG — SIGNIFICANT CHANGE UP (ref 27–34)
MCHC RBC-ENTMCNC: 35.1 GM/DL — SIGNIFICANT CHANGE UP (ref 32–36)
MCV RBC AUTO: 88.4 FL — SIGNIFICANT CHANGE UP (ref 80–100)
MONOCYTES # BLD AUTO: 0.57 K/UL — SIGNIFICANT CHANGE UP (ref 0–0.9)
MONOCYTES NFR BLD AUTO: 9.6 % — SIGNIFICANT CHANGE UP (ref 2–14)
NEUTROPHILS # BLD AUTO: 4.06 K/UL — SIGNIFICANT CHANGE UP (ref 1.8–7.4)
NEUTROPHILS NFR BLD AUTO: 68.5 % — SIGNIFICANT CHANGE UP (ref 43–77)
NRBC # BLD: 0 /100 WBCS — SIGNIFICANT CHANGE UP (ref 0–0)
PHOSPHATE SERPL-MCNC: 2.5 MG/DL — SIGNIFICANT CHANGE UP (ref 2.5–4.5)
PLATELET # BLD AUTO: 208 K/UL — SIGNIFICANT CHANGE UP (ref 150–400)
POTASSIUM SERPL-MCNC: 3.6 MMOL/L — SIGNIFICANT CHANGE UP (ref 3.5–5.3)
POTASSIUM SERPL-SCNC: 3.6 MMOL/L — SIGNIFICANT CHANGE UP (ref 3.5–5.3)
RBC # BLD: 3.61 M/UL — LOW (ref 4.2–5.8)
RBC # FLD: 13.5 % — SIGNIFICANT CHANGE UP (ref 10.3–14.5)
SODIUM SERPL-SCNC: 139 MMOL/L — SIGNIFICANT CHANGE UP (ref 135–145)
WBC # BLD: 5.93 K/UL — SIGNIFICANT CHANGE UP (ref 3.8–10.5)
WBC # FLD AUTO: 5.93 K/UL — SIGNIFICANT CHANGE UP (ref 3.8–10.5)

## 2021-12-13 PROCEDURE — 99232 SBSQ HOSP IP/OBS MODERATE 35: CPT | Mod: GC

## 2021-12-13 RX ORDER — CEFTRIAXONE 500 MG/1
INJECTION, POWDER, FOR SOLUTION INTRAMUSCULAR; INTRAVENOUS
Refills: 0 | Status: DISCONTINUED | OUTPATIENT
Start: 2021-12-13 | End: 2021-12-15

## 2021-12-13 RX ORDER — CEFTRIAXONE 500 MG/1
1000 INJECTION, POWDER, FOR SOLUTION INTRAMUSCULAR; INTRAVENOUS EVERY 24 HOURS
Refills: 0 | Status: DISCONTINUED | OUTPATIENT
Start: 2021-12-14 | End: 2021-12-15

## 2021-12-13 RX ORDER — CEFTRIAXONE 500 MG/1
1000 INJECTION, POWDER, FOR SOLUTION INTRAMUSCULAR; INTRAVENOUS ONCE
Refills: 0 | Status: COMPLETED | OUTPATIENT
Start: 2021-12-13 | End: 2021-12-13

## 2021-12-13 RX ORDER — OXYCODONE AND ACETAMINOPHEN 5; 325 MG/1; MG/1
2 TABLET ORAL EVERY 6 HOURS
Refills: 0 | Status: DISCONTINUED | OUTPATIENT
Start: 2021-12-13 | End: 2021-12-15

## 2021-12-13 RX ADMIN — Medication 150 MILLIGRAM(S): at 14:22

## 2021-12-13 RX ADMIN — Medication 125 MILLIGRAM(S): at 23:53

## 2021-12-13 RX ADMIN — OXCARBAZEPINE 300 MILLIGRAM(S): 300 TABLET, FILM COATED ORAL at 05:10

## 2021-12-13 RX ADMIN — Medication 125 MILLIGRAM(S): at 00:02

## 2021-12-13 RX ADMIN — OXYCODONE AND ACETAMINOPHEN 2 TABLET(S): 5; 325 TABLET ORAL at 17:18

## 2021-12-13 RX ADMIN — OXCARBAZEPINE 300 MILLIGRAM(S): 300 TABLET, FILM COATED ORAL at 17:18

## 2021-12-13 RX ADMIN — OXYCODONE AND ACETAMINOPHEN 2 TABLET(S): 5; 325 TABLET ORAL at 12:53

## 2021-12-13 RX ADMIN — Medication 125 MILLIGRAM(S): at 05:09

## 2021-12-13 RX ADMIN — CEFTRIAXONE 1000 MILLIGRAM(S): 500 INJECTION, POWDER, FOR SOLUTION INTRAMUSCULAR; INTRAVENOUS at 15:46

## 2021-12-13 RX ADMIN — Medication 125 MILLIGRAM(S): at 17:18

## 2021-12-13 RX ADMIN — OXYCODONE AND ACETAMINOPHEN 2 TABLET(S): 5; 325 TABLET ORAL at 18:16

## 2021-12-13 RX ADMIN — Medication 15 MILLIGRAM(S): at 12:13

## 2021-12-13 RX ADMIN — Medication 250 MILLIGRAM(S): at 17:18

## 2021-12-13 RX ADMIN — OXYCODONE AND ACETAMINOPHEN 2 TABLET(S): 5; 325 TABLET ORAL at 23:52

## 2021-12-13 RX ADMIN — Medication 250 MILLIGRAM(S): at 05:10

## 2021-12-13 RX ADMIN — Medication 150 MILLIGRAM(S): at 05:10

## 2021-12-13 RX ADMIN — VALSARTAN 160 MILLIGRAM(S): 80 TABLET ORAL at 05:10

## 2021-12-13 RX ADMIN — Medication 150 MILLIGRAM(S): at 22:02

## 2021-12-13 RX ADMIN — OXYCODONE AND ACETAMINOPHEN 2 TABLET(S): 5; 325 TABLET ORAL at 11:53

## 2021-12-13 RX ADMIN — ESCITALOPRAM OXALATE 20 MILLIGRAM(S): 10 TABLET, FILM COATED ORAL at 12:37

## 2021-12-13 RX ADMIN — ENOXAPARIN SODIUM 40 MILLIGRAM(S): 100 INJECTION SUBCUTANEOUS at 12:33

## 2021-12-13 RX ADMIN — Medication 125 MILLIGRAM(S): at 12:14

## 2021-12-13 NOTE — PROGRESS NOTE ADULT - PROBLEM SELECTOR PLAN 2
- Patient endorsing 5 water BMs on 12/12  - C diff positive. Started on Oral vanc. Patient endorse 2 loose BM in the past 24 hours. Will continue to monitor improvement of symptoms  - Will discuss with ID regarding DC planning

## 2021-12-13 NOTE — PROGRESS NOTE ADULT - SUBJECTIVE AND OBJECTIVE BOX
Patient is a 66y old  Male who presents with a chief complaint of UTI, left arm weakness    INTERVAL HPI/OVERNIGHT EVENTS: Patient seen and examined at bedside. Patient states his left arm weakness has resolved and function has returned to baseline.   Cdiff positive. Oral Vanc started. Patient endorses 2 Loose BM in the past 24 hours, one being mucous in nature.   No other complaints at this time. Denies fever, chills, chest pain, shortness of breath, abdominal pain, nausea, vomiting.     MEDICATIONS  (STANDING):  cefTRIAXone   IVPB 1000 milliGRAM(s) IV Intermittent every 24 hours  enoxaparin Injectable 40 milliGRAM(s) SubCutaneous daily  escitalopram 20 milliGRAM(s) Oral daily  lactated ringers. 1000 milliLiter(s) (75 mL/Hr) IV Continuous <Continuous>  OXcarbazepine 300 milliGRAM(s) Oral two times a day  oxybutynin 15 milliGRAM(s) Oral daily  potassium chloride    Tablet ER 40 milliEquivalent(s) Oral every 4 hours  potassium chloride  10 mEq/100 mL IVPB 10 milliEquivalent(s) IV Intermittent every 1 hour  pregabalin 150 milliGRAM(s) Oral three times a day  saccharomyces boulardii 250 milliGRAM(s) Oral two times a day  senna 2 Tablet(s) Oral at bedtime  valsartan 160 milliGRAM(s) Oral daily    MEDICATIONS  (PRN):  acetaminophen     Tablet .. 650 milliGRAM(s) Oral every 6 hours PRN Temp greater or equal to 38C (100.4F)  oxycodone    5 mG/acetaminophen 325 mG 2 Tablet(s) Oral every 8 hours PRN Moderate Pain (4 - 6)  polyethylene glycol 3350 17 Gram(s) Oral daily PRN Constipation      Allergies    No Known Allergies    Intolerances        REVIEW OF SYSTEMS:  CONSTITUTIONAL: No fever or chills  HEENT:  No headache, no sore throat  RESPIRATORY: No cough, wheezing, or shortness of breath  CARDIOVASCULAR: No chest pain, palpitations  GASTROINTESTINAL: No abd pain, nausea, vomiting, or diarrhea  GENITOURINARY: No dysuria, frequency, or hematuria  NEUROLOGICAL: no focal weakness or dizziness  MUSCULOSKELETAL: no myalgias     Vital Signs Last 24 Hrs  T(C): 36.7 (13 Dec 2021 08:45), Max: 37.1 (12 Dec 2021 14:26)  T(F): 98.1 (13 Dec 2021 08:45), Max: 98.7 (12 Dec 2021 14:26)  HR: 66 (13 Dec 2021 08:45) (62 - 80)  BP: 146/87 (13 Dec 2021 08:45) (105/66 - 164/74)  BP(mean): --  RR: 16 (13 Dec 2021 08:45) (16 - 20)  SpO2: 97% (13 Dec 2021 08:45) (95% - 97%)    PHYSICAL EXAM:  GENERAL: NAD  HEENT: EOMI, PERRL, anicteric, moist mucous membranes  CHEST/LUNG:  CTA b/l, no rales, wheezes, or rhonchi  HEART:  RRR, S1, S2  ABDOMEN:  BS+, soft, nondistended + mild periumbilical TTP  EXTREMITIES: + nonpitting edema in all 4 extremities, +3 pulses in b/l UE; no cyanosis, or calf tenderness  NERVOUS SYSTEM: answers questions and follows commands appropriately  MSK: 4/5 muscle strength in LUE, 0/5 muscle strength in RUE and b/l LE; baseline      LABS:                        10.8   6.66  )-----------( 199      ( 12 Dec 2021 09:35 )             31.5     12-12    136  |  103  |  10  ----------------------------<  83  3.7   |  25  |  0.43<L>    Ca    7.8<L>      12 Dec 2021 09:35  Phos  2.2     12-12  Mg     1.8     12-12    TPro  5.7<L>  /  Alb  2.2<L>  /  TBili  0.4  /  DBili  x   /  AST  6<L>  /  ALT  12  /  AlkPhos  54  12-12          RADIOLOGY & ADDITIONAL TESTS:    Personally reviewed.     Consultant(s) Notes Reviewed:  [x] YES  [ ] NO

## 2021-12-13 NOTE — PROGRESS NOTE ADULT - ATTENDING COMMENTS
66 year old male w PMHx MS, L foot neuropathic pain admitted for sepsis secondary to UTI with acute left arm weakness.    Patient seen and examined at bedside. States he feels well, LUE weakness continues to improve. Patient noted to be positive for C. diff yesterday, started on PO vanco. Discussed with ID, will complete treatment for UTI with IV ceftriaxone and will need total 14 day course of PO vanco. Patient states he has had 2 loose BMs today. Discussed with patient's wife regarding dispo planning. Wife states she can not have patient return home until he completes course of antibiotics or has no more diarrhea. Explained to wife, risk of transmission of C. diff is low if proper hand washing and safety guidelines are followed, but she states she did not feel comfortable. Will discuss with CM/SW regarding dispo plan.

## 2021-12-13 NOTE — PROGRESS NOTE ADULT - PROBLEM SELECTOR PLAN 1
Patient presents with fever, leukocytosis and left arm weakness  - Sepsis POA - fever, leukocytosis, HR >90, Urinary source  - CT renal stone hunt: Nonobstructing intrarenal calcifications right kidney. Moderate to large colonic fecal retention.  - UA: large ketone, 15 protein, trace LE, mod blood, 11-25 RBCs, mod bacteria  - S/P Motrin 600mg x1, IV Zosyn x1, 1L IV NS x1 in ED  - C/w Rocephin 1g IV qd  - Trend WBC and monitor for fever  - F/u urine and blood cultures  - ID (Dr. Bone) consulted, recs appreciated  - C diff positive. Started on Oral vanc. Patient endorse 2 loose BM in the past 24 hours. Will continue to monitor improvement of symptoms  - Will discuss with ID regarding DC planning

## 2021-12-13 NOTE — PROGRESS NOTE ADULT - ASSESSMENT
Pt is a 66M w/ PMHx of MS, L foot neuropathic pain presented to ED from home c/o fever of 103.5 and new onset inability to move his L arm. similar episode in March 2020 when he spiked a fever and was unable to move his L arm. Strength in his L arm was regained after a few hours started on cell cept for his MS about 2 months ago. recent abx for dental infection    Sepsis 2/2 Acute Pyelonephritis  Fevers  Leukocytosis  -infectious w/u reviewed  --BCx/UCx NGTD  -imaging reviewed  -c/w ceftriaxone 1gm q24h to complete total 7 day course until 12/16. Can switch to PO cefpodoxime 200mg BID to complete course if planning for d/c    C. Diff Infection  Watery Diarrhea  -c/w PO vancomycin 125mg q6h to complete total 14 day course until 12/25    D/w Dr. Zhong    Infectious Diseases will continue to follow. Please call with any questions.   Zoe Baird M.D.  [facility], Division of Infectious Diseases [phone]  For over the weekend and after hours, please call [phone]     Pt is a 66M w/ PMHx of MS, L foot neuropathic pain presented to ED from home c/o fever of 103.5 and new onset inability to move his L arm. similar episode in March 2020 when he spiked a fever and was unable to move his L arm. Strength in his L arm was regained after a few hours started on cell cept for his MS about 2 months ago. recent abx for dental infection    Acute Complicated UTI  Fevers  Leukocytosis  -infectious w/u reviewed  --BCx/UCx NGTD  -imaging reviewed  -c/w ceftriaxone 1gm q24h to complete total 7 day course until 12/16. Can switch to PO cefpodoxime 200mg BID to complete course if planning for d/c    C. Diff Infection  Watery Diarrhea  -c/w PO vancomycin 125mg q6h to complete total 14 day course until 12/25    D/w Dr. Zhong    Infectious Diseases will continue to follow. Please call with any questions.   Zoe Baird M.D.  [facility], Division of Infectious Diseases [phone]  For over the weekend and after hours, please call [phone]

## 2021-12-13 NOTE — PROGRESS NOTE ADULT - SUBJECTIVE AND OBJECTIVE BOX
Neurology follow up note    THANH [id number]      Interval History:    Patient feels ok no new complaints.    Allergies    No Known Allergies    Intolerances        MEDICATIONS    acetaminophen     Tablet .. 650 milliGRAM(s) Oral every 6 hours PRN  cefTRIAXone   IVPB 1000 milliGRAM(s) IV Intermittent once  cefTRIAXone   IVPB      enoxaparin Injectable 40 milliGRAM(s) SubCutaneous daily  escitalopram 20 milliGRAM(s) Oral daily  OXcarbazepine 300 milliGRAM(s) Oral two times a day  oxybutynin 15 milliGRAM(s) Oral daily  oxycodone    5 mG/acetaminophen 325 mG 2 Tablet(s) Oral every 8 hours PRN  pregabalin 150 milliGRAM(s) Oral three times a day  saccharomyces boulardii 250 milliGRAM(s) Oral two times a day  valsartan 160 milliGRAM(s) Oral daily  vancomycin    Solution 125 milliGRAM(s) Oral every 6 hours              Vital Signs Last 24 Hrs  T(C): 36.7 (13 Dec 2021 12:16), Max: 36.8 (13 Dec 2021 05:04)  T(F): 98.1 (13 Dec 2021 12:16), Max: 98.2 (13 Dec 2021 05:04)  HR: 62 (13 Dec 2021 12:16) (62 - 71)  BP: 146/82 (13 Dec 2021 12:16) (105/66 - 164/74)  BP(mean): --  RR: 16 (13 Dec 2021 12:16) (16 - 18)  SpO2: 94% (13 Dec 2021 12:16) (94% - 97%)        REVIEW OF SYSTEMS:  Constitutional:  At present, he does not feel fever but did have fevers at home.  Eyes:  No double vision or blurry vision.  Ears:  No ringing in the ears.  Neck:  No neck pain.  Respiratory:  No shortness of breath.  Cardiovascular:  No chest pain.  Abdomen:  No nausea, vomiting, or abdominal pain.  Extremities/Neurological:  Positive history of neuropathic pain.    PHYSICAL EXAMINATION:  HEENT:  Head:  Normocephalic, atraumatic.  Eyes:  No scleral icterus.  Ears:  Hearing bilaterally intact.  NECK:  Supple.  RESPIRATORY:  Good air entry bilaterally.  CARDIOVASCULAR:  S1 and S2 heard.  ABDOMEN:  Soft and nontender.  EXTREMITIES:  No clubbing or cyanosis was noted.      NEUROLOGIC:  The patient is awake and alert.  Extraocular movements were intact.  Speech was fluent.  Smile was symmetric.  Motor:  Right upper and bilateral lower extremities were 0/5, which is his baseline.  Left upper, slight flexation at the elbow, slight flexation of the hand, baseline is that he is able to elevate his left arm roughly about 30 to 40 degrees off the bed.  Bilateral lower extremities, swelling was noted.               LABS:  CBC Full  -  ( 13 Dec 2021 11:17 )  WBC Count : 5.93 K/uL  RBC Count : 3.61 M/uL  Hemoglobin : 11.2 g/dL  Hematocrit : 31.9 %  Platelet Count - Automated : 208 K/uL  Mean Cell Volume : 88.4 fl  Mean Cell Hemoglobin : 31.0 pg  Mean Cell Hemoglobin Concentration : 35.1 gm/dL  Auto Neutrophil # : 4.06 K/uL  Auto Lymphocyte # : 1.06 K/uL  Auto Monocyte # : 0.57 K/uL  Auto Eosinophil # : 0.19 K/uL  Auto Basophil # : 0.03 K/uL  Auto Neutrophil % : 68.5 %  Auto Lymphocyte % : 17.9 %  Auto Monocyte % : 9.6 %  Auto Eosinophil % : 3.2 %  Auto Basophil % : 0.5 %      12-13    139  |  104  |  8   ----------------------------<  105<H>  3.6   |  29  |  0.51    Ca    8.0<L>      13 Dec 2021 11:17  Phos  2.5     12-13  Mg     1.7     12-13    TPro  5.7<L>  /  Alb  2.2<L>  /  TBili  0.4  /  DBili  x   /  AST  6<L>  /  ALT  12  /  AlkPhos  54  12-12    Hemoglobin A1C:     LIVER FUNCTIONS - ( 12 Dec 2021 09:35 )  Alb: 2.2 g/dL / Pro: 5.7 g/dL / ALK PHOS: 54 U/L / ALT: 12 U/L / AST: 6 U/L / GGT: x           Vitamin B12         RADIOLOGY        ANALYSIS AND PLAN:  This is a 66-year-old with weakness and a history of multiple sclerosis.  For episode of weakness, suspect most likely multiple sclerosis exacerbation secondary to underlying infectious type process  with a history of temperatures.  I would recommend infectious workup and antibiotics as needed.  For history of multiple sclerosis, appears to be fairly advanced, I would recommend supportive therapy.  For history of neuropathic pain and trigeminal neuralgia, continue the patient on his Lyrica and Trileptal.  At present, I would not recommend any type of steroids, infectious workup is ongoing.  monitor diarrhea as needed  increase ROM of left arm today does feel better 12/13 as per patient arm coming back to baseline     Spoke with the spouse, Alisson, at [phone] 12/12       Greater than 34 minutes of time was spent with the patient, plan of care, reviewing data, and speaking to the multidisciplinary healthcare team with greater than 50% of that time in counseling and care coordination.    Thank you for the courtesy of this consultation.

## 2021-12-13 NOTE — PROGRESS NOTE ADULT - SUBJECTIVE AND OBJECTIVE BOX
[facility], Division of Infectious Diseases  HALEIGH Willams Y. Patel, S. Shah  [phone]    Name: THANH ISRAEL  Age: 66y  Gender: Male  MRN: [id number]    Interval History:  Patient seen and examined at bedside this morning  No acute overnight events.   Notes reviewed    Antibiotics:  vancomycin    Solution 125 milliGRAM(s) Oral every 6 hours      Medications:  acetaminophen     Tablet .. 650 milliGRAM(s) Oral every 6 hours PRN  enoxaparin Injectable 40 milliGRAM(s) SubCutaneous daily  escitalopram 20 milliGRAM(s) Oral daily  OXcarbazepine 300 milliGRAM(s) Oral two times a day  oxybutynin 15 milliGRAM(s) Oral daily  oxycodone    5 mG/acetaminophen 325 mG 2 Tablet(s) Oral every 8 hours PRN  pregabalin 150 milliGRAM(s) Oral three times a day  saccharomyces boulardii 250 milliGRAM(s) Oral two times a day  valsartan 160 milliGRAM(s) Oral daily  vancomycin    Solution 125 milliGRAM(s) Oral every 6 hours      Review of Systems:  A 10-point review of systems was obtained.     Pertinent positives and negatives--  Constitutional: No fevers. No Chills. No Rigors.   Cardiovascular: No chest pain. No palpitations.  Respiratory: No shortness of breath. No cough.  Gastrointestinal: No nausea or vomiting. No diarrhea or constipation.   Psychiatric: Pleasant. Appropriate affect.    Review of systems otherwise negative except as previously noted.    Allergies: No Known Allergies    For details regarding the patient's past medical history, social history, family history, and other miscellaneous elements, please refer the initial infectious diseases consultation and/or the admitting history and physical examination for this admission.    Objective:  Vitals:   T(C): 36.7 (12-13-21 @ 12:16), Max: 37.1 (12-12-21 @ 14:26)  HR: 62 (12-13-21 @ 12:16) (62 - 80)  BP: 146/82 (12-13-21 @ 12:16) (105/66 - 164/74)  RR: 16 (12-13-21 @ 12:16) (16 - 20)  SpO2: 94% (12-13-21 @ 12:16) (94% - 97%)    Physical Examination:  General: no acute distress  HEENT: NC/AT, EOMI, anicteric, no oral lesions  Neck: supple, no palpable LAD  Cardio: S1, S2 heard, RRR, no murmurs  Resp: breath sounds heard bilaterally, no rales, wheezes or rhonchi  Abd: soft, NT, ND, + bowel sounds  Neuro: no obvious focal deficits  Ext: no edema or cyanosis  Skin: warm, dry, no visible rash      Laboratory Studies:  CBC:                       11.2   5.93  )-----------( 208      ( 13 Dec 2021 11:17 )             31.9     CMP: 12-13    139  |  104  |  8   ----------------------------<  105<H>  3.6   |  29  |  0.51    Ca    8.0<L>      13 Dec 2021 11:17  Phos  2.5     12-13  Mg     1.7     12-13    TPro  5.7<L>  /  Alb  2.2<L>  /  TBili  0.4  /  DBili  x   /  AST  6<L>  /  ALT  12  /  AlkPhos  54  12-12    LIVER FUNCTIONS - ( 12 Dec 2021 09:35 )  Alb: 2.2 g/dL / Pro: 5.7 g/dL / ALK PHOS: 54 U/L / ALT: 12 U/L / AST: 6 U/L / GGT: x               Microbiology: reviewed    Culture - Blood (collected 12-10-21 @ 09:05)  Source: .Blood Blood-Peripheral  Preliminary Report (12-11-21 @ 10:01):    No growth to date.    Culture - Blood (collected 12-10-21 @ 09:05)  Source: .Blood Blood-Peripheral  Preliminary Report (12-11-21 @ 10:01):    No growth to date.    Culture - Urine (collected 12-10-21 @ 09:04)  Source: Clean Catch Clean Catch (Midstream)  Final Report (12-12-21 @ 10:16):    No growth        Radiology: reviewed

## 2021-12-14 LAB
BASOPHILS # BLD AUTO: 0.05 K/UL — SIGNIFICANT CHANGE UP (ref 0–0.2)
BASOPHILS NFR BLD AUTO: 0.8 % — SIGNIFICANT CHANGE UP (ref 0–2)
EOSINOPHIL # BLD AUTO: 0.28 K/UL — SIGNIFICANT CHANGE UP (ref 0–0.5)
EOSINOPHIL NFR BLD AUTO: 4.4 % — SIGNIFICANT CHANGE UP (ref 0–6)
HCT VFR BLD CALC: 34.1 % — LOW (ref 39–50)
HGB BLD-MCNC: 11.6 G/DL — LOW (ref 13–17)
IMM GRANULOCYTES NFR BLD AUTO: 0.3 % — SIGNIFICANT CHANGE UP (ref 0–1.5)
LYMPHOCYTES # BLD AUTO: 1.78 K/UL — SIGNIFICANT CHANGE UP (ref 1–3.3)
LYMPHOCYTES # BLD AUTO: 27.7 % — SIGNIFICANT CHANGE UP (ref 13–44)
MCHC RBC-ENTMCNC: 30.4 PG — SIGNIFICANT CHANGE UP (ref 27–34)
MCHC RBC-ENTMCNC: 34 GM/DL — SIGNIFICANT CHANGE UP (ref 32–36)
MCV RBC AUTO: 89.5 FL — SIGNIFICANT CHANGE UP (ref 80–100)
MONOCYTES # BLD AUTO: 0.6 K/UL — SIGNIFICANT CHANGE UP (ref 0–0.9)
MONOCYTES NFR BLD AUTO: 9.3 % — SIGNIFICANT CHANGE UP (ref 2–14)
NEUTROPHILS # BLD AUTO: 3.69 K/UL — SIGNIFICANT CHANGE UP (ref 1.8–7.4)
NEUTROPHILS NFR BLD AUTO: 57.5 % — SIGNIFICANT CHANGE UP (ref 43–77)
NRBC # BLD: 0 /100 WBCS — SIGNIFICANT CHANGE UP (ref 0–0)
PLATELET # BLD AUTO: 230 K/UL — SIGNIFICANT CHANGE UP (ref 150–400)
RBC # BLD: 3.81 M/UL — LOW (ref 4.2–5.8)
RBC # FLD: 13.2 % — SIGNIFICANT CHANGE UP (ref 10.3–14.5)
WBC # BLD: 6.42 K/UL — SIGNIFICANT CHANGE UP (ref 3.8–10.5)
WBC # FLD AUTO: 6.42 K/UL — SIGNIFICANT CHANGE UP (ref 3.8–10.5)

## 2021-12-14 PROCEDURE — 99232 SBSQ HOSP IP/OBS MODERATE 35: CPT | Mod: GC

## 2021-12-14 RX ADMIN — Medication 125 MILLIGRAM(S): at 05:04

## 2021-12-14 RX ADMIN — OXYCODONE AND ACETAMINOPHEN 2 TABLET(S): 5; 325 TABLET ORAL at 00:22

## 2021-12-14 RX ADMIN — OXYCODONE AND ACETAMINOPHEN 2 TABLET(S): 5; 325 TABLET ORAL at 22:15

## 2021-12-14 RX ADMIN — OXYCODONE AND ACETAMINOPHEN 2 TABLET(S): 5; 325 TABLET ORAL at 12:55

## 2021-12-14 RX ADMIN — OXYCODONE AND ACETAMINOPHEN 2 TABLET(S): 5; 325 TABLET ORAL at 09:21

## 2021-12-14 RX ADMIN — OXYCODONE AND ACETAMINOPHEN 2 TABLET(S): 5; 325 TABLET ORAL at 15:31

## 2021-12-14 RX ADMIN — Medication 250 MILLIGRAM(S): at 05:04

## 2021-12-14 RX ADMIN — Medication 125 MILLIGRAM(S): at 18:26

## 2021-12-14 RX ADMIN — OXYCODONE AND ACETAMINOPHEN 2 TABLET(S): 5; 325 TABLET ORAL at 21:58

## 2021-12-14 RX ADMIN — Medication 125 MILLIGRAM(S): at 13:14

## 2021-12-14 RX ADMIN — OXYCODONE AND ACETAMINOPHEN 2 TABLET(S): 5; 325 TABLET ORAL at 16:02

## 2021-12-14 RX ADMIN — Medication 125 MILLIGRAM(S): at 23:38

## 2021-12-14 RX ADMIN — Medication 150 MILLIGRAM(S): at 05:04

## 2021-12-14 RX ADMIN — OXCARBAZEPINE 300 MILLIGRAM(S): 300 TABLET, FILM COATED ORAL at 05:04

## 2021-12-14 RX ADMIN — VALSARTAN 160 MILLIGRAM(S): 80 TABLET ORAL at 05:04

## 2021-12-14 RX ADMIN — Medication 150 MILLIGRAM(S): at 15:30

## 2021-12-14 RX ADMIN — Medication 150 MILLIGRAM(S): at 21:58

## 2021-12-14 RX ADMIN — Medication 15 MILLIGRAM(S): at 13:14

## 2021-12-14 RX ADMIN — OXCARBAZEPINE 300 MILLIGRAM(S): 300 TABLET, FILM COATED ORAL at 18:26

## 2021-12-14 RX ADMIN — CEFTRIAXONE 100 MILLIGRAM(S): 500 INJECTION, POWDER, FOR SOLUTION INTRAMUSCULAR; INTRAVENOUS at 15:38

## 2021-12-14 RX ADMIN — ESCITALOPRAM OXALATE 20 MILLIGRAM(S): 10 TABLET, FILM COATED ORAL at 13:14

## 2021-12-14 RX ADMIN — Medication 250 MILLIGRAM(S): at 18:26

## 2021-12-14 RX ADMIN — ENOXAPARIN SODIUM 40 MILLIGRAM(S): 100 INJECTION SUBCUTANEOUS at 13:14

## 2021-12-14 NOTE — PROGRESS NOTE ADULT - SUBJECTIVE AND OBJECTIVE BOX
Neurology follow up note    THANH [id number]      Interval History:    Patient feels ok no new complaints.    Allergies    No Known Allergies    Intolerances        MEDICATIONS    acetaminophen     Tablet .. 650 milliGRAM(s) Oral every 6 hours PRN  cefTRIAXone   IVPB      cefTRIAXone   IVPB 1000 milliGRAM(s) IV Intermittent every 24 hours  enoxaparin Injectable 40 milliGRAM(s) SubCutaneous daily  escitalopram 20 milliGRAM(s) Oral daily  OXcarbazepine 300 milliGRAM(s) Oral two times a day  oxybutynin 15 milliGRAM(s) Oral daily  oxycodone    5 mG/acetaminophen 325 mG 2 Tablet(s) Oral every 6 hours PRN  pregabalin 150 milliGRAM(s) Oral three times a day  saccharomyces boulardii 250 milliGRAM(s) Oral two times a day  valsartan 160 milliGRAM(s) Oral daily  vancomycin    Solution 125 milliGRAM(s) Oral every 6 hours              Vital Signs Last 24 Hrs  T(C): 36.7 (14 Dec 2021 04:45), Max: 36.8 (13 Dec 2021 21:42)  T(F): 98 (14 Dec 2021 04:45), Max: 98.2 (13 Dec 2021 21:42)  HR: 60 (14 Dec 2021 04:45) (60 - 66)  BP: 149/82 (14 Dec 2021 04:45) (132/78 - 151/79)  BP(mean): --  RR: 18 (14 Dec 2021 04:45) (16 - 18)  SpO2: 96% (14 Dec 2021 04:45) (94% - 97%)    bREVIEW OF SYSTEMS:  Constitutional:  At present, he does not feel fever but did have fevers at home.  Eyes:  No double vision or blurry vision.  Ears:  No ringing in the ears.  Neck:  No neck pain.  Respiratory:  No shortness of breath.  Cardiovascular:  No chest pain.  Abdomen:  No nausea, vomiting, or abdominal pain.  Extremities/Neurological:  Positive history of neuropathic pain.    PHYSICAL EXAMINATION:  HEENT:  Head:  Normocephalic, atraumatic.  Eyes:  No scleral icterus.  Ears:  Hearing bilaterally intact.  NECK:  Supple.  RESPIRATORY:  Good air entry bilaterally.  CARDIOVASCULAR:  S1 and S2 heard.  ABDOMEN:  Soft and nontender.  EXTREMITIES:  No clubbing or cyanosis was noted.      NEUROLOGIC:  The patient is awake and alert.  Extraocular movements were intact.  Speech was fluent.  Smile was symmetric.  Motor:  Right upper and bilateral lower extremities were 0/5, which is his baseline.  Left upper, slight flexation at the elbow, slight flexation of the hand, baseline is that he is able to elevate his left arm roughly about 30 to 40 degrees off the bed.  Bilateral lower extremities, swelling was noted.                 LABS:  CBC Full  -  ( 13 Dec 2021 11:17 )  WBC Count : 5.93 K/uL  RBC Count : 3.61 M/uL  Hemoglobin : 11.2 g/dL  Hematocrit : 31.9 %  Platelet Count - Automated : 208 K/uL  Mean Cell Volume : 88.4 fl  Mean Cell Hemoglobin : 31.0 pg  Mean Cell Hemoglobin Concentration : 35.1 gm/dL  Auto Neutrophil # : 4.06 K/uL  Auto Lymphocyte # : 1.06 K/uL  Auto Monocyte # : 0.57 K/uL  Auto Eosinophil # : 0.19 K/uL  Auto Basophil # : 0.03 K/uL  Auto Neutrophil % : 68.5 %  Auto Lymphocyte % : 17.9 %  Auto Monocyte % : 9.6 %  Auto Eosinophil % : 3.2 %  Auto Basophil % : 0.5 %      12-13    139  |  104  |  8   ----------------------------<  105<H>  3.6   |  29  |  0.51    Ca    8.0<L>      13 Dec 2021 11:17  Phos  2.5     12-13  Mg     1.7     12-13    TPro  5.7<L>  /  Alb  2.2<L>  /  TBili  0.4  /  DBili  x   /  AST  6<L>  /  ALT  12  /  AlkPhos  54  12-12    Hemoglobin A1C:     LIVER FUNCTIONS - ( 12 Dec 2021 09:35 )  Alb: 2.2 g/dL / Pro: 5.7 g/dL / ALK PHOS: 54 U/L / ALT: 12 U/L / AST: 6 U/L / GGT: x           Vitamin B12         RADIOLOGY        ANALYSIS AND PLAN:  This is a 66-year-old with weakness and a history of multiple sclerosis.  For episode of weakness, suspect most likely multiple sclerosis exacerbation secondary to underlying infectious type process  with a history of temperatures.  I would recommend infectious workup and antibiotics as needed.  For history of multiple sclerosis, appears to be fairly advanced, I would recommend supportive therapy.  For history of neuropathic pain and trigeminal neuralgia, continue the patient on his Lyrica and Trileptal.  At present, I would not recommend any type of steroids, infectious workup is ongoing.  monitor diarrhea as needed  increase ROM of left arm today does feel better 12/13 as per patient arm coming back to baseline     Spoke with the spouse, Alisson, at [phone] 12/12       Greater than 34 minutes of time was spent with the patient, plan of care, reviewing data, and speaking to the multidisciplinary healthcare team with greater than 50% of that time in counseling and care coordination.    Thank you for the courtesy of this consultation.   Neurology follow up note    THANH [id number]      Interval History:    Patient feels ok no new complaints.    Allergies    No Known Allergies    Intolerances        MEDICATIONS    acetaminophen     Tablet .. 650 milliGRAM(s) Oral every 6 hours PRN  cefTRIAXone   IVPB      cefTRIAXone   IVPB 1000 milliGRAM(s) IV Intermittent every 24 hours  enoxaparin Injectable 40 milliGRAM(s) SubCutaneous daily  escitalopram 20 milliGRAM(s) Oral daily  OXcarbazepine 300 milliGRAM(s) Oral two times a day  oxybutynin 15 milliGRAM(s) Oral daily  oxycodone    5 mG/acetaminophen 325 mG 2 Tablet(s) Oral every 6 hours PRN  pregabalin 150 milliGRAM(s) Oral three times a day  saccharomyces boulardii 250 milliGRAM(s) Oral two times a day  valsartan 160 milliGRAM(s) Oral daily  vancomycin    Solution 125 milliGRAM(s) Oral every 6 hours              Vital Signs Last 24 Hrs  T(C): 36.7 (14 Dec 2021 04:45), Max: 36.8 (13 Dec 2021 21:42)  T(F): 98 (14 Dec 2021 04:45), Max: 98.2 (13 Dec 2021 21:42)  HR: 60 (14 Dec 2021 04:45) (60 - 66)  BP: 149/82 (14 Dec 2021 04:45) (132/78 - 151/79)  BP(mean): --  RR: 18 (14 Dec 2021 04:45) (16 - 18)  SpO2: 96% (14 Dec 2021 04:45) (94% - 97%)    bREVIEW OF SYSTEMS:  Constitutional:  At present, he does not feel fever but did have fevers at home.  Eyes:  No double vision or blurry vision.  Ears:  No ringing in the ears.  Neck:  No neck pain.  Respiratory:  No shortness of breath.  Cardiovascular:  No chest pain.  Abdomen:  No nausea, vomiting, or abdominal pain.  Extremities/Neurological:  Positive history of neuropathic pain.    PHYSICAL EXAMINATION:  HEENT:  Head:  Normocephalic, atraumatic.  Eyes:  No scleral icterus.  Ears:  Hearing bilaterally intact.  NECK:  Supple.  RESPIRATORY:  Good air entry bilaterally.  CARDIOVASCULAR:  S1 and S2 heard.  ABDOMEN:  Soft and nontender.  EXTREMITIES:  No clubbing or cyanosis was noted.      NEUROLOGIC:  The patient is awake and alert.  Extraocular movements were intact.  Speech was fluent.  Smile was symmetric.  Motor:  Right upper and bilateral lower extremities were 0/5, which is his baseline.  Left upper, slight flexation at the elbow, slight flexation of the hand, baseline is that he is able to elevate his left arm roughly about 30 to 40 degrees off the bed.  Bilateral lower extremities, swelling was noted.                 LABS:  CBC Full  -  ( 13 Dec 2021 11:17 )  WBC Count : 5.93 K/uL  RBC Count : 3.61 M/uL  Hemoglobin : 11.2 g/dL  Hematocrit : 31.9 %  Platelet Count - Automated : 208 K/uL  Mean Cell Volume : 88.4 fl  Mean Cell Hemoglobin : 31.0 pg  Mean Cell Hemoglobin Concentration : 35.1 gm/dL  Auto Neutrophil # : 4.06 K/uL  Auto Lymphocyte # : 1.06 K/uL  Auto Monocyte # : 0.57 K/uL  Auto Eosinophil # : 0.19 K/uL  Auto Basophil # : 0.03 K/uL  Auto Neutrophil % : 68.5 %  Auto Lymphocyte % : 17.9 %  Auto Monocyte % : 9.6 %  Auto Eosinophil % : 3.2 %  Auto Basophil % : 0.5 %      12-13    139  |  104  |  8   ----------------------------<  105<H>  3.6   |  29  |  0.51    Ca    8.0<L>      13 Dec 2021 11:17  Phos  2.5     12-13  Mg     1.7     12-13    TPro  5.7<L>  /  Alb  2.2<L>  /  TBili  0.4  /  DBili  x   /  AST  6<L>  /  ALT  12  /  AlkPhos  54  12-12    Hemoglobin A1C:     LIVER FUNCTIONS - ( 12 Dec 2021 09:35 )  Alb: 2.2 g/dL / Pro: 5.7 g/dL / ALK PHOS: 54 U/L / ALT: 12 U/L / AST: 6 U/L / GGT: x           Vitamin B12         RADIOLOGY        ANALYSIS AND PLAN:  This is a 66-year-old with weakness and a history of multiple sclerosis.  For episode of weakness, suspect most likely multiple sclerosis exacerbation secondary to underlying infectious type process  with a history of temperatures.  I would recommend infectious workup and antibiotics as needed.  For history of multiple sclerosis, appears to be fairly advanced, I would recommend supportive therapy.  For history of neuropathic pain and trigeminal neuralgia, continue the patient on his Lyrica and Trileptal.  At present, I would not recommend any type of steroids, infectious workup is ongoing.  monitor diarrhea as needed  increase ROM of left arm today does feel better 12/14 as per patient arm coming back to baseline   no new diarrhea as per patient     Spoke with the spouse, Alisson, at [phone] 12/12       Greater than 30 minutes of time was spent with the patient, plan of care, reviewing data, and speaking to the multidisciplinary healthcare team with greater than 50% of that time in counseling and care coordination.    Thank you for the courtesy of this consultation.

## 2021-12-14 NOTE — PROGRESS NOTE ADULT - SUBJECTIVE AND OBJECTIVE BOX
Patient is a 66y old  Male who presents with a chief complaint of UTI, left arm weakness    INTERVAL HPI/OVERNIGHT EVENTS: Patient seen and examined at bedside. Patient states his left arm weakness has resolved and function has returned to baseline.   Cdiff positive. Oral Vanc started.   Patient endorses xx Loose BM in the past 24 hours.   No other complaints at this time. Denies fever, chills, chest pain, shortness of breath, abdominal pain, nausea, vomiting.     MEDICATIONS  (STANDING):  cefTRIAXone   IVPB 1000 milliGRAM(s) IV Intermittent every 24 hours  enoxaparin Injectable 40 milliGRAM(s) SubCutaneous daily  escitalopram 20 milliGRAM(s) Oral daily  lactated ringers. 1000 milliLiter(s) (75 mL/Hr) IV Continuous <Continuous>  OXcarbazepine 300 milliGRAM(s) Oral two times a day  oxybutynin 15 milliGRAM(s) Oral daily  potassium chloride    Tablet ER 40 milliEquivalent(s) Oral every 4 hours  potassium chloride  10 mEq/100 mL IVPB 10 milliEquivalent(s) IV Intermittent every 1 hour  pregabalin 150 milliGRAM(s) Oral three times a day  saccharomyces boulardii 250 milliGRAM(s) Oral two times a day  senna 2 Tablet(s) Oral at bedtime  valsartan 160 milliGRAM(s) Oral daily    MEDICATIONS  (PRN):  acetaminophen     Tablet .. 650 milliGRAM(s) Oral every 6 hours PRN Temp greater or equal to 38C (100.4F)  oxycodone    5 mG/acetaminophen 325 mG 2 Tablet(s) Oral every 8 hours PRN Moderate Pain (4 - 6)  polyethylene glycol 3350 17 Gram(s) Oral daily PRN Constipation      Allergies    No Known Allergies    Intolerances        REVIEW OF SYSTEMS:  CONSTITUTIONAL: No fever or chills  HEENT:  No headache, no sore throat  RESPIRATORY: No cough, wheezing, or shortness of breath  CARDIOVASCULAR: No chest pain, palpitations  GASTROINTESTINAL: No abd pain, nausea, vomiting, or diarrhea  GENITOURINARY: No dysuria, frequency, or hematuria  NEUROLOGICAL: no focal weakness or dizziness  MUSCULOSKELETAL: no myalgias     Vital Signs Last 24 Hrs  T(C): 36.7 (14 Dec 2021 04:45), Max: 36.8 (13 Dec 2021 21:42)  T(F): 98 (14 Dec 2021 04:45), Max: 98.2 (13 Dec 2021 21:42)  HR: 60 (14 Dec 2021 04:45) (60 - 66)  BP: 149/82 (14 Dec 2021 04:45) (132/78 - 151/79)  BP(mean): --  RR: 18 (14 Dec 2021 04:45) (16 - 18)  SpO2: 96% (14 Dec 2021 04:45) (94% - 97%)    PHYSICAL EXAM:  GENERAL: NAD  HEENT: EOMI, PERRL, anicteric, moist mucous membranes  CHEST/LUNG:  CTA b/l, no rales, wheezes, or rhonchi  HEART:  RRR, S1, S2  ABDOMEN:  BS+, soft, nondistended + mild periumbilical TTP  EXTREMITIES: + nonpitting edema in all 4 extremities, +3 pulses in b/l UE; no cyanosis, or calf tenderness  NERVOUS SYSTEM: answers questions and follows commands appropriately  MSK: 4/5 muscle strength in LUE, 0/5 muscle strength in RUE and b/l LE; baseline      LABS:                        10.8   6.66  )-----------( 199      ( 12 Dec 2021 09:35 )             31.5     12-12    136  |  103  |  10  ----------------------------<  83  3.7   |  25  |  0.43<L>    Ca    7.8<L>      12 Dec 2021 09:35  Phos  2.2     12-12  Mg     1.8     12-12    TPro  5.7<L>  /  Alb  2.2<L>  /  TBili  0.4  /  DBili  x   /  AST  6<L>  /  ALT  12  /  AlkPhos  54  12-12          RADIOLOGY & ADDITIONAL TESTS:    Personally reviewed.     Consultant(s) Notes Reviewed:  [x] YES  [ ] NO     Patient is a 66y old  Male who presents with a chief complaint of UTI, left arm weakness    INTERVAL HPI/OVERNIGHT EVENTS: Patient seen and examined at bedside. Patient states his left arm weakness has resolved and function has returned to baseline.   Cdiff positive. Oral Vanc started.   Patient endorses No Loose BM in the past 24 hours.   No other complaints at this time. Denies fever, chills, chest pain, shortness of breath, abdominal pain, nausea, vomiting.     MEDICATIONS  (STANDING):  cefTRIAXone   IVPB 1000 milliGRAM(s) IV Intermittent every 24 hours  enoxaparin Injectable 40 milliGRAM(s) SubCutaneous daily  escitalopram 20 milliGRAM(s) Oral daily  lactated ringers. 1000 milliLiter(s) (75 mL/Hr) IV Continuous <Continuous>  OXcarbazepine 300 milliGRAM(s) Oral two times a day  oxybutynin 15 milliGRAM(s) Oral daily  potassium chloride    Tablet ER 40 milliEquivalent(s) Oral every 4 hours  potassium chloride  10 mEq/100 mL IVPB 10 milliEquivalent(s) IV Intermittent every 1 hour  pregabalin 150 milliGRAM(s) Oral three times a day  saccharomyces boulardii 250 milliGRAM(s) Oral two times a day  senna 2 Tablet(s) Oral at bedtime  valsartan 160 milliGRAM(s) Oral daily    MEDICATIONS  (PRN):  acetaminophen     Tablet .. 650 milliGRAM(s) Oral every 6 hours PRN Temp greater or equal to 38C (100.4F)  oxycodone    5 mG/acetaminophen 325 mG 2 Tablet(s) Oral every 8 hours PRN Moderate Pain (4 - 6)  polyethylene glycol 3350 17 Gram(s) Oral daily PRN Constipation      Allergies    No Known Allergies    Intolerances        REVIEW OF SYSTEMS:  CONSTITUTIONAL: No fever or chills  HEENT:  No headache, no sore throat  RESPIRATORY: No cough, wheezing, or shortness of breath  CARDIOVASCULAR: No chest pain, palpitations  GASTROINTESTINAL: No abd pain, nausea, vomiting, or diarrhea  GENITOURINARY: No dysuria, frequency, or hematuria  NEUROLOGICAL: no focal weakness or dizziness  MUSCULOSKELETAL: no myalgias     Vital Signs Last 24 Hrs  T(C): 36.7 (14 Dec 2021 04:45), Max: 36.8 (13 Dec 2021 21:42)  T(F): 98 (14 Dec 2021 04:45), Max: 98.2 (13 Dec 2021 21:42)  HR: 60 (14 Dec 2021 04:45) (60 - 66)  BP: 149/82 (14 Dec 2021 04:45) (132/78 - 151/79)  BP(mean): --  RR: 18 (14 Dec 2021 04:45) (16 - 18)  SpO2: 96% (14 Dec 2021 04:45) (94% - 97%)    PHYSICAL EXAM:  GENERAL: NAD  HEENT: EOMI, PERRL, anicteric, moist mucous membranes  CHEST/LUNG:  CTA b/l, no rales, wheezes, or rhonchi  HEART:  RRR, S1, S2  ABDOMEN:  BS+, soft, nondistended + mild periumbilical TTP  EXTREMITIES: + nonpitting edema in all 4 extremities, +3 pulses in b/l UE; no cyanosis, or calf tenderness  NERVOUS SYSTEM: answers questions and follows commands appropriately  MSK: 4/5 muscle strength in LUE, 0/5 muscle strength in RUE and b/l LE; baseline      LABS:                        11.6   6.42  )-----------( 230      ( 14 Dec 2021 08:53 )             34.1     12-14    140  |  104  |  6<L>  ----------------------------<  88  3.6   |  31  |  0.49<L>    Ca    8.5      14 Dec 2021 08:53  Phos  2.5     12-13  Mg     1.7     12-13    TPro  5.9<L>  /  Alb  2.4<L>  /  TBili  0.4  /  DBili  x   /  AST  7<L>  /  ALT  13  /  AlkPhos  56  12-14        RADIOLOGY & ADDITIONAL TESTS:    Personally reviewed.     Consultant(s) Notes Reviewed:  [x] YES  [ ] NO

## 2021-12-14 NOTE — PROGRESS NOTE ADULT - ATTENDING COMMENTS
66 year old male w PMHx MS, L foot neuropathic pain admitted for sepsis secondary to UTI with acute left arm weakness.    Patient seen and examined at bedside. States he feels well, LUE weakness continues to improve. Patient states he did not have diarrhea today. Discussed with ID, who had spoken to patient's wife regarding course of treatment. Wife concerned regarding transmission of infection and states she will not be able to take care of patient. Educated wife regarding disease process and that if patient is on antibiotics and does not have diarrhea for 24-48 hours, risk of transmission is very low. Also advised proper hand hygiene and hand washing. Patient's wife states she is not comfortable having patient back home, but patient and wife also refusing rehab. Will need to discuss with CM/SW regarding dispo plan.

## 2021-12-14 NOTE — PROGRESS NOTE ADULT - ASSESSMENT
Pt is a 66M w/ PMHx of MS, L foot neuropathic pain presented to ED from home c/o fever of 103.5 and new onset inability to move his L arm. similar episode in March 2020 when he spiked a fever and was unable to move his L arm. Strength in his L arm was regained after a few hours started on cell cept for his MS about 2 months ago. recent abx for dental infection    Acute Complicated UTI  Fevers-resolved  Leukocytosis-resolved  -infectious w/u reviewed  --BCx/UCx NGTD  -imaging reviewed  -c/w ceftriaxone 1gm q24h to complete total 7 day course until 12/16. Can switch to PO cefpodoxime 200mg BID to complete course if planning for d/c    C. Diff Infection  Watery Diarrhea  -c/w PO vancomycin 125mg q6h to complete total 14 day course until 12/25    Infectious Diseases will continue to follow. Please call with any questions.   Zoe Baird M.D.  [facility], Division of Infectious Diseases [phone]  For over the weekend and after hours, please call [phone]     Pt is a 66M w/ PMHx of MS, L foot neuropathic pain presented to ED from home c/o fever of 103.5 and new onset inability to move his L arm. similar episode in March 2020 when he spiked a fever and was unable to move his L arm. Strength in his L arm was regained after a few hours started on cell cept for his MS about 2 months ago. recent abx for dental infection    Acute Complicated UTI  Fevers-resolved  Leukocytosis-resolved  -infectious w/u reviewed  --BCx/UCx NGTD  -imaging reviewed  -c/w ceftriaxone 1gm q24h to complete total 7 day course until 12/16. Can switch to PO cefpodoxime 200mg BID to complete course if planning for d/c    C. Diff Infection  Watery Diarrhea  -c/w PO vancomycin 125mg q6h to complete total 14 day course until 12/25    Spoke to wife on phone at length at bedside. Expresses concern being able to take care of patient by herself    Infectious Diseases will continue to follow. Please call with any questions.   Zoe Baird M.D.  [facility], Division of Infectious Diseases [phone]  For over the weekend and after hours, please call [phone]     Pt is a 66M w/ PMHx of MS, L foot neuropathic pain presented to ED from home c/o fever of 103.5 and new onset inability to move his L arm. similar episode in March 2020 when he spiked a fever and was unable to move his L arm. Strength in his L arm was regained after a few hours started on cell cept for his MS about 2 months ago. recent abx for dental infection    Acute Complicated UTI  Fevers-resolved  Leukocytosis-resolved  -infectious w/u reviewed  --BCx/UCx NGTD  -imaging reviewed  -c/w ceftriaxone 1gm q24h to complete total 7 day course until 12/16. Can switch to PO cefpodoxime 200mg BID to complete course if planning for d/c    C. Diff Infection  Watery Diarrhea  -c/w PO vancomycin 125mg q6h to complete total 14 day course until 12/25    Spoke to wife on phone at length at bedside. Expresses concern being able to take care of patient by herself.  I reviewed at length that if patient is no longer having diarrhea, they are not contagious. I also reviewed proper hand hygiene and maintaining a clean environment.  I also reviewed that there is no test of cure; as long as pt clinically improved (afebrile, no more diarrhea), the infection is likely cleared  Patient and wife questioned if patient will have recurrent infection--I reviewed that risk of recurrence is there and I cannot guarantee that the patient will never have this infection again, however given that this is the 1st time patient is having this infection, he is on the appropriate treatment course per IDSA guidelines.   All questions answered    Above c/w Dr. Zhong    Infectious Diseases will continue to follow. Please call with any questions.   Zoe Baird M.D.  [facility], Division of Infectious Diseases [phone]  For over the weekend and after hours, please call [phone]     Pt is a 66M w/ PMHx of MS, L foot neuropathic pain presented to ED from home c/o fever of 103.5 and new onset inability to move his L arm. similar episode in March 2020 when he spiked a fever and was unable to move his L arm. Strength in his L arm was regained after a few hours started on cell cept for his MS about 2 months ago. recent abx for dental infection    Acute Complicated UTI  Fevers-resolved  Leukocytosis-resolved  -infectious w/u reviewed  --BCx/UCx NGTD  -imaging reviewed  -c/w ceftriaxone 1gm q24h to complete total 7 day course until 12/16. Can switch to PO cefpodoxime 200mg BID to complete course if planning for d/c    C. Diff Infection  Watery Diarrhea  -c/w PO vancomycin 125mg q6h to complete total 14 day course until 12/25  -contact isolation per infection control policy    Spoke to wife on phone at length at bedside. Expresses concern being able to take care of patient by herself.  I reviewed at length that if patient is no longer having diarrhea, they are not contagious. I also reviewed proper hand hygiene and maintaining a clean environment.  I also reviewed that there is no test of cure; as long as pt clinically improved (afebrile, no more diarrhea), the infection is likely cleared  Patient and wife questioned if patient will have recurrent infection--I reviewed that risk of recurrence is there and I cannot guarantee that the patient will never have this infection again, however given that this is the 1st time patient is having this infection, he is on the appropriate treatment course per IDSA guidelines.   All questions answered    Above d/w Dr. Zhong    Infectious Diseases will continue to follow. Please call with any questions.   Zoe Baird M.D.  [facility], Division of Infectious Diseases [phone]  For over the weekend and after hours, please call [phone]

## 2021-12-14 NOTE — PROGRESS NOTE ADULT - SUBJECTIVE AND OBJECTIVE BOX
[facility] [address], Division of Infectious Diseases  HALEIGH Willams Y. Patel, S. Shah  [phone]    Name: THANH ISRAEL  Age: 66y  Gender: Male  MRN: [id number]    Interval History:  Patient seen and examined at bedside this morning  No acute overnight events. Afebrile  No complaints this AM  Tolerating medications well  Notes reviewed    Antibiotics:  cefTRIAXone   IVPB      cefTRIAXone   IVPB 1000 milliGRAM(s) IV Intermittent every 24 hours  vancomycin    Solution 125 milliGRAM(s) Oral every 6 hours      Medications:  acetaminophen     Tablet .. 650 milliGRAM(s) Oral every 6 hours PRN  cefTRIAXone   IVPB      cefTRIAXone   IVPB 1000 milliGRAM(s) IV Intermittent every 24 hours  enoxaparin Injectable 40 milliGRAM(s) SubCutaneous daily  escitalopram 20 milliGRAM(s) Oral daily  OXcarbazepine 300 milliGRAM(s) Oral two times a day  oxybutynin 15 milliGRAM(s) Oral daily  oxycodone    5 mG/acetaminophen 325 mG 2 Tablet(s) Oral every 6 hours PRN  pregabalin 150 milliGRAM(s) Oral three times a day  saccharomyces boulardii 250 milliGRAM(s) Oral two times a day  valsartan 160 milliGRAM(s) Oral daily  vancomycin    Solution 125 milliGRAM(s) Oral every 6 hours      Review of Systems:  A 10-point review of systems was obtained.     Pertinent positives and negatives--  Constitutional: No fevers. No Chills. No Rigors.   Cardiovascular: No chest pain. No palpitations.  Respiratory: No shortness of breath. No cough.  Gastrointestinal: No nausea or vomiting. No diarrhea or constipation.   Psychiatric: Pleasant. Appropriate affect.    Review of systems otherwise negative except as previously noted.    Allergies: No Known Allergies    For details regarding the patient's past medical history, social history, family history, and other miscellaneous elements, please refer the initial infectious diseases consultation and/or the admitting history and physical examination for this admission.    Objective:  Vitals:   T(C): 36.7 (12-14-21 @ 04:45), Max: 36.8 (12-13-21 @ 21:42)  HR: 60 (12-14-21 @ 04:45) (60 - 62)  BP: 149/82 (12-14-21 @ 04:45) (132/78 - 151/79)  RR: 18 (12-14-21 @ 04:45) (16 - 18)  SpO2: 96% (12-14-21 @ 04:45) (94% - 97%)    Physical Examination:  General: no acute distress  HEENT: NC/AT, EOMI, anicteric, no oral lesions  Neck: supple, no palpable LAD  Cardio: S1, S2 heard, RRR, no murmurs  Resp: breath sounds heard bilaterally, no rales, wheezes or rhonchi  Abd: soft, NT, ND, + bowel sounds  Ext: no edema or cyanosis  Skin: warm, dry, no visible rash      Laboratory Studies:  CBC:                       11.2   5.93  )-----------( 208      ( 13 Dec 2021 11:17 )             31.9     CMP: 12-13    139  |  104  |  8   ----------------------------<  105<H>  3.6   |  29  |  0.51    Ca    8.0<L>      13 Dec 2021 11:17  Phos  2.5     12-13  Mg     1.7     12-13    TPro  5.7<L>  /  Alb  2.2<L>  /  TBili  0.4  /  DBili  x   /  AST  6<L>  /  ALT  12  /  AlkPhos  54  12-12    LIVER FUNCTIONS - ( 12 Dec 2021 09:35 )  Alb: 2.2 g/dL / Pro: 5.7 g/dL / ALK PHOS: 54 U/L / ALT: 12 U/L / AST: 6 U/L / GGT: x               Microbiology: reviewed    Culture - Blood (collected 12-10-21 @ 09:05)  Source: .Blood Blood-Peripheral  Preliminary Report (12-11-21 @ 10:01):    No growth to date.    Culture - Blood (collected 12-10-21 @ 09:05)  Source: .Blood Blood-Peripheral  Preliminary Report (12-11-21 @ 10:01):    No growth to date.    Culture - Urine (collected 12-10-21 @ 09:04)  Source: Clean Catch Clean Catch (Midstream)  Final Report (12-12-21 @ 10:16):    No growth        Radiology: reviewed

## 2021-12-15 ENCOUNTER — TRANSCRIPTION ENCOUNTER (OUTPATIENT)
Age: 66
End: 2021-12-15

## 2021-12-15 VITALS
TEMPERATURE: 98 F | RESPIRATION RATE: 20 BRPM | OXYGEN SATURATION: 93 % | HEART RATE: 64 BPM | SYSTOLIC BLOOD PRESSURE: 150 MMHG | DIASTOLIC BLOOD PRESSURE: 81 MMHG

## 2021-12-15 LAB
ALBUMIN SERPL ELPH-MCNC: 2.5 G/DL — LOW (ref 3.3–5)
ALP SERPL-CCNC: 60 U/L — SIGNIFICANT CHANGE UP (ref 40–120)
ALT FLD-CCNC: 18 U/L — SIGNIFICANT CHANGE UP (ref 12–78)
ANION GAP SERPL CALC-SCNC: 5 MMOL/L — SIGNIFICANT CHANGE UP (ref 5–17)
AST SERPL-CCNC: 12 U/L — LOW (ref 15–37)
BASOPHILS # BLD AUTO: 0.04 K/UL — SIGNIFICANT CHANGE UP (ref 0–0.2)
BASOPHILS NFR BLD AUTO: 0.5 % — SIGNIFICANT CHANGE UP (ref 0–2)
BILIRUB SERPL-MCNC: 0.3 MG/DL — SIGNIFICANT CHANGE UP (ref 0.2–1.2)
BUN SERPL-MCNC: 6 MG/DL — LOW (ref 7–23)
CALCIUM SERPL-MCNC: 8.6 MG/DL — SIGNIFICANT CHANGE UP (ref 8.5–10.1)
CHLORIDE SERPL-SCNC: 105 MMOL/L — SIGNIFICANT CHANGE UP (ref 96–108)
CO2 SERPL-SCNC: 30 MMOL/L — SIGNIFICANT CHANGE UP (ref 22–31)
CREAT SERPL-MCNC: 0.49 MG/DL — LOW (ref 0.5–1.3)
CULTURE RESULTS: SIGNIFICANT CHANGE UP
CULTURE RESULTS: SIGNIFICANT CHANGE UP
EOSINOPHIL # BLD AUTO: 0.35 K/UL — SIGNIFICANT CHANGE UP (ref 0–0.5)
EOSINOPHIL NFR BLD AUTO: 4.4 % — SIGNIFICANT CHANGE UP (ref 0–6)
GLUCOSE SERPL-MCNC: 107 MG/DL — HIGH (ref 70–99)
HCT VFR BLD CALC: 34.8 % — LOW (ref 39–50)
HGB BLD-MCNC: 12.2 G/DL — LOW (ref 13–17)
IMM GRANULOCYTES NFR BLD AUTO: 0.6 % — SIGNIFICANT CHANGE UP (ref 0–1.5)
LYMPHOCYTES # BLD AUTO: 1.4 K/UL — SIGNIFICANT CHANGE UP (ref 1–3.3)
LYMPHOCYTES # BLD AUTO: 17.5 % — SIGNIFICANT CHANGE UP (ref 13–44)
MCHC RBC-ENTMCNC: 30.9 PG — SIGNIFICANT CHANGE UP (ref 27–34)
MCHC RBC-ENTMCNC: 35.1 GM/DL — SIGNIFICANT CHANGE UP (ref 32–36)
MCV RBC AUTO: 88.1 FL — SIGNIFICANT CHANGE UP (ref 80–100)
MONOCYTES # BLD AUTO: 0.61 K/UL — SIGNIFICANT CHANGE UP (ref 0–0.9)
MONOCYTES NFR BLD AUTO: 7.6 % — SIGNIFICANT CHANGE UP (ref 2–14)
NEUTROPHILS # BLD AUTO: 5.53 K/UL — SIGNIFICANT CHANGE UP (ref 1.8–7.4)
NEUTROPHILS NFR BLD AUTO: 69.4 % — SIGNIFICANT CHANGE UP (ref 43–77)
NRBC # BLD: 0 /100 WBCS — SIGNIFICANT CHANGE UP (ref 0–0)
PLATELET # BLD AUTO: 257 K/UL — SIGNIFICANT CHANGE UP (ref 150–400)
POTASSIUM SERPL-MCNC: 3.8 MMOL/L — SIGNIFICANT CHANGE UP (ref 3.5–5.3)
POTASSIUM SERPL-SCNC: 3.8 MMOL/L — SIGNIFICANT CHANGE UP (ref 3.5–5.3)
PROT SERPL-MCNC: 6.3 G/DL — SIGNIFICANT CHANGE UP (ref 6–8.3)
RBC # BLD: 3.95 M/UL — LOW (ref 4.2–5.8)
RBC # FLD: 13.2 % — SIGNIFICANT CHANGE UP (ref 10.3–14.5)
SODIUM SERPL-SCNC: 140 MMOL/L — SIGNIFICANT CHANGE UP (ref 135–145)
SPECIMEN SOURCE: SIGNIFICANT CHANGE UP
SPECIMEN SOURCE: SIGNIFICANT CHANGE UP
WBC # BLD: 7.98 K/UL — SIGNIFICANT CHANGE UP (ref 3.8–10.5)
WBC # FLD AUTO: 7.98 K/UL — SIGNIFICANT CHANGE UP (ref 3.8–10.5)

## 2021-12-15 PROCEDURE — 99239 HOSP IP/OBS DSCHRG MGMT >30: CPT

## 2021-12-15 RX ORDER — ACETAMINOPHEN 500 MG
2 TABLET ORAL
Qty: 0 | Refills: 0 | DISCHARGE
Start: 2021-12-15

## 2021-12-15 RX ORDER — SACCHAROMYCES BOULARDII 250 MG
1 POWDER IN PACKET (EA) ORAL
Qty: 14 | Refills: 0
Start: 2021-12-15 | End: 2021-12-21

## 2021-12-15 RX ORDER — CEFPODOXIME PROXETIL 100 MG
1 TABLET ORAL
Qty: 0 | Refills: 0 | DISCHARGE
Start: 2021-12-15

## 2021-12-15 RX ORDER — VANCOMYCIN HCL 1 G
1 VIAL (EA) INTRAVENOUS
Qty: 40 | Refills: 0
Start: 2021-12-15 | End: 2021-12-24

## 2021-12-15 RX ORDER — CEFPODOXIME PROXETIL 100 MG
1 TABLET ORAL
Qty: 2 | Refills: 0
Start: 2021-12-15 | End: 2021-12-15

## 2021-12-15 RX ORDER — CEFPODOXIME PROXETIL 100 MG
200 TABLET ORAL EVERY 12 HOURS
Refills: 0 | Status: DISCONTINUED | OUTPATIENT
Start: 2021-12-15 | End: 2021-12-15

## 2021-12-15 RX ADMIN — Medication 125 MILLIGRAM(S): at 18:29

## 2021-12-15 RX ADMIN — OXYCODONE AND ACETAMINOPHEN 2 TABLET(S): 5; 325 TABLET ORAL at 13:52

## 2021-12-15 RX ADMIN — Medication 150 MILLIGRAM(S): at 08:43

## 2021-12-15 RX ADMIN — ESCITALOPRAM OXALATE 20 MILLIGRAM(S): 10 TABLET, FILM COATED ORAL at 12:39

## 2021-12-15 RX ADMIN — OXYCODONE AND ACETAMINOPHEN 2 TABLET(S): 5; 325 TABLET ORAL at 19:34

## 2021-12-15 RX ADMIN — OXYCODONE AND ACETAMINOPHEN 2 TABLET(S): 5; 325 TABLET ORAL at 12:52

## 2021-12-15 RX ADMIN — Medication 150 MILLIGRAM(S): at 13:10

## 2021-12-15 RX ADMIN — VALSARTAN 160 MILLIGRAM(S): 80 TABLET ORAL at 06:47

## 2021-12-15 RX ADMIN — Medication 125 MILLIGRAM(S): at 06:47

## 2021-12-15 RX ADMIN — Medication 200 MILLIGRAM(S): at 18:29

## 2021-12-15 RX ADMIN — Medication 250 MILLIGRAM(S): at 18:29

## 2021-12-15 RX ADMIN — Medication 15 MILLIGRAM(S): at 12:39

## 2021-12-15 RX ADMIN — Medication 250 MILLIGRAM(S): at 06:47

## 2021-12-15 RX ADMIN — ENOXAPARIN SODIUM 40 MILLIGRAM(S): 100 INJECTION SUBCUTANEOUS at 12:39

## 2021-12-15 RX ADMIN — OXCARBAZEPINE 300 MILLIGRAM(S): 300 TABLET, FILM COATED ORAL at 06:47

## 2021-12-15 RX ADMIN — Medication 125 MILLIGRAM(S): at 12:40

## 2021-12-15 RX ADMIN — OXYCODONE AND ACETAMINOPHEN 2 TABLET(S): 5; 325 TABLET ORAL at 19:13

## 2021-12-15 RX ADMIN — OXCARBAZEPINE 300 MILLIGRAM(S): 300 TABLET, FILM COATED ORAL at 18:29

## 2021-12-15 NOTE — PROGRESS NOTE ADULT - PROBLEM SELECTOR PLAN 2
- Patient endorsing 5 water BMs on 12/12  - C diff positive. Started on Oral vanc. Will continue to monitor improvement of symptoms. No recent episodes of loose BM  - Will discuss with ID regarding DC planning

## 2021-12-15 NOTE — PROGRESS NOTE ADULT - PROBLEM SELECTOR PROBLEM 2
Left arm weakness
Watery diarrhea

## 2021-12-15 NOTE — PROGRESS NOTE ADULT - SUBJECTIVE AND OBJECTIVE BOX
Patient is a 66y old  Male who presents with a chief complaint of UTI, left arm weakness    INTERVAL HPI/OVERNIGHT EVENTS: Patient seen and examined at bedside. Patient states his left arm weakness has resolved and function has returned to baseline.   On PO Vanc for C diff.  Patient endorses No Loose BM in the past 24 hours.   No other complaints at this time. Denies fever, chills, chest pain, shortness of breath, abdominal pain, nausea, vomiting.     MEDICATIONS  (STANDING):  cefTRIAXone   IVPB 1000 milliGRAM(s) IV Intermittent every 24 hours  enoxaparin Injectable 40 milliGRAM(s) SubCutaneous daily  escitalopram 20 milliGRAM(s) Oral daily  lactated ringers. 1000 milliLiter(s) (75 mL/Hr) IV Continuous <Continuous>  OXcarbazepine 300 milliGRAM(s) Oral two times a day  oxybutynin 15 milliGRAM(s) Oral daily  potassium chloride    Tablet ER 40 milliEquivalent(s) Oral every 4 hours  potassium chloride  10 mEq/100 mL IVPB 10 milliEquivalent(s) IV Intermittent every 1 hour  pregabalin 150 milliGRAM(s) Oral three times a day  saccharomyces boulardii 250 milliGRAM(s) Oral two times a day  senna 2 Tablet(s) Oral at bedtime  valsartan 160 milliGRAM(s) Oral daily    MEDICATIONS  (PRN):  acetaminophen     Tablet .. 650 milliGRAM(s) Oral every 6 hours PRN Temp greater or equal to 38C (100.4F)  oxycodone    5 mG/acetaminophen 325 mG 2 Tablet(s) Oral every 8 hours PRN Moderate Pain (4 - 6)  polyethylene glycol 3350 17 Gram(s) Oral daily PRN Constipation      Allergies    No Known Allergies    Intolerances        REVIEW OF SYSTEMS:  CONSTITUTIONAL: No fever or chills  HEENT:  No headache, no sore throat  RESPIRATORY: No cough, wheezing, or shortness of breath  CARDIOVASCULAR: No chest pain, palpitations  GASTROINTESTINAL: No abd pain, nausea, vomiting, or diarrhea  GENITOURINARY: No dysuria, frequency, or hematuria  NEUROLOGICAL: no focal weakness or dizziness  MUSCULOSKELETAL: no myalgias     Vital Signs Last 24 Hrs  T(C): 36.6 (15 Dec 2021 06:45), Max: 36.8 (14 Dec 2021 12:18)  T(F): 97.8 (15 Dec 2021 06:45), Max: 98.2 (14 Dec 2021 12:18)  HR: 66 (15 Dec 2021 06:45) (55 - 66)  BP: 151/82 (15 Dec 2021 06:45) (121/77 - 151/82)  BP(mean): --  RR: 18 (15 Dec 2021 06:45) (18 - 20)  SpO2: 93% (15 Dec 2021 06:45) (93% - 94%)    PHYSICAL EXAM:  GENERAL: NAD  HEENT: EOMI, PERRL, anicteric, moist mucous membranes  CHEST/LUNG:  CTA b/l, no rales, wheezes, or rhonchi  HEART:  RRR, S1, S2  ABDOMEN:  BS+, soft, nondistended + mild periumbilical TTP  EXTREMITIES: + nonpitting edema in all 4 extremities, +3 pulses in b/l UE; no cyanosis, or calf tenderness  NERVOUS SYSTEM: answers questions and follows commands appropriately  MSK: 4/5 muscle strength in LUE, 0/5 muscle strength in RUE and b/l LE; baseline        LABS:                        11.6   6.42  )-----------( 230      ( 14 Dec 2021 08:53 )             34.1     12-14    140  |  104  |  6<L>  ----------------------------<  88  3.6   |  31  |  0.49<L>    Ca    8.5      14 Dec 2021 08:53  Phos  2.5     12-13  Mg     1.7     12-13    TPro  5.9<L>  /  Alb  2.4<L>  /  TBili  0.4  /  DBili  x   /  AST  7<L>  /  ALT  13  /  AlkPhos  56  12-14          VITAL SIGNS:  T(C): 36.6 (12-15-21 @ 06:45), Max: 36.8 (12-14-21 @ 12:18)  HR: 66 (12-15-21 @ 06:45) (55 - 66)  BP: 151/82 (12-15-21 @ 06:45) (121/77 - 151/82)  RR: 18 (12-15-21 @ 06:45) (18 - 20)  SpO2: 93% (12-15-21 @ 06:45) (93% - 94%)    RADIOLOGY & ADDITIONAL TESTS:    Personally reviewed.     Consultant(s) Notes Reviewed:  [x] YES  [ ] NO

## 2021-12-15 NOTE — PROGRESS NOTE ADULT - SUBJECTIVE AND OBJECTIVE BOX
Neurology follow up note    THANH [id number]      Interval History:    Patient feels ok no new complaints no diarrhea     Allergies    No Known Allergies    Intolerances        MEDICATIONS    acetaminophen     Tablet .. 650 milliGRAM(s) Oral every 6 hours PRN  artificial tears (preservative free) Ophthalmic Solution 1 Drop(s) Both EYES two times a day PRN  cefpodoxime 200 milliGRAM(s) Oral every 12 hours  enoxaparin Injectable 40 milliGRAM(s) SubCutaneous daily  escitalopram 20 milliGRAM(s) Oral daily  OXcarbazepine 300 milliGRAM(s) Oral two times a day  oxybutynin 15 milliGRAM(s) Oral daily  oxycodone    5 mG/acetaminophen 325 mG 2 Tablet(s) Oral every 6 hours PRN  pregabalin 150 milliGRAM(s) Oral three times a day  saccharomyces boulardii 250 milliGRAM(s) Oral two times a day  valsartan 160 milliGRAM(s) Oral daily  vancomycin    Solution 125 milliGRAM(s) Oral every 6 hours              Vital Signs Last 24 Hrs  T(C): 36.6 (15 Dec 2021 06:45), Max: 36.8 (14 Dec 2021 12:18)  T(F): 97.8 (15 Dec 2021 06:45), Max: 98.2 (14 Dec 2021 12:18)  HR: 66 (15 Dec 2021 06:45) (55 - 66)  BP: 151/82 (15 Dec 2021 06:45) (121/77 - 151/82)  BP(mean): --  RR: 18 (15 Dec 2021 06:45) (18 - 20)  SpO2: 93% (15 Dec 2021 06:45) (93% - 94%)      REVIEW OF SYSTEMS:  Constitutional:  At present, he does not feel fever but did have fevers at home.  Eyes:  No double vision or blurry vision.  Ears:  No ringing in the ears.  Neck:  No neck pain.  Respiratory:  No shortness of breath.  Cardiovascular:  No chest pain.  Abdomen:  No nausea, vomiting, or abdominal pain.  Extremities/Neurological:  Positive history of neuropathic pain.    PHYSICAL EXAMINATION:  HEENT:  Head:  Normocephalic, atraumatic.  Eyes:  No scleral icterus.  Ears:  Hearing bilaterally intact.  NECK:  Supple.  RESPIRATORY:  Good air entry bilaterally.  CARDIOVASCULAR:  S1 and S2 heard.  ABDOMEN:  Soft and nontender.  EXTREMITIES:  No clubbing or cyanosis was noted.      NEUROLOGIC:  The patient is awake and alert.  Extraocular movements were intact.  Speech was fluent.  Smile was symmetric.  Motor:  Right upper and bilateral lower extremities were 0/5, which is his baseline.  Left upper, slight flexation at the elbow, slight flexation of the hand, baseline is that he is able to elevate his left arm roughly about 30 to 40 degrees off the bed.  Bilateral lower extremities, swelling was noted.                 LABS:  CBC Full  -  ( 15 Dec 2021 08:14 )  WBC Count : 7.98 K/uL  RBC Count : 3.95 M/uL  Hemoglobin : 12.2 g/dL  Hematocrit : 34.8 %  Platelet Count - Automated : 257 K/uL  Mean Cell Volume : 88.1 fl  Mean Cell Hemoglobin : 30.9 pg  Mean Cell Hemoglobin Concentration : 35.1 gm/dL  Auto Neutrophil # : 5.53 K/uL  Auto Lymphocyte # : 1.40 K/uL  Auto Monocyte # : 0.61 K/uL  Auto Eosinophil # : 0.35 K/uL  Auto Basophil # : 0.04 K/uL  Auto Neutrophil % : 69.4 %  Auto Lymphocyte % : 17.5 %  Auto Monocyte % : 7.6 %  Auto Eosinophil % : 4.4 %  Auto Basophil % : 0.5 %      12-15    140  |  105  |  6<L>  ----------------------------<  107<H>  3.8   |  30  |  0.49<L>    Ca    8.6      15 Dec 2021 08:14  Phos  2.5     12-13  Mg     1.7     12-13    TPro  6.3  /  Alb  2.5<L>  /  TBili  0.3  /  DBili  x   /  AST  12<L>  /  ALT  18  /  AlkPhos  60  12-15    Hemoglobin A1C:     LIVER FUNCTIONS - ( 15 Dec 2021 08:14 )  Alb: 2.5 g/dL / Pro: 6.3 g/dL / ALK PHOS: 60 U/L / ALT: 18 U/L / AST: 12 U/L / GGT: x           Vitamin B12         RADIOLOGY    ANALYSIS AND PLAN:  This is a 66-year-old with weakness and a history of multiple sclerosis.  For episode of weakness, suspect most likely multiple sclerosis exacerbation secondary to underlying infectious type process  with a history of temperatures.  I would recommend infectious workup and antibiotics as needed.  For history of multiple sclerosis, appears to be fairly advanced, I would recommend supportive therapy.  For history of neuropathic pain and trigeminal neuralgia, continue the patient on his Lyrica and Trileptal.  At present, I would not recommend any type of steroids, infectious workup is ongoing.  monitor diarrhea as needed  increase ROM of left arm today does feel better 12/14 as per patient arm coming back to baseline   no new diarrhea as per patient   cleared by neurology for dc planning     Spoke with the spouse, Alisson, at [phone] 12/12       Greater than 15 minutes of time was spent with the patient, plan of care, reviewing data, and speaking to the multidisciplinary healthcare team with greater than 50% of that time in counseling and care coordination.    Thank you for the courtesy of this consultation.

## 2021-12-15 NOTE — PROGRESS NOTE ADULT - REASON FOR ADMISSION
UTI, left arm weakness

## 2021-12-15 NOTE — PROGRESS NOTE ADULT - PROBLEM SELECTOR PLAN 11
DVT ppx  - Lovenox 40mg subq qd

## 2021-12-15 NOTE — PROGRESS NOTE ADULT - PROBLEM SELECTOR PLAN 1
Patient presents with fever, leukocytosis and left arm weakness  - Sepsis POA - fever, leukocytosis, HR >90, Urinary source  - CT renal stone hunt: Nonobstructing intrarenal calcifications right kidney. Moderate to large colonic fecal retention.  - UA: large ketone, 15 protein, trace LE, mod blood, 11-25 RBCs, mod bacteria  - S/P Motrin 600mg x1, IV Zosyn x1, 1L IV NS x1 in ED  - C/w Rocephin 1g IV qd  - Trend WBC and monitor for fever  - F/u urine and blood cultures  - ID (Dr. Bone) consulted, recs appreciated  - C diff positive. Started on Oral vanc. Will continue to monitor improvement of symptoms. No further episodes of loose BM  - Will discuss with ID regarding DC planning

## 2021-12-15 NOTE — DISCHARGE NOTE NURSING/CASE MANAGEMENT/SOCIAL WORK - PATIENT PORTAL LINK FT
You can access the FollowMyHealth Patient Portal offered by [facility] by registering at the following website: [email or web address]. By joining [facility]’s FollowMyHealth portal, you will also be able to view your health information using other applications (apps) compatible with our system.

## 2021-12-15 NOTE — PROGRESS NOTE ADULT - ASSESSMENT
Pt is a 66M w/ PMHx of MS, L foot neuropathic pain presented to ED from home c/o fever of 103.5 and new onset inability to move his L arm. similar episode in March 2020 when he spiked a fever and was unable to move his L arm. Strength in his L arm was regained after a few hours started on cell cept for his MS about 2 months ago. recent abx for dental infection    Acute Complicated UTI  Fevers-resolved  Leukocytosis-resolved  -infectious w/u reviewed  --BCx/UCx NGTD  -imaging reviewed  -c/w ceftriaxone 1gm q24h to complete total 7 day course until 12/16. Can switch to PO cefpodoxime 200mg BID to complete course if planning for d/c    C. Diff Infection  Watery Diarrhea  -c/w PO vancomycin 125mg q6h to complete total 14 day course until 12/25  -contact isolation per infection control policies    Had extensive conversation w/ wife on phone at bedside on 12/14, as documented in prior progress note.   At this time, as pt w/o diarrhea, pt not contagious   No contraindication from ID standpoint for d/c planning    Infectious Diseases will continue to follow. Please call with any questions.   Zoe Baird M.D.  [facility], Division of Infectious Diseases [phone]  For over the weekend and after hours, please call [phone]

## 2021-12-15 NOTE — CHART NOTE - NSCHARTNOTEFT_GEN_A_CORE
Patient needs ambulance to be transported to home, because his is bed bound, non ambulatory Patient needs ambulance to be transported to home, because he is bed bound, non ambulatory

## 2021-12-15 NOTE — PROGRESS NOTE ADULT - PROVIDER SPECIALTY LIST ADULT
Neurology
Infectious Disease
Hospitalist

## 2021-12-15 NOTE — PHARMACOTHERAPY INTERVENTION NOTE - COMMENTS
Prescriptions filled at [facility].  Prescriptions: Vancomycin oral capsules, vantin  Brought to bedside and counseled.  [facility] name: Artie Gordon PharmD  Person(s) counseled (translation used?,family member called? if relevant): patient  Counseling materials provided/counseling aids used: n/a  Time spent Counselin minutes  Counseling provided according to [facility] guidelines including side effects  Notes: Pt to complete abx course

## 2021-12-15 NOTE — PROGRESS NOTE ADULT - PROBLEM/PLAN-11
DISPLAY PLAN FREE TEXT
[id number] Comprehensive

## 2021-12-15 NOTE — PROGRESS NOTE ADULT - SUBJECTIVE AND OBJECTIVE BOX
[facility], Division of Infectious Diseases  HALEIGH Willams Y. Patel, S. Shah  [phone]    Name: THANH ISRAEL  Age: 66y  Gender: Male  MRN: [id number]    Interval History:  Patient seen and examined at bedside this morning  No acute overnight events. Afebrile  Sleeping comfortably  Notes reviewed    Antibiotics:  cefTRIAXone   IVPB      cefTRIAXone   IVPB 1000 milliGRAM(s) IV Intermittent every 24 hours  vancomycin    Solution 125 milliGRAM(s) Oral every 6 hours      Medications:  acetaminophen     Tablet .. 650 milliGRAM(s) Oral every 6 hours PRN  artificial tears (preservative free) Ophthalmic Solution 1 Drop(s) Both EYES two times a day PRN  cefTRIAXone   IVPB      cefTRIAXone   IVPB 1000 milliGRAM(s) IV Intermittent every 24 hours  enoxaparin Injectable 40 milliGRAM(s) SubCutaneous daily  escitalopram 20 milliGRAM(s) Oral daily  OXcarbazepine 300 milliGRAM(s) Oral two times a day  oxybutynin 15 milliGRAM(s) Oral daily  oxycodone    5 mG/acetaminophen 325 mG 2 Tablet(s) Oral every 6 hours PRN  pregabalin 150 milliGRAM(s) Oral three times a day  saccharomyces boulardii 250 milliGRAM(s) Oral two times a day  valsartan 160 milliGRAM(s) Oral daily  vancomycin    Solution 125 milliGRAM(s) Oral every 6 hours      Review of Systems:  A 10-point review of systems was obtained.   Review of systems otherwise negative except as previously noted.    Allergies: No Known Allergies    For details regarding the patient's past medical history, social history, family history, and other miscellaneous elements, please refer the initial infectious diseases consultation and/or the admitting history and physical examination for this admission.    Objective:  Vitals:   T(C): 36.8 (12-14-21 @ 20:41), Max: 36.8 (12-14-21 @ 12:18)  HR: 59 (12-14-21 @ 20:41) (55 - 59)  BP: 121/77 (12-14-21 @ 20:41) (121/77 - 145/78)  RR: 18 (12-14-21 @ 20:41) (18 - 20)  SpO2: 93% (12-14-21 @ 20:41) (93% - 94%)    Physical Examination:  General: no acute distress  HEENT: NC/AT, EOMI, anicteric, no oral lesions  Neck: supple, no palpable LAD  Cardio: S1, S2 heard, RRR, no murmurs  Resp: CTAB  Abd: soft, NT, ND, + bowel sounds  Neuro: no obvious focal deficits  Ext: no edema or cyanosis  Skin: warm, dry, no visible rash      Laboratory Studies:  CBC:                       11.6   6.42  )-----------( 230      ( 14 Dec 2021 08:53 )             34.1     CMP: 12-14    140  |  104  |  6<L>  ----------------------------<  88  3.6   |  31  |  0.49<L>    Ca    8.5      14 Dec 2021 08:53  Phos  2.5     12-13  Mg     1.7     12-13    TPro  5.9<L>  /  Alb  2.4<L>  /  TBili  0.4  /  DBili  x   /  AST  7<L>  /  ALT  13  /  AlkPhos  56  12-14    LIVER FUNCTIONS - ( 14 Dec 2021 08:53 )  Alb: 2.4 g/dL / Pro: 5.9 g/dL / ALK PHOS: 56 U/L / ALT: 13 U/L / AST: 7 U/L / GGT: x               Microbiology: reviewed    Culture - Blood (collected 12-10-21 @ 09:05)  Source: .Blood Blood-Peripheral  Preliminary Report (12-11-21 @ 10:01):    No growth to date.    Culture - Blood (collected 12-10-21 @ 09:05)  Source: .Blood Blood-Peripheral  Preliminary Report (12-11-21 @ 10:01):    No growth to date.    Culture - Urine (collected 12-10-21 @ 09:04)  Source: Clean Catch Clean Catch (Midstream)  Final Report (12-12-21 @ 10:16):    No growth        Radiology: reviewed

## 2021-12-15 NOTE — PROGRESS NOTE ADULT - PROBLEM SELECTOR PLAN 3
Improving, new onset left arm weakness likely in setting of infection  - CT head: negative   - Had similar presentation in the past  - Neuro (Dr. Lizarraga) consulted, recs appreciated; likely secondary to multiple sclerosis exacerbation secondary to underlying sepsis type process  - PT consulted, f/u recs  - At baseline

## 2021-12-30 PROCEDURE — 71045 X-RAY EXAM CHEST 1 VIEW: CPT

## 2021-12-30 PROCEDURE — 81001 URINALYSIS AUTO W/SCOPE: CPT

## 2021-12-30 PROCEDURE — 93970 EXTREMITY STUDY: CPT

## 2021-12-30 PROCEDURE — 74176 CT ABD & PELVIS W/O CONTRAST: CPT | Mod: MA

## 2021-12-30 PROCEDURE — 36415 COLL VENOUS BLD VENIPUNCTURE: CPT

## 2021-12-30 PROCEDURE — 70450 CT HEAD/BRAIN W/O DYE: CPT

## 2021-12-30 PROCEDURE — 96375 TX/PRO/DX INJ NEW DRUG ADDON: CPT

## 2021-12-30 PROCEDURE — 85610 PROTHROMBIN TIME: CPT

## 2021-12-30 PROCEDURE — 80053 COMPREHEN METABOLIC PANEL: CPT

## 2021-12-30 PROCEDURE — 96374 THER/PROPH/DIAG INJ IV PUSH: CPT

## 2021-12-30 PROCEDURE — 87086 URINE CULTURE/COLONY COUNT: CPT

## 2021-12-30 PROCEDURE — 99285 EMERGENCY DEPT VISIT HI MDM: CPT

## 2021-12-30 PROCEDURE — 83735 ASSAY OF MAGNESIUM: CPT

## 2021-12-30 PROCEDURE — 85025 COMPLETE CBC W/AUTO DIFF WBC: CPT

## 2021-12-30 PROCEDURE — 80048 BASIC METABOLIC PNL TOTAL CA: CPT

## 2021-12-30 PROCEDURE — 83605 ASSAY OF LACTIC ACID: CPT

## 2021-12-30 PROCEDURE — 85730 THROMBOPLASTIN TIME PARTIAL: CPT

## 2021-12-30 PROCEDURE — 84100 ASSAY OF PHOSPHORUS: CPT

## 2021-12-30 PROCEDURE — 87493 C DIFF AMPLIFIED PROBE: CPT

## 2021-12-30 PROCEDURE — 87040 BLOOD CULTURE FOR BACTERIA: CPT

## 2021-12-30 PROCEDURE — 0225U NFCT DS DNA&RNA 21 SARSCOV2: CPT

## 2021-12-30 PROCEDURE — 93005 ELECTROCARDIOGRAM TRACING: CPT

## 2022-01-13 ENCOUNTER — INPATIENT (INPATIENT)
Facility: HOSPITAL | Age: 67
LOS: 3 days | Discharge: HOME | DRG: 389 | End: 2022-01-17
Attending: STUDENT IN AN ORGANIZED HEALTH CARE EDUCATION/TRAINING PROGRAM | Admitting: STUDENT IN AN ORGANIZED HEALTH CARE EDUCATION/TRAINING PROGRAM
Payer: MEDICARE

## 2022-01-13 VITALS
TEMPERATURE: 103 F | HEIGHT: 76 IN | OXYGEN SATURATION: 93 % | RESPIRATION RATE: 18 BRPM | DIASTOLIC BLOOD PRESSURE: 73 MMHG | HEART RATE: 87 BPM | WEIGHT: 229.94 LBS | SYSTOLIC BLOOD PRESSURE: 123 MMHG

## 2022-01-13 DIAGNOSIS — R19.7 DIARRHEA, UNSPECIFIED: ICD-10-CM

## 2022-01-13 DIAGNOSIS — Z29.9 ENCOUNTER FOR PROPHYLACTIC MEASURES, UNSPECIFIED: ICD-10-CM

## 2022-01-13 DIAGNOSIS — E87.8 OTHER DISORDERS OF ELECTROLYTE AND FLUID BALANCE, NOT ELSEWHERE CLASSIFIED: ICD-10-CM

## 2022-01-13 DIAGNOSIS — Z86.59 PERSONAL HISTORY OF OTHER MENTAL AND BEHAVIORAL DISORDERS: ICD-10-CM

## 2022-01-13 DIAGNOSIS — N32.81 OVERACTIVE BLADDER: ICD-10-CM

## 2022-01-13 DIAGNOSIS — G35 MULTIPLE SCLEROSIS: ICD-10-CM

## 2022-01-13 DIAGNOSIS — I10 ESSENTIAL (PRIMARY) HYPERTENSION: ICD-10-CM

## 2022-01-13 DIAGNOSIS — K52.89 OTHER SPECIFIED NONINFECTIVE GASTROENTERITIS AND COLITIS: ICD-10-CM

## 2022-01-13 DIAGNOSIS — R50.9 FEVER, UNSPECIFIED: ICD-10-CM

## 2022-01-13 DIAGNOSIS — M79.2 NEURALGIA AND NEURITIS, UNSPECIFIED: ICD-10-CM

## 2022-01-13 DIAGNOSIS — Z90.49 ACQUIRED ABSENCE OF OTHER SPECIFIED PARTS OF DIGESTIVE TRACT: Chronic | ICD-10-CM

## 2022-01-13 LAB
ALBUMIN SERPL ELPH-MCNC: 3 G/DL — LOW (ref 3.3–5)
ALP SERPL-CCNC: 83 U/L — SIGNIFICANT CHANGE UP (ref 40–120)
ALT FLD-CCNC: 14 U/L — SIGNIFICANT CHANGE UP (ref 12–78)
ANION GAP SERPL CALC-SCNC: 8 MMOL/L — SIGNIFICANT CHANGE UP (ref 5–17)
APPEARANCE UR: CLEAR — SIGNIFICANT CHANGE UP
APTT BLD: 35.1 SEC — SIGNIFICANT CHANGE UP (ref 27.5–35.5)
AST SERPL-CCNC: 13 U/L — LOW (ref 15–37)
BASOPHILS # BLD AUTO: 0.04 K/UL — SIGNIFICANT CHANGE UP (ref 0–0.2)
BASOPHILS NFR BLD AUTO: 0.4 % — SIGNIFICANT CHANGE UP (ref 0–2)
BILIRUB SERPL-MCNC: 0.6 MG/DL — SIGNIFICANT CHANGE UP (ref 0.2–1.2)
BILIRUB UR-MCNC: NEGATIVE — SIGNIFICANT CHANGE UP
BUN SERPL-MCNC: 12 MG/DL — SIGNIFICANT CHANGE UP (ref 7–23)
CALCIUM SERPL-MCNC: 8.8 MG/DL — SIGNIFICANT CHANGE UP (ref 8.5–10.1)
CHLORIDE SERPL-SCNC: 91 MMOL/L — LOW (ref 96–108)
CO2 SERPL-SCNC: 29 MMOL/L — SIGNIFICANT CHANGE UP (ref 22–31)
COLOR SPEC: YELLOW — SIGNIFICANT CHANGE UP
CREAT SERPL-MCNC: 0.74 MG/DL — SIGNIFICANT CHANGE UP (ref 0.5–1.3)
DIFF PNL FLD: ABNORMAL
EOSINOPHIL # BLD AUTO: 0.16 K/UL — SIGNIFICANT CHANGE UP (ref 0–0.5)
EOSINOPHIL NFR BLD AUTO: 1.6 % — SIGNIFICANT CHANGE UP (ref 0–6)
GLUCOSE SERPL-MCNC: 89 MG/DL — SIGNIFICANT CHANGE UP (ref 70–99)
GLUCOSE UR QL: NEGATIVE — SIGNIFICANT CHANGE UP
HCT VFR BLD CALC: 36.2 % — LOW (ref 39–50)
HGB BLD-MCNC: 12.6 G/DL — LOW (ref 13–17)
IMM GRANULOCYTES NFR BLD AUTO: 0.3 % — SIGNIFICANT CHANGE UP (ref 0–1.5)
INR BLD: 1.31 RATIO — HIGH (ref 0.88–1.16)
KETONES UR-MCNC: ABNORMAL
LACTATE SERPL-SCNC: 2 MMOL/L — SIGNIFICANT CHANGE UP (ref 0.7–2)
LEUKOCYTE ESTERASE UR-ACNC: NEGATIVE — SIGNIFICANT CHANGE UP
LIDOCAIN IGE QN: 31 U/L — LOW (ref 73–393)
LYMPHOCYTES # BLD AUTO: 1.01 K/UL — SIGNIFICANT CHANGE UP (ref 1–3.3)
LYMPHOCYTES # BLD AUTO: 10.1 % — LOW (ref 13–44)
MCHC RBC-ENTMCNC: 30.7 PG — SIGNIFICANT CHANGE UP (ref 27–34)
MCHC RBC-ENTMCNC: 34.8 GM/DL — SIGNIFICANT CHANGE UP (ref 32–36)
MCV RBC AUTO: 88.1 FL — SIGNIFICANT CHANGE UP (ref 80–100)
MONOCYTES # BLD AUTO: 1.07 K/UL — HIGH (ref 0–0.9)
MONOCYTES NFR BLD AUTO: 10.7 % — SIGNIFICANT CHANGE UP (ref 2–14)
NEUTROPHILS # BLD AUTO: 7.68 K/UL — HIGH (ref 1.8–7.4)
NEUTROPHILS NFR BLD AUTO: 76.9 % — SIGNIFICANT CHANGE UP (ref 43–77)
NITRITE UR-MCNC: NEGATIVE — SIGNIFICANT CHANGE UP
NRBC # BLD: 0 /100 WBCS — SIGNIFICANT CHANGE UP (ref 0–0)
PH UR: 6 — SIGNIFICANT CHANGE UP (ref 5–8)
PLATELET # BLD AUTO: 268 K/UL — SIGNIFICANT CHANGE UP (ref 150–400)
POTASSIUM SERPL-MCNC: 3.9 MMOL/L — SIGNIFICANT CHANGE UP (ref 3.5–5.3)
POTASSIUM SERPL-SCNC: 3.9 MMOL/L — SIGNIFICANT CHANGE UP (ref 3.5–5.3)
PROT SERPL-MCNC: 7.1 G/DL — SIGNIFICANT CHANGE UP (ref 6–8.3)
PROT UR-MCNC: NEGATIVE — SIGNIFICANT CHANGE UP
PROTHROM AB SERPL-ACNC: 15.1 SEC — HIGH (ref 10.6–13.6)
RAPID RVP RESULT: SIGNIFICANT CHANGE UP
RBC # BLD: 4.11 M/UL — LOW (ref 4.2–5.8)
RBC # FLD: 13.6 % — SIGNIFICANT CHANGE UP (ref 10.3–14.5)
SARS-COV-2 RNA SPEC QL NAA+PROBE: SIGNIFICANT CHANGE UP
SODIUM SERPL-SCNC: 128 MMOL/L — LOW (ref 135–145)
SP GR SPEC: 1.01 — SIGNIFICANT CHANGE UP (ref 1.01–1.02)
UROBILINOGEN FLD QL: NEGATIVE — SIGNIFICANT CHANGE UP
WBC # BLD: 9.99 K/UL — SIGNIFICANT CHANGE UP (ref 3.8–10.5)
WBC # FLD AUTO: 9.99 K/UL — SIGNIFICANT CHANGE UP (ref 3.8–10.5)

## 2022-01-13 PROCEDURE — 99285 EMERGENCY DEPT VISIT HI MDM: CPT | Mod: CS

## 2022-01-13 PROCEDURE — 74177 CT ABD & PELVIS W/CONTRAST: CPT | Mod: 26,MA

## 2022-01-13 PROCEDURE — 71045 X-RAY EXAM CHEST 1 VIEW: CPT | Mod: 26

## 2022-01-13 PROCEDURE — 99222 1ST HOSP IP/OBS MODERATE 55: CPT | Mod: GC

## 2022-01-13 PROCEDURE — 93010 ELECTROCARDIOGRAM REPORT: CPT

## 2022-01-13 RX ORDER — LANOLIN ALCOHOL/MO/W.PET/CERES
3 CREAM (GRAM) TOPICAL AT BEDTIME
Refills: 0 | Status: DISCONTINUED | OUTPATIENT
Start: 2022-01-13 | End: 2022-01-17

## 2022-01-13 RX ORDER — ENOXAPARIN SODIUM 100 MG/ML
40 INJECTION SUBCUTANEOUS DAILY
Refills: 0 | Status: DISCONTINUED | OUTPATIENT
Start: 2022-01-13 | End: 2022-01-17

## 2022-01-13 RX ORDER — TERIPARATIDE 250 UG/ML
1 INJECTION, SOLUTION SUBCUTANEOUS
Qty: 0 | Refills: 0 | DISCHARGE

## 2022-01-13 RX ORDER — OXCARBAZEPINE 300 MG/1
300 TABLET, FILM COATED ORAL
Refills: 0 | Status: DISCONTINUED | OUTPATIENT
Start: 2022-01-13 | End: 2022-01-17

## 2022-01-13 RX ORDER — ACETAMINOPHEN 500 MG
1000 TABLET ORAL ONCE
Refills: 0 | Status: COMPLETED | OUTPATIENT
Start: 2022-01-13 | End: 2022-01-13

## 2022-01-13 RX ORDER — SODIUM CHLORIDE 9 MG/ML
2700 INJECTION INTRAMUSCULAR; INTRAVENOUS; SUBCUTANEOUS ONCE
Refills: 0 | Status: COMPLETED | OUTPATIENT
Start: 2022-01-13 | End: 2022-01-13

## 2022-01-13 RX ORDER — VALSARTAN 80 MG/1
1 TABLET ORAL
Qty: 0 | Refills: 0 | DISCHARGE

## 2022-01-13 RX ORDER — SODIUM CHLORIDE 9 MG/ML
1000 INJECTION INTRAMUSCULAR; INTRAVENOUS; SUBCUTANEOUS
Refills: 0 | Status: DISCONTINUED | OUTPATIENT
Start: 2022-01-13 | End: 2022-01-14

## 2022-01-13 RX ORDER — VALSARTAN 80 MG/1
160 TABLET ORAL DAILY
Refills: 0 | Status: DISCONTINUED | OUTPATIENT
Start: 2022-01-13 | End: 2022-01-17

## 2022-01-13 RX ORDER — VANCOMYCIN HCL 1 G
125 VIAL (EA) INTRAVENOUS EVERY 6 HOURS
Refills: 0 | Status: DISCONTINUED | OUTPATIENT
Start: 2022-01-13 | End: 2022-01-17

## 2022-01-13 RX ORDER — ONDANSETRON 8 MG/1
4 TABLET, FILM COATED ORAL EVERY 8 HOURS
Refills: 0 | Status: DISCONTINUED | OUTPATIENT
Start: 2022-01-13 | End: 2022-01-17

## 2022-01-13 RX ORDER — OXYBUTYNIN CHLORIDE 5 MG
15 TABLET ORAL DAILY
Refills: 0 | Status: DISCONTINUED | OUTPATIENT
Start: 2022-01-13 | End: 2022-01-16

## 2022-01-13 RX ORDER — ESCITALOPRAM OXALATE 10 MG/1
1 TABLET, FILM COATED ORAL
Qty: 0 | Refills: 0 | DISCHARGE

## 2022-01-13 RX ORDER — OXCARBAZEPINE 300 MG/1
1 TABLET, FILM COATED ORAL
Qty: 0 | Refills: 0 | DISCHARGE

## 2022-01-13 RX ORDER — ESCITALOPRAM OXALATE 10 MG/1
20 TABLET, FILM COATED ORAL DAILY
Refills: 0 | Status: DISCONTINUED | OUTPATIENT
Start: 2022-01-13 | End: 2022-01-17

## 2022-01-13 RX ORDER — ACETAMINOPHEN 500 MG
650 TABLET ORAL EVERY 6 HOURS
Refills: 0 | Status: DISCONTINUED | OUTPATIENT
Start: 2022-01-13 | End: 2022-01-17

## 2022-01-13 RX ADMIN — SODIUM CHLORIDE 2700 MILLILITER(S): 9 INJECTION INTRAMUSCULAR; INTRAVENOUS; SUBCUTANEOUS at 20:08

## 2022-01-13 RX ADMIN — Medication 400 MILLIGRAM(S): at 20:36

## 2022-01-13 NOTE — H&P ADULT - NSHPREVIEWOFSYSTEMS_GEN_ALL_CORE
CONSTITUTIONAL: denies fever, admits chills, fatigue and weakness  HEENT: denies blurred vision, sore throat  SKIN: denies new lesions, rash  CARDIOVASCULAR: denies chest pain, chest pressure, palpitations  RESPIRATORY: denies shortness of breath, cough, sputum production  GASTROINTESTINAL: denies nausea, vomiting, admits diarrhea and abdominal pain  GENITOURINARY: denies dysuria, discharge  NEUROLOGICAL: denies numbness, headache, focal weakness  MUSCULOSKELETAL: denies new joint pain, muscle aches  HEMATOLOGIC: denies gross bleeding, bruising

## 2022-01-13 NOTE — ED PROVIDER NOTE - CLINICAL SUMMARY MEDICAL DECISION MAKING FREE TEXT BOX
c/o general fatigue. pt reports he was admitted in December for UTI and developed C Dif. pt reports having abdominal pain. pt admits to diarrhea. pt notes he is feeling sick which increases his MS symptoms and becomes general weak. (+) Febrile. plan includes labs, UA r/o UTI, CXR r/o pneumonia, CT abd/pelvis, C dif, re-assess

## 2022-01-13 NOTE — H&P ADULT - NSHPSOCIALHISTORY_GEN_ALL_CORE
Tobacco: denies  EtOH: denies  Recreational drug use: smokes 1/2 joint marijuana daily  Lives with: wife at home  Ambulates: wheelchair-bound  ADLs: w assistance   Vaccinations: COVID Pfizer 3/3 Oct 2021 Tobacco: denies  EtOH: denies  Recreational drug use: smokes marijuana 3-4x/weel  Lives with: wife at home  Ambulates: wheelchair-bound  ADLs: w/ assistance   Vaccinations: COVID Pfizer 3/3 Oct 2021

## 2022-01-13 NOTE — H&P ADULT - PROBLEM SELECTOR PLAN 5
Chronic - L foot neuropathic pain  - Continue home Pregabalin 150 mg TID  - Continue home Percocet 10/325 mg TID PRN  - Continue home Forteo Chronic - L foot neuropathic pain  - Continue home Pregabalin 150 mg TID  - Continue home Percocet 10/325 mg TID PRN  - Pt to bring home Forteo Chronic - L foot neuropathic pain  - Continue home Pregabalin 150 mg TID  - On home Percocet 10/325 mg TID PRN, will start Oxycodone 10 mg and Tylenol 325 mg q6h prn   - Pt to bring home Forteo Chronic - L foot neuropathic pain  - Continue home Pregabalin 150 mg TID  - On home Percocet 10/325 mg TID prn, will start Oxycodone 10 mg and Tylenol 325 mg q8h prn   - Pt to bring home Forteo Chronic  - Stable  - Continue with home Valsartan 160 mg QD w/ hold parameters  - Monitor BP & titrate BP meds PRN

## 2022-01-13 NOTE — ED PROVIDER NOTE - OBJECTIVE STATEMENT
65 y/o male with PMHx MS presents today c/o general fatigue. pt reports he was admitted in December for UTI and developed C Dif. pt reports having abdominal pain, aching, non-radiating, and currently 8/10. pt admits to diarrhea. pt notes he is feeling sick which increases his MS symptoms and becomes general weak. pt denies chest pain, SOB, headache, vomiting, dysuria, hematuria, or any other complaints.

## 2022-01-13 NOTE — H&P ADULT - HISTORY OF PRESENT ILLNESS
67 yo M With PMH MHx MS wheelchair bound, neuropathy of the left foot presents to the ED with diarrhea x2-3 days. Of note, pt was recently admitted in December 2021 for sepsis secondary to UTI and later developed C diff from being on Ceftriaxone. Pt has been experiencing abdominal pain that he described as aching and non-radiating and diarrhea x2-3 days. Reports that diarrhea feels similar to the time when he had C diff.  Denies nausea, vomiting. Yesterday, pt;s PCP prescribed PO Vancomycin but he is still experiencing diarrhea. Pt also admits to increased weakness and worsening of his MS symptoms today. Decreased PO intake today. Denies fever, chills, chest pain, palpitations, SOB, cough, urinary sx, headaches, changes in vision, dizziness, numbness, tingling.  Denies recent travel or sick contacts.    ED Course:   Vitals: BP: 123/73, HR: 87, Temp: 102.6 F-->100 F, RR: 18, SpO2: 93% on RA   Labs: H/H: 12.6/36.2, neut: 7.68, PT/INR: 15.1/1.31, Na: 128, Cl: 91, alb: 3,       CXR: No acute lung pathology as per personal read, official read pending   CT A/P: Fecal impaction of the rectosigmoid colon with associated stercoral colitis.  EKG:   Received Ofirmiv x1 and 2.7L NS bolus in the ED  65 yo M With PMH MHx MS wheelchair bound, neuropathy of the left foot presents to the ED with diarrhea x3 days. Of note, pt was recently admitted in December 2021 for sepsis secondary to UTI and later developed C diff from being on Ceftriaxone. Pt was discharged on Vancomycin for 14 days which he completed. After discharge pt was feeling well until 3 days ago. Pt has been experiencing abdominal pain mostly around his umbilicus, that he described as aching and non-radiating along with diarrhea. Pt has been having 3-4 episodes of loose, watery and foul smelling bowel movements. Reports that diarrhea feels similar to the time when he had C diff. Denies nausea and vomiting. Yesterday, pt had a tele med visit 2 days ago with PCP who prescribed him PO Vancomycin q6h. Pt has beentbut he is still experiencing diarrhea. Pt also admits to increased weakness and worsening of his MS symptoms today. Decreased PO intake today. Denies fever, chills, chest pain, palpitations, SOB, cough, urinary sx, headaches, changes in vision, dizziness, numbness, tingling.  Denies recent travel or sick contacts.    ED Course:   Vitals: BP: 123/73, HR: 87, Temp: 102.6 F-->100 F, RR: 18, SpO2: 93% on RA   Labs: H/H: 12.6/36.2, neut: 7.68, PT/INR: 15.1/1.31, Na: 128, Cl: 91, alb: 3,       CXR: No acute lung pathology as per personal read, official read pending   CT A/P: Fecal impaction of the rectosigmoid colon with associated stercoral colitis.  EKG:   Received Ofirmiv x1 and 2.7L NS bolus in the ED  67 yo M With PMH MHx MS wheelchair bound, neuropathy of the left foot, overactive bladder, HTN, depression presents to the ED with diarrhea x3 days. Of note, pt was recently admitted in December 2021 for sepsis secondary to UTI and later developed C diff from being on Ceftriaxone. Pt was discharged on Vancomycin for 14 days which he completed. After discharge pt was feeling well until 3 days ago. Pt has been experiencing abdominal pain mostly around his umbilicus, that he described as aching and non-radiating along with diarrhea. Pt has been having 3-4 episodes of loose, watery and foul smelling bowel movements. Reports that diarrhea feels similar to the time when he had C diff. Denies nausea and vomiting. Denies melena or hematochezia. Denies eating anything out of the ordinary. Pt had a tele med visit 2 days ago with PCP who prescribed him PO Vancomycin q6h. Pt has been taking abx but is still experiencing diarrhea. Pt also admits to increased weakness and worsening of his MS symptoms today. Pt has had decreased PO intake today due to increased fatigue and weakness. Denies fever, chills, chest pain, palpitations, SOB, cough, urinary sx, headaches, changes in vision, dizziness.   Denies recent travel or sick contacts.    ED Course:   Vitals: BP: 123/73, HR: 87, Temp: 102.6 F-->100 F, RR: 18, SpO2: 93% on RA   Labs: H/H: 12.6/36.2, neut: 7.68, PT/INR: 15.1/1.31, Na: 128, Cl: 91, alb: 3,       CXR: No acute lung pathology as per personal read, official read pending   CT A/P: Fecal impaction of the rectosigmoid colon with associated stercoral colitis.  UA: moderate ketone, moderate blood, 6-10 rbc   EKG: NSR, HR: 91, no signs of acute ischemia   Received Ofirmiv x1 and 2.7L NS bolus in the ED

## 2022-01-13 NOTE — H&P ADULT - PROBLEM SELECTOR PLAN 4
Chronic - Pt is unable to move BL LE or BL UE at baseline (pt used to be able to move left arm but has not been able to since last admission)  - Continue home Oxcarbazepine 300 mg BID   - Will hold recently started Mycophenolate Mofetil in the setting of infection as this is an immunosuppressant agent Chronic - L foot neuropathic pain  - Continue home Pregabalin 150 mg TID  - On home Percocet 10/325 mg TID prn, will start Oxycodone 10 mg and Tylenol 325 mg q8h prn   - Pt to bring home Forteo

## 2022-01-13 NOTE — H&P ADULT - PROBLEM SELECTOR PLAN 1
Pt presents with diarrhea and weakness, pt was recently treated for cdiff   r/o C. diff   Pt does not meet SIRS criteria, pt was only febrile in the ED T: 102.6 F  - s/p Ofirmiv x1 and 2.7L NS bolus in the ED   - F/u Cdiff PCR  - Continue Vancomycin 125 mL q6h  - Tylenol prn for fever   - IVF as pt is dry on exam 2/2 diarrhea -- NS @80 cc/hr x12 hours   - GI Dr. Fry consulted, f/u recs   - ID Dr. Bone consulted, f/u recs  - I solation precautions Pt presents with diarrhea and weakness, pt was recently treated for cdiff   r/o C. diff   Pt does not meet SIRS criteria, pt was only febrile in the ED T: 102.6 F  - s/p Ofirmiv x1 and 2.7L NS bolus in the ED   - F/u C. diff PCR, GI Stool PCR   - Continue Vancomycin 125 mL q6h  - Tylenol prn for fever   - IVF as pt is dry on exam 2/2 diarrhea -- NS @80 cc/hr x12 hours   - F/u BCx and UCx   - GI Dr. Fry consulted, f/u recs   - ID Dr. Day consulted, f/u recs  - Isolation precautions Pt presents with diarrhea and weakness, pt was recently treated for cdiff   r/o C. diff   Pt does not meet SIRS criteria, pt was only febrile in the ED T: 102.6 F  - s/p Ofirmiv x1 and 2.7L NS bolus in the ED   - F/u C. diff PCR, GI Stool PCR   - CT A/P: Fecal impaction of the rectosigmoid colon with associated stercoral colitis  - Continue Vancomycin 125 mL q6h  - Tylenol prn for fever   - IVF as pt is dry on exam 2/2 diarrhea -- NS @80 cc/hr x12 hours   - F/u BCx and UCx   - GI Dr. Fry consulted, f/u recs   - ID Dr. Day consulted, f/u recs  - Isolation precautions

## 2022-01-13 NOTE — H&P ADULT - PROBLEM SELECTOR PLAN 2
- CT A/P: Fecal impaction of the rectosigmoid colon with associated stercoral colitis - CT A/P: Fecal impaction of the rectosigmoid colon with associated stercoral colitis  - Plan as above   - Bowel regimen: Miralax qd and Senna at bedtime - CT A/P: Fecal impaction of the rectosigmoid colon with associated stercoral colitis  - Plan as above   - Pt may require disimpaction and a bowel regimen Na: 128, Cl: 91 on admission 2/2 diarrhea  - IVF as pt is dry on exam 2/2 diarrhea -- s/p 2700cc NS, c/w IVF @125 cc/hr x12 hours   - Monitor CMP

## 2022-01-13 NOTE — ED PROVIDER NOTE - ATTENDING CONTRIBUTION TO CARE
66 male h/o MS, bedbound, uses lift for wheelchair and showers, cannot use his right arm and lower extremities, feeds himself with left arm, recently at [place] 12/10/21 - 12/15/21 for left arm weakness, UTI, sepsis, treated with ceftriaxone, developed C.Diff, treated with course of vanco and diarrhea resolved, patient states 2-3 days ago he developed diarrhea again, PCP prescribed vanco, still having diarrhea, today developed weakness and chills, has not eaten, patient noted to be febrile in ER, abdomen soft and tender to palpation, f/u labs. cultures, ct abdomen/pelvis, iv fluids, tylenol, admit.

## 2022-01-13 NOTE — ED ADULT TRIAGE NOTE - AS TEMP SITE
Can we check with coding if adding dx of adverse reaction to food would meet requirment for food allergy codes? Technically this diagnosis is accurate and I believe both adverse reaction and food allergy both used last year. IF yes, we can addend if need be as this is accurate for visit.   oral

## 2022-01-13 NOTE — ED PROVIDER NOTE - PHYSICAL EXAMINATION
Constitutional: Awake, Alert, non-toxic   HEAD: Normocephalic, atraumatic.   EYES: EOM intact, conjunctiva and sclera are clear bilaterally.   ENT: No rhinorrhea, patent, mucous membranes pink/moist, no drooling or stridor.   NECK: Supple, non-tender  CARDIOVASCULAR: Normal S1, S2; regular rate and rhythm.  RESPIRATORY: Normal respiratory effort; breath sounds CTAB, no wheezes, rhonchi, or rales. Speaking in full sentences. No accessory muscle use.   ABDOMEN: Soft; (+) generalized abdominal TTP  EXTREMITIES: non-tender to palpation; distal pulses palpable and symmetric  SKIN: Warm, dry; good skin turgor, no apparent lesions or rashes, no ecchymosis, brisk capillary refill.  NEURO: A&O x3. Sensory and motor functions are grossly intact. Speech is normal. Appearance and judgement seem appropriate for gender and age.

## 2022-01-13 NOTE — H&P ADULT - NSHPPHYSICALEXAM_GEN_ALL_CORE
T(C): 37.8 (01-13-22 @ 20:11), Max: 39.2 (01-13-22 @ 18:27)  HR: 90 (01-13-22 @ 20:11) (87 - 90)  BP: 164/80 (01-13-22 @ 20:11) (123/73 - 164/80)  RR: 16 (01-13-22 @ 20:11) (16 - 18)  SpO2: 94% (01-13-22 @ 20:11) (93% - 94%)    GENERAL: patient appears well, no acute distress, appropriately interactive  EYES: sclera clear, no exudates  ENMT: oropharynx clear without erythema, no exudates, moist mucous membranes  NECK: supple, soft  LUNGS: good air entry bilaterally, clear to auscultation, symmetric breath sounds, no wheezing or rhonchi appreciated  HEART: soft S1/S2, regular rate and rhythm, no murmurs noted, no lower extremity edema  GASTROINTESTINAL: abdomen is soft, nontender, nondistended, normoactive bowel sounds  INTEGUMENT: good skin turgor, warm skin, appears well perfused  MUSCULOSKELETAL: no clubbing or cyanosis, no obvious deformity  NEUROLOGIC: awake, alert, oriented x3, good muscle tone in all 4 extremities  HEME/LYMPH: no obvious ecchymosis or petechiae T(C): 37.8 (01-13-22 @ 20:11), Max: 39.2 (01-13-22 @ 18:27)  HR: 90 (01-13-22 @ 20:11) (87 - 90)  BP: 164/80 (01-13-22 @ 20:11) (123/73 - 164/80)  RR: 16 (01-13-22 @ 20:11) (16 - 18)  SpO2: 94% (01-13-22 @ 20:11) (93% - 94%)    GENERAL: patient appears well, no acute distress, appropriately interactive  EYES: sclera clear, no exudates  ENMT: oropharynx clear without erythema, no exudates, moist mucous membranes  NECK: supple, soft  LUNGS: good air entry bilaterally, clear to auscultation, symmetric breath sounds, no wheezing or rhonchi appreciated  HEART: soft S1/S2, regular rate and rhythm, no murmurs noted, no lower extremity edema  GASTROINTESTINAL: diffusely TTP, no rebound or guarding. BS x 4Q.   INTEGUMENT: good skin turgor, warm skin, appears well perfused  MUSCULOSKELETAL: no clubbing or cyanosis, no obvious deformity  NEUROLOGIC: awake, alert, oriented x3, good muscle tone in all 4 extremities  HEME/LYMPH: no obvious ecchymosis or petechiae

## 2022-01-13 NOTE — H&P ADULT - ASSESSMENT
65 yo M With PMH MHx MS wheelchair bound, neuropathy of the left foot presents to the ED with diarrhea x2-3 days. Admitted for sepsis 2/2 Cdiff.  67 yo M With PMH MHx MS wheelchair bound, neuropathy of the left foot, overactive bladder, HTN, depression presents to the ED with diarrhea x3 days. Admitted for diarrhea r/o Cdiff.

## 2022-01-13 NOTE — ED ADULT NURSE NOTE - OBJECTIVE STATEMENT
Patient reports that he has had diarrhea, weakness, and decreased appetite. Recently discharge from in patient acute care setting due to UTI, weakness, and c-diff. Currently being treated for c-diff. Hx of MS.

## 2022-01-13 NOTE — H&P ADULT - PROBLEM SELECTOR PLAN 3
Na: 128, Cl: 91 on admission 2/2 diarrhea  - IVF as pt is dry on exam 2/2 diarrhea -- NS @80 cc/hr x12 hours   - Monitor CMP Chronic - Pt is unable to move BL LE or BL UE at baseline (pt used to be able to move left arm but has not been able to since last admission)  - Continue home Oxcarbazepine 300 mg BID   - Will hold recently started Mycophenolate Mofetil in the setting of infection as this is an immunosuppressant agent

## 2022-01-13 NOTE — H&P ADULT - PROBLEM SELECTOR PLAN 6
Chronic  - Stable  - Continue with home Valsartan 160 mg QD w/ hold parameters  - Monitor BP & titrate BP meds PRN Chronic  - Continue home Oxybutynin 15 mg QD

## 2022-01-14 PROBLEM — S82.209A UNSPECIFIED FRACTURE OF SHAFT OF UNSPECIFIED TIBIA, INITIAL ENCOUNTER FOR CLOSED FRACTURE: Chronic | Status: ACTIVE | Noted: 2021-12-10

## 2022-01-14 PROBLEM — S72.91XA UNSPECIFIED FRACTURE OF RIGHT FEMUR, INITIAL ENCOUNTER FOR CLOSED FRACTURE: Chronic | Status: ACTIVE | Noted: 2021-12-10

## 2022-01-14 LAB
ALBUMIN SERPL ELPH-MCNC: 2.5 G/DL — LOW (ref 3.3–5)
ALP SERPL-CCNC: 71 U/L — SIGNIFICANT CHANGE UP (ref 40–120)
ALT FLD-CCNC: 10 U/L — LOW (ref 12–78)
ANION GAP SERPL CALC-SCNC: 11 MMOL/L — SIGNIFICANT CHANGE UP (ref 5–17)
AST SERPL-CCNC: 11 U/L — LOW (ref 15–37)
BASOPHILS # BLD AUTO: 0.04 K/UL — SIGNIFICANT CHANGE UP (ref 0–0.2)
BASOPHILS NFR BLD AUTO: 0.4 % — SIGNIFICANT CHANGE UP (ref 0–2)
BILIRUB SERPL-MCNC: 0.6 MG/DL — SIGNIFICANT CHANGE UP (ref 0.2–1.2)
BUN SERPL-MCNC: 7 MG/DL — SIGNIFICANT CHANGE UP (ref 7–23)
CALCIUM SERPL-MCNC: 8.1 MG/DL — LOW (ref 8.5–10.1)
CHLORIDE SERPL-SCNC: 99 MMOL/L — SIGNIFICANT CHANGE UP (ref 96–108)
CO2 SERPL-SCNC: 25 MMOL/L — SIGNIFICANT CHANGE UP (ref 22–31)
CREAT SERPL-MCNC: 0.44 MG/DL — LOW (ref 0.5–1.3)
EOSINOPHIL # BLD AUTO: 0.01 K/UL — SIGNIFICANT CHANGE UP (ref 0–0.5)
EOSINOPHIL NFR BLD AUTO: 0.1 % — SIGNIFICANT CHANGE UP (ref 0–6)
GLUCOSE SERPL-MCNC: 90 MG/DL — SIGNIFICANT CHANGE UP (ref 70–99)
HCT VFR BLD CALC: 34.7 % — LOW (ref 39–50)
HGB BLD-MCNC: 11.8 G/DL — LOW (ref 13–17)
IMM GRANULOCYTES NFR BLD AUTO: 0.3 % — SIGNIFICANT CHANGE UP (ref 0–1.5)
LYMPHOCYTES # BLD AUTO: 0.78 K/UL — LOW (ref 1–3.3)
LYMPHOCYTES # BLD AUTO: 8.6 % — LOW (ref 13–44)
MCHC RBC-ENTMCNC: 30.2 PG — SIGNIFICANT CHANGE UP (ref 27–34)
MCHC RBC-ENTMCNC: 34 GM/DL — SIGNIFICANT CHANGE UP (ref 32–36)
MCV RBC AUTO: 88.7 FL — SIGNIFICANT CHANGE UP (ref 80–100)
MONOCYTES # BLD AUTO: 0.91 K/UL — HIGH (ref 0–0.9)
MONOCYTES NFR BLD AUTO: 10 % — SIGNIFICANT CHANGE UP (ref 2–14)
NEUTROPHILS # BLD AUTO: 7.31 K/UL — SIGNIFICANT CHANGE UP (ref 1.8–7.4)
NEUTROPHILS NFR BLD AUTO: 80.6 % — HIGH (ref 43–77)
NRBC # BLD: 0 /100 WBCS — SIGNIFICANT CHANGE UP (ref 0–0)
PLATELET # BLD AUTO: 258 K/UL — SIGNIFICANT CHANGE UP (ref 150–400)
POTASSIUM SERPL-MCNC: 3.3 MMOL/L — LOW (ref 3.5–5.3)
POTASSIUM SERPL-SCNC: 3.3 MMOL/L — LOW (ref 3.5–5.3)
PROT SERPL-MCNC: 6.1 G/DL — SIGNIFICANT CHANGE UP (ref 6–8.3)
RBC # BLD: 3.91 M/UL — LOW (ref 4.2–5.8)
RBC # FLD: 13.5 % — SIGNIFICANT CHANGE UP (ref 10.3–14.5)
SODIUM SERPL-SCNC: 135 MMOL/L — SIGNIFICANT CHANGE UP (ref 135–145)
WBC # BLD: 9.08 K/UL — SIGNIFICANT CHANGE UP (ref 3.8–10.5)
WBC # FLD AUTO: 9.08 K/UL — SIGNIFICANT CHANGE UP (ref 3.8–10.5)

## 2022-01-14 PROCEDURE — 99233 SBSQ HOSP IP/OBS HIGH 50: CPT

## 2022-01-14 RX ORDER — POTASSIUM CHLORIDE 20 MEQ
40 PACKET (EA) ORAL ONCE
Refills: 0 | Status: COMPLETED | OUTPATIENT
Start: 2022-01-14 | End: 2022-01-14

## 2022-01-14 RX ORDER — OXYCODONE HYDROCHLORIDE 5 MG/1
10 TABLET ORAL EVERY 8 HOURS
Refills: 0 | Status: DISCONTINUED | OUTPATIENT
Start: 2022-01-14 | End: 2022-01-14

## 2022-01-14 RX ORDER — SODIUM CHLORIDE 9 MG/ML
1000 INJECTION INTRAMUSCULAR; INTRAVENOUS; SUBCUTANEOUS
Refills: 0 | Status: DISCONTINUED | OUTPATIENT
Start: 2022-01-14 | End: 2022-01-14

## 2022-01-14 RX ORDER — ACETAMINOPHEN 500 MG
325 TABLET ORAL EVERY 8 HOURS
Refills: 0 | Status: DISCONTINUED | OUTPATIENT
Start: 2022-01-14 | End: 2022-01-14

## 2022-01-14 RX ORDER — SODIUM CHLORIDE 9 MG/ML
1000 INJECTION INTRAMUSCULAR; INTRAVENOUS; SUBCUTANEOUS
Refills: 0 | Status: DISCONTINUED | OUTPATIENT
Start: 2022-01-14 | End: 2022-01-17

## 2022-01-14 RX ADMIN — OXCARBAZEPINE 300 MILLIGRAM(S): 300 TABLET, FILM COATED ORAL at 19:09

## 2022-01-14 RX ADMIN — Medication 1000 MILLIGRAM(S): at 02:58

## 2022-01-14 RX ADMIN — Medication 125 MILLIGRAM(S): at 11:57

## 2022-01-14 RX ADMIN — Medication 125 MILLIGRAM(S): at 01:15

## 2022-01-14 RX ADMIN — Medication 125 MILLIGRAM(S): at 19:09

## 2022-01-14 RX ADMIN — ESCITALOPRAM OXALATE 20 MILLIGRAM(S): 10 TABLET, FILM COATED ORAL at 11:57

## 2022-01-14 RX ADMIN — SODIUM CHLORIDE 125 MILLILITER(S): 9 INJECTION INTRAMUSCULAR; INTRAVENOUS; SUBCUTANEOUS at 20:09

## 2022-01-14 RX ADMIN — Medication 150 MILLIGRAM(S): at 06:55

## 2022-01-14 RX ADMIN — VALSARTAN 160 MILLIGRAM(S): 80 TABLET ORAL at 08:32

## 2022-01-14 RX ADMIN — Medication 150 MILLIGRAM(S): at 22:30

## 2022-01-14 RX ADMIN — Medication 15 MILLIGRAM(S): at 11:58

## 2022-01-14 RX ADMIN — OXCARBAZEPINE 300 MILLIGRAM(S): 300 TABLET, FILM COATED ORAL at 06:55

## 2022-01-14 RX ADMIN — Medication 40 MILLIEQUIVALENT(S): at 10:00

## 2022-01-14 RX ADMIN — SODIUM CHLORIDE 75 MILLILITER(S): 9 INJECTION INTRAMUSCULAR; INTRAVENOUS; SUBCUTANEOUS at 22:31

## 2022-01-14 RX ADMIN — Medication 10 MILLIGRAM(S): at 19:09

## 2022-01-14 RX ADMIN — Medication 150 MILLIGRAM(S): at 13:51

## 2022-01-14 RX ADMIN — Medication 125 MILLIGRAM(S): at 06:55

## 2022-01-14 RX ADMIN — ENOXAPARIN SODIUM 40 MILLIGRAM(S): 100 INJECTION SUBCUTANEOUS at 11:57

## 2022-01-14 NOTE — PATIENT PROFILE ADULT - VISION (WITH CORRECTIVE LENSES IF THE PATIENT USUALLY WEARS THEM):
pt wearing glasses/Partially impaired: cannot see medication labels or newsprint, but can see obstacles in path, and the surrounding layout; can count fingers at arm's length

## 2022-01-14 NOTE — CONSULT NOTE ADULT - ASSESSMENT
Patient is a 66 year old male with PMH of MS, wheelchair bound, neuropathy of the left foot, overactive bladder, HTN, depression presents to the ED with diarrhea for 3 days.    Diarrhea rule out C.diff  Fecal impaction rectosigmoid colon with stercoral colitis   Fever possibly due to above  Hyponatremia in setting of diarrhea  Acute urinary retention, s/p meyers, no UTI   - h/o recent admission for complicated UTI treated with ceftriaxone x7 and developed C.diff 12/12/21 -s/p PO vancomycin x14d completed 12/25   - no antibiotic use since then per pt, now with multiple episodes diarrhea x3d   - febrile in ER to 102.6F, felt chills, WBC wnl   - RVP/COVID negative    - CXR with no acute findings    - CTAP reviewed - noted with fecal impaction of rectosigmoid colon with associated stercoral colitis    - GI following    - had acute urinary retention, s/p meyers placement in ER -draining clear yellow urine    -- UA with no pyuria, meyers care per primary team    - follow blood cultures    - follow C.diff PCR and GI stool PCR - ordered and pending    - started on empiric PO vancomycin pending C.diff   - on isolation for possible C.diff per infection control protocol   - continue supportive care, IVF per primary team   - monitor clinically, temps, WBC      Kenneth Castellanos M.D.  [facility], Division of Infectious Diseases  [phone]  After 5pm on weekdays and all day on weekends - please call [phone]
fecal impaction  recent c diff      suspected he has fecal impaction based on CT  diarrhea likely 2/2 overflow  dulcolax suppository  diet as tolerated  doubt c diff  d/w patient bedside    I reviewed the overnight course of events on the unit, re-confirming the patient history. I discussed the care with the patient and their family  The plan of care was discussed with the physician assistant and modifications were made to the notation where appropriate.   Differential diagnosis and plan of care discussed with patient after the evaluation  35 minutes spent on total encounter of which more than fifty percent of the encounter was spent counseling and/or coordinating care by the attending physician.  Advanced care planning was discussed with patient and family.  Advanced care planning forms were reviewed and discussed.  Risks, benefits and alternatives of gastroenterologic procedures were discussed in detail and all questions were answered.

## 2022-01-14 NOTE — PROGRESS NOTE ADULT - PROBLEM SELECTOR PLAN 1
Pt presents with diarrhea and weakness, pt was recently treated for cdiff   r/o C. diff   Pt does not meet SIRS criteria, pt was only febrile in the ED T: 102.6 F  - s/p Ofirmiv x1 and 2.7L NS bolus in the ED   - F/u C. diff PCR, GI Stool PCR   - CT A/P: Fecal impaction of the rectosigmoid colon with associated stercoral colitis  - Continue Vancomycin 125 mL q6h  - Tylenol prn for fever   - IVF as pt is dry on exam 2/2 diarrhea -- NS @80 cc/hr x12 hours   - F/u BCx and UCx   - GI Dr. Fry consulted, f/u recs   - ID Dr. Day consulted, f/u recs  - Isolation precautions Pt presents with diarrhea and weakness, pt was recently treated for cdiff   r/o C. diff   Pt does not meet SIRS criteria, pt was only febrile in the ED T: 102.6 F  - s/p Ofirmiv x1 and 2.7L NS bolus in the ED   - F/u C. diff PCR, GI Stool PCR   - CT A/P: Fecal impaction of the rectosigmoid colon with associated stercoral colitis  - Continue Vancomycin 125 mL q6h  - Tylenol prn for fever   - IVF as pt is dry on exam 2/2 diarrhea -- NS @80 cc/hr x12 hours   - F/u BCx and UCx     1/14 -  GI Dr. Fry consulted, f/u recs -- no disimpaction advised at this time, states diarrhea possibly stool passing past rect-sigmoid obstruction, may not be true watery diarrhea as seen in Cdiff. Clinical impression is stercoral colitis causing urinary retention -- meyers had drained approx 1.5L overnight, will maintain -- ID eval noted, will c/w PO vanco and send stool studies for GI PCR and Cdiff toxins - -will d/w ID if rec to still hold home mycophenolate -- per wife Alisson, pt had self-discontinued in fear of superimposed infection 1 month ago.    *wife updated

## 2022-01-14 NOTE — ED ADULT NURSE REASSESSMENT NOTE - NS ED NURSE REASSESS COMMENT FT1
Patient A/Ox4. NAD noted, resting in hospital bed. Call light in reach. Shafer draining urine. Will continue to monitor. Patient A/Ox4. NAD noted, resting in hospital bed. Shafer draining urine. Will continue to monitor. Stool cup at bedside, awaiting sample.

## 2022-01-14 NOTE — CONSULT NOTE ADULT - SUBJECTIVE AND OBJECTIVE BOX
[facility]  Darron Bustos PA-C  [place] [address]  [phone]      Chief Complaint:  Patient is a 66y old  Male who presents with a chief complaint of sepsis (2022 13:48)      HPI: 67 yo M With PMH MHx MS wheelchair bound, neuropathy of the left foot, overactive bladder, HTN, depression presents to the ED with diarrhea x3 days. Of note, pt was recently admitted in 2021 for sepsis secondary to UTI and later developed C diff from being on Ceftriaxone. Pt was discharged on Vancomycin for 14 days which he completed. After discharge pt was feeling well until 3 days ago. Pt has been experiencing abdominal pain mostly around his umbilicus, that he described as aching and non-radiating along with diarrhea. Pt has been having 3-4 episodes of loose, watery and foul smelling bowel movements. Reports that diarrhea feels similar to the time when he had C diff. Denies nausea and vomiting. Denies melena or hematochezia. Denies eating anything out of the ordinary. Pt had a tele med visit 2 days ago with PCP who prescribed him PO Vancomycin q6h. Pt has been taking abx but is still experiencing diarrhea. Pt also admits to increased weakness and worsening of his MS symptoms today. Pt has had decreased PO intake today due to increased fatigue and weakness. Denies fever, chills, chest pain, palpitations, SOB, cough, urinary sx, headaches, changes in vision, dizziness.   Denies recent travel or sick contacts.    Allergies:  No Known Allergies      Medications:  acetaminophen     Tablet .. 650 milliGRAM(s) Oral every 6 hours PRN  bisacodyl Suppository 10 milliGRAM(s) Rectal once  enoxaparin Injectable 40 milliGRAM(s) SubCutaneous daily  escitalopram 20 milliGRAM(s) Oral daily  melatonin 3 milliGRAM(s) Oral at bedtime PRN  ondansetron Injectable 4 milliGRAM(s) IV Push every 8 hours PRN  OXcarbazepine 300 milliGRAM(s) Oral two times a day  oxybutynin 15 milliGRAM(s) Oral daily  pregabalin 150 milliGRAM(s) Oral three times a day  sodium chloride 0.9%. 1000 milliLiter(s) IV Continuous <Continuous>  valsartan 160 milliGRAM(s) Oral daily  vancomycin    Solution 125 milliGRAM(s) Oral every 6 hours      PMHX/PSHX:  MS (multiple sclerosis)    Femur fracture, left    Femur fracture, right    Tibia fracture    No significant past surgical history    History of cholecystectomy        Family history:  No pertinent family history in first degree relatives    No pertinent family history in first degree relatives        Social History:     ROS:     General:  No wt loss, fevers, chills, night sweats, fatigue,   Eyes:  Good vision, no reported pain  ENT:  No sore throat, pain, runny nose, dysphagia  CV:  No pain, palpitations, hypo/hypertension  Resp:  No dyspnea, cough, tachypnea, wheezing  GI:  No pain, No nausea, No vomiting, + diarrhea, No constipation, No weight loss, No fever, No pruritis, No rectal bleeding, No tarry stools, No dysphagia,  :  No pain, bleeding, incontinence, nocturia  Muscle:  No pain, weakness  Neuro:  No weakness, tingling, memory problems  Psych:  No fatigue, insomnia, mood problems, depression  Endocrine:  No polyuria, polydipsia, cold/heat intolerance  Heme:  No petechiae, ecchymosis, easy bruisability  Skin:  No rash, tattoos, scars, edema      PHYSICAL EXAM:   Vital Signs:  Vital Signs Last 24 Hrs  T(C): 36.7 (2022 12:21), Max: 39.2 (2022 18:27)  T(F): 98 (2022 12:21), Max: 102.6 (2022 18:27)  HR: 84 (2022 12:21) (82 - 102)  BP: 120/60 (2022 12:21) (120/60 - 177/76)  BP(mean): --  RR: 16 (2022 12:21) (16 - 18)  SpO2: 98% (2022 12:21) (93% - 98%)  Daily Height in cm: 193.04 (2022 18:27)    Daily     GENERAL:  Appears stated age,   HEENT:  NC/AT,    CHEST:  Full & symmetric excursion,   HEART:  Regular rhythm  ABDOMEN:  Soft, non-tender, non-distended,   EXTEREMITIES:  no cyanosis,clubbing or edema  SKIN:  No rash  NEURO:  Alert,    LABS:                        11.8   9.08  )-----------( 258      ( 2022 08:06 )             34.7     -    135  |  99  |  7   ----------------------------<  90  3.3<L>   |  25  |  0.44<L>    Ca    8.1<L>      2022 08:06    TPro  6.1  /  Alb  2.5<L>  /  TBili  0.6  /  DBili  x   /  AST  11<L>  /  ALT  10<L>  /  AlkPhos  71  -14    LIVER FUNCTIONS - ( 2022 08:06 )  Alb: 2.5 g/dL / Pro: 6.1 g/dL / ALK PHOS: 71 U/L / ALT: 10 U/L / AST: 11 U/L / GGT: x           PT/INR - ( 2022 20:07 )   PT: 15.1 sec;   INR: 1.31 ratio         PTT - ( 2022 20:07 )  PTT:35.1 sec  Urinalysis Basic - ( 2022 22:47 )    Color: Yellow / Appearance: Clear / S.015 / pH: x  Gluc: x / Ketone: Moderate  / Bili: Negative / Urobili: Negative   Blood: x / Protein: Negative / Nitrite: Negative   Leuk Esterase: Negative / RBC: 6-10 /HPF / WBC 3-5   Sq Epi: x / Non Sq Epi: Occasional / Bacteria: x      Amylase Serum--      Lipase serum31       Ammonia--      Imaging:          
[facility], Division of Infectious Diseases  HALEIGH Willams, JUDY Baird, DEBORAH Ferguson Casimir  [phone]  ([phone] - weekdays after 5pm and weekends)    THANH ISRAEL  66y, Male  [id number]    HPI:  Patient is a 66 year old male with PMH of MS, wheelchair bound, neuropathy of the left foot, overactive bladder, HTN, depression presents to the ED with diarrhea x3 days. Of note, pt was recently admitted in 2021 for sepsis secondary to UTI and later developed C diff from being on Ceftriaxone. Pt was discharged on Vancomycin for 14 days which he completed. After discharge pt was feeling well until 3 days ago. Pt has been experiencing abdominal pain mostly around his umbilicus, that he described as aching and non-radiating along with diarrhea. Pt has been having 3-4 episodes of loose, watery and foul smelling bowel movements. Reports that diarrhea feels similar to the time when he had C diff. Denies nausea and vomiting. Denies melena or hematochezia. Denies eating anything out of the ordinary. Pt had a tele med visit 2 days ago with PCP who prescribed him PO Vancomycin q6h. Pt has been taking abx but is still experiencing diarrhea. Pt also admits to increased weakness and worsening of his MS symptoms today. Pt has had decreased PO intake today due to increased fatigue and weakness. Denies fever, chills, chest pain, palpitations, SOB, cough, urinary sx, headaches, changes in vision, dizziness. Denies recent travel or sick contacts.  ED Course: Vitals: BP: 123/73, HR: 87, Temp: 102.6 F-->100 F, RR: 18, SpO2: 93% on RA   Labs: H/H: 12.6/36.2, neut: 7.68, PT/INR: 15.1/1.31, Na: 128, Cl: 91, alb: 3,       CXR: No acute lung pathology as per personal read, official read pending   CT A/P: Fecal impaction of the rectosigmoid colon with associated stercoral colitis.  UA: moderate ketone, moderate blood, 6-10 rbc   EKG: NSR, HR: 91, no signs of acute ischemia   Received Ofirmiv x1 and 2.7L NS bolus in the ED  (2022 22:36)  Patient seen and examined at bedside earlier this morning in ER. Patient states he had loose bowel movements last night, none this morning. Complains of left lower abdominal pain, no nausea or vomiting. Reports feeling chills occasionally but denies fever. Patient denies chest pain, dyspnea or cough. States he usually voids every few hours but had not voided yesterday since 1pm, now patient s/p meyers placement for urinary retention, draining clear yellow urine. Patient states after completing PO vancomycin course, he has not been on any antibiotics.   ROS: 14 point review of systems completed, pertinent positives and negatives as per HPI.    Allergies: No Known Allergies    PMH -- MS (multiple sclerosis)  Femur fracture, left  Femur fracture, right  Tibia fracture    PSH -- History of cholecystectomy  FH -- No pertinent family history in first degree relatives  Social History -- denies tobacco or alcohol use, uses marijuana 3-4 times a week, , lives at home with wife, wheelchair bound, COVID vaccinated + booster Pfizer x3    Physical Exam--  Vital Signs Last 24 Hrs  T(F): 98.2 (2022 08:11), Max: 102.6 (2022 18:27)  HR: 84 (2022 12:21) (82 - 102)  BP: 120/60 (2022 12:21) (120/60 - 177/76)  RR: 16 (2022 12:21) (16 - 18)  SpO2: 98% (2022 12:21) (93% - 98%)  General: no acute distress  HEENT: NC/AT, EOMI, anicteric, neck supple  Lungs: Clear bilaterally without rales, wheezing or rhonchi  Heart: S1 and S2 present, normal rate   Abdomen: Soft. Nondistended. LLQ TTP. BS present.   Neuro: AAOx3, no obvious focal deficits   Extremities: No cyanosis. No edema.   Skin: Warm. Dry. Good turgor. No visible rash.  Psychiatric: Appropriate affect and mood for situation.   Lines: PIV without eryhtema  Meyers: present, draining clear yellow urine     Laboratory & Imaging Data--  CBC:                       11.8   9.08  )-----------( 258      ( 2022 08:06 )             34.7     WBC Count: 9.08 K/uL (22 @ 08:06)  WBC Count: 9.99 K/uL (22 @ 20:07)    CMP:     135  |  99  |  7   ----------------------------<  90  3.3<L>   |  25  |  0.44<L>    Ca    8.1<L>      2022 08:06    TPro  6.1  /  Alb  2.5<L>  /  TBili  0.6  /  DBili  x   /  AST  11<L>  /  ALT  10<L>  /  AlkPhos  71      LIVER FUNCTIONS - ( 2022 08:06 )  Alb: 2.5 g/dL / Pro: 6.1 g/dL / ALK PHOS: 71 U/L / ALT: 10 U/L / AST: 11 U/L / GGT: x           Urinalysis Basic - ( 2022 22:47 )  Color: Yellow / Appearance: Clear / S.015 / pH: x  Gluc: x / Ketone: Moderate  / Bili: Negative / Urobili: Negative   Blood: x / Protein: Negative / Nitrite: Negative   Leuk Esterase: Negative / RBC: 6-10 /HPF / WBC 3-5   Sq Epi: x / Non Sq Epi: Occasional / Bacteria: x    Microbiology:    - RVP/COVID - negative     Radiology--  CT Abdomen and Pelvis w/ IV Cont (22 @ 21:40) >IMPRESSION: Fecal impaction of the rectosigmoid colon with associated stercoral colitis.    Xray Chest 1 View- PORTABLE-Urgent (22 @ 20:15) >IMPRESSION: No acute finding or change.    Active Medications--  acetaminophen     Tablet .. 650 milliGRAM(s) Oral every 6 hours PRN  bisacodyl Suppository 10 milliGRAM(s) Rectal once  enoxaparin Injectable 40 milliGRAM(s) SubCutaneous daily  escitalopram 20 milliGRAM(s) Oral daily  melatonin 3 milliGRAM(s) Oral at bedtime PRN  ondansetron Injectable 4 milliGRAM(s) IV Push every 8 hours PRN  OXcarbazepine 300 milliGRAM(s) Oral two times a day  oxybutynin 15 milliGRAM(s) Oral daily  pregabalin 150 milliGRAM(s) Oral three times a day  sodium chloride 0.9%. 1000 milliLiter(s) IV Continuous <Continuous>  valsartan 160 milliGRAM(s) Oral daily  vancomycin    Solution 125 milliGRAM(s) Oral every 6 hours    Antimicrobials:   vancomycin    Solution 125 milliGRAM(s) Oral every 6 hours

## 2022-01-14 NOTE — CHART NOTE - NSCHARTNOTEFT_GEN_A_CORE
RN called to report patient complaining of bladder distension w/ decreased urine output. Patient voiding small amounts of urine. RN bladder scanned patient multiple times, consistently getting measurements of retained urine >700cc w/ some scans showing >999cc of retained urine. With straight cath patient with meyers catheter, if patient drains more than 1L, will leave meyers catheter in place. RN to call w/ changes. RN called to report patient complaining of bladder distension w/ decreased urine output. Patient voiding small amounts of urine. RN bladder scanned patient multiple times, consistently getting measurements of retained urine >700cc w/ some scans showing >999cc of retained urine. With straight cath patient with meyers catheter, if patient drains more than 1L, will leave meyers catheter in place. RN to call w/ changes.      ---------ADDENDUM---------- 5:12, Hoa Vann, PGY-3    ED RN placed straight catheter which drained ~1500cc of urine. RN advised to leave indwelling catheter in place at this time.

## 2022-01-14 NOTE — PATIENT PROFILE ADULT - FALL HARM RISK - HARM RISK INTERVENTIONS

## 2022-01-14 NOTE — PROGRESS NOTE ADULT - ASSESSMENT
65 yo M With PMH MHx MS wheelchair bound, neuropathy of the left foot, overactive bladder, HTN, depression presents to the ED with diarrhea x3 days. Admitted for diarrhea r/o Cdiff.

## 2022-01-14 NOTE — PROGRESS NOTE ADULT - SUBJECTIVE AND OBJECTIVE BOX
Patient is a 66y old  Male who presents with a chief complaint of sepsis (2022 22:36)      INTERVAL HPI/OVERNIGHT EVENTS:    MEDICATIONS  (STANDING):  bisacodyl Suppository 10 milliGRAM(s) Rectal once  enoxaparin Injectable 40 milliGRAM(s) SubCutaneous daily  escitalopram 20 milliGRAM(s) Oral daily  OXcarbazepine 300 milliGRAM(s) Oral two times a day  oxybutynin 15 milliGRAM(s) Oral daily  pregabalin 150 milliGRAM(s) Oral three times a day  sodium chloride 0.9%. 1000 milliLiter(s) (125 mL/Hr) IV Continuous <Continuous>  valsartan 160 milliGRAM(s) Oral daily  vancomycin    Solution 125 milliGRAM(s) Oral every 6 hours    MEDICATIONS  (PRN):  acetaminophen     Tablet .. 650 milliGRAM(s) Oral every 6 hours PRN Temp greater or equal to 38C (100.4F), Mild Pain (1 - 3)  melatonin 3 milliGRAM(s) Oral at bedtime PRN Insomnia  ondansetron Injectable 4 milliGRAM(s) IV Push every 8 hours PRN Nausea and/or Vomiting      Allergies    No Known Allergies    Intolerances        REVIEW OF SYSTEMS:  CONSTITUTIONAL: No fever or chills  HEENT:  No headache, no sore throat  RESPIRATORY: No cough, wheezing, or shortness of breath  CARDIOVASCULAR: No chest pain, palpitations, or leg swelling  GASTROINTESTINAL: No abd pain, nausea, vomiting, or diarrhea  GENITOURINARY: No dysuria, frequency, or hematuria  NEUROLOGICAL: no focal weakness or dizziness  MUSCULOSKELETAL: no myalgias     Vital Signs Last 24 Hrs  T(C): 36.8 (2022 08:11), Max: 39.2 (2022 18:27)  T(F): 98.2 (2022 08:11), Max: 102.6 (2022 18:27)  HR: 84 (2022 12:21) (82 - 102)  BP: 120/60 (2022 12:21) (120/60 - 177/76)  BP(mean): --  RR: 16 (2022 12:21) (16 - 18)  SpO2: 98% (2022 12:21) (93% - 98%)    PHYSICAL EXAM:  GENERAL: NAD  HEENT:  NC/AT, EOMI, moist mucous membranes  CHEST/LUNG:  CTA b/l, no rales, wheezes, or rhonchi  HEART:  RRR, S1, S2  ABDOMEN:  BS+, soft, nontender, nondistended  EXTREMITIES: no edema, cyanosis, or calf tenderness  NERVOUS SYSTEM: AA&Ox3    LABS:                        11.8   9.08  )-----------( 258      ( 2022 08:06 )             34.7     CBC Full  -  ( 2022 08:06 )  WBC Count : 9.08 K/uL  Hemoglobin : 11.8 g/dL  Hematocrit : 34.7 %  Platelet Count - Automated : 258 K/uL  Mean Cell Volume : 88.7 fl  Mean Cell Hemoglobin : 30.2 pg  Mean Cell Hemoglobin Concentration : 34.0 gm/dL  Auto Neutrophil # : 7.31 K/uL  Auto Lymphocyte # : 0.78 K/uL  Auto Monocyte # : 0.91 K/uL  Auto Eosinophil # : 0.01 K/uL  Auto Basophil # : 0.04 K/uL  Auto Neutrophil % : 80.6 %  Auto Lymphocyte % : 8.6 %  Auto Monocyte % : 10.0 %  Auto Eosinophil % : 0.1 %  Auto Basophil % : 0.4 %    2022 08:06    135    |  99     |  7      ----------------------------<  90     3.3     |  25     |  0.44     Ca    8.1        2022 08:06    TPro  6.1    /  Alb  2.5    /  TBili  0.6    /  DBili  x      /  AST  11     /  ALT  10     /  AlkPhos  71     2022 08:06    PT/INR - ( 2022 20:07 )   PT: 15.1 sec;   INR: 1.31 ratio         PTT - ( 2022 20:07 )  PTT:35.1 sec  Urinalysis Basic - ( 2022 22:47 )    Color: Yellow / Appearance: Clear / S.015 / pH: x  Gluc: x / Ketone: Moderate  / Bili: Negative / Urobili: Negative   Blood: x / Protein: Negative / Nitrite: Negative   Leuk Esterase: Negative / RBC: 6-10 /HPF / WBC 3-5   Sq Epi: x / Non Sq Epi: Occasional / Bacteria: x      CAPILLARY BLOOD GLUCOSE              RADIOLOGY & ADDITIONAL TESTS:    Personally reviewed.     Consultant(s) Notes Reviewed:  [x] YES  [ ] NO    Care Discussed with [x] Consultants  [x] Patient  [ ] Family  [ ]      [ ] Other; RN  DVT ppx   Patient is a 66y old  Male who presents with a chief complaint of sepsis (2022 22:36)      INTERVAL HPI/OVERNIGHT EVENTS: Pt seen and examined at bedside. lethargic, has no complaints. No diarrhea since last night pt states.     MEDICATIONS  (STANDING):  bisacodyl Suppository 10 milliGRAM(s) Rectal once  enoxaparin Injectable 40 milliGRAM(s) SubCutaneous daily  escitalopram 20 milliGRAM(s) Oral daily  OXcarbazepine 300 milliGRAM(s) Oral two times a day  oxybutynin 15 milliGRAM(s) Oral daily  pregabalin 150 milliGRAM(s) Oral three times a day  sodium chloride 0.9%. 1000 milliLiter(s) (125 mL/Hr) IV Continuous <Continuous>  valsartan 160 milliGRAM(s) Oral daily  vancomycin    Solution 125 milliGRAM(s) Oral every 6 hours    MEDICATIONS  (PRN):  acetaminophen     Tablet .. 650 milliGRAM(s) Oral every 6 hours PRN Temp greater or equal to 38C (100.4F), Mild Pain (1 - 3)  melatonin 3 milliGRAM(s) Oral at bedtime PRN Insomnia  ondansetron Injectable 4 milliGRAM(s) IV Push every 8 hours PRN Nausea and/or Vomiting      Allergies    No Known Allergies    Intolerances        REVIEW OF SYSTEMS:  CONSTITUTIONAL: No fever or chills  HEENT:  No headache, no sore throat  RESPIRATORY: No cough, wheezing, or shortness of breath  CARDIOVASCULAR: No chest pain, palpitations, or leg swelling  GASTROINTESTINAL: No abd pain, nausea, vomiting, or diarrhea  GENITOURINARY: No dysuria, frequency, or hematuria  NEUROLOGICAL: no focal weakness or dizziness  MUSCULOSKELETAL: no myalgias     Vital Signs Last 24 Hrs  T(C): 36.8 (2022 08:11), Max: 39.2 (2022 18:27)  T(F): 98.2 (2022 08:11), Max: 102.6 (2022 18:27)  HR: 84 (2022 12:21) (82 - 102)  BP: 120/60 (2022 12:21) (120/60 - 177/76)  BP(mean): --  RR: 16 (2022 12:21) (16 - 18)  SpO2: 98% (2022 12:21) (93% - 98%)    PHYSICAL EXAM:  GENERAL: lethargic M in NAD  HEENT:  NC/AT, EOMI, moist mucous membranes  CHEST/LUNG: CTA b/l, no rales, wheezes, or rhonchi  HEART:  RRR, S1, S2  ABDOMEN:  BS+, soft, nontender, nondistended  EXTREMITIES: chronic b/l LE and RUE weakness, LUE weakness noted now as well  NERVOUS SYSTEM: lethargic    LABS:                        11.8   9.08  )-----------( 258      ( 2022 08:06 )             34.7     CBC Full  -  ( 2022 08:06 )  WBC Count : 9.08 K/uL  Hemoglobin : 11.8 g/dL  Hematocrit : 34.7 %  Platelet Count - Automated : 258 K/uL  Mean Cell Volume : 88.7 fl  Mean Cell Hemoglobin : 30.2 pg  Mean Cell Hemoglobin Concentration : 34.0 gm/dL  Auto Neutrophil # : 7.31 K/uL  Auto Lymphocyte # : 0.78 K/uL  Auto Monocyte # : 0.91 K/uL  Auto Eosinophil # : 0.01 K/uL  Auto Basophil # : 0.04 K/uL  Auto Neutrophil % : 80.6 %  Auto Lymphocyte % : 8.6 %  Auto Monocyte % : 10.0 %  Auto Eosinophil % : 0.1 %  Auto Basophil % : 0.4 %    2022 08:06    135    |  99     |  7      ----------------------------<  90     3.3     |  25     |  0.44     Ca    8.1        2022 08:06    TPro  6.1    /  Alb  2.5    /  TBili  0.6    /  DBili  x      /  AST  11     /  ALT  10     /  AlkPhos  71     2022 08:06    PT/INR - ( 2022 20:07 )   PT: 15.1 sec;   INR: 1.31 ratio         PTT - ( 2022 20:07 )  PTT:35.1 sec  Urinalysis Basic - ( 2022 22:47 )    Color: Yellow / Appearance: Clear / S.015 / pH: x  Gluc: x / Ketone: Moderate  / Bili: Negative / Urobili: Negative   Blood: x / Protein: Negative / Nitrite: Negative   Leuk Esterase: Negative / RBC: 6-10 /HPF / WBC 3-5   Sq Epi: x / Non Sq Epi: Occasional / Bacteria: x      CAPILLARY BLOOD GLUCOSE              RADIOLOGY & ADDITIONAL TESTS:    Personally reviewed.     Consultant(s) Notes Reviewed:  [x] YES  [ ] NO    Care Discussed with [x] Consultants  [x] Patient  [ ] Family  [ ]      [ ] Other; RN  DVT ppx

## 2022-01-14 NOTE — ED ADULT NURSE REASSESSMENT NOTE - NS ED NURSE REASSESS COMMENT FT1
0430:Patient reports abdominal pain. Tender with palpation, bladder feels distended. Bladder scan yields 700 to >999 ml. Placed 16fr meyers after calling resident, output of 1500ml.

## 2022-01-14 NOTE — ED ADULT NURSE REASSESSMENT NOTE - NS ED NURSE REASSESS COMMENT FT1
Received the patient in the Er. patient is alert and oriented. resting comfortably. pending for bed availability.

## 2022-01-15 LAB
ALBUMIN SERPL ELPH-MCNC: 2.4 G/DL — LOW (ref 3.3–5)
ALP SERPL-CCNC: 61 U/L — SIGNIFICANT CHANGE UP (ref 40–120)
ALT FLD-CCNC: 12 U/L — SIGNIFICANT CHANGE UP (ref 12–78)
ANION GAP SERPL CALC-SCNC: 9 MMOL/L — SIGNIFICANT CHANGE UP (ref 5–17)
AST SERPL-CCNC: 9 U/L — LOW (ref 15–37)
BASOPHILS # BLD AUTO: 0.04 K/UL — SIGNIFICANT CHANGE UP (ref 0–0.2)
BASOPHILS NFR BLD AUTO: 0.6 % — SIGNIFICANT CHANGE UP (ref 0–2)
BILIRUB SERPL-MCNC: 0.4 MG/DL — SIGNIFICANT CHANGE UP (ref 0.2–1.2)
BUN SERPL-MCNC: 8 MG/DL — SIGNIFICANT CHANGE UP (ref 7–23)
CALCIUM SERPL-MCNC: 7.7 MG/DL — LOW (ref 8.5–10.1)
CHLORIDE SERPL-SCNC: 103 MMOL/L — SIGNIFICANT CHANGE UP (ref 96–108)
CO2 SERPL-SCNC: 27 MMOL/L — SIGNIFICANT CHANGE UP (ref 22–31)
CREAT SERPL-MCNC: 0.49 MG/DL — LOW (ref 0.5–1.3)
CULTURE RESULTS: SIGNIFICANT CHANGE UP
EOSINOPHIL # BLD AUTO: 0.09 K/UL — SIGNIFICANT CHANGE UP (ref 0–0.5)
EOSINOPHIL NFR BLD AUTO: 1.4 % — SIGNIFICANT CHANGE UP (ref 0–6)
GLUCOSE SERPL-MCNC: 107 MG/DL — HIGH (ref 70–99)
HCT VFR BLD CALC: 30.7 % — LOW (ref 39–50)
HGB BLD-MCNC: 10.7 G/DL — LOW (ref 13–17)
IMM GRANULOCYTES NFR BLD AUTO: 0.3 % — SIGNIFICANT CHANGE UP (ref 0–1.5)
LYMPHOCYTES # BLD AUTO: 1.23 K/UL — SIGNIFICANT CHANGE UP (ref 1–3.3)
LYMPHOCYTES # BLD AUTO: 19.2 % — SIGNIFICANT CHANGE UP (ref 13–44)
MCHC RBC-ENTMCNC: 30.9 PG — SIGNIFICANT CHANGE UP (ref 27–34)
MCHC RBC-ENTMCNC: 34.9 GM/DL — SIGNIFICANT CHANGE UP (ref 32–36)
MCV RBC AUTO: 88.7 FL — SIGNIFICANT CHANGE UP (ref 80–100)
MONOCYTES # BLD AUTO: 0.43 K/UL — SIGNIFICANT CHANGE UP (ref 0–0.9)
MONOCYTES NFR BLD AUTO: 6.7 % — SIGNIFICANT CHANGE UP (ref 2–14)
NEUTROPHILS # BLD AUTO: 4.6 K/UL — SIGNIFICANT CHANGE UP (ref 1.8–7.4)
NEUTROPHILS NFR BLD AUTO: 71.8 % — SIGNIFICANT CHANGE UP (ref 43–77)
NRBC # BLD: 0 /100 WBCS — SIGNIFICANT CHANGE UP (ref 0–0)
PLATELET # BLD AUTO: 252 K/UL — SIGNIFICANT CHANGE UP (ref 150–400)
POTASSIUM SERPL-MCNC: 3.3 MMOL/L — LOW (ref 3.5–5.3)
POTASSIUM SERPL-SCNC: 3.3 MMOL/L — LOW (ref 3.5–5.3)
PROCALCITONIN SERPL-MCNC: 0.12 NG/ML — HIGH (ref 0–0.04)
PROT SERPL-MCNC: 5.7 G/DL — LOW (ref 6–8.3)
RBC # BLD: 3.46 M/UL — LOW (ref 4.2–5.8)
RBC # FLD: 13.3 % — SIGNIFICANT CHANGE UP (ref 10.3–14.5)
SODIUM SERPL-SCNC: 139 MMOL/L — SIGNIFICANT CHANGE UP (ref 135–145)
SPECIMEN SOURCE: SIGNIFICANT CHANGE UP
WBC # BLD: 6.41 K/UL — SIGNIFICANT CHANGE UP (ref 3.8–10.5)
WBC # FLD AUTO: 6.41 K/UL — SIGNIFICANT CHANGE UP (ref 3.8–10.5)

## 2022-01-15 PROCEDURE — 99233 SBSQ HOSP IP/OBS HIGH 50: CPT

## 2022-01-15 RX ORDER — POTASSIUM CHLORIDE 20 MEQ
40 PACKET (EA) ORAL ONCE
Refills: 0 | Status: COMPLETED | OUTPATIENT
Start: 2022-01-15 | End: 2022-01-15

## 2022-01-15 RX ADMIN — ENOXAPARIN SODIUM 40 MILLIGRAM(S): 100 INJECTION SUBCUTANEOUS at 12:18

## 2022-01-15 RX ADMIN — Medication 125 MILLIGRAM(S): at 00:13

## 2022-01-15 RX ADMIN — Medication 150 MILLIGRAM(S): at 12:24

## 2022-01-15 RX ADMIN — Medication 125 MILLIGRAM(S): at 23:00

## 2022-01-15 RX ADMIN — OXCARBAZEPINE 300 MILLIGRAM(S): 300 TABLET, FILM COATED ORAL at 06:57

## 2022-01-15 RX ADMIN — Medication 40 MILLIEQUIVALENT(S): at 10:19

## 2022-01-15 RX ADMIN — Medication 150 MILLIGRAM(S): at 06:57

## 2022-01-15 RX ADMIN — VALSARTAN 160 MILLIGRAM(S): 80 TABLET ORAL at 06:57

## 2022-01-15 RX ADMIN — Medication 125 MILLIGRAM(S): at 17:14

## 2022-01-15 RX ADMIN — OXCARBAZEPINE 300 MILLIGRAM(S): 300 TABLET, FILM COATED ORAL at 17:55

## 2022-01-15 RX ADMIN — Medication 150 MILLIGRAM(S): at 22:04

## 2022-01-15 RX ADMIN — Medication 15 MILLIGRAM(S): at 12:19

## 2022-01-15 RX ADMIN — ESCITALOPRAM OXALATE 20 MILLIGRAM(S): 10 TABLET, FILM COATED ORAL at 12:19

## 2022-01-15 RX ADMIN — Medication 125 MILLIGRAM(S): at 12:19

## 2022-01-15 RX ADMIN — Medication 125 MILLIGRAM(S): at 06:57

## 2022-01-15 NOTE — DIETITIAN INITIAL EVALUATION ADULT. - PROBLEM SELECTOR PLAN 4
Chronic - L foot neuropathic pain  - Continue home Pregabalin 150 mg TID  - On home Percocet 10/325 mg TID prn, will start Oxycodone 10 mg and Tylenol 325 mg q8h prn   - Pt to bring home Forteo

## 2022-01-15 NOTE — DIETITIAN INITIAL EVALUATION ADULT. - PROBLEM SELECTOR PLAN 3
Chronic - Pt is unable to move BL LE or BL UE at baseline (pt used to be able to move left arm but has not been able to since last admission)  - Continue home Oxcarbazepine 300 mg BID   - Will hold recently started Mycophenolate Mofetil in the setting of infection as this is an immunosuppressant agent

## 2022-01-15 NOTE — DIETITIAN INITIAL EVALUATION ADULT. - PROBLEM SELECTOR PLAN 1
Pt presents with diarrhea and weakness, pt was recently treated for cdiff   r/o C. diff   Pt does not meet SIRS criteria, pt was only febrile in the ED T: 102.6 F  - s/p Ofirmiv x1 and 2.7L NS bolus in the ED   - F/u C. diff PCR, GI Stool PCR   - CT A/P: Fecal impaction of the rectosigmoid colon with associated stercoral colitis  - Continue Vancomycin 125 mL q6h  - Tylenol prn for fever   - IVF as pt is dry on exam 2/2 diarrhea -- NS @80 cc/hr x12 hours   - F/u BCx and UCx   - GI Dr. Fry consulted, f/u recs   - ID Dr. Day consulted, f/u recs  - Isolation precautions

## 2022-01-15 NOTE — PROGRESS NOTE ADULT - ASSESSMENT
Patient is a 66 year old male with PMH of MS, wheelchair bound, neuropathy of the left foot, overactive bladder, HTN, depression presents to the ED with diarrhea for 3 days.    Fecal impaction rectosigmoid colon with stercoral colitis   Diarrhea suspected overflow diarrhea, low suspicion C.diff  Fever possibly due to above, improved   Hyponatremia in setting of diarrhea  Acute urinary retention, s/p meyers, no UTI   - h/o recent admission for cUTI, s/p ceftriaxone x7d, developed C.diff 12/12/21 -s/p PO vanc x14d completed 12/25   - no antibiotic use since then per pt, now with multiple episodes diarrhea x3d   - was febrile in ER to 102.6F, felt chills, WBC wnl -- now afebrile x 24h   - CTAP with fecal impaction of rectosigmoid colon with associated stercoral colitis    - GI following - suspect diarrhea due to overflow, on suppository    - had acute urinary retention, s/p meyers placement in ER -draining clear yellow urine    -- UA with no pyuria, meyers care per primary team    - blood cultures - NGTD x2   - C.diff PCR and GI stool PCR pending - send if pt still with watery stools    -- noted with mucous this morning, no stool to send to lab -- if no further watery/loose stools, can d/c C.diff testing    - started on empiric PO vancomycin on admission   - on isolation for possible C.diff per infection control protocol   - continue supportive care, IVF per primary team   - monitor clinically, temps, WBC      Kenneth Castellanos M.D.  [facility], Division of Infectious Diseases  [phone]  After 5pm on weekdays and all day on weekends - please call [phone]

## 2022-01-15 NOTE — DIETITIAN INITIAL EVALUATION ADULT. - PROBLEM SELECTOR PLAN 5
Chronic  - Stable  - Continue with home Valsartan 160 mg QD w/ hold parameters  - Monitor BP & titrate BP meds PRN

## 2022-01-15 NOTE — DIETITIAN INITIAL EVALUATION ADULT. - OTHER INFO
GI/Intake:   -No intake documented thus far  -C/o diarrhea PTA; noted to continue in-house   -Presenting C-Diff+     Pressure Injuries:  -Sacrum: stage 2   -Scrotum: stage 2  -Pt noted to be wheelchair bound

## 2022-01-15 NOTE — PROGRESS NOTE ADULT - SUBJECTIVE AND OBJECTIVE BOX
Patient is a 66y old  Male who presents with a chief complaint of sepsis (15 Bronson 2022 13:05)      INTERVAL HPI/OVERNIGHT EVENTS:    MEDICATIONS  (STANDING):  enoxaparin Injectable 40 milliGRAM(s) SubCutaneous daily  escitalopram 20 milliGRAM(s) Oral daily  OXcarbazepine 300 milliGRAM(s) Oral two times a day  oxybutynin 15 milliGRAM(s) Oral daily  pregabalin 150 milliGRAM(s) Oral three times a day  sodium chloride 0.9%. 1000 milliLiter(s) (75 mL/Hr) IV Continuous <Continuous>  valsartan 160 milliGRAM(s) Oral daily  vancomycin    Solution 125 milliGRAM(s) Oral every 6 hours    MEDICATIONS  (PRN):  acetaminophen     Tablet .. 650 milliGRAM(s) Oral every 6 hours PRN Temp greater or equal to 38C (100.4F), Mild Pain (1 - 3)  melatonin 3 milliGRAM(s) Oral at bedtime PRN Insomnia  ondansetron Injectable 4 milliGRAM(s) IV Push every 8 hours PRN Nausea and/or Vomiting      Allergies    No Known Allergies    Intolerances        REVIEW OF SYSTEMS:  CONSTITUTIONAL: No fever or chills  HEENT:  No headache, no sore throat  RESPIRATORY: No cough, wheezing, or shortness of breath  CARDIOVASCULAR: No chest pain, palpitations, or leg swelling  GASTROINTESTINAL: No abd pain, nausea, vomiting, or diarrhea  GENITOURINARY: No dysuria, frequency, or hematuria  NEUROLOGICAL: no focal weakness or dizziness  MUSCULOSKELETAL: no myalgias     Vital Signs Last 24 Hrs  T(C): 36.7 (15 Bronson 2022 12:51), Max: 37.4 (2022 17:27)  T(F): 98 (15 Bronson 2022 12:51), Max: 99.4 (2022 17:27)  HR: 78 (15 Bronson 2022 12:51) (71 - 80)  BP: 157/98 (15 Bronson 2022 12:51) (105/63 - 157/98)  BP(mean): --  RR: 18 (15 Bronson 2022 12:51) (17 - 18)  SpO2: 98% (15 Bronson 2022 12:51) (96% - 98%)    PHYSICAL EXAM:  GENERAL: NAD  HEENT:  NC/AT, EOMI, moist mucous membranes  CHEST/LUNG:  CTA b/l, no rales, wheezes, or rhonchi  HEART:  RRR, S1, S2  ABDOMEN:  BS+, soft, nontender, nondistended  EXTREMITIES: no edema, cyanosis, or calf tenderness  NERVOUS SYSTEM: AA&Ox3    LABS:                        10.7   6.41  )-----------( 252      ( 15 Bronson 2022 07:25 )             30.7     CBC Full  -  ( 15 Bronson 2022 07:25 )  WBC Count : 6.41 K/uL  Hemoglobin : 10.7 g/dL  Hematocrit : 30.7 %  Platelet Count - Automated : 252 K/uL  Mean Cell Volume : 88.7 fl  Mean Cell Hemoglobin : 30.9 pg  Mean Cell Hemoglobin Concentration : 34.9 gm/dL  Auto Neutrophil # : 4.60 K/uL  Auto Lymphocyte # : 1.23 K/uL  Auto Monocyte # : 0.43 K/uL  Auto Eosinophil # : 0.09 K/uL  Auto Basophil # : 0.04 K/uL  Auto Neutrophil % : 71.8 %  Auto Lymphocyte % : 19.2 %  Auto Monocyte % : 6.7 %  Auto Eosinophil % : 1.4 %  Auto Basophil % : 0.6 %    15 Bronson 2022 07:25    139    |  103    |  8      ----------------------------<  107    3.3     |  27     |  0.49     Ca    7.7        15 Bronson 2022 07:25    TPro  5.7    /  Alb  2.4    /  TBili  0.4    /  DBili  x      /  AST  9      /  ALT  12     /  AlkPhos  61     15 Bronson 2022 07:25    PT/INR - ( 2022 20:07 )   PT: 15.1 sec;   INR: 1.31 ratio         PTT - ( 2022 20:07 )  PTT:35.1 sec  Urinalysis Basic - ( 2022 22:47 )    Color: Yellow / Appearance: Clear / S.015 / pH: x  Gluc: x / Ketone: Moderate  / Bili: Negative / Urobili: Negative   Blood: x / Protein: Negative / Nitrite: Negative   Leuk Esterase: Negative / RBC: 6-10 /HPF / WBC 3-5   Sq Epi: x / Non Sq Epi: Occasional / Bacteria: x      CAPILLARY BLOOD GLUCOSE            Culture - Urine (collected 22 @ 05:13)  Source: Clean Catch Clean Catch (Midstream)  Final Report (01-15-22 @ 08:05):    <10,000 CFU/mL Normal Urogenital Sushma    Culture - Blood (collected 22 @ 00:37)  Source: .Blood Blood-Peripheral  Preliminary Report (01-15-22 @ 01:02):    No growth to date.    Culture - Blood (collected 22 @ 00:37)  Source: .Blood Blood-Peripheral  Preliminary Report (01-15-22 @ 01:02):    No growth to date.        RADIOLOGY & ADDITIONAL TESTS:    Personally reviewed.     Consultant(s) Notes Reviewed:  [x] YES  [ ] NO    Care Discussed with [x] Consultants  [x] Patient  [ ] Family  [ ]      [ ] Other; RN  DVT ppx   Patient is a 66y old  Male who presents with a chief complaint of sepsis (15 Bronson 2022 13:05)      INTERVAL HPI/OVERNIGHT EVENTS: Pt seen and examined at bedside. states had small BM after dulcolax suppository. no other complaints.     MEDICATIONS  (STANDING):  enoxaparin Injectable 40 milliGRAM(s) SubCutaneous daily  escitalopram 20 milliGRAM(s) Oral daily  OXcarbazepine 300 milliGRAM(s) Oral two times a day  oxybutynin 15 milliGRAM(s) Oral daily  pregabalin 150 milliGRAM(s) Oral three times a day  sodium chloride 0.9%. 1000 milliLiter(s) (75 mL/Hr) IV Continuous <Continuous>  valsartan 160 milliGRAM(s) Oral daily  vancomycin    Solution 125 milliGRAM(s) Oral every 6 hours    MEDICATIONS  (PRN):  acetaminophen     Tablet .. 650 milliGRAM(s) Oral every 6 hours PRN Temp greater or equal to 38C (100.4F), Mild Pain (1 - 3)  melatonin 3 milliGRAM(s) Oral at bedtime PRN Insomnia  ondansetron Injectable 4 milliGRAM(s) IV Push every 8 hours PRN Nausea and/or Vomiting      Allergies    No Known Allergies    Intolerances        REVIEW OF SYSTEMS:  CONSTITUTIONAL: No fever or chills  HEENT:  No headache, no sore throat  RESPIRATORY: No cough, wheezing, or shortness of breath  CARDIOVASCULAR: No chest pain, palpitations, or leg swelling  GASTROINTESTINAL: No abd pain, nausea, vomiting, or diarrhea  GENITOURINARY: No dysuria, frequency, or hematuria  NEUROLOGICAL: no focal weakness or dizziness  MUSCULOSKELETAL: no myalgias     Vital Signs Last 24 Hrs  T(C): 36.7 (15 Bronson 2022 12:51), Max: 37.4 (2022 17:27)  T(F): 98 (15 Bronson 2022 12:51), Max: 99.4 (2022 17:27)  HR: 78 (15 Bronson 2022 12:51) (71 - 80)  BP: 157/98 (15 Bronson 2022 12:51) (105/63 - 157/98)  BP(mean): --  RR: 18 (15 Bronson 2022 12:51) (17 - 18)  SpO2: 98% (15 Bronson 2022 12:51) (96% - 98%)    PHYSICAL:  GENERAL: M in NAD  HEENT:  NC/AT, EOMI, moist mucous membranes  CHEST/LUNG: CTA b/l, no rales, wheezes, or rhonchi  HEART:  RRR, S1, S2  ABDOMEN:  BS+, soft, nontender, nondistended  EXTREMITIES: chronic b/l LE and RUE weakness, LUE weakness noted now as well  NERVOUS SYSTEM: AAOx3    LABS:                        10.7   6.41  )-----------( 252      ( 15 Bronson 2022 07:25 )             30.7     CBC Full  -  ( 15 Bronson 2022 07:25 )  WBC Count : 6.41 K/uL  Hemoglobin : 10.7 g/dL  Hematocrit : 30.7 %  Platelet Count - Automated : 252 K/uL  Mean Cell Volume : 88.7 fl  Mean Cell Hemoglobin : 30.9 pg  Mean Cell Hemoglobin Concentration : 34.9 gm/dL  Auto Neutrophil # : 4.60 K/uL  Auto Lymphocyte # : 1.23 K/uL  Auto Monocyte # : 0.43 K/uL  Auto Eosinophil # : 0.09 K/uL  Auto Basophil # : 0.04 K/uL  Auto Neutrophil % : 71.8 %  Auto Lymphocyte % : 19.2 %  Auto Monocyte % : 6.7 %  Auto Eosinophil % : 1.4 %  Auto Basophil % : 0.6 %    15 Bronson 2022 07:25    139    |  103    |  8      ----------------------------<  107    3.3     |  27     |  0.49     Ca    7.7        15 Bronson 2022 07:25    TPro  5.7    /  Alb  2.4    /  TBili  0.4    /  DBili  x      /  AST  9      /  ALT  12     /  AlkPhos  61     15 Bronson 2022 07:25    PT/INR - ( 2022 20:07 )   PT: 15.1 sec;   INR: 1.31 ratio         PTT - ( 2022 20:07 )  PTT:35.1 sec  Urinalysis Basic - ( 2022 22:47 )    Color: Yellow / Appearance: Clear / S.015 / pH: x  Gluc: x / Ketone: Moderate  / Bili: Negative / Urobili: Negative   Blood: x / Protein: Negative / Nitrite: Negative   Leuk Esterase: Negative / RBC: 6-10 /HPF / WBC 3-5   Sq Epi: x / Non Sq Epi: Occasional / Bacteria: x      CAPILLARY BLOOD GLUCOSE            Culture - Urine (collected 22 @ 05:13)  Source: Clean Catch Clean Catch (Midstream)  Final Report (01-15-22 @ 08:05):    <10,000 CFU/mL Normal Urogenital Sushma    Culture - Blood (collected 22 @ 00:37)  Source: .Blood Blood-Peripheral  Preliminary Report (01-15-22 @ 01:02):    No growth to date.    Culture - Blood (collected 22 @ 00:37)  Source: .Blood Blood-Peripheral  Preliminary Report (01-15-22 @ 01:02):    No growth to date.        RADIOLOGY & ADDITIONAL TESTS:    Personally reviewed.     Consultant(s) Notes Reviewed:  [x] YES  [ ] NO    Care Discussed with [x] Consultants  [x] Patient  [ ] Family  [ ]      [ ] Other; RN  DVT ppx

## 2022-01-15 NOTE — PROGRESS NOTE ADULT - SUBJECTIVE AND OBJECTIVE BOX
[facility], Division of Infectious Diseases  HALEIGH Willams Y. Patel, S. Shah, G. Casimir  [phone]  ([phone] - weekdays after 5pm and weekends)    Name: THANH ISRAEL  Age/Gender: 66y Male  MRN: [id number]    Interval History:  Patient seen this morning.   Feels better, had a suppository earlier.   Reports he just had another BM, RN to send to lab if able.  Denies fever, chills, pain or any new complaints.   Notes reviewed. No concerning overnight events  Afebrile     Allergies: No Known Allergies    Objective:  Vitals:   T(F): 98 (01-15-22 @ 12:51), Max: 99.4 (22 @ 17:27)  HR: 78 (01-15-22 @ 12:51) (71 - 80)  BP: 157/98 (01-15-22 @ 12:51) (105/63 - 157/98)  RR: 18 (01-15-22 @ 12:51) (17 - 18)  SpO2: 98% (01-15-22 @ 12:51) (96% - 98%)  Physical Examination:  General: no acute distress  HEENT: NC/AT, anicteric, neck supple  Respiratory: no acc muscle use, breathing comfortably  Cardiovascular: S1 and S2 present  Gastrointestinal: soft, nontender, nondistended  Extremities: no edema, no cyanosis  Skin: no visible rash    Laboratory Studies:  CBC:                       10.7   6.41  )-----------( 252      ( 15 Bronson 2022 07:25 )             30.7     WBC Trend:  6.41 01-15-22 @ 07:25  9.08 22 @ 08:06  9.99 22 @ 20:07    CMP: 01-15    139  |  103  |  8   ----------------------------<  107<H>  3.3<L>   |  27  |  0.49<L>    Ca    7.7<L>      15 Bronson 2022 07:25    TPro  5.7<L>  /  Alb  2.4<L>  /  TBili  0.4  /  DBili  x   /  AST  9<L>  /  ALT  12  /  AlkPhos  61  -15    LIVER FUNCTIONS - ( 15 Bronson 2022 07:25 )  Alb: 2.4 g/dL / Pro: 5.7 g/dL / ALK PHOS: 61 U/L / ALT: 12 U/L / AST: 9 U/L / GGT: x           Urinalysis Basic - ( 2022 22:47 )  Color: Yellow / Appearance: Clear / S.015 / pH: x  Gluc: x / Ketone: Moderate  / Bili: Negative / Urobili: Negative   Blood: x / Protein: Negative / Nitrite: Negative   Leuk Esterase: Negative / RBC: 6-10 /HPF / WBC 3-5   Sq Epi: x / Non Sq Epi: Occasional / Bacteria: x    Microbiology: reviewed   Culture - Urine (collected 22 @ 05:13)  Source: Clean Catch Clean Catch (Midstream)  Final Report (01-15-22 @ 08:05):    <10,000 CFU/mL Normal Urogenital Sushma    Culture - Blood (collected 22 @ 00:37)  Source: .Blood Blood-Peripheral  Preliminary Report (01-15-22 @ 01:02):    No growth to date.    Culture - Blood (collected 22 @ 00:37)  Source: .Blood Blood-Peripheral  Preliminary Report (01-15-22 @ 01:02):    No growth to date.    Radiology: reviewed   CT Abdomen and Pelvis w/ IV Cont (22 @ 21:40) >IMPRESSION: Fecal impaction of the rectosigmoid colon with associated stercoral colitis.    Medications:  acetaminophen     Tablet .. 650 milliGRAM(s) Oral every 6 hours PRN  enoxaparin Injectable 40 milliGRAM(s) SubCutaneous daily  escitalopram 20 milliGRAM(s) Oral daily  melatonin 3 milliGRAM(s) Oral at bedtime PRN  ondansetron Injectable 4 milliGRAM(s) IV Push every 8 hours PRN  OXcarbazepine 300 milliGRAM(s) Oral two times a day  oxybutynin 15 milliGRAM(s) Oral daily  pregabalin 150 milliGRAM(s) Oral three times a day  sodium chloride 0.9%. 1000 milliLiter(s) IV Continuous <Continuous>  valsartan 160 milliGRAM(s) Oral daily  vancomycin    Solution 125 milliGRAM(s) Oral every 6 hours    Antimicrobials:  vancomycin    Solution 125 milliGRAM(s) Oral every 6 hours

## 2022-01-15 NOTE — PROGRESS NOTE ADULT - ASSESSMENT
65 yo M With PMH MHx MS wheelchair bound, neuropathy of the left foot, overactive bladder, HTN, depression presents to the ED with diarrhea x3 days. Admitted for diarrhea r/o Cdiff. 67 yo M With PMH MHx MS wheelchair bound, neuropathy of the left foot, overactive bladder, HTN, depression presents to the ED with diarrhea x3 days. Admitted for diarrhea r/o Cdiff.    1/14 -  GI Dr. Fry consulted, f/u recs -- no disimpaction advised at this time, states diarrhea possibly stool passing past rect-sigmoid obstruction, may not be true watery diarrhea as seen in Cdiff. Clinical impression is stercoral colitis causing urinary retention -- meyers had drained approx 1.5L overnight, will maintain -- ID eval noted, will c/w PO vanco and send stool studies for GI PCR and Cdiff toxins - -will d/w ID if rec to still hold home mycophenolate -- per wife Alisson, pt had self-discontinued in fear of superimposed infection 1 month ago.    1/15 - will d/w neuro, reg need to c/w [employer]. Pt had small BM, however sample not adequate per lab reqs for stool testing. Plan to wait for adequate sample for GI PCR and stool testing. Per GI, plan to c/w dulcolax suppository. I spoke to pt's wife Alisson today, who was infuriated over what she believes is lack of care. She states there is no action plan, and is upset that pt's solution here is to continue the same PO antibiotic he was on previously and to take 'OTC suppositories'. She and the pt are both adamant that pt will require sigmoidoscopy to relieve rectosigmoid obstruction. I attempted manual disimpaction myself w/ permission of the pt, but there was no stool apparent in immediate rectal vault, only mucous. I would recommend GI eval for sigmoidoscopy, as this has reportedly helped pt in the past, to help with his diarrhea, urinary retention, and discomfort.    *wife updated

## 2022-01-15 NOTE — PROGRESS NOTE ADULT - ASSESSMENT
fecal impaction  recent c diff      suspected he has fecal impaction based on CT  diarrhea likely 2/2 overflow  dulcolax suppository  diet as tolerated  doubt c diff  d/w patient bedside    Advanced care planning was discussed with patient and family.  Advanced care planning forms were reviewed and discussed.  Risks, benefits and alternatives of gastroenterologic procedures were discussed in detail and all questions were answered.    30 minutes spent.

## 2022-01-15 NOTE — DIETITIAN INITIAL EVALUATION ADULT. - PROBLEM SELECTOR PLAN 2
Na: 128, Cl: 91 on admission 2/2 diarrhea  - IVF as pt is dry on exam 2/2 diarrhea -- s/p 2700cc NS, c/w IVF @125 cc/hr x12 hours   - Monitor CMP

## 2022-01-15 NOTE — DIETITIAN INITIAL EVALUATION ADULT. - ADD RECOMMEND
1) Recommend regular diet. 2) Add Ensure BID. 3) Add Probiotic. 4) Monitor PO intake, labs, GI function.

## 2022-01-15 NOTE — DIETITIAN INITIAL EVALUATION ADULT. - CHIEF COMPLAINT
66y Male with PMH of MS (wheelchair bound). Pt admitted on 1/13 complaining of diarrhea. Presenting with C-Diff+.

## 2022-01-15 NOTE — PROGRESS NOTE ADULT - PROBLEM SELECTOR PLAN 1
Pt presents with diarrhea and weakness, pt was recently treated for cdiff   r/o C. diff   Pt does not meet SIRS criteria, pt was only febrile in the ED T: 102.6 F  - s/p Ofirmiv x1 and 2.7L NS bolus in the ED   - F/u C. diff PCR, GI Stool PCR   - CT A/P: Fecal impaction of the rectosigmoid colon with associated stercoral colitis  - Continue Vancomycin 125 mL q6h  - Tylenol prn for fever   - IVF as pt is dry on exam 2/2 diarrhea -- NS @80 cc/hr x12 hours   - F/u BCx and UCx     1/14 -  GI Dr. Fry consulted, f/u recs -- no disimpaction advised at this time, states diarrhea possibly stool passing past rect-sigmoid obstruction, may not be true watery diarrhea as seen in Cdiff. Clinical impression is stercoral colitis causing urinary retention -- meyers had drained approx 1.5L overnight, will maintain -- ID eval noted, will c/w PO vanco and send stool studies for GI PCR and Cdiff toxins - -will d/w ID if rec to still hold home mycophenolate -- per wife Alisson, pt had self-discontinued in fear of superimposed infection 1 month ago.    *wife updated Pt presents with diarrhea and weakness, pt was recently treated for cdiff   r/o C. diff   Pt does not meet SIRS criteria, pt was only febrile in the ED T: 102.6 F  - s/p Ofirmiv x1 and 2.7L NS bolus in the ED   - F/u C. diff PCR, GI Stool PCR   - CT A/P: Fecal impaction of the rectosigmoid colon with associated stercoral colitis  - Continue Vancomycin 125 mL q6h  - Tylenol prn for fever   - IVF as pt is dry on exam 2/2 diarrhea -- NS @80 cc/hr x12 hours   - F/u BCx and UCx     1/14 -  GI Dr. Fry consulted, f/u recs -- no disimpaction advised at this time, states diarrhea possibly stool passing past rect-sigmoid obstruction, may not be true watery diarrhea as seen in Cdiff. Clinical impression is stercoral colitis causing urinary retention -- meyers had drained approx 1.5L overnight, will maintain -- ID eval noted, will c/w PO vanco and send stool studies for GI PCR and Cdiff toxins - -will d/w ID if rec to still hold home mycophenolate -- per wife Alisson, pt had self-discontinued in fear of superimposed infection 1 month ago.    1/15 - d/w ID, no need to c/w Cellcept. Pt had small BM, however sample not adequate per lab reqs for stool testing. Plan to wait for adequate sample for GI PCR and stool testing. Per GI, plan to c/w dulcolax suppository. I spoke to pt's wife Alisson today, who was infuriated over what she believes is lack of care. She states there is no action plan, and is upset that pt's solution here is to continue the same PO antibiotic he was on previously and to take 'OTC suppositories'. She and the pt are both adamant that pt will require sigmoidoscopy to relieve rectosigmoid obstruction. I attempted manual disimpaction myself w/ permission of the pt, but there was no stool apparent in immediate rectal vault, only mucous. I would recommend GI eval for sigmoidoscopy, as this has reportedly helped pt in the past, to help with his diarrhea, urinary retention, and discomfort.    *wife updated Pt presents with diarrhea and weakness, pt was recently treated for cdiff   r/o C. diff   Pt does not meet SIRS criteria, pt was only febrile in the ED T: 102.6 F  - F/u C. diff PCR, GI Stool PCR   - CT A/P: Fecal impaction of the rectosigmoid colon with associated stercoral colitis  - Continue Vancomycin 125 mL q6h  - F/u BCx and UCx     1/14 -  GI Dr. Fry consulted, f/u recs -- no disimpaction advised at this time, states diarrhea possibly stool passing past rect-sigmoid obstruction, may not be true watery diarrhea as seen in Cdiff. Clinical impression is stercoral colitis causing urinary retention -- meyers had drained approx 1.5L overnight, will maintain -- ID eval noted, will c/w PO vanco and send stool studies for GI PCR and Cdiff toxins - -will d/w ID if rec to still hold home mycophenolate -- per wife Alisson, pt had self-discontinued in fear of superimposed infection 1 month ago.    1/15 - will d/w neuro, reg need to c/w [employer]. Pt had small BM, however sample not adequate per lab reqs for stool testing. Plan to wait for adequate sample for GI PCR and stool testing. Per GI, plan to c/w dulcolax suppository. I spoke to pt's wife Alisson today, who was infuriated over what she believes is lack of care. She states there is no action plan, and is upset that pt's solution here is to continue the same PO antibiotic he was on previously and to take 'OTC suppositories'. She and the pt are both adamant that pt will require sigmoidoscopy to relieve rectosigmoid obstruction. I attempted manual disimpaction myself w/ permission of the pt, but there was no stool apparent in immediate rectal vault, only mucous. I would recommend GI eval for sigmoidoscopy, as this has reportedly helped pt in the past, to help with his diarrhea, urinary retention, and discomfort.    *wife updated

## 2022-01-16 ENCOUNTER — TRANSCRIPTION ENCOUNTER (OUTPATIENT)
Age: 67
End: 2022-01-16

## 2022-01-16 LAB
ANION GAP SERPL CALC-SCNC: 6 MMOL/L — SIGNIFICANT CHANGE UP (ref 5–17)
BASOPHILS # BLD AUTO: 0.03 K/UL — SIGNIFICANT CHANGE UP (ref 0–0.2)
BASOPHILS NFR BLD AUTO: 0.4 % — SIGNIFICANT CHANGE UP (ref 0–2)
BUN SERPL-MCNC: 10 MG/DL — SIGNIFICANT CHANGE UP (ref 7–23)
CALCIUM SERPL-MCNC: 8 MG/DL — LOW (ref 8.5–10.1)
CHLORIDE SERPL-SCNC: 106 MMOL/L — SIGNIFICANT CHANGE UP (ref 96–108)
CO2 SERPL-SCNC: 26 MMOL/L — SIGNIFICANT CHANGE UP (ref 22–31)
CREAT SERPL-MCNC: 0.44 MG/DL — LOW (ref 0.5–1.3)
EOSINOPHIL # BLD AUTO: 0.12 K/UL — SIGNIFICANT CHANGE UP (ref 0–0.5)
EOSINOPHIL NFR BLD AUTO: 1.4 % — SIGNIFICANT CHANGE UP (ref 0–6)
GLUCOSE SERPL-MCNC: 110 MG/DL — HIGH (ref 70–99)
HCT VFR BLD CALC: 31.5 % — LOW (ref 39–50)
HGB BLD-MCNC: 10.5 G/DL — LOW (ref 13–17)
IMM GRANULOCYTES NFR BLD AUTO: 0.5 % — SIGNIFICANT CHANGE UP (ref 0–1.5)
LYMPHOCYTES # BLD AUTO: 1.62 K/UL — SIGNIFICANT CHANGE UP (ref 1–3.3)
LYMPHOCYTES # BLD AUTO: 19.4 % — SIGNIFICANT CHANGE UP (ref 13–44)
MAGNESIUM SERPL-MCNC: 1.6 MG/DL — SIGNIFICANT CHANGE UP (ref 1.6–2.6)
MCHC RBC-ENTMCNC: 29.8 PG — SIGNIFICANT CHANGE UP (ref 27–34)
MCHC RBC-ENTMCNC: 33.3 GM/DL — SIGNIFICANT CHANGE UP (ref 32–36)
MCV RBC AUTO: 89.5 FL — SIGNIFICANT CHANGE UP (ref 80–100)
MONOCYTES # BLD AUTO: 0.5 K/UL — SIGNIFICANT CHANGE UP (ref 0–0.9)
MONOCYTES NFR BLD AUTO: 6 % — SIGNIFICANT CHANGE UP (ref 2–14)
NEUTROPHILS # BLD AUTO: 6.04 K/UL — SIGNIFICANT CHANGE UP (ref 1.8–7.4)
NEUTROPHILS NFR BLD AUTO: 72.3 % — SIGNIFICANT CHANGE UP (ref 43–77)
NRBC # BLD: 0 /100 WBCS — SIGNIFICANT CHANGE UP (ref 0–0)
PHOSPHATE SERPL-MCNC: 2 MG/DL — LOW (ref 2.5–4.5)
PLATELET # BLD AUTO: 259 K/UL — SIGNIFICANT CHANGE UP (ref 150–400)
POTASSIUM SERPL-MCNC: 3.1 MMOL/L — LOW (ref 3.5–5.3)
POTASSIUM SERPL-SCNC: 3.1 MMOL/L — LOW (ref 3.5–5.3)
RBC # BLD: 3.52 M/UL — LOW (ref 4.2–5.8)
RBC # FLD: 13.3 % — SIGNIFICANT CHANGE UP (ref 10.3–14.5)
SODIUM SERPL-SCNC: 138 MMOL/L — SIGNIFICANT CHANGE UP (ref 135–145)
WBC # BLD: 8.35 K/UL — SIGNIFICANT CHANGE UP (ref 3.8–10.5)
WBC # FLD AUTO: 8.35 K/UL — SIGNIFICANT CHANGE UP (ref 3.8–10.5)

## 2022-01-16 PROCEDURE — 99233 SBSQ HOSP IP/OBS HIGH 50: CPT

## 2022-01-16 RX ORDER — POTASSIUM PHOSPHATE, MONOBASIC POTASSIUM PHOSPHATE, DIBASIC 236; 224 MG/ML; MG/ML
15 INJECTION, SOLUTION INTRAVENOUS ONCE
Refills: 0 | Status: COMPLETED | OUTPATIENT
Start: 2022-01-16 | End: 2022-01-16

## 2022-01-16 RX ORDER — POTASSIUM CHLORIDE 20 MEQ
40 PACKET (EA) ORAL EVERY 4 HOURS
Refills: 0 | Status: COMPLETED | OUTPATIENT
Start: 2022-01-16 | End: 2022-01-16

## 2022-01-16 RX ORDER — SENNA PLUS 8.6 MG/1
2 TABLET ORAL AT BEDTIME
Refills: 0 | Status: DISCONTINUED | OUTPATIENT
Start: 2022-01-16 | End: 2022-01-17

## 2022-01-16 RX ORDER — GLYCERIN ADULT
1 SUPPOSITORY, RECTAL RECTAL
Refills: 0 | Status: DISCONTINUED | OUTPATIENT
Start: 2022-01-16 | End: 2022-01-16

## 2022-01-16 RX ADMIN — POTASSIUM PHOSPHATE, MONOBASIC POTASSIUM PHOSPHATE, DIBASIC 62.5 MILLIMOLE(S): 236; 224 INJECTION, SOLUTION INTRAVENOUS at 14:11

## 2022-01-16 RX ADMIN — Medication 150 MILLIGRAM(S): at 22:58

## 2022-01-16 RX ADMIN — Medication 40 MILLIEQUIVALENT(S): at 13:04

## 2022-01-16 RX ADMIN — Medication 125 MILLIGRAM(S): at 11:21

## 2022-01-16 RX ADMIN — Medication 150 MILLIGRAM(S): at 05:20

## 2022-01-16 RX ADMIN — Medication 125 MILLIGRAM(S): at 05:20

## 2022-01-16 RX ADMIN — Medication 150 MILLIGRAM(S): at 13:04

## 2022-01-16 RX ADMIN — Medication 125 MILLIGRAM(S): at 22:58

## 2022-01-16 RX ADMIN — Medication 40 MILLIEQUIVALENT(S): at 11:22

## 2022-01-16 RX ADMIN — OXCARBAZEPINE 300 MILLIGRAM(S): 300 TABLET, FILM COATED ORAL at 05:20

## 2022-01-16 RX ADMIN — ESCITALOPRAM OXALATE 20 MILLIGRAM(S): 10 TABLET, FILM COATED ORAL at 11:24

## 2022-01-16 RX ADMIN — Medication 125 MILLIGRAM(S): at 18:04

## 2022-01-16 RX ADMIN — SENNA PLUS 2 TABLET(S): 8.6 TABLET ORAL at 22:58

## 2022-01-16 RX ADMIN — Medication 15 MILLIGRAM(S): at 11:24

## 2022-01-16 RX ADMIN — VALSARTAN 160 MILLIGRAM(S): 80 TABLET ORAL at 05:24

## 2022-01-16 RX ADMIN — ENOXAPARIN SODIUM 40 MILLIGRAM(S): 100 INJECTION SUBCUTANEOUS at 11:21

## 2022-01-16 RX ADMIN — OXCARBAZEPINE 300 MILLIGRAM(S): 300 TABLET, FILM COATED ORAL at 18:04

## 2022-01-16 NOTE — PROGRESS NOTE ADULT - PROBLEM SELECTOR PLAN 6
Chronic  - Continue home Oxybutynin 15 mg QD

## 2022-01-16 NOTE — DISCHARGE NOTE PROVIDER - ATTENDING DISCHARGE PHYSICAL EXAMINATION:
GENERAL: M in NAD  HEENT:  NC/AT, EOMI, moist mucous membranes  CHEST/LUNG: CTA b/l, no rales, wheezes, or rhonchi  HEART:  RRR, S1, S2  ABDOMEN:  BS+, soft, nontender, nondistended  EXTREMITIES: chronic b/l LE and RUE weakness, LUE weakness improved  NERVOUS SYSTEM: AAOx3

## 2022-01-16 NOTE — PROGRESS NOTE ADULT - ASSESSMENT
67 yo M With PMH MHx MS wheelchair bound, neuropathy of the left foot, overactive bladder, HTN, depression presents to the ED with diarrhea x3 days. Admitted for diarrhea r/o Cdiff.    1/14 -  GI Dr. Fry consulted, f/u recs -- no disimpaction advised at this time, states diarrhea possibly stool passing past rect-sigmoid obstruction, may not be true watery diarrhea as seen in Cdiff. Clinical impression is stercoral colitis causing urinary retention -- meyers had drained approx 1.5L overnight, will maintain -- ID eval noted, will c/w PO vanco and send stool studies for GI PCR and Cdiff toxins - -will d/w ID if rec to still hold home mycophenolate -- per wife Alisson, pt had self-discontinued in fear of superimposed infection 1 month ago.    1/15 - will d/w neuro, reg need to c/w [employer]. Pt had small BM, however sample not adequate per lab reqs for stool testing. Plan to wait for adequate sample for GI PCR and stool testing. Per GI, plan to c/w dulcolax suppository. I spoke to pt's wife Alisson today, who was infuriated over what she believes is lack of care. She states there is no action plan, and is upset that pt's solution here is to continue the same PO antibiotic he was on previously and to take 'OTC suppositories'. She and the pt are both adamant that pt will require sigmoidoscopy to relieve rectosigmoid obstruction. I attempted manual disimpaction myself w/ permission of the pt, but there was no stool apparent in immediate rectal vault, only mucous. I would recommend GI eval for sigmoidoscopy, as this has reportedly helped pt in the past, to help with his diarrhea, urinary retention, and discomfort.    1/16 - updated wife -- d/w ID, will plan to complete total 10 days vanco for suspected C.diff as pt is convinced of this, and had started tx anyway. Sample unable to be sent as did not meet lab criteria. Will await GI eval for sigmoidoscopy to remove obstruction -- if no plan for this, and no BM with suppositories -- plan to DC pt to seek disimpaction elsewhere, at wife's request. Will need to pass TOV prior to this, although there is concern over voiding if stercoral colitis is still present.     *wife updated 67 yo M With PMH MHx MS wheelchair bound, neuropathy of the left foot, overactive bladder, HTN, depression presents to the ED with diarrhea x3 days. Admitted for diarrhea r/o Cdiff.    1/14 -  GI Dr. Fry consulted, f/u recs -- no disimpaction advised at this time, states diarrhea possibly stool passing past rect-sigmoid obstruction, may not be true watery diarrhea as seen in Cdiff. Clinical impression is stercoral colitis causing urinary retention -- meyers had drained approx 1.5L overnight, will maintain -- ID eval noted, will c/w PO vanco and send stool studies for GI PCR and Cdiff toxins - -will d/w ID if rec to still hold home mycophenolate -- per wife Alisson, pt had self-discontinued in fear of superimposed infection 1 month ago.    1/15 - will d/w neuro, reg need to c/w [employer]. Pt had small BM, however sample not adequate per lab reqs for stool testing. Plan to wait for adequate sample for GI PCR and stool testing. Per GI, plan to c/w dulcolax suppository. I spoke to pt's wife Alisson today, who was infuriated over what she believes is lack of care. She states there is no action plan, and is upset that pt's solution here is to continue the same PO antibiotic he was on previously and to take 'OTC suppositories'. She and the pt are both adamant that pt will require sigmoidoscopy to relieve rectosigmoid obstruction. I attempted manual disimpaction myself w/ permission of the pt, but there was no stool apparent in immediate rectal vault, only mucous. I would recommend GI eval for sigmoidoscopy, as this has reportedly helped pt in the past, to help with his diarrhea, urinary retention, and discomfort.    1/16 - updated wife -- d/w ID, will plan to complete total 10 days vanco for suspected C.diff as pt is convinced of this, and had started tx anyway. Sample unable to be sent as did not meet lab criteria. Will await GI eval for sigmoidoscopy to remove obstruction per pt request -- if no plan for this, and no BM with suppositories -- plan to DC pt to seek disimpaction elsewhere, at wife's request. Will need to pass TOV prior to this, although there is concern over voiding if stercoral colitis is still present.     *wife updated 65 yo M With PMH MHx MS wheelchair bound, neuropathy of the left foot, overactive bladder, HTN, depression presents to the ED with diarrhea x3 days. Admitted for diarrhea r/o Cdiff.    1/14 -  GI Dr. Fry consulted, f/u recs -- no disimpaction advised at this time, states diarrhea possibly stool passing past rect-sigmoid obstruction, may not be true watery diarrhea as seen in Cdiff. Clinical impression is stercoral colitis causing urinary retention -- meyers had drained approx 1.5L overnight, will maintain -- ID eval noted, will c/w PO vanco and send stool studies for GI PCR and Cdiff toxins - -will d/w ID if rec to still hold home mycophenolate -- per wife Alisson, pt had self-discontinued in fear of superimposed infection 1 month ago.    1/15 - will d/w neuro, reg need to c/w [employer]. Pt had small BM, however sample not adequate per lab reqs for stool testing. Plan to wait for adequate sample for GI PCR and stool testing. Per GI, plan to c/w dulcolax suppository. I spoke to pt's wife Alisson today, who was infuriated over what she believes is lack of care. She states there is no action plan, and is upset that pt's solution here is to continue the same PO antibiotic he was on previously and to take 'OTC suppositories'. She and the pt are both adamant that pt will require sigmoidoscopy to relieve rectosigmoid obstruction. I attempted manual disimpaction myself w/ permission of the pt, but there was no stool apparent in immediate rectal vault, only mucous. I would recommend GI eval for sigmoidoscopy, as this has reportedly helped pt in the past, to help with his diarrhea, urinary retention, and discomfort.    1/16 - updated wife -- d/w ID, will plan to complete total 10 days vanco for suspected C.diff as pt is convinced of this, and had started tx anyway. Sample unable to be sent as did not meet lab criteria. Will await GI eval for sigmoidoscopy to remove obstruction per pt request -- if no plan for this, and no BM with suppositories -- plan to DC pt to seek disimpaction elsewhere, at wife's request. Will need to pass TOV prior to this, although there is concern over voiding if stercoral colitis is still present. Will DC oxybutynin as well as this may be contributing to UR/GI obstruction.     *wife updated

## 2022-01-16 NOTE — PROGRESS NOTE ADULT - SUBJECTIVE AND OBJECTIVE BOX
Patient is a 66y old  Male who presents with a chief complaint of sepsis (16 Jan 2022 12:34)      INTERVAL HPI/OVERNIGHT EVENTS:    MEDICATIONS  (STANDING):  enoxaparin Injectable 40 milliGRAM(s) SubCutaneous daily  escitalopram 20 milliGRAM(s) Oral daily  OXcarbazepine 300 milliGRAM(s) Oral two times a day  oxybutynin 15 milliGRAM(s) Oral daily  pregabalin 150 milliGRAM(s) Oral three times a day  sodium chloride 0.9%. 1000 milliLiter(s) (75 mL/Hr) IV Continuous <Continuous>  valsartan 160 milliGRAM(s) Oral daily  vancomycin    Solution 125 milliGRAM(s) Oral every 6 hours    MEDICATIONS  (PRN):  acetaminophen     Tablet .. 650 milliGRAM(s) Oral every 6 hours PRN Temp greater or equal to 38C (100.4F), Mild Pain (1 - 3)  melatonin 3 milliGRAM(s) Oral at bedtime PRN Insomnia  ondansetron Injectable 4 milliGRAM(s) IV Push every 8 hours PRN Nausea and/or Vomiting      Allergies    No Known Allergies    Intolerances        REVIEW OF SYSTEMS:  CONSTITUTIONAL: No fever or chills  HEENT:  No headache, no sore throat  RESPIRATORY: No cough, wheezing, or shortness of breath  CARDIOVASCULAR: No chest pain, palpitations, or leg swelling  GASTROINTESTINAL: No abd pain, nausea, vomiting, or diarrhea  GENITOURINARY: No dysuria, frequency, or hematuria  NEUROLOGICAL: no focal weakness or dizziness  MUSCULOSKELETAL: no myalgias     Vital Signs Last 24 Hrs  T(C): 37 (16 Jan 2022 11:09), Max: 37 (16 Jan 2022 05:30)  T(F): 98.6 (16 Jan 2022 11:09), Max: 98.6 (16 Jan 2022 05:30)  HR: 63 (16 Jan 2022 11:09) (60 - 63)  BP: 149/78 (16 Jan 2022 11:09) (149/78 - 155/84)  BP(mean): --  RR: 19 (16 Jan 2022 11:09) (19 - 22)  SpO2: 97% (16 Jan 2022 11:09) (97% - 99%)    PHYSICAL EXAM:  GENERAL: NAD  HEENT:  NC/AT, EOMI, moist mucous membranes  CHEST/LUNG:  CTA b/l, no rales, wheezes, or rhonchi  HEART:  RRR, S1, S2  ABDOMEN:  BS+, soft, nontender, nondistended  EXTREMITIES: no edema, cyanosis, or calf tenderness  NERVOUS SYSTEM: AA&Ox3    LABS:                        10.5   8.35  )-----------( 259      ( 16 Jan 2022 08:54 )             31.5     CBC Full  -  ( 16 Jan 2022 08:54 )  WBC Count : 8.35 K/uL  Hemoglobin : 10.5 g/dL  Hematocrit : 31.5 %  Platelet Count - Automated : 259 K/uL  Mean Cell Volume : 89.5 fl  Mean Cell Hemoglobin : 29.8 pg  Mean Cell Hemoglobin Concentration : 33.3 gm/dL  Auto Neutrophil # : 6.04 K/uL  Auto Lymphocyte # : 1.62 K/uL  Auto Monocyte # : 0.50 K/uL  Auto Eosinophil # : 0.12 K/uL  Auto Basophil # : 0.03 K/uL  Auto Neutrophil % : 72.3 %  Auto Lymphocyte % : 19.4 %  Auto Monocyte % : 6.0 %  Auto Eosinophil % : 1.4 %  Auto Basophil % : 0.4 %    16 Jan 2022 08:54    138    |  106    |  10     ----------------------------<  110    3.1     |  26     |  0.44     Ca    8.0        16 Jan 2022 08:54  Phos  2.0       16 Jan 2022 10:22  Mg     1.6       16 Jan 2022 08:54          CAPILLARY BLOOD GLUCOSE            Culture - Urine (collected 01-14-22 @ 05:13)  Source: Clean Catch Clean Catch (Midstream)  Final Report (01-15-22 @ 08:05):    <10,000 CFU/mL Normal Urogenital Sushma    Culture - Blood (collected 01-14-22 @ 00:37)  Source: .Blood Blood-Peripheral  Preliminary Report (01-15-22 @ 01:02):    No growth to date.    Culture - Blood (collected 01-14-22 @ 00:37)  Source: .Blood Blood-Peripheral  Preliminary Report (01-15-22 @ 01:02):    No growth to date.        RADIOLOGY & ADDITIONAL TESTS:    Personally reviewed.     Consultant(s) Notes Reviewed:  [x] YES  [ ] NO    Care Discussed with [x] Consultants  [x] Patient  [ ] Family  [ ]      [ ] Other; RN  DVT ppx   Patient is a 66y old  Male who presents with a chief complaint of sepsis (16 Jan 2022 12:34)      INTERVAL HPI/OVERNIGHT EVENTS: Pt seen and examined at bedside. no BM overnight. no diarrhea either.     MEDICATIONS  (STANDING):  enoxaparin Injectable 40 milliGRAM(s) SubCutaneous daily  escitalopram 20 milliGRAM(s) Oral daily  OXcarbazepine 300 milliGRAM(s) Oral two times a day  oxybutynin 15 milliGRAM(s) Oral daily  pregabalin 150 milliGRAM(s) Oral three times a day  sodium chloride 0.9%. 1000 milliLiter(s) (75 mL/Hr) IV Continuous <Continuous>  valsartan 160 milliGRAM(s) Oral daily  vancomycin    Solution 125 milliGRAM(s) Oral every 6 hours    MEDICATIONS  (PRN):  acetaminophen     Tablet .. 650 milliGRAM(s) Oral every 6 hours PRN Temp greater or equal to 38C (100.4F), Mild Pain (1 - 3)  melatonin 3 milliGRAM(s) Oral at bedtime PRN Insomnia  ondansetron Injectable 4 milliGRAM(s) IV Push every 8 hours PRN Nausea and/or Vomiting      Allergies    No Known Allergies    Intolerances        REVIEW OF SYSTEMS:  CONSTITUTIONAL: No fever or chills  HEENT:  No headache, no sore throat  RESPIRATORY: No cough, wheezing, or shortness of breath  CARDIOVASCULAR: No chest pain, palpitations, or leg swelling  GASTROINTESTINAL: No abd pain, nausea, vomiting, or diarrhea  GENITOURINARY: No dysuria, frequency, or hematuria  NEUROLOGICAL: no focal weakness or dizziness  MUSCULOSKELETAL: no myalgias     Vital Signs Last 24 Hrs  T(C): 37 (16 Jan 2022 11:09), Max: 37 (16 Jan 2022 05:30)  T(F): 98.6 (16 Jan 2022 11:09), Max: 98.6 (16 Jan 2022 05:30)  HR: 63 (16 Jan 2022 11:09) (60 - 63)  BP: 149/78 (16 Jan 2022 11:09) (149/78 - 155/84)  BP(mean): --  RR: 19 (16 Jan 2022 11:09) (19 - 22)  SpO2: 97% (16 Jan 2022 11:09) (97% - 99%)    PHYSICAL EXAM:  GENERAL: M in NAD  HEENT:  NC/AT, EOMI, moist mucous membranes  CHEST/LUNG: CTA b/l, no rales, wheezes, or rhonchi  HEART:  RRR, S1, S2  ABDOMEN:  BS+, soft, nontender, nondistended  EXTREMITIES: chronic b/l LE and RUE weakness, LUE weakness improved  NERVOUS SYSTEM: AAOx3  LABS:                        10.5   8.35  )-----------( 259      ( 16 Jan 2022 08:54 )             31.5     CBC Full  -  ( 16 Jan 2022 08:54 )  WBC Count : 8.35 K/uL  Hemoglobin : 10.5 g/dL  Hematocrit : 31.5 %  Platelet Count - Automated : 259 K/uL  Mean Cell Volume : 89.5 fl  Mean Cell Hemoglobin : 29.8 pg  Mean Cell Hemoglobin Concentration : 33.3 gm/dL  Auto Neutrophil # : 6.04 K/uL  Auto Lymphocyte # : 1.62 K/uL  Auto Monocyte # : 0.50 K/uL  Auto Eosinophil # : 0.12 K/uL  Auto Basophil # : 0.03 K/uL  Auto Neutrophil % : 72.3 %  Auto Lymphocyte % : 19.4 %  Auto Monocyte % : 6.0 %  Auto Eosinophil % : 1.4 %  Auto Basophil % : 0.4 %    16 Jan 2022 08:54    138    |  106    |  10     ----------------------------<  110    3.1     |  26     |  0.44     Ca    8.0        16 Jan 2022 08:54  Phos  2.0       16 Jan 2022 10:22  Mg     1.6       16 Jan 2022 08:54          CAPILLARY BLOOD GLUCOSE            Culture - Urine (collected 01-14-22 @ 05:13)  Source: Clean Catch Clean Catch (Midstream)  Final Report (01-15-22 @ 08:05):    <10,000 CFU/mL Normal Urogenital Sushma    Culture - Blood (collected 01-14-22 @ 00:37)  Source: .Blood Blood-Peripheral  Preliminary Report (01-15-22 @ 01:02):    No growth to date.    Culture - Blood (collected 01-14-22 @ 00:37)  Source: .Blood Blood-Peripheral  Preliminary Report (01-15-22 @ 01:02):    No growth to date.        RADIOLOGY & ADDITIONAL TESTS:    Personally reviewed.     Consultant(s) Notes Reviewed:  [x] YES  [ ] NO    Care Discussed with [x] Consultants  [x] Patient  [ ] Family  [ ]      [ ] Other; RN  DVT ppx

## 2022-01-16 NOTE — PROGRESS NOTE ADULT - PROBLEM SELECTOR PLAN 1
Pt presents with diarrhea and weakness, pt was recently treated for cdiff   r/o C. diff   Pt does not meet SIRS criteria, pt was only febrile in the ED T: 102.6 F  - F/u C. diff PCR, GI Stool PCR   - CT A/P: Fecal impaction of the rectosigmoid colon with associated stercoral colitis  - Continue Vancomycin 125 mL q6h  - F/u BCx and UCx     1/14 -  GI Dr. Fry consulted, f/u recs -- no disimpaction advised at this time, states diarrhea possibly stool passing past rect-sigmoid obstruction, may not be true watery diarrhea as seen in Cdiff. Clinical impression is stercoral colitis causing urinary retention -- meyers had drained approx 1.5L overnight, will maintain -- ID eval noted, will c/w PO vanco and send stool studies for GI PCR and Cdiff toxins - -will d/w ID if rec to still hold home mycophenolate -- per wife Alisson, pt had self-discontinued in fear of superimposed infection 1 month ago.    1/15 - will d/w neuro, reg need to c/w Cellcept. Pt had small BM, however sample not adequate per lab reqs for stool testing. Plan to wait for adequate sample for GI PCR and stool testing. Per GI, plan to c/w dulcolax suppository. I spoke to pt's wife Alisson today, who was infuriated over what she believes is lack of care. She states there is no action plan, and is upset that pt's solution here is to continue the same PO antibiotic he was on previously and to take 'OTC suppositories'. She and the pt are both adamant that pt will require sigmoidoscopy to relieve rectosigmoid obstruction. I attempted manual disimpaction myself w/ permission of the pt, but there was no stool apparent in immediate rectal vault, only mucous. I would recommend GI eval for sigmoidoscopy, as this has reportedly helped pt in the past, to help with his diarrhea, urinary retention, and discomfort.    *wife updated

## 2022-01-16 NOTE — DISCHARGE NOTE PROVIDER - CARE PROVIDER_API CALL
rafa butler  GI specialist  Phone: (   )    -  Fax: (   )    -  Follow Up Time:     dr. florence  [facility] [place]  Phone: (   )    -  Fax: (   )    -  Follow Up Time:     Kenneth Castellanos)  Internal Medicine  [address] [place] [address], [place] [address]  Phone: [phone]  Fax: [phone]  Follow Up Time:

## 2022-01-16 NOTE — DISCHARGE NOTE PROVIDER - HOSPITAL COURSE
FROM ADMISSION H+P:   HPI:  65 yo M With PMH MHx MS wheelchair bound, neuropathy of the left foot, overactive bladder, HTN, depression presents to the ED with diarrhea x3 days. Of note, pt was recently admitted in December 2021 for sepsis secondary to UTI and later developed C diff from being on Ceftriaxone. Pt was discharged on Vancomycin for 14 days which he completed. After discharge pt was feeling well until 3 days ago. Pt has been experiencing abdominal pain mostly around his umbilicus, that he described as aching and non-radiating along with diarrhea. Pt has been having 3-4 episodes of loose, watery and foul smelling bowel movements. Reports that diarrhea feels similar to the time when he had C diff. Denies nausea and vomiting. Denies melena or hematochezia. Denies eating anything out of the ordinary. Pt had a tele med visit 2 days ago with PCP who prescribed him PO Vancomycin q6h. Pt has been taking abx but is still experiencing diarrhea. Pt also admits to increased weakness and worsening of his MS symptoms today. Pt has had decreased PO intake today due to increased fatigue and weakness. Denies fever, chills, chest pain, palpitations, SOB, cough, urinary sx, headaches, changes in vision, dizziness.   Denies recent travel or sick contacts.    ED Course:   Vitals: BP: 123/73, HR: 87, Temp: 102.6 F-->100 F, RR: 18, SpO2: 93% on RA   Labs: H/H: 12.6/36.2, neut: 7.68, PT/INR: 15.1/1.31, Na: 128, Cl: 91, alb: 3,       CXR: No acute lung pathology as per personal read, official read pending   CT A/P: Fecal impaction of the rectosigmoid colon with associated stercoral colitis.  UA: moderate ketone, moderate blood, 6-10 rbc   EKG: NSR, HR: 91, no signs of acute ischemia   Received Ofirmiv x1 and 2.7L NS bolus in the ED  (13 Jan 2022 22:36)      ---  HOSPITAL COURSE:     ---  CONSULTANTS:     ---     FROM ADMISSION H+P:   HPI:  65 yo M With PMH MHx MS wheelchair bound, neuropathy of the left foot, overactive bladder, HTN, depression presents to the ED with diarrhea x3 days. Of note, pt was recently admitted in December 2021 for sepsis secondary to UTI and later developed C diff from being on Ceftriaxone. Pt was discharged on Vancomycin for 14 days which he completed. After discharge pt was feeling well until 3 days ago. Pt has been experiencing abdominal pain mostly around his umbilicus, that he described as aching and non-radiating along with diarrhea. Pt has been having 3-4 episodes of loose, watery and foul smelling bowel movements. Reports that diarrhea feels similar to the time when he had C diff. Denies nausea and vomiting. Denies melena or hematochezia. Denies eating anything out of the ordinary. Pt had a tele med visit 2 days ago with PCP who prescribed him PO Vancomycin q6h. Pt has been taking abx but is still experiencing diarrhea. Pt also admits to increased weakness and worsening of his MS symptoms today. Pt has had decreased PO intake today due to increased fatigue and weakness. Denies fever, chills, chest pain, palpitations, SOB, cough, urinary sx, headaches, changes in vision, dizziness.   Denies recent travel or sick contacts.    ED Course:   Vitals: BP: 123/73, HR: 87, Temp: 102.6 F-->100 F, RR: 18, SpO2: 93% on RA   Labs: H/H: 12.6/36.2, neut: 7.68, PT/INR: 15.1/1.31, Na: 128, Cl: 91, alb: 3,       CXR: No acute lung pathology as per personal read, official read pending   CT A/P: Fecal impaction of the rectosigmoid colon with associated stercoral colitis.  UA: moderate ketone, moderate blood, 6-10 rbc   EKG: NSR, HR: 91, no signs of acute ischemia   Received Ofirmiv x1 and 2.7L NS bolus in the ED  (13 Jan 2022 22:36)      ---  HOSPITAL COURSE: 65 yo M With PMH MHx MS wheelchair bound, neuropathy of the left foot, overactive bladder, HTN, depression presents to the ED with diarrhea x3 days. Admitted for diarrhea r/o Cdiff.    1/14 -  GI Dr. Fry consulted, f/u recs -- no disimpaction advised at this time, states diarrhea possibly stool passing past rect-sigmoid obstruction, may not be true watery diarrhea as seen in Cdiff. Clinical impression is stercoral colitis causing urinary retention -- meyers had drained approx 1.5L overnight, will maintain -- ID eval noted, will c/w PO vanco and send stool studies for GI PCR and Cdiff toxins - -will d/w ID if rec to still hold home mycophenolate -- per wife Alisson, pt had self-discontinued in fear of superimposed infection 1 month ago.    1/15 - will d/w neuro, reg need to c/w Cellcept. Pt had small BM, however sample not adequate per lab reqs for stool testing. Plan to wait for adequate sample for GI PCR and stool testing. Per GI, plan to c/w dulcolax suppository. I spoke to pt's wife Alisson today, who was infuriated over what she believes is lack of care. She states there is no action plan, and is upset that pt's solution here is to continue the same PO antibiotic he was on previously and to take 'OTC suppositories'. She and the pt are both adamant that pt will require sigmoidoscopy to relieve rectosigmoid obstruction. I attempted manual disimpaction myself w/ permission of the pt, but there was no stool apparent in immediate rectal vault, only mucous. I would recommend GI eval for sigmoidoscopy, as this has reportedly helped pt in the past, to help with his diarrhea, urinary retention, and discomfort.    1/16 - updated wife -- d/w ID, will plan to complete total 10 days vanco for suspected C.diff as pt is convinced of this, and had started tx anyway. Sample unable to be sent as did not meet lab criteria. Will await GI eval for sigmoidoscopy to remove obstruction per pt request -- if no plan for this, and no BM with suppositories -- plan to DC pt to seek disimpaction elsewhere, at wife's request. Will need to pass TOV prior to this, although there is concern over voiding if stercoral colitis is still present. Will DC oxybutynin as well as this may be contributing to UR/GI obstruction.     *wife updated      ---  CONSULTANTS:   GI:   ID: Emanuel  ---

## 2022-01-16 NOTE — DISCHARGE NOTE PROVIDER - NSDCMRMEDTOKEN_GEN_ALL_CORE_FT
escitalopram 20 mg oral tablet: 1 tab(s) orally once a day  Forteo 600 mcg/2.4 mL subcutaneous solution: 1 application subcutaneous once a day  mycophenolate mofetil 500 mg oral tablet: 1 tab(s) orally 2 times a day  OXcarbazepine 300 mg oral tablet: 1 tab(s) orally 2 times a day  oxybutynin 15 mg/24 hr oral tablet, extended release: 1 tab(s) orally 2 times a day  oxyCODONE-acetaminophen 10 mg-325 mg oral tablet: 1 tab(s) orally 3 times a day, As Needed  pregabalin 150 mg oral capsule: 1 cap(s) orally 3 times a day  valsartan 160 mg oral tablet: 1 tab(s) orally once a day   Doculase 100 mg oral capsule: 1 cap(s) orally once a day   docusate 283 mg rectal enema: 1 each rectally once a day   escitalopram 20 mg oral tablet: 1 tab(s) orally once a day  Forteo 600 mcg/2.4 mL subcutaneous solution: 1 application subcutaneous once a day  OXcarbazepine 300 mg oral tablet: 1 tab(s) orally 2 times a day  oxyCODONE-acetaminophen 10 mg-325 mg oral tablet: 1 tab(s) orally 3 times a day, As Needed  pregabalin 150 mg oral capsule: 1 cap(s) orally 3 times a day  senna oral tablet: 2 tab(s) orally once a day (at bedtime)  valsartan 160 mg oral tablet: 1 tab(s) orally once a day  vancomycin 125 mg oral capsule: 1 cap(s) orally every 6 hours

## 2022-01-16 NOTE — PROGRESS NOTE ADULT - SUBJECTIVE AND OBJECTIVE BOX
[facility], Division of Infectious Diseases  HALEIGH Willams Y. Patel, S. Shah, G. Casimir  [phone]  ([phone] - weekdays after 5pm and weekends)    Name: THANH ISRAEL  Age/Gender: 66y Male  MRN: [id number]    Interval History:  Patient seen this morning, states he feels better.   No new complaints. Notes reviewed  No concerning overnight events  Afebrile     Allergies: No Known Allergies    Objective:  Vitals:   T(F): 98.6 (01-16-22 @ 11:09), Max: 98.6 (01-16-22 @ 05:30)  HR: 63 (01-16-22 @ 11:09) (60 - 63)  BP: 149/78 (01-16-22 @ 11:09) (149/78 - 155/84)  RR: 19 (01-16-22 @ 11:09) (19 - 22)  SpO2: 97% (01-16-22 @ 11:09) (97% - 99%)  Physical Examination:  General: no acute distress  HEENT: NC/AT, anicteric, neck supple  Respiratory: no acc muscle use, breathing comfortably  Cardiovascular: S1 and S2 present  Gastrointestinal: soft, nontender, nondistended  Extremities: no edema, no cyanosis  Skin: no visible rash    Laboratory Studies:  CBC:                       10.5   8.35  )-----------( 259      ( 16 Jan 2022 08:54 )             31.5     WBC Trend:  8.35 01-16-22 @ 08:54  6.41 01-15-22 @ 07:25  9.08 01-14-22 @ 08:06  9.99 01-13-22 @ 20:07    CMP: 01-16    138  |  106  |  10  ----------------------------<  110<H>  3.1<L>   |  26  |  0.44<L>    Ca    8.0<L>      16 Jan 2022 08:54  Phos  2.0     01-16  Mg     1.6     01-16    TPro  5.7<L>  /  Alb  2.4<L>  /  TBili  0.4  /  DBili  x   /  AST  9<L>  /  ALT  12  /  AlkPhos  61  01-15    LIVER FUNCTIONS - ( 15 Bronson 2022 07:25 )  Alb: 2.4 g/dL / Pro: 5.7 g/dL / ALK PHOS: 61 U/L / ALT: 12 U/L / AST: 9 U/L / GGT: x           Microbiology: reviewed   Culture - Urine (collected 01-14-22 @ 05:13)  Source: Clean Catch Clean Catch (Midstream)  Final Report (01-15-22 @ 08:05):    <10,000 CFU/mL Normal Urogenital Sushma    Culture - Blood (collected 01-14-22 @ 00:37)  Source: .Blood Blood-Peripheral  Preliminary Report (01-15-22 @ 01:02):    No growth to date.    Culture - Blood (collected 01-14-22 @ 00:37)  Source: .Blood Blood-Peripheral  Preliminary Report (01-15-22 @ 01:02):    No growth to date.    Radiology: reviewed     Medications:  acetaminophen     Tablet .. 650 milliGRAM(s) Oral every 6 hours PRN  bisacodyl Suppository 10 milliGRAM(s) Rectal daily PRN  enoxaparin Injectable 40 milliGRAM(s) SubCutaneous daily  escitalopram 20 milliGRAM(s) Oral daily  melatonin 3 milliGRAM(s) Oral at bedtime PRN  ondansetron Injectable 4 milliGRAM(s) IV Push every 8 hours PRN  OXcarbazepine 300 milliGRAM(s) Oral two times a day  pregabalin 150 milliGRAM(s) Oral three times a day  senna 2 Tablet(s) Oral at bedtime  sodium chloride 0.9%. 1000 milliLiter(s) IV Continuous <Continuous>  valsartan 160 milliGRAM(s) Oral daily  vancomycin    Solution 125 milliGRAM(s) Oral every 6 hours    Antimicrobials:  vancomycin    Solution 125 milliGRAM(s) Oral every 6 hours

## 2022-01-16 NOTE — DISCHARGE NOTE PROVIDER - PROVIDER TOKENS
FREE:[LAST:[carrie],FIRST:[rafa],PHONE:[(   )    -],FAX:[(   )    -],ADDRESS:[GI specialist]],FREE:[LAST:[florence],FIRST:[],PHONE:[(   )    -],FAX:[(   )    -],ADDRESS:[[place], [place]]],PROVIDER:[TOKEN:[[id number]]]

## 2022-01-16 NOTE — PROGRESS NOTE ADULT - ASSESSMENT
Patient is a 66 year old male with PMH of MS, wheelchair bound, neuropathy of the left foot, overactive bladder, HTN, depression presents to the ED with diarrhea for 3 days.    Fecal impaction rectosigmoid colon with stercoral colitis   Diarrhea suspected overflow diarrhea, possible C.diff  Fever possibly due to above, resolved    Hyponatremia in setting of diarrhea  Acute urinary retention, s/p meyers, no UTI   - h/o recent admission for cUTI, s/p ceftriaxone x7d, developed C.diff 12/12/21 -s/p PO vanc x14d completed 12/25   - no antibiotics, but pt reports today that when diarrhea started, if was very watery and foul smelling like previous C.diff    -- called PMD and she prescribed PO vancomycin 1/12 and he was taking it when he came to ER   - afebrile now, no leukocytosis   - CTAP with fecal impaction of rectosigmoid colon with associated stercoral colitis    - GI following - suspect diarrhea due to overflow, s/p suppository    - AUR, s/p meyers placement in ER, UA with no pyuria, meyers care per primary team    - blood cultures - NGTD x2   - C.diff PCR and GI stool PCR pending - send if pt still with watery stools    -- noted with mucous, no stool to send to lab -- given no further watery/loose stools, will d/c C.diff testing    - continue on empiric PO vancomycin - complete total 10d course including days he took at home - end 1/21   - on isolation per infection control protocol   - continue supportive care      D/w Dr. Asa Castellanos M.D.  [facility], Division of Infectious Diseases  [phone]  After 5pm on weekdays and all day on weekends - please call [phone]

## 2022-01-16 NOTE — PROGRESS NOTE ADULT - PROBLEM SELECTOR PLAN 7
Chronic   - Continue home Escitalopram 20 mg QD

## 2022-01-16 NOTE — DISCHARGE NOTE PROVIDER - NSDCCPCAREPLAN_GEN_ALL_CORE_FT
PRINCIPAL DISCHARGE DIAGNOSIS  Diagnosis: Diarrhea  Assessment and Plan of Treatment: You came in with diarrhea, with suspicion for C.diff infection. There was no adequate sample able to be sent to lab for testing. You were evaluated by GI, ID specialists, who doubt C.diff, but will treat empirically to complete course of vancomycin already started at home. Diarrhea was more likely from stool surpassing a fecal obstruction. A manual disimpaction was performed without avail. GI did not recommend a sigmoidoscopy, and started stool softening suppositories which eventually resulted in a bowel movement. Urinary retention improved as well, Shafer was removed. Home Ditropan was STOPPED in setting of GI obstruction and urinary retention. Advise close followup with GI outpatient, neuro, and ID.      SECONDARY DISCHARGE DIAGNOSES  Diagnosis: Multiple sclerosis  Assessment and Plan of Treatment: Home CELLCEPT was held and stopped, neuro recommended stopping this medication indefinitely as well. Home Ditropan was STOPPED in setting of GI obstruction and urinary retention. Advise close followup with GI outpatient, neuro, and ID.

## 2022-01-16 NOTE — PROGRESS NOTE ADULT - SUBJECTIVE AND OBJECTIVE BOX
[facility]  Darron Bustos PA-C  [address] [place] [address]  [phone]      INTERVAL HPI/OVERNIGHT EVENTS:  pt seen and examined   reports small BM after suppository yesterday, No BM overnight   denies diarrhea or other GI complaint at this time       MEDICATIONS  (STANDING):  enoxaparin Injectable 40 milliGRAM(s) SubCutaneous daily  escitalopram 20 milliGRAM(s) Oral daily  OXcarbazepine 300 milliGRAM(s) Oral two times a day  oxybutynin 15 milliGRAM(s) Oral daily  potassium chloride    Tablet ER 40 milliEquivalent(s) Oral every 4 hours  pregabalin 150 milliGRAM(s) Oral three times a day  sodium chloride 0.9%. 1000 milliLiter(s) (75 mL/Hr) IV Continuous <Continuous>  valsartan 160 milliGRAM(s) Oral daily  vancomycin    Solution 125 milliGRAM(s) Oral every 6 hours    MEDICATIONS  (PRN):  acetaminophen     Tablet .. 650 milliGRAM(s) Oral every 6 hours PRN Temp greater or equal to 38C (100.4F), Mild Pain (1 - 3)  melatonin 3 milliGRAM(s) Oral at bedtime PRN Insomnia  ondansetron Injectable 4 milliGRAM(s) IV Push every 8 hours PRN Nausea and/or Vomiting      Allergies  No Known Allergies    Intolerances    ROS:   General:  No wt loss, fevers, chills, night sweats, fatigue,   Eyes:  Good vision, no reported pain  ENT:  No sore throat, pain, runny nose, dysphagia  CV:  No pain, palpitations, hypo/hypertension  Resp:  No dyspnea, cough, tachypnea, wheezing  GI:  No pain, No nausea, No vomiting, + diarrhea, No constipation, No weight loss  :  No pain, bleeding, incontinence, nocturia  Muscle:  No pain, weakness  Neuro:  No weakness, tingling, memory problems  Psych:  No fatigue, insomnia, mood problems, depression  Endocrine:  No polyuria, polydipsia, cold/heat intolerance  Heme:  No petechiae, ecchymosis, easy bruisability  Skin:  No rash, tattoos, scars, edema      Vital Signs Last 24 Hrs  T(C): 37 (16 Jan 2022 11:09), Max: 37 (16 Jan 2022 05:30)  T(F): 98.6 (16 Jan 2022 11:09), Max: 98.6 (16 Jan 2022 05:30)  HR: 63 (16 Jan 2022 11:09) (60 - 78)  BP: 149/78 (16 Jan 2022 11:09) (149/78 - 157/98)  BP(mean): --  RR: 19 (16 Jan 2022 11:09) (18 - 22)  SpO2: 97% (16 Jan 2022 11:09) (97% - 99%)      01-15-22 @ 07:01  -  01-16-22 @ 07:00  --------------------------------------------------------  IN: 1725 mL / OUT: 700 mL / NET: 1025 mL    PHYSICAL EXAM:   GENERAL:  Appears stated age,   HEENT:  NC/AT,    CHEST:  Full & symmetric excursion,   HEART:  Regular rhythm  ABDOMEN:  Soft, non-tender, non-distended,   EXTEREMITIES:  no cyanosis,clubbing  +Edema BL LE  SKIN:  No rash  NEURO:  Alert,              LABS:                        10.5   8.35  )-----------( 259      ( 16 Jan 2022 08:54 )             31.5     01-16    138  |  106  |  10  ----------------------------<  110<H>  3.1<L>   |  26  |  0.44<L>    Ca    8.0<L>      16 Jan 2022 08:54  Phos  2.0     01-16  Mg     1.6     01-16    TPro  5.7<L>  /  Alb  2.4<L>  /  TBili  0.4  /  DBili  x   /  AST  9<L>  /  ALT  12  /  AlkPhos  61  01-15        Culture - Urine (collected 14 Jan 2022 05:13)  Source: Clean Catch Clean Catch (Midstream)  Final Report (15 Bronson 2022 08:05):    <10,000 CFU/mL Normal Urogenital Sushma    Culture - Blood (collected 14 Jan 2022 00:37)  Source: .Blood Blood-Peripheral  Preliminary Report (15 Bronson 2022 01:02):    No growth to date.    Culture - Blood (collected 14 Jan 2022 00:37)  Source: .Blood Blood-Peripheral  Preliminary Report (15 Bronson 2022 01:02):    No growth to date.

## 2022-01-17 ENCOUNTER — TRANSCRIPTION ENCOUNTER (OUTPATIENT)
Age: 67
End: 2022-01-17

## 2022-01-17 VITALS
SYSTOLIC BLOOD PRESSURE: 125 MMHG | RESPIRATION RATE: 19 BRPM | TEMPERATURE: 98 F | HEART RATE: 59 BPM | DIASTOLIC BLOOD PRESSURE: 73 MMHG | OXYGEN SATURATION: 95 %

## 2022-01-17 LAB
ANION GAP SERPL CALC-SCNC: 6 MMOL/L — SIGNIFICANT CHANGE UP (ref 5–17)
BASOPHILS # BLD AUTO: 0.04 K/UL — SIGNIFICANT CHANGE UP (ref 0–0.2)
BASOPHILS NFR BLD AUTO: 0.5 % — SIGNIFICANT CHANGE UP (ref 0–2)
BUN SERPL-MCNC: 8 MG/DL — SIGNIFICANT CHANGE UP (ref 7–23)
CALCIUM SERPL-MCNC: 8.3 MG/DL — LOW (ref 8.5–10.1)
CHLORIDE SERPL-SCNC: 108 MMOL/L — SIGNIFICANT CHANGE UP (ref 96–108)
CO2 SERPL-SCNC: 26 MMOL/L — SIGNIFICANT CHANGE UP (ref 22–31)
CREAT SERPL-MCNC: 0.49 MG/DL — LOW (ref 0.5–1.3)
EOSINOPHIL # BLD AUTO: 0.2 K/UL — SIGNIFICANT CHANGE UP (ref 0–0.5)
EOSINOPHIL NFR BLD AUTO: 2.3 % — SIGNIFICANT CHANGE UP (ref 0–6)
GLUCOSE SERPL-MCNC: 102 MG/DL — HIGH (ref 70–99)
HCT VFR BLD CALC: 32.1 % — LOW (ref 39–50)
HGB BLD-MCNC: 10.8 G/DL — LOW (ref 13–17)
IMM GRANULOCYTES NFR BLD AUTO: 0.7 % — SIGNIFICANT CHANGE UP (ref 0–1.5)
LYMPHOCYTES # BLD AUTO: 1.77 K/UL — SIGNIFICANT CHANGE UP (ref 1–3.3)
LYMPHOCYTES # BLD AUTO: 20.6 % — SIGNIFICANT CHANGE UP (ref 13–44)
MAGNESIUM SERPL-MCNC: 1.8 MG/DL — SIGNIFICANT CHANGE UP (ref 1.6–2.6)
MCHC RBC-ENTMCNC: 30.2 PG — SIGNIFICANT CHANGE UP (ref 27–34)
MCHC RBC-ENTMCNC: 33.6 GM/DL — SIGNIFICANT CHANGE UP (ref 32–36)
MCV RBC AUTO: 89.7 FL — SIGNIFICANT CHANGE UP (ref 80–100)
MONOCYTES # BLD AUTO: 0.63 K/UL — SIGNIFICANT CHANGE UP (ref 0–0.9)
MONOCYTES NFR BLD AUTO: 7.3 % — SIGNIFICANT CHANGE UP (ref 2–14)
NEUTROPHILS # BLD AUTO: 5.91 K/UL — SIGNIFICANT CHANGE UP (ref 1.8–7.4)
NEUTROPHILS NFR BLD AUTO: 68.6 % — SIGNIFICANT CHANGE UP (ref 43–77)
NRBC # BLD: 0 /100 WBCS — SIGNIFICANT CHANGE UP (ref 0–0)
PHOSPHATE SERPL-MCNC: 3 MG/DL — SIGNIFICANT CHANGE UP (ref 2.5–4.5)
PLATELET # BLD AUTO: 288 K/UL — SIGNIFICANT CHANGE UP (ref 150–400)
POTASSIUM SERPL-MCNC: 3.6 MMOL/L — SIGNIFICANT CHANGE UP (ref 3.5–5.3)
POTASSIUM SERPL-SCNC: 3.6 MMOL/L — SIGNIFICANT CHANGE UP (ref 3.5–5.3)
RBC # BLD: 3.58 M/UL — LOW (ref 4.2–5.8)
RBC # FLD: 13.4 % — SIGNIFICANT CHANGE UP (ref 10.3–14.5)
SODIUM SERPL-SCNC: 140 MMOL/L — SIGNIFICANT CHANGE UP (ref 135–145)
WBC # BLD: 8.61 K/UL — SIGNIFICANT CHANGE UP (ref 3.8–10.5)
WBC # FLD AUTO: 8.61 K/UL — SIGNIFICANT CHANGE UP (ref 3.8–10.5)

## 2022-01-17 PROCEDURE — 99285 EMERGENCY DEPT VISIT HI MDM: CPT

## 2022-01-17 PROCEDURE — 83690 ASSAY OF LIPASE: CPT

## 2022-01-17 PROCEDURE — 74177 CT ABD & PELVIS W/CONTRAST: CPT | Mod: MA

## 2022-01-17 PROCEDURE — 85730 THROMBOPLASTIN TIME PARTIAL: CPT

## 2022-01-17 PROCEDURE — 80048 BASIC METABOLIC PNL TOTAL CA: CPT

## 2022-01-17 PROCEDURE — 0225U NFCT DS DNA&RNA 21 SARSCOV2: CPT

## 2022-01-17 PROCEDURE — 99239 HOSP IP/OBS DSCHRG MGMT >30: CPT

## 2022-01-17 PROCEDURE — 36415 COLL VENOUS BLD VENIPUNCTURE: CPT

## 2022-01-17 PROCEDURE — 71045 X-RAY EXAM CHEST 1 VIEW: CPT

## 2022-01-17 PROCEDURE — 84145 PROCALCITONIN (PCT): CPT

## 2022-01-17 PROCEDURE — 87040 BLOOD CULTURE FOR BACTERIA: CPT

## 2022-01-17 PROCEDURE — 96374 THER/PROPH/DIAG INJ IV PUSH: CPT

## 2022-01-17 PROCEDURE — 93005 ELECTROCARDIOGRAM TRACING: CPT

## 2022-01-17 PROCEDURE — 84100 ASSAY OF PHOSPHORUS: CPT

## 2022-01-17 PROCEDURE — 85610 PROTHROMBIN TIME: CPT

## 2022-01-17 PROCEDURE — 87086 URINE CULTURE/COLONY COUNT: CPT

## 2022-01-17 PROCEDURE — 85025 COMPLETE CBC W/AUTO DIFF WBC: CPT

## 2022-01-17 PROCEDURE — 80053 COMPREHEN METABOLIC PANEL: CPT

## 2022-01-17 PROCEDURE — 83605 ASSAY OF LACTIC ACID: CPT

## 2022-01-17 PROCEDURE — 81001 URINALYSIS AUTO W/SCOPE: CPT

## 2022-01-17 PROCEDURE — 83735 ASSAY OF MAGNESIUM: CPT

## 2022-01-17 RX ORDER — DOCUSATE SODIUM 100 MG
1 CAPSULE ORAL
Qty: 7 | Refills: 0
Start: 2022-01-17 | End: 2022-01-23

## 2022-01-17 RX ORDER — DOCUSATE SODIUM 100 MG
1 CAPSULE ORAL
Qty: 14 | Refills: 0
Start: 2022-01-17 | End: 2022-01-30

## 2022-01-17 RX ORDER — VANCOMYCIN HCL 1 G
1 VIAL (EA) INTRAVENOUS
Qty: 16 | Refills: 0
Start: 2022-01-17 | End: 2022-01-20

## 2022-01-17 RX ORDER — OXYBUTYNIN CHLORIDE 5 MG
1 TABLET ORAL
Qty: 0 | Refills: 0 | DISCHARGE

## 2022-01-17 RX ORDER — SENNA PLUS 8.6 MG/1
2 TABLET ORAL
Qty: 28 | Refills: 0
Start: 2022-01-17 | End: 2022-01-30

## 2022-01-17 RX ORDER — MYCOPHENOLATE MOFETIL 250 MG/1
1 CAPSULE ORAL
Qty: 0 | Refills: 0 | DISCHARGE

## 2022-01-17 RX ADMIN — Medication 150 MILLIGRAM(S): at 13:16

## 2022-01-17 RX ADMIN — ENOXAPARIN SODIUM 40 MILLIGRAM(S): 100 INJECTION SUBCUTANEOUS at 13:16

## 2022-01-17 RX ADMIN — ESCITALOPRAM OXALATE 20 MILLIGRAM(S): 10 TABLET, FILM COATED ORAL at 13:15

## 2022-01-17 RX ADMIN — OXCARBAZEPINE 300 MILLIGRAM(S): 300 TABLET, FILM COATED ORAL at 05:55

## 2022-01-17 RX ADMIN — VALSARTAN 160 MILLIGRAM(S): 80 TABLET ORAL at 05:55

## 2022-01-17 RX ADMIN — Medication 125 MILLIGRAM(S): at 13:16

## 2022-01-17 RX ADMIN — Medication 150 MILLIGRAM(S): at 05:55

## 2022-01-17 RX ADMIN — Medication 125 MILLIGRAM(S): at 05:55

## 2022-01-17 NOTE — PROGRESS NOTE ADULT - PROVIDER SPECIALTY LIST ADULT
Infectious Disease
Neurology
Gastroenterology
Infectious Disease
Gastroenterology
Infectious Disease
Hospitalist

## 2022-01-17 NOTE — PROGRESS NOTE ADULT - SUBJECTIVE AND OBJECTIVE BOX
Neurology follow up note    THANH ISRAEL66yMale      Interval History:    Patient feels ok no new complaints.    Allergies    No Known Allergies    Intolerances        MEDICATIONS    acetaminophen     Tablet .. 650 milliGRAM(s) Oral every 6 hours PRN  bisacodyl Suppository 10 milliGRAM(s) Rectal daily PRN  enoxaparin Injectable 40 milliGRAM(s) SubCutaneous daily  escitalopram 20 milliGRAM(s) Oral daily  melatonin 3 milliGRAM(s) Oral at bedtime PRN  ondansetron Injectable 4 milliGRAM(s) IV Push every 8 hours PRN  OXcarbazepine 300 milliGRAM(s) Oral two times a day  pregabalin 150 milliGRAM(s) Oral three times a day  senna 2 Tablet(s) Oral at bedtime  sodium chloride 0.9%. 1000 milliLiter(s) IV Continuous <Continuous>  valsartan 160 milliGRAM(s) Oral daily  vancomycin    Solution 125 milliGRAM(s) Oral every 6 hours              Vital Signs Last 24 Hrs  T(C): 36.7 (17 Jan 2022 05:30), Max: 37.2 (16 Jan 2022 21:13)  T(F): 98.1 (17 Jan 2022 05:30), Max: 98.9 (16 Jan 2022 21:13)  HR: 71 (17 Jan 2022 05:30) (60 - 71)  BP: 134/72 (17 Jan 2022 05:30) (134/72 - 154/91)  BP(mean): --  RR: 20 (17 Jan 2022 05:30) (19 - 22)  SpO2: 94% (17 Jan 2022 05:30) (94% - 99%)    REVIEW OF SYSTEMS:  Constitutional:  At present, he does not feel fever but did have fevers at home.  Eyes:  No double vision or blurry vision.  Ears:  No ringing in the ears.  Neck:  No neck pain.  Respiratory:  No shortness of breath.  Cardiovascular:  No chest pain.  Abdomen:  No nausea, vomiting, or abdominal pain.  Extremities/Neurological:  Positive history of neuropathic pain.    PHYSICAL EXAMINATION:  HEENT:  Head:  Normocephalic, atraumatic.  Eyes:  No scleral icterus.  Ears:  Hearing bilaterally intact.  NECK:  Supple.  RESPIRATORY:  Good air entry bilaterally.  CARDIOVASCULAR:  S1 and S2 heard.  ABDOMEN:  Soft and nontender.  EXTREMITIES:  No clubbing or cyanosis was noted.      NEUROLOGIC:  The patient is awake and alert.  Extraocular movements were intact.  Speech was fluent.  Smile was symmetric.  Motor:  Right upper and bilateral lower extremities were 0/5, which is his baseline.  Left upper, slight flexation at the elbow, slight flexation of the hand, baseline is that he is able to elevate his left arm roughly about 30 to 40 degrees off the bed.  Bilateral lower extremities, swelling was noted.                 LABS:  CBC Full  -  ( 17 Jan 2022 08:24 )  WBC Count : 8.61 K/uL  RBC Count : 3.58 M/uL  Hemoglobin : 10.8 g/dL  Hematocrit : 32.1 %  Platelet Count - Automated : 288 K/uL  Mean Cell Volume : 89.7 fl  Mean Cell Hemoglobin : 30.2 pg  Mean Cell Hemoglobin Concentration : 33.6 gm/dL  Auto Neutrophil # : 5.91 K/uL  Auto Lymphocyte # : 1.77 K/uL  Auto Monocyte # : 0.63 K/uL  Auto Eosinophil # : 0.20 K/uL  Auto Basophil # : 0.04 K/uL  Auto Neutrophil % : 68.6 %  Auto Lymphocyte % : 20.6 %  Auto Monocyte % : 7.3 %  Auto Eosinophil % : 2.3 %  Auto Basophil % : 0.5 %      01-17    140  |  108  |  8   ----------------------------<  102<H>  3.6   |  26  |  0.49<L>    Ca    8.3<L>      17 Jan 2022 08:24  Phos  3.0     01-17  Mg     1.8     01-17      Hemoglobin A1C:       Vitamin B12         RADIOLOGY  ANALYSIS AND PLAN:  This is a 66-year-old with weakness and a history of multiple sclerosis.  For episode of weakness, suspect most likely multiple sclerosis exacerbation secondary to underlying infectious type process  with a history of temperatures.  For history of multiple sclerosis, appears to be fairly advanced, I would recommend supportive therapy.  For history of neuropathic pain and trigeminal neuralgia, continue the patient on his Lyrica and Trileptal.  At present, I would not recommend any type of steroids, infectious workup is ongoing.  monitor diarrhea as needed      Greater than 25 minutes of time was spent with the patient, plan of care, reviewing data, and speaking to the multidisciplinary healthcare team with greater than 50% of that time in counseling and care coordination.    Thank you for the courtesy of this consultation.

## 2022-01-17 NOTE — DISCHARGE NOTE NURSING/CASE MANAGEMENT/SOCIAL WORK - NSDCPEFALRISK_GEN_ALL_CORE
For information on Fall & Injury Prevention, visit: [email or web address] OR  [email or web address] OR  https://www.cdc.gov/steadi/patient.html

## 2022-01-17 NOTE — DISCHARGE NOTE NURSING/CASE MANAGEMENT/SOCIAL WORK - PATIENT PORTAL LINK FT
You can access the FollowMyHealth Patient Portal offered by [facility] by registering at the following website: [email or web address]. By joining [employer]’s FollowMyHealth portal, you will also be able to view your health information using other applications (apps) compatible with our system.

## 2022-01-17 NOTE — PROGRESS NOTE ADULT - ASSESSMENT
Patient is a 66 year old male with PMH of MS, wheelchair bound, neuropathy of the left foot, overactive bladder, HTN, depression presents to the ED with diarrhea for 3 days.    Fecal impaction rectosigmoid colon with stercoral colitis   Diarrhea suspected overflow diarrhea, possible C.diff  Fever possibly due to above, resolved    Hyponatremia in setting of diarrhea  Acute urinary retention, s/p meyers, no UTI   - h/o recent admission for cUTI, s/p ceftriaxone x7d, developed C.diff 12/12/21 -s/p PO vanc x14d completed 12/25   - no antibiotics, reports PMD started PO vancomycin 1/12 when diarrhea started   - CTAP with fecal impaction of rectosigmoid colon with associated stercoral colitis    - GI following - suspect diarrhea due to overflow, s/p suppository    - AUR, s/p meyers placement in ER, UA with no pyuria, s/p meyers removal and voiding    - had formed BM overnight, feels better, afebrile   - continue empiric PO vancomycin - complete total 10d course including days he took at home - end 1/21   - continue supportive care   - discharge planning per primary team      Kenneth Castellanos M.D.  [facility], Division of Infectious Diseases  [phone]  After 5pm on weekdays and all day on weekends - please call [phone]

## 2022-01-17 NOTE — PROGRESS NOTE ADULT - SUBJECTIVE AND OBJECTIVE BOX
[facility], Division of Infectious Diseases  HALEIGH Willams Y. Patel, S. Shah, G. Casimir  [phone]  ([phone] - weekdays after 5pm and weekends)    Name: THANH ISRAEL  Age/Gender: 66y Male  MRN: [id number]    Interval History:  Patient seen this morning, feels better.   States he had a formed BM last night.  Denies fever, pain or any new complaints  Notes reviewed  No concerning overnight events  Afebrile     Allergies: No Known Allergies    Objective:  Vitals:   T(F): 98.1 (01-17-22 @ 05:30), Max: 98.9 (01-16-22 @ 21:13)  HR: 71 (01-17-22 @ 05:30) (60 - 71)  BP: 134/72 (01-17-22 @ 05:30) (134/72 - 154/91)  RR: 20 (01-17-22 @ 05:30) (20 - 22)  SpO2: 94% (01-17-22 @ 05:30) (94% - 99%)  Physical Examination:  General: no acute distress  HEENT: NC/AT, anicteric, neck supple  Respiratory: no acc muscle use, breathing comfortably  Cardiovascular: S1 and S2 present  Gastrointestinal: normal appearing, nondistended  Extremities: trace edema, no cyanosis  Skin: no visible rash    Laboratory Studies:  CBC:                       10.8   8.61  )-----------( 288      ( 17 Jan 2022 08:24 )             32.1     WBC Trend:  8.61 01-17-22 @ 08:24  8.35 01-16-22 @ 08:54  6.41 01-15-22 @ 07:25  9.08 01-14-22 @ 08:06  9.99 01-13-22 @ 20:07    CMP: 01-17    140  |  108  |  8   ----------------------------<  102<H>  3.6   |  26  |  0.49<L>    Ca    8.3<L>      17 Jan 2022 08:24  Phos  3.0     01-17  Mg     1.8     01-17    Microbiology: reviewed   Culture - Urine (collected 01-14-22 @ 05:13)  Source: Clean Catch Clean Catch (Midstream)  Final Report (01-15-22 @ 08:05):    <10,000 CFU/mL Normal Urogenital Sushma    Culture - Blood (collected 01-14-22 @ 00:37)  Source: .Blood Blood-Peripheral  Preliminary Report (01-15-22 @ 01:02):    No growth to date.    Culture - Blood (collected 01-14-22 @ 00:37)  Source: .Blood Blood-Peripheral  Preliminary Report (01-15-22 @ 01:02):    No growth to date.    Radiology: reviewed     Medications:  acetaminophen     Tablet .. 650 milliGRAM(s) Oral every 6 hours PRN  bisacodyl Suppository 10 milliGRAM(s) Rectal daily PRN  enoxaparin Injectable 40 milliGRAM(s) SubCutaneous daily  escitalopram 20 milliGRAM(s) Oral daily  melatonin 3 milliGRAM(s) Oral at bedtime PRN  ondansetron Injectable 4 milliGRAM(s) IV Push every 8 hours PRN  OXcarbazepine 300 milliGRAM(s) Oral two times a day  pregabalin 150 milliGRAM(s) Oral three times a day  senna 2 Tablet(s) Oral at bedtime  sodium chloride 0.9%. 1000 milliLiter(s) IV Continuous <Continuous>  valsartan 160 milliGRAM(s) Oral daily  vancomycin    Solution 125 milliGRAM(s) Oral every 6 hours    Antimicrobials:  vancomycin    Solution 125 milliGRAM(s) Oral every 6 hours

## 2022-04-15 NOTE — ED ADULT TRIAGE NOTE - CHIEF COMPLAINT QUOTE
"Triage Call:    Pt calling to report that a couple of his toes on his right foot \"feel like they are going to sleep\"  Painful  Tingly  Happens on and off for a couple times for the past 2 months  Last two toes in the right foot  Pain: 1/10  This is only on his right foot/toes    Pt is a     Disposition: See today in office. Pt was given care advice. Pt stated he will call his PCP about making an appt. He is aware of the nearest [facility] if no appts.      Maureen Mcguire RN  [facility] Nurse Advisor 9:59 AM 4/15/2022      Reason for Disposition    Numbness in one foot (i.e., loss of sensation)    Toe pain is a chronic symptom (recurrent or ongoing AND present > 4 weeks)    Additional Information    Negative: Followed an injury    Negative: Wound looks infected    Negative: Caused by an animal bite    Negative: Caused by frostbite    Negative: Athlete's Foot suspected (i.e., itchy red rash in web space between toes)    Negative: Foot pain is the main symptom    Negative: Foot is cool or blue in comparison to other foot    Negative: Purple or black skin on toe (Exception: simple recalled injury with bruise)    Negative: Patient sounds very sick or weak to the triager    Negative: SEVERE pain (e.g., excruciating, unable to do any normal activities) and not improved after 2 hours of pain medicine    Negative: Looks like a boil, infected sore, deep ulcer, or other infected rash (spreading redness, pus)    Negative: Yellow pus seen in skin around toenail (cuticle area), or pus seen under toenail    Negative: Swollen joint and no fever or redness    Negative: Pain in the big toe joint    Negative: Localized redness and swelling of skin around nail (i.e., cuticle area or nail fold)    Negative: Diabetes mellitus or peripheral vascular disease (\"poor circulation\")    Negative: MODERATE pain (e.g., interferes with normal activities, limping) and present > 3 days    Negative: MILD pain (e.g., does not interfere " with normal activities) and present > 7 days    Protocols used: TOE PAIN-A-OH    COVID 19 Nurse Triage Plan/Patient Instructions    Please be aware that novel coronavirus (COVID-19) may be circulating in the community. If you develop symptoms such as fever, cough, or SOB or if you have concerns about the presence of another infection including coronavirus (COVID-19), please contact your health care provider or visit [email or web address].     Disposition/Instructions    In-Person Visit with provider recommended. Reference Visit Selection Guide.    Thank you for taking steps to prevent the spread of this virus.  o Limit your contact with others.  o Wear a simple mask to cover your cough.  o Wash your hands well and often.    Resources    [facility]: About COVID-19: [email or web address]    CDC: What to Do If You're Sick: www.cdc.gov/coronavirus/2019-ncov/about/steps-when-sick.html    CDC: Ending Home Isolation: www.cdc.gov/coronavirus/2019-ncov/hcp/disposition-in-home-patients.html     CDC: Caring for Someone: www.cdc.gov/coronavirus/2019-ncov/if-you-are-sick/care-for-someone.html     [facility]: Interim Guidance for Hospital Discharge to Home: www.health.[facility].mn.us/diseases/coronavirus/hcp/hospdischarge.pdf    [facility] clinical trials (COVID-19 research studies): [email or web address].[email or web address]-clinical-trials     Below are the COVID-19 hotlines at the [facility] ([facility]). Interpreters are available.   o For health questions: Call [phone] or [phone] (7 a.m. to 7 p.m.)  o For questions about schools and childcare: Call [phone] or [phone] (7 a.m. to 7 p.m.)                    Pt BIB EMS c/c of worsening weakness and muscle rigidity x 3 days. Hx of MS

## 2022-08-09 NOTE — OCCUPATIONAL THERAPY INITIAL EVALUATION ADULT - EATING, PREVIOUS LEVEL OF FUNCTION, OT EVAL
Relevant Problems   No relevant active problems       Anesthetic History     PONV          Review of Systems / Medical History  Patient summary reviewed and pertinent labs reviewed    Pulmonary              Pertinent negatives: No COPD, asthma, sleep apnea and smoker     Neuro/Psych       CVA: no residual symptoms  TIA and psychiatric history     Cardiovascular    Hypertension: well controlled        Dysrhythmias : atrial fibrillation    Pertinent negatives: No past MI and CHF       GI/Hepatic/Renal     GERD: well controlled    Renal disease: stones  Hiatal hernia  Pertinent negatives: No liver disease   Endo/Other      Hypothyroidism  Arthritis  Pertinent negatives: No diabetes   Other Findings              Physical Exam    Airway  Mallampati: III  TM Distance: 4 - 6 cm  Neck ROM: normal range of motion   Mouth opening: Normal     Cardiovascular               Dental    Dentition: Poor dentition     Pulmonary                 Abdominal  GI exam deferred       Other Findings            Anesthetic Plan    ASA: 3  Anesthesia type: general          Induction: Intravenous  Anesthetic plan and risks discussed with: Patient setup/independent

## 2022-08-31 NOTE — PATIENT PROFILE ADULT - FUNCTIONAL SCREEN CURRENT LEVEL: COMMUNICATION, MLM
HPI:  22, Time: 7:29 AM EDT      Russian Interpretor: Jada Weller #[id number]     Littie Hammans is a 48 y.o. female presenting to the ED for left sided localized pain, beginning this morning. The complaint has been persistent, moderate in severity, and worsened by nothing. Patient states that she started to have left chest pain which began this morning without radiating to the left arm or back. She states that chest pain has been associated with a headache which is unilateral mostly on the left side. She denies any photophobia tearing of the eye, or rhinorrhea. She states that she has not taken anything for the pain and described the pain as sharp 10/10. She does states that the pain is reproducible on palpation as well. She denies any trauma to the area that she is aware of. The patient denies recent trauma, fever, chills, fatigue, HA, dizziness, lightheadedness, paresthesias, generalized weakness, confusion, forgetfulness, vision changes, eye redness, congestion, rhinorrhea, sore throat, neck pain, C/T/L pain, chest pain, palpitations, LE edema, SOB, cough, wheezing, abdominal pain, diarrhea, constipation, hematochezia, melena, dysuria, hematuria, flank pain, decreased UOP, myalgias, arthralgias, ROM issues, swelling, rashes and erythema. ROS is otherwise negative. Review of Systems:   A complete review of systems was performed and pertinent positives and negatives are stated within HPI, all other systems reviewed and are negative.      --------------------------------------------- PAST HISTORY ---------------------------------------------  Past Medical History:  has a past medical history of Leaky heart valve and Migraines. Past Surgical History:  has a past surgical history that includes  section. Social History:  reports that she has never smoked. She has never used smokeless tobacco. She reports that she does not drink alcohol and does not use drugs.     Family History: family history is not on file. The patients home medications have been reviewed. Allergies: Patient has no known allergies.     -------------------------------------------------- RESULTS -------------------------------------------------  All laboratory and radiology results have been personally reviewed by myself   LABS:  Results for orders placed or performed during the hospital encounter of 08/31/22   CBC with Auto Differential   Result Value Ref Range    WBC 10.0 4.5 - 11.5 E9/L    RBC 4.43 3.50 - 5.50 E12/L    Hemoglobin 13.0 11.5 - 15.5 g/dL    Hematocrit 38.8 34.0 - 48.0 %    MCV 87.6 80.0 - 99.9 fL    MCH 29.3 26.0 - 35.0 pg    MCHC 33.5 32.0 - 34.5 %    RDW 11.9 11.5 - 15.0 fL    Platelets 133 176 - 966 E9/L    MPV 10.1 7.0 - 12.0 fL    Neutrophils % 48.2 43.0 - 80.0 %    Immature Granulocytes % 0.2 0.0 - 5.0 %    Lymphocytes % 40.5 20.0 - 42.0 %    Monocytes % 8.6 2.0 - 12.0 %    Eosinophils % 2.1 0.0 - 6.0 %    Basophils % 0.4 0.0 - 2.0 %    Neutrophils Absolute 4.82 1.80 - 7.30 E9/L    Immature Granulocytes # 0.02 E9/L    Lymphocytes Absolute 4.05 (H) 1.50 - 4.00 E9/L    Monocytes Absolute 0.86 0.10 - 0.95 E9/L    Eosinophils Absolute 0.21 0.05 - 0.50 E9/L    Basophils Absolute 0.04 0.00 - 0.20 E9/L   Comprehensive Metabolic Panel w/ Reflex to MG   Result Value Ref Range    Sodium 145 132 - 146 mmol/L    Potassium reflex Magnesium 3.6 3.5 - 5.0 mmol/L    Chloride 107 98 - 107 mmol/L    CO2 26 22 - 29 mmol/L    Anion Gap 12 7 - 16 mmol/L    Glucose 97 74 - 99 mg/dL    BUN 10 6 - 20 mg/dL    Creatinine 0.6 0.5 - 1.0 mg/dL    GFR Non-African American >60 >=60 mL/min/1.73    GFR African American >60     Calcium 9.5 8.6 - 10.2 mg/dL    Total Protein 7.2 6.4 - 8.3 g/dL    Albumin 4.4 3.5 - 5.2 g/dL    Total Bilirubin 0.3 0.0 - 1.2 mg/dL    Alkaline Phosphatase 87 35 - 104 U/L    ALT 12 0 - 32 U/L    AST 12 0 - 31 U/L   Troponin   Result Value Ref Range    Troponin, High Sensitivity <6 0 - 9 ng/L   Troponin Result Value Ref Range    Troponin, High Sensitivity 6 0 - 9 ng/L   EKG 12 Lead   Result Value Ref Range    Ventricular Rate 74 BPM    Atrial Rate 74 BPM    P-R Interval 150 ms    QRS Duration 88 ms    Q-T Interval 380 ms    QTc Calculation (Bazett) 421 ms    P Axis 31 degrees    R Axis 45 degrees    T Axis 38 degrees       RADIOLOGY:  Interpreted by Radiologist.  XR CHEST PORTABLE   Final Result   No acute process. EKG:   Normal sinus rhythm  Normal ECG  When compared with ECG of 25-AUG-2022 13:47,  No significant change was found  Confirmed by Ariana Hernandes ([id number]) on 8/31/2022 9:37:58 AM    ------------------------- NURSING NOTES AND VITALS REVIEWED ---------------------------   The nursing notes within the ED encounter and vital signs as below have been reviewed. BP (!) 164/92   Pulse 80   Temp 97.8 °F (36.6 °C) (Oral)   Resp 18   Ht 4' 11\" (1.499 m)   Wt 125 lb (56.7 kg)   LMP 05/20/2022 (Approximate)   SpO2 98%   BMI 25.25 kg/m²   Oxygen Saturation Interpretation: Normal      ---------------------------------------------------PHYSICAL EXAM--------------------------------------      Constitutional/General: in no distress, Alert and oriented x3, well appearing, non toxic in NAD  Head: Normocephalic and atraumatic  Eyes: PERRL, EOMI  Mouth: Oropharynx clear, handling secretions, no trismus  Neck: Supple, full ROM,   Pulmonary: Lungs clear to auscultation bilaterally, no wheezes, rales, or rhonchi. Not in respiratory distress, breast exam performed with and by female medical student. No nodules were found on breast exam at site of pain   Cardiovascular:  Left chest tenderness to palpation, regular rate and rhythm, no murmurs, gallops, or rubs. 2+ distal pulses  Abdomen: Soft, non tender, non distended,   Extremities: Moves all extremities x 4.  Warm and well perfused  Skin: warm and dry without rash  Neurologic: GCS 15,  Psych: Normal Affect      ------------------------------ ED COURSE/MEDICAL DECISION MAKING----------------------  Medications   aspirin tablet 325 mg (has no administration in time range)   acetaminophen (TYLENOL) tablet 650 mg (650 mg Oral Given 8/31/22 0902)   metoclopramide (REGLAN) injection 10 mg (10 mg IntraVENous Given 8/31/22 1022)   diphenhydrAMINE (BENADRYL) injection 25 mg (25 mg IntraVENous Given 8/31/22 1028)         ED COURSE:  CXR was unremarkable. Patient was initially given 650 mg of Tylenol which improved her pain from 10/10 down to 7/10. CBC, CMP and initial troponin (<6) unremarkable, repeat troponin 6. Although her abdominal pain improved, her headache persisted for which she received Reglan 10 mg  IV with Benadryl 25 mg IV once. Upon reevaluation, patient was comfortably sleeping, vitals were stable. Patient's primary care doctor faxed over echo results which I went over with pt with help of interpretor [facility] #[id number]). Patient's headache has completely resolved. Chest pain persists described as 5/10. Explained to patient that chest pain is likely noncardiac in nature. Pt was discharged with naprosyn PRN. Advised patient to follow-up with primary doctor OP for further work-up. Medical Decision Making:    Asiya Almonte is a 48 y.o. female with PMH of intermittent left-sided chest pain with headache presenting with acute onset left chest pain without radiation to left arm or back. Chest pain is without red flags. Basic labs and imaging obtained. Symptomatic treatment and discharged back home. Discharge patient based on negative findings on lab as well as symptomatic improvement. Counseling: The emergency provider has spoken with the patient and family member patient and nephew and discussed todays results, in addition to providing specific details for the plan of care and counseling regarding the diagnosis and prognosis.   Questions are answered at this time and they are agreeable with the plan.      --------------------------------- IMPRESSION AND DISPOSITION ---------------------------------    IMPRESSION  1. Chest wall pain    2. Nonintractable headache, unspecified chronicity pattern, unspecified headache type        DISPOSITION  Disposition: Discharge to home  Patient condition is good        Jeremy Atkinson MD   Internal Medicine PGY-2  8/31/2022 at 12:40 PM        NOTE: This report was transcribed using voice recognition software.  Every effort was made to ensure accuracy; however, inadvertent computerized transcription errors may be present       Jeremy Atkinson MD  Resident  08/31/22 0796 2 = difficulty understanding and speaking (not related to language barrier)

## 2022-09-28 NOTE — PROGRESS NOTE ADULT - PROBLEM SELECTOR PLAN 5
Medication(s) Requested: strattera and vyvanse  PDMP reviewed : 9/28/2022  Alerts: negative  Last dispensed: 8/31/22  UDS on file: Yes , year 2022  Contract on file:  Yes  Last office visit: 8/15/22  Next office visit: 1/20/23   But patient has an active script at the pharmacy  Message sent via live well   Chronic L foot neuropathic pain  - Continue Pregabalin 150mg tid  - Continue Percocet tid PRN New onset peripheral edema of upper extremities   - B/L UE dopplers: negative for DVT

## 2022-10-11 NOTE — PROGRESS NOTE ADULT - PROBLEM/PLAN-6
DISPLAY PLAN FREE TEXT
same

## 2022-11-14 NOTE — PROGRESS NOTE ADULT - SUBJECTIVE AND OBJECTIVE BOX
[facility]  Darron Bustos PA-C  [facility] [place] [address]  [phone]      INTERVAL HPI/OVERNIGHT EVENTS:  pt seen and examined   reports BM after suppository   denies diarrhea or other GI complaint at this time     MEDICATIONS  (STANDING):  enoxaparin Injectable 40 milliGRAM(s) SubCutaneous daily  escitalopram 20 milliGRAM(s) Oral daily  OXcarbazepine 300 milliGRAM(s) Oral two times a day  oxybutynin 15 milliGRAM(s) Oral daily  pregabalin 150 milliGRAM(s) Oral three times a day  sodium chloride 0.9%. 1000 milliLiter(s) (75 mL/Hr) IV Continuous <Continuous>  valsartan 160 milliGRAM(s) Oral daily  vancomycin    Solution 125 milliGRAM(s) Oral every 6 hours    MEDICATIONS  (PRN):  acetaminophen     Tablet .. 650 milliGRAM(s) Oral every 6 hours PRN Temp greater or equal to 38C (100.4F), Mild Pain (1 - 3)  melatonin 3 milliGRAM(s) Oral at bedtime PRN Insomnia  ondansetron Injectable 4 milliGRAM(s) IV Push every 8 hours PRN Nausea and/or Vomiting      Allergies  No Known Allergies    Intolerances    ROS:   General:  No wt loss, fevers, chills, night sweats, fatigue,   Eyes:  Good vision, no reported pain  ENT:  No sore throat, pain, runny nose, dysphagia  CV:  No pain, palpitations, hypo/hypertension  Resp:  No dyspnea, cough, tachypnea, wheezing  GI:  No pain, No nausea, No vomiting, + diarrhea, No constipation, No weight loss, No fever, No pruritis, No rectal bleeding, No tarry stools, No dysphagia,  :  No pain, bleeding, incontinence, nocturia  Muscle:  No pain, weakness  Neuro:  No weakness, tingling, memory problems  Psych:  No fatigue, insomnia, mood problems, depression  Endocrine:  No polyuria, polydipsia, cold/heat intolerance  Heme:  No petechiae, ecchymosis, easy bruisability  Skin:  No rash, tattoos, scars, edema    Vital Signs Last 24 Hrs  T(C): 36.9 (15 Bronson 2022 04:44), Max: 37.4 (14 Jan 2022 17:27)  T(F): 98.5 (15 Bronson 2022 04:44), Max: 99.4 (14 Jan 2022 17:27)  HR: 71 (15 Rbonson 2022 04:44) (71 - 84)  BP: 148/86 (15 Bronson 2022 04:44) (105/63 - 153/74)  BP(mean): --  RR: 18 (15 Bronson 2022 04:44) (16 - 18)  SpO2: 96% (15 Bronson 2022 04:44) (96% - 98%)    PHYSICAL EXAM:   GENERAL:  Appears stated age,   HEENT:  NC/AT,    CHEST:  Full & symmetric excursion,   HEART:  Regular rhythm  ABDOMEN:  Soft, non-tender, non-distended,   EXTEREMITIES:  no cyanosis,clubbing  +Edema BL LE  SKIN:  No rash  NEURO:  Alert,          LABS:                        10.7   6.41  )-----------( 252      ( 15 Bronson 2022 07:25 )             30.7     01-15    139  |  103  |  8   ----------------------------<  107<H>  3.3<L>   |  27  |  0.49<L>    Ca    7.7<L>      15 Bronson 2022 07:25    TPro  5.7<L>  /  Alb  2.4<L>  /  TBili  0.4  /  DBili  x   /  AST  9<L>  /  ALT  12  /  AlkPhos  61  01-15    PT/INR - ( 13 Jan 2022 20:07 )   PT: 15.1 sec;   INR: 1.31 ratio         PTT - ( 13 Jan 2022 20:07 )  PTT:35.1 sec          Culture - Urine (collected 14 Jan 2022 05:13)  Source: Clean Catch Clean Catch (Midstream)  Final Report (15 Bronson 2022 08:05):    <10,000 CFU/mL Normal Urogenital Sushma    Culture - Blood (collected 14 Jan 2022 00:37)  Source: .Blood Blood-Peripheral  Preliminary Report (15 Bronson 2022 01:02):    No growth to date.    Culture - Blood (collected 14 Jan 2022 00:37)  Source: .Blood Blood-Peripheral  Preliminary Report (15 Bronson 2022 01:02):    No growth to date. alert and awake

## 2023-03-08 NOTE — PROGRESS NOTE ADULT - PROBLEM SELECTOR PLAN 1
Patient presents with fever, leukocytosis and left arm weakness  - Sepsis POA - fever, leukocytosis, HR >90, Urinary source  - CT renal stone hunt: Nonobstructing intrarenal calcifications right kidney. Moderate to large colonic fecal retention.  - UA: large ketone, 15 protein, trace LE, mod blood, 11-25 RBCs, mod bacteria  - S/P Motrin 600mg x1, IV Zosyn x1, 1L IV NS x1 in ED  - C/w Rocephin 1g IV qd  - Trend WBC and monitor for fever  - F/u urine and blood cultures  - ID (Dr. Bone) consulted, recs appreciated  - C diff positive. Started on Oral vanc. Patient endorse 2 loose BM in the past 24 hours. Will continue to monitor improvement of symptoms  - Will discuss with ID regarding DC planning DOS done

## 2023-03-30 NOTE — PROGRESS NOTE ADULT - ATTENDING COMMENTS
Patient/Caregiver provided printed discharge information. 66 year old male w PMHx MS, L foot neuropathic pain admitted for sepsis secondary to UTI with acute left arm weakness.    Patient seen and examined at bedside. States he feels better than yesterday, but still overall weak. Still with weakness in L hand/arm, but states it is improved. Patient denied any chest pain, SOB, abd pain, urinary frequency, dysuria. F/U CT head, f/u B/L UE dopplers to r/o DVT.

## 2023-04-19 NOTE — ED ADULT NURSE NOTE - CINV DISCH TEACH PARTICIP
Problem: INFECTION - ADULT  Goal: Absence or prevention of progression during hospitalization  Description: INTERVENTIONS:  - Assess and monitor for signs and symptoms of infection  - Monitor lab/diagnostic results  - Monitor all insertion sites, i e  indwelling lines, tubes, and drains  - Monitor endotracheal if appropriate and nasal secretions for changes in amount and color  - [place] appropriate cooling/warming therapies per order  - Administer medications as ordered  - Instruct and encourage patient and family to use good hand hygiene technique  - Identify and instruct in appropriate isolation precautions for identified infection/condition  Outcome: Progressing Patient

## 2023-06-20 NOTE — ED PROVIDER NOTE - WET READ LAUNCH FT
1130 Patient arrived for a CCTA. They were connected to a cardiac monitor and vital signs were obtained. The patient's heart rate was 52 . Medications and allergies were reviewed. The procedure was explained, and the patient was instructed to rest and relax, as much as possible. A saline lock was established. The patient's heart rate was 42. The patient was offered the restroom, and then they were wheeled to CT in a wheelchair.  The cardiac monitor was placed there, and the scan was completed. The patient was given one bottle of water, and they were told to  Drink at least 3 other additional glasses of water today, per post contrast instructions.  The patient was instructed that the ordering MD will call with results in one to two days with test results. The patient left ambulatory in stable condition.      There are no Wet Read(s) to document.

## 2023-07-13 NOTE — PROGRESS NOTE ADULT - PROBLEM SELECTOR PLAN 4
Diagnosed with hip fx and orthopedic sx evaluation called,pain managmnet and dvt prophylaxis administrated Cardiology clearance requested for surgical treatment Patient refused sx intervention ,he would like to get 2nd opinion from his ortho ,no sx at this point
Hip fx sustained Sept 2017. Orthopedic evaluation called- no intervention at this time as per patient  Pain management and dvt prophylaxis administrated
Opt out

## 2023-08-14 NOTE — DIETITIAN INITIAL EVALUATION ADULT. - ORAL INTAKE PTA/DIET HISTORY
oriented to person, place and time , normal sensation , short and long term memory intact
Unable to interview pt as current was drawn.

## 2023-08-15 NOTE — DISCHARGE NOTE NURSING/CASE MANAGEMENT/SOCIAL WORK - NURSING SECTION COMPLETE
- magnesium biglycinate nightly  - reading before bed   - 5 minute meditation    Add below, if needed    - chamomile tea  - glycine powder (amino acid that promotes relaxation)   Patient/Caregiver provided printed discharge information.

## 2023-10-17 NOTE — OCCUPATIONAL THERAPY INITIAL EVALUATION ADULT - RANGE OF MOTION EXAMINATION, UPPER EXTREMITY
Left UE Active Assistive ROM was WFL  (within functional limits)/Right UE: abnormal muscle tone noted & minimal elbow flexion. AROM left shd flexion 0-70 degrees, elbow/forearm/digits (gross flexion/extension) WFL's w/decreased strength noted. Opposition left digits 1-3 only. FM skills LUE: severely impaired at this time. Olumiant Pregnancy And Lactation Text: Based on animal studies, Olumiant may cause embryo-fetal harm when administered to pregnant women.  The medication should not be used in pregnancy.  Breastfeeding is not recommended during treatment.

## 2024-03-11 ENCOUNTER — TRANSCRIPTION ENCOUNTER (OUTPATIENT)
Age: 69
End: 2024-03-11

## 2024-04-15 NOTE — PATIENT PROFILE ADULT. - NSTOBACCO TYPE_GEN_A_CORE_RD
Medication: enalapril (VASOTEC) 20 MG tablet   Last office visit date: 1/9/24  Medication Refill Protocol Failed.  Failed criteria: Last BP was under 140/90 or if patient has diabetes, CAD, or PVD, BP under 130/80 in past year . Sent to clinician to review.      Cigarettes/Cigars

## 2024-11-21 NOTE — ED ADULT NURSE NOTE - NSSEPSISSUSPECTED_ED_A_ED
Discharge Planning Assessment  LILLIANA Haines     Patient Name: Lety Johnson  MRN: [id number]  Today's Date: 11/21/2024    Admit Date: 9/26/2024       Discharge Plan       Row Name 11/21/24 1412       Plan    Plan SS contacted [facility] [phone] per Ryan and faxed referral to fax [phone]. SS to follow.                    KAZ Reynolds     No

## 2024-11-26 NOTE — PATIENT PROFILE ADULT. - DOES PATIENT HAVE ADVANCE DIRECTIVE
Bill For Surgical Tray: no
Lab Facility: [id number]
Expected Date Of Service: 11/26/2024
Performing Laboratory: [id number]
Billing Type: Third-Party Bill
No

## 2025-03-26 NOTE — H&P ADULT - SKIN/BREAST
PROCEDURES:  Navigational bronchoscopy of right lung 26-Mar-2025 11:55:32 Ion assisted bronchoscopy, BAL, fine needle biopsy right upper lobe Bernice Cornelius   negative